# Patient Record
Sex: FEMALE | Race: BLACK OR AFRICAN AMERICAN | NOT HISPANIC OR LATINO | Employment: OTHER | URBAN - METROPOLITAN AREA
[De-identification: names, ages, dates, MRNs, and addresses within clinical notes are randomized per-mention and may not be internally consistent; named-entity substitution may affect disease eponyms.]

---

## 2017-12-05 ENCOUNTER — OFFICE VISIT (OUTPATIENT)
Dept: ENDOCRINOLOGY | Age: 52
End: 2017-12-05

## 2017-12-05 VITALS
SYSTOLIC BLOOD PRESSURE: 128 MMHG | RESPIRATION RATE: 16 BRPM | BODY MASS INDEX: 32.1 KG/M2 | WEIGHT: 188 LBS | DIASTOLIC BLOOD PRESSURE: 76 MMHG | HEIGHT: 64 IN

## 2017-12-05 DIAGNOSIS — R53.82 CHRONIC FATIGUE: ICD-10-CM

## 2017-12-05 DIAGNOSIS — R68.89 HEAT INTOLERANCE: ICD-10-CM

## 2017-12-05 DIAGNOSIS — R00.2 PALPITATIONS: ICD-10-CM

## 2017-12-05 DIAGNOSIS — E05.00 THYROTOXICOSIS WITH DIFFUSE GOITER AND WITHOUT THYROID STORM: Primary | ICD-10-CM

## 2017-12-05 DIAGNOSIS — R68.89 COLD INTOLERANCE: ICD-10-CM

## 2017-12-05 DIAGNOSIS — E55.9 VITAMIN D DEFICIENCY: ICD-10-CM

## 2017-12-05 DIAGNOSIS — R73.9 HYPERGLYCEMIA: ICD-10-CM

## 2017-12-05 DIAGNOSIS — E28.319 PREMATURE MENOPAUSE: ICD-10-CM

## 2017-12-05 DIAGNOSIS — G47.9 SLEEP DISTURBANCE: ICD-10-CM

## 2017-12-05 PROCEDURE — 99205 OFFICE O/P NEW HI 60 MIN: CPT | Performed by: INTERNAL MEDICINE

## 2017-12-05 RX ORDER — HYDROCODONE BITARTRATE AND ACETAMINOPHEN 7.5; 325 MG/1; MG/1
TABLET ORAL
Refills: 0 | COMMUNITY
Start: 2017-11-03 | End: 2021-07-14 | Stop reason: SDUPTHER

## 2017-12-05 RX ORDER — SUCRALFATE 1 G/1
1 TABLET ORAL 4 TIMES DAILY
COMMUNITY
End: 2021-06-29

## 2017-12-05 RX ORDER — PROPRANOLOL HYDROCHLORIDE 40 MG/1
TABLET ORAL
Refills: 1 | COMMUNITY
Start: 2017-11-03

## 2017-12-05 RX ORDER — POLYETHYLENE GLYCOL 3350 17 G/17G
17 POWDER, FOR SOLUTION ORAL DAILY
COMMUNITY
End: 2021-07-14

## 2017-12-05 RX ORDER — METHIMAZOLE 5 MG/1
TABLET ORAL
Refills: 3 | COMMUNITY
Start: 2017-10-14 | End: 2021-06-29

## 2017-12-05 RX ORDER — LISINOPRIL 10 MG/1
TABLET ORAL
Refills: 1 | COMMUNITY
Start: 2017-10-25 | End: 2021-06-29 | Stop reason: SDUPTHER

## 2017-12-05 RX ORDER — SUMATRIPTAN 50 MG/1
50 TABLET, FILM COATED ORAL
COMMUNITY
End: 2021-06-29

## 2017-12-05 RX ORDER — OMEPRAZOLE 40 MG/1
CAPSULE, DELAYED RELEASE ORAL
Refills: 3 | COMMUNITY
Start: 2017-09-22 | End: 2021-09-01

## 2017-12-05 RX ORDER — ONDANSETRON 4 MG/1
4 TABLET, FILM COATED ORAL EVERY 8 HOURS PRN
COMMUNITY
End: 2021-09-01

## 2017-12-05 RX ORDER — SPIRONOLACTONE 50 MG/1
TABLET, FILM COATED ORAL
Refills: 0 | COMMUNITY
Start: 2017-10-25

## 2017-12-05 RX ORDER — CONJUGATED ESTROGENS/BAZEDOXIFENE .45; 2 MG/1; MG/1
TABLET, FILM COATED ORAL
Qty: 30 TABLET | Refills: 5 | Status: SHIPPED | OUTPATIENT
Start: 2017-12-05 | End: 2021-06-29

## 2017-12-05 RX ORDER — MAGNESIUM OXIDE 400 MG/1
TABLET ORAL
Refills: 1 | COMMUNITY
Start: 2017-11-03

## 2017-12-05 RX ORDER — AZATHIOPRINE 50 MG/1
50 TABLET ORAL
Refills: 5 | COMMUNITY
Start: 2017-11-02 | End: 2021-09-01 | Stop reason: SDUPTHER

## 2017-12-05 RX ORDER — FUROSEMIDE 40 MG/1
40 TABLET ORAL 2 TIMES DAILY
COMMUNITY

## 2017-12-05 RX ORDER — APIXABAN 5 MG/1
TABLET, FILM COATED ORAL
Refills: 5 | COMMUNITY
Start: 2017-09-22 | End: 2021-06-21

## 2017-12-05 RX ORDER — PROMETHAZINE HYDROCHLORIDE 25 MG/1
25 TABLET ORAL EVERY 6 HOURS PRN
COMMUNITY
End: 2022-09-30

## 2017-12-05 NOTE — PROGRESS NOTES
"Subjective   Anitha Mcclure is a 52 y.o. female seen as a new patient for hyperthyroidism. She is having fatigue, trouble sleeping, changes in her voice, intolerance to cold and heat, headaches.     History of Present Illness this is a 52-year-old female who is being referred for further evaluation and treatment of recently diagnosed thyrotoxicosis.  She stated that area and in the year because of weighing 2 much she started to diet and when she reached her goal she continued to lose weight and at the same time she started having palpitation and irregular heart rate and because of that she was hospitalized at [facility] where along with other tests she also had a radioactive iodine uptake and scan and the diagnosis of Graves thyrotoxicosis was made and she was placed on Tapazole 5 mg 3 times daily which has made her feel better.  However she wants a more permanent resolution of this problem.  Additionally she also suffering from rheumatoid arthritis and lupus erythematosus.  Also she says at age 36 she stopped having menstrual cycles and despite the symptoms of hot flashes and vaginal dryness she was never treated with hormone replacement therapy.  She feels very tired and run down and also is being bothered by puffy face and puffy eyelids and early-morning we are vision which corrects itself as the day goes on.    /76  Resp 16  Ht 162.6 cm (64\")  Wt 85.3 kg (188 lb)  BMI 32.27 kg/m2     Allergies   Allergen Reactions   • Aspirin    • Salicylic Acid        Current Outpatient Prescriptions:   •  azaTHIOprine (IMURAN) 50 MG tablet, TK 1 T PO BID, Disp: , Rfl: 5  •  calcium carbonate-vitamin d (CALTRATE 600+D) 600-400 MG-UNIT per tablet, Take 1 tablet by mouth Daily., Disp: , Rfl:   •  ELIQUIS 5 MG tablet tablet, TK 1 T PO BID, Disp: , Rfl: 5  •  furosemide (LASIX) 40 MG tablet, Take 40 mg by mouth 2 (Two) Times a Day., Disp: , Rfl:   •  HYDROcodone-acetaminophen (NORCO) 7.5-325 MG per tablet, " TK 1 T PO  Q 6 H PRN P, Disp: , Rfl: 0  •  lisinopril (PRINIVIL,ZESTRIL) 10 MG tablet, TK 1 T PO QD, Disp: , Rfl: 1  •  magnesium oxide (MAG-OX) 400 MG tablet, TK 1 T PO D, Disp: , Rfl: 1  •  MAGNESIUM-OXIDE 400 (241.3 Mg) MG tablet tablet, TK 1 T PO QD, Disp: , Rfl: 1  •  methIMAzole (TAPAZOLE) 5 MG tablet, TK 1 T PO  TID, Disp: , Rfl: 3  •  omeprazole (priLOSEC) 40 MG capsule, TK ONE C PO QD, Disp: , Rfl: 3  •  ondansetron (ZOFRAN) 4 MG tablet, Take 4 mg by mouth Every 8 (Eight) Hours As Needed for Nausea or Vomiting., Disp: , Rfl:   •  polyethylene glycol (MIRALAX) packet, Take 17 g by mouth Daily., Disp: , Rfl:   •  PROAIR  (90 Base) MCG/ACT inhaler, INHALE 2 PUFFS PO Q 6 H, Disp: , Rfl: 4  •  promethazine (PHENERGAN) 25 MG tablet, Take 25 mg by mouth Every 6 (Six) Hours As Needed for Nausea or Vomiting., Disp: , Rfl:   •  propranolol (INDERAL) 40 MG tablet, TK SS T PO TID, Disp: , Rfl: 1  •  spironolactone (ALDACTONE) 50 MG tablet, TK 1 T PO QD, Disp: , Rfl: 0  •  sucralfate (CARAFATE) 1 g tablet, Take 1 g by mouth 4 (Four) Times a Day., Disp: , Rfl:   •  SUMAtriptan (IMITREX) 50 MG tablet, Take 50 mg by mouth Every 2 (Two) Hours As Needed for Migraine. Take one tablet at onset of headache. May repeat dose one time in 2 hours if headache not relieved., Disp: , Rfl:       The following portions of the patient's history were reviewed and updated as appropriate: allergies, current medications, past family history, past medical history, past social history, past surgical history and problem list.    Review of Systems   Constitutional: Positive for fatigue.   HENT: Positive for voice change.    Eyes: Negative.    Respiratory: Negative.    Cardiovascular: Negative.    Gastrointestinal: Negative.    Endocrine: Positive for cold intolerance and heat intolerance.   Genitourinary: Negative.    Musculoskeletal: Negative.    Skin: Negative.    Allergic/Immunologic: Negative.    Neurological: Positive for headaches.    Hematological: Negative.    Psychiatric/Behavioral: Positive for sleep disturbance.       Objective   Physical Exam   Constitutional: She is oriented to person, place, and time. She appears well-developed and well-nourished. No distress.   HENT:   Head: Normocephalic and atraumatic.   Right Ear: External ear normal.   Left Ear: External ear normal.   Nose: Nose normal.   Mouth/Throat: Oropharynx is clear and moist. No oropharyngeal exudate.   Puffiness around both eyes.   Eyes: Conjunctivae and EOM are normal. Pupils are equal, round, and reactive to light. Right eye exhibits no discharge. Left eye exhibits no discharge. No scleral icterus.   Neck: Normal range of motion. Neck supple. No JVD present. No tracheal deviation present. Thyromegaly present.   Diffuse thyroid enlargement with meaty consistency.  The entire goiter moves freely with swallowing and its not associated with any tenderness or lymphadenopathy in the anterior or posterior cervical or supraclavicular area.   Cardiovascular: Normal rate, regular rhythm, normal heart sounds and intact distal pulses.  Exam reveals no gallop and no friction rub.    No murmur heard.  Pulmonary/Chest: Effort normal and breath sounds normal. No stridor. No respiratory distress. She has no wheezes. She has no rales. She exhibits no tenderness.   Abdominal: Soft. Bowel sounds are normal. She exhibits no distension and no mass. There is no tenderness. There is no rebound and no guarding. No hernia.   Musculoskeletal: Normal range of motion. She exhibits deformity. She exhibits no edema or tenderness.   Deformities in her fingers and hand consistent with rheumatoid arthritis.   Lymphadenopathy:     She has no cervical adenopathy.   Neurological: She is alert and oriented to person, place, and time. She has normal reflexes. She displays normal reflexes. No cranial nerve deficit. She exhibits normal muscle tone. Coordination normal.   Skin: Skin is warm and dry. No rash  noted. She is not diaphoretic. No erythema. No pallor.   Psychiatric: She has a normal mood and affect. Her behavior is normal. Judgment and thought content normal.   Nursing note and vitals reviewed.        Assessment/Plan   Diagnoses and all orders for this visit:    Thyrotoxicosis with diffuse goiter and without thyroid storm  -     T3, Free  -     T4 & TSH ([facility])  -     T4, Free  -     Thyroglobulin With Anti-TG  -     Uric Acid  -     Vitamin D 25 Hydroxy  -     Thyroid Stimulating Immunoglobulin  -     ACTH  -     Cortisol  -     Comprehensive Metabolic Panel  -     Follicle Stimulating Hormone  -     Hemoglobin A1c  -     C-Peptide  -     Lipid Panel  -     Luteinizing Hormone  -     Prolactin  -     Magnesium    Chronic fatigue  -     T3, Free  -     T4 & TSH ([facility])  -     T4, Free  -     Thyroglobulin With Anti-TG  -     Uric Acid  -     Vitamin D 25 Hydroxy  -     Thyroid Stimulating Immunoglobulin  -     ACTH  -     Cortisol  -     Comprehensive Metabolic Panel  -     Follicle Stimulating Hormone  -     Hemoglobin A1c  -     C-Peptide  -     Lipid Panel  -     Luteinizing Hormone  -     Prolactin  -     Magnesium    Palpitations  -     T3, Free  -     T4 & TSH ([facility])  -     T4, Free  -     Thyroglobulin With Anti-TG  -     Uric Acid  -     Vitamin D 25 Hydroxy  -     Thyroid Stimulating Immunoglobulin  -     ACTH  -     Cortisol  -     Comprehensive Metabolic Panel  -     Follicle Stimulating Hormone  -     Hemoglobin A1c  -     C-Peptide  -     Lipid Panel  -     Luteinizing Hormone  -     Prolactin  -     Magnesium    Heat intolerance  -     T3, Free  -     T4 & TSH ([facility])  -     T4, Free  -     Thyroglobulin With Anti-TG  -     Uric Acid  -     Vitamin D 25 Hydroxy  -     Thyroid Stimulating Immunoglobulin  -     ACTH  -     Cortisol  -     Comprehensive Metabolic Panel  -     Follicle Stimulating Hormone  -     Hemoglobin A1c  -     C-Peptide  -     Lipid Panel  -     Luteinizing  Hormone  -     Prolactin  -     Magnesium    Cold intolerance  -     T3, Free  -     T4 & TSH ([facility])  -     T4, Free  -     Thyroglobulin With Anti-TG  -     Uric Acid  -     Vitamin D 25 Hydroxy  -     Thyroid Stimulating Immunoglobulin  -     ACTH  -     Cortisol  -     Comprehensive Metabolic Panel  -     Follicle Stimulating Hormone  -     Hemoglobin A1c  -     C-Peptide  -     Lipid Panel  -     Luteinizing Hormone  -     Prolactin  -     Magnesium    Sleep disturbance  -     T3, Free  -     T4 & TSH ([facility])  -     T4, Free  -     Thyroglobulin With Anti-TG  -     Uric Acid  -     Vitamin D 25 Hydroxy  -     Thyroid Stimulating Immunoglobulin  -     ACTH  -     Cortisol  -     Comprehensive Metabolic Panel  -     Follicle Stimulating Hormone  -     Hemoglobin A1c  -     C-Peptide  -     Lipid Panel  -     Luteinizing Hormone  -     Prolactin  -     Magnesium    Vitamin D deficiency  -     T3, Free  -     T4 & TSH ([facility])  -     T4, Free  -     Thyroglobulin With Anti-TG  -     Uric Acid  -     Vitamin D 25 Hydroxy  -     Thyroid Stimulating Immunoglobulin  -     ACTH  -     Cortisol  -     Comprehensive Metabolic Panel  -     Follicle Stimulating Hormone  -     Hemoglobin A1c  -     C-Peptide  -     Lipid Panel  -     Luteinizing Hormone  -     Prolactin  -     Magnesium    Premature menopause  -     T3, Free  -     T4 & TSH ([facility])  -     T4, Free  -     Thyroglobulin With Anti-TG  -     Uric Acid  -     Vitamin D 25 Hydroxy  -     Thyroid Stimulating Immunoglobulin  -     ACTH  -     Cortisol  -     Comprehensive Metabolic Panel  -     Follicle Stimulating Hormone  -     Hemoglobin A1c  -     C-Peptide  -     Lipid Panel  -     Luteinizing Hormone  -     Prolactin  -     Magnesium    Hyperglycemia  -     T3, Free  -     T4 & TSH ([facility])  -     T4, Free  -     Thyroglobulin With Anti-TG  -     Uric Acid  -     Vitamin D 25 Hydroxy  -     Thyroid Stimulating Immunoglobulin  -     ACTH  -      Cortisol  -     Comprehensive Metabolic Panel  -     Follicle Stimulating Hormone  -     Hemoglobin A1c  -     C-Peptide  -     Lipid Panel  -     Luteinizing Hormone  -     Prolactin  -     Magnesium    Other orders  -     DUAVEE 0.45-20 MG tablet; Take 1 tablet by mouth daily.             In summary I saw and examined this 52-year-old female for above-mentioned problems.  I reviewed her laboratory evaluation and office notes from her primary care provider's office.  However at this point we do not have her report of radioactive iodine uptake and scan which we are trying to get by calling the hospital.  In the meantime I am going to go ahead and order an extensive laboratory evaluation and once the results come back we will go ahead and call for any possible modification or new medications.  Also explained to her the nature of Graves' disease and thyrotoxicosis and the therapeutic at approaches to a it being the thyroidectomy or staying on Tapazole or going ahead with radioactive iodine therapy.  I provided her with literature and she seemed to the interested in either staying on her medication which she said she is taking Tapazole 5 mg twice a day instead of what is recorded at 3 times a day or going ahead with radioactive iodine therapy I also provided her literature to make her decision making easier.  I also gave her 2 weeks sample of Duavee for her menopausal symptoms and a prescription was sent to her pharmacy.  This office visit including patient counseling and education which was 50% of the time lasted 60 minutes.  She will see Ms. rand Rehman in 3 months or sooner if needed with laboratory evaluation prior to each office visit.

## 2017-12-08 ENCOUNTER — CONVERSION ENCOUNTER (OUTPATIENT)
Dept: MAMMOGRAPHY | Facility: HOSPITAL | Age: 52
End: 2017-12-08

## 2017-12-15 LAB
25(OH)D3+25(OH)D2 SERPL-MCNC: 22.6 NG/ML (ref 30–100)
ACTH PLAS-MCNC: 10.1 PG/ML (ref 7.2–63.3)
ALBUMIN SERPL-MCNC: 3.6 G/DL (ref 3.5–5.2)
ALBUMIN/GLOB SERPL: 1.2 G/DL
ALP SERPL-CCNC: 113 U/L (ref 39–117)
ALT SERPL-CCNC: 5 U/L (ref 1–33)
AST SERPL-CCNC: 9 U/L (ref 1–32)
BILIRUB SERPL-MCNC: 0.6 MG/DL (ref 0.1–1.2)
BUN SERPL-MCNC: 15 MG/DL (ref 6–20)
BUN/CREAT SERPL: 19.7 (ref 7–25)
C PEPTIDE SERPL-MCNC: 4.3 NG/ML (ref 1.1–4.4)
CALCIUM SERPL-MCNC: 9.2 MG/DL (ref 8.6–10.5)
CHLORIDE SERPL-SCNC: 111 MMOL/L (ref 98–107)
CHOLEST SERPL-MCNC: 92 MG/DL (ref 0–200)
CO2 SERPL-SCNC: 25.9 MMOL/L (ref 22–29)
CORTIS SERPL-MCNC: 4.2 UG/DL
CREAT SERPL-MCNC: 0.76 MG/DL (ref 0.57–1)
FSH SERPL-ACNC: 51.6 MIU/ML
GFR SERPLBLD CREATININE-BSD FMLA CKD-EPI: 79 ML/MIN/1.73
GFR SERPLBLD CREATININE-BSD FMLA CKD-EPI: 96 ML/MIN/1.73
GLOBULIN SER CALC-MCNC: 3.1 GM/DL
GLUCOSE SERPL-MCNC: 89 MG/DL (ref 65–99)
HBA1C MFR BLD: 5.1 % (ref 4.8–5.6)
HDLC SERPL-MCNC: 43 MG/DL (ref 40–60)
INTERPRETATION: NORMAL
LDLC SERPL CALC-MCNC: 32 MG/DL (ref 0–100)
LH SERPL-ACNC: 37.1 MIU/ML
MAGNESIUM SERPL-MCNC: 1.7 MG/DL (ref 1.6–2.6)
POTASSIUM SERPL-SCNC: 5.4 MMOL/L (ref 3.5–5.2)
PROLACTIN SERPL-MCNC: 17.8 NG/ML (ref 4.8–23.3)
PROT SERPL-MCNC: 6.7 G/DL (ref 6–8.5)
SODIUM SERPL-SCNC: 146 MMOL/L (ref 136–145)
T3FREE SERPL-MCNC: 3.8 PG/ML (ref 2–4.4)
T4 FREE SERPL-MCNC: 1.21 NG/DL (ref 0.93–1.7)
T4 SERPL-MCNC: 6.03 MCG/DL (ref 4.5–11.7)
THYROGLOB AB SERPL-ACNC: 2.1 IU/ML (ref 0–0.9)
THYROGLOB SERPL-MCNC: 342 NG/ML
TRIGL SERPL-MCNC: 86 MG/DL (ref 0–150)
TSH SERPL DL<=0.005 MIU/L-ACNC: <0.005 MIU/ML (ref 0.27–4.2)
TSI ACT/NOR SER: 442 % (ref 0–139)
URATE SERPL-MCNC: 6.9 MG/DL (ref 2.4–5.7)
VLDLC SERPL CALC-MCNC: 17.2 MG/DL (ref 5–40)

## 2017-12-15 RX ORDER — ERGOCALCIFEROL 1.25 MG/1
50000 CAPSULE ORAL 2 TIMES WEEKLY
Qty: 26 CAPSULE | Refills: 3 | Status: SHIPPED | OUTPATIENT
Start: 2017-12-18 | End: 2018-12-18

## 2018-01-05 ENCOUNTER — OFFICE VISIT CONVERTED (OUTPATIENT)
Dept: INTERNAL MEDICINE | Facility: CLINIC | Age: 53
End: 2018-01-05
Attending: INTERNAL MEDICINE

## 2018-01-05 ENCOUNTER — CONVERSION ENCOUNTER (OUTPATIENT)
Dept: INTERNAL MEDICINE | Facility: CLINIC | Age: 53
End: 2018-01-05

## 2018-02-28 ENCOUNTER — OFFICE VISIT CONVERTED (OUTPATIENT)
Dept: GASTROENTEROLOGY | Facility: CLINIC | Age: 53
End: 2018-02-28
Attending: NURSE PRACTITIONER

## 2018-03-27 ENCOUNTER — OFFICE VISIT CONVERTED (OUTPATIENT)
Dept: PULMONOLOGY | Facility: CLINIC | Age: 53
End: 2018-03-27
Attending: PHYSICIAN ASSISTANT

## 2018-03-27 ENCOUNTER — OFFICE VISIT CONVERTED (OUTPATIENT)
Dept: INTERNAL MEDICINE | Facility: CLINIC | Age: 53
End: 2018-03-27
Attending: INTERNAL MEDICINE

## 2018-04-10 ENCOUNTER — OFFICE VISIT CONVERTED (OUTPATIENT)
Dept: INTERNAL MEDICINE | Facility: CLINIC | Age: 53
End: 2018-04-10
Attending: INTERNAL MEDICINE

## 2018-04-24 ENCOUNTER — OFFICE VISIT CONVERTED (OUTPATIENT)
Dept: INTERNAL MEDICINE | Facility: CLINIC | Age: 53
End: 2018-04-24
Attending: PHYSICIAN ASSISTANT

## 2018-04-24 ENCOUNTER — CONVERSION ENCOUNTER (OUTPATIENT)
Dept: OTHER | Facility: HOSPITAL | Age: 53
End: 2018-04-24

## 2018-05-01 ENCOUNTER — OFFICE VISIT CONVERTED (OUTPATIENT)
Dept: CARDIOLOGY | Facility: CLINIC | Age: 53
End: 2018-05-01
Attending: INTERNAL MEDICINE

## 2018-06-05 ENCOUNTER — OFFICE VISIT CONVERTED (OUTPATIENT)
Dept: PULMONOLOGY | Facility: CLINIC | Age: 53
End: 2018-06-05
Attending: INTERNAL MEDICINE

## 2018-06-14 ENCOUNTER — CONVERSION ENCOUNTER (OUTPATIENT)
Dept: INTERNAL MEDICINE | Facility: CLINIC | Age: 53
End: 2018-06-14

## 2018-06-14 ENCOUNTER — OFFICE VISIT CONVERTED (OUTPATIENT)
Dept: INTERNAL MEDICINE | Facility: CLINIC | Age: 53
End: 2018-06-14
Attending: INTERNAL MEDICINE

## 2018-07-05 ENCOUNTER — OFFICE VISIT CONVERTED (OUTPATIENT)
Dept: GASTROENTEROLOGY | Facility: CLINIC | Age: 53
End: 2018-07-05
Attending: NURSE PRACTITIONER

## 2018-08-07 ENCOUNTER — OFFICE VISIT CONVERTED (OUTPATIENT)
Dept: INTERNAL MEDICINE | Facility: CLINIC | Age: 53
End: 2018-08-07
Attending: PHYSICIAN ASSISTANT

## 2018-08-09 ENCOUNTER — OFFICE VISIT CONVERTED (OUTPATIENT)
Dept: INTERNAL MEDICINE | Facility: CLINIC | Age: 53
End: 2018-08-09
Attending: NURSE PRACTITIONER

## 2018-09-14 ENCOUNTER — OFFICE VISIT CONVERTED (OUTPATIENT)
Dept: INTERNAL MEDICINE | Facility: CLINIC | Age: 53
End: 2018-09-14
Attending: INTERNAL MEDICINE

## 2018-09-25 ENCOUNTER — CONVERSION ENCOUNTER (OUTPATIENT)
Dept: MAMMOGRAPHY | Facility: HOSPITAL | Age: 53
End: 2018-09-25

## 2018-10-30 ENCOUNTER — OFFICE VISIT CONVERTED (OUTPATIENT)
Dept: INTERNAL MEDICINE | Facility: CLINIC | Age: 53
End: 2018-10-30
Attending: INTERNAL MEDICINE

## 2019-01-30 ENCOUNTER — OFFICE VISIT CONVERTED (OUTPATIENT)
Dept: INTERNAL MEDICINE | Facility: CLINIC | Age: 54
End: 2019-01-30
Attending: PHYSICIAN ASSISTANT

## 2019-01-30 ENCOUNTER — HOSPITAL ENCOUNTER (OUTPATIENT)
Dept: OTHER | Facility: HOSPITAL | Age: 54
Discharge: HOME | End: 2019-01-30
Attending: PHYSICIAN ASSISTANT

## 2019-02-08 ENCOUNTER — HOSPITAL ENCOUNTER (OUTPATIENT)
Dept: OTHER | Facility: HOSPITAL | Age: 54
Discharge: HOME | End: 2019-02-08
Attending: PHYSICIAN ASSISTANT

## 2019-02-08 ENCOUNTER — OFFICE VISIT CONVERTED (OUTPATIENT)
Dept: INTERNAL MEDICINE | Facility: CLINIC | Age: 54
End: 2019-02-08
Attending: PHYSICIAN ASSISTANT

## 2019-02-08 LAB
ALBUMIN SERPL-MCNC: 3.8 G/DL (ref 3.5–5)
ALBUMIN/GLOB SERPL: 1.3 {RATIO} (ref 1.4–2.6)
ALP SERPL-CCNC: 131 U/L (ref 53–141)
ALT SERPL-CCNC: 10 U/L (ref 10–40)
ANION GAP SERPL CALC-SCNC: 16 MMOL/L (ref 8–19)
AST SERPL-CCNC: 24 U/L (ref 15–50)
BASOPHILS # BLD AUTO: 0 10*3/UL (ref 0–0.2)
BASOPHILS NFR BLD AUTO: 0 % (ref 0–3)
BILIRUB SERPL-MCNC: 0.66 MG/DL (ref 0.2–1.3)
BUN SERPL-MCNC: 23 MG/DL (ref 5–25)
BUN/CREAT SERPL: 30 {RATIO} (ref 6–20)
CALCIUM SERPL-MCNC: 9.1 MG/DL (ref 8.7–10.4)
CHLORIDE SERPL-SCNC: 108 MMOL/L (ref 99–111)
CONV CO2: 24 MMOL/L (ref 22–32)
CONV TOTAL PROTEIN: 6.7 G/DL (ref 6.3–8.2)
CREAT UR-MCNC: 0.77 MG/DL (ref 0.5–0.9)
EOSINOPHIL # BLD AUTO: 0.06 10*3/UL (ref 0–0.7)
EOSINOPHIL # BLD AUTO: 2.29 % (ref 0–7)
ERYTHROCYTE [DISTWIDTH] IN BLOOD BY AUTOMATED COUNT: 12.6 % (ref 11.5–14.5)
GFR SERPLBLD BASED ON 1.73 SQ M-ARVRAT: >60 ML/MIN/{1.73_M2}
GLOBULIN UR ELPH-MCNC: 2.9 G/DL (ref 2–3.5)
GLUCOSE SERPL-MCNC: 103 MG/DL (ref 65–99)
HBA1C MFR BLD: 11.1 G/DL (ref 12–16)
HCT VFR BLD AUTO: 34.4 % (ref 37–47)
LYMPHOCYTES # BLD AUTO: 0.94 10*3/UL (ref 1–5)
MCH RBC QN AUTO: 30.1 PG (ref 27–31)
MCHC RBC AUTO-ENTMCNC: 32.2 G/DL (ref 33–37)
MCV RBC AUTO: 93.5 FL (ref 81–99)
MONOCYTES # BLD AUTO: 0.59 10*3/UL (ref 0.2–1.2)
MONOCYTES NFR BLD AUTO: 23.2 % (ref 3–10)
NEUTROPHILS # BLD AUTO: 0.96 10*3/UL (ref 2–8)
NEUTROPHILS NFR BLD AUTO: 37.7 % (ref 30–85)
NRBC BLD AUTO-RTO: 0 % (ref 0–0.01)
OSMOLALITY SERPL CALC.SUM OF ELEC: 300 MOSM/KG (ref 273–304)
PLATELET # BLD AUTO: 121 10*3/UL (ref 130–400)
PMV BLD AUTO: 11 FL (ref 7.4–10.4)
POTASSIUM SERPL-SCNC: 4.7 MMOL/L (ref 3.5–5.3)
RBC # BLD AUTO: 3.69 10*6/UL (ref 4.2–5.4)
SODIUM SERPL-SCNC: 143 MMOL/L (ref 135–147)
T4 FREE SERPL-MCNC: 6.7 NG/DL (ref 0.9–1.8)
TSH SERPL-ACNC: <0.005 M[IU]/L (ref 0.27–4.2)
VARIANT LYMPHS NFR BLD MANUAL: 36.7 % (ref 20–45)
WBC # BLD AUTO: 2.55 10*3/UL (ref 4.8–10.8)

## 2019-02-11 ENCOUNTER — HOSPITAL ENCOUNTER (OUTPATIENT)
Dept: OTHER | Facility: HOSPITAL | Age: 54
Discharge: HOME | End: 2019-02-11
Attending: PHYSICIAN ASSISTANT

## 2019-02-13 LAB — BACTERIA SPEC AEROBE CULT: NORMAL

## 2019-02-26 ENCOUNTER — HOSPITAL ENCOUNTER (OUTPATIENT)
Dept: CARDIOLOGY | Facility: HOSPITAL | Age: 54
Discharge: HOME | End: 2019-02-26
Attending: INTERNAL MEDICINE

## 2019-06-11 ENCOUNTER — OFFICE VISIT CONVERTED (OUTPATIENT)
Dept: PULMONOLOGY | Facility: CLINIC | Age: 54
End: 2019-06-11
Attending: INTERNAL MEDICINE

## 2019-06-17 ENCOUNTER — CONVERSION ENCOUNTER (OUTPATIENT)
Dept: INTERNAL MEDICINE | Facility: CLINIC | Age: 54
End: 2019-06-17

## 2019-06-17 ENCOUNTER — OFFICE VISIT CONVERTED (OUTPATIENT)
Dept: INTERNAL MEDICINE | Facility: CLINIC | Age: 54
End: 2019-06-17
Attending: INTERNAL MEDICINE

## 2019-06-18 ENCOUNTER — HOSPITAL ENCOUNTER (OUTPATIENT)
Dept: OTHER | Facility: HOSPITAL | Age: 54
Discharge: HOME | End: 2019-06-18
Attending: INTERNAL MEDICINE

## 2019-06-18 LAB
ALBUMIN SERPL-MCNC: 3.8 G/DL (ref 3.5–5)
ALBUMIN/GLOB SERPL: 1.3 {RATIO} (ref 1.4–2.6)
ALP SERPL-CCNC: 116 U/L (ref 53–141)
ALT SERPL-CCNC: 20 U/L (ref 10–40)
ANION GAP SERPL CALC-SCNC: 17 MMOL/L (ref 8–19)
AST SERPL-CCNC: 33 U/L (ref 15–50)
BASOPHILS # BLD AUTO: 0.02 10*3/UL (ref 0–0.2)
BASOPHILS NFR BLD AUTO: 0.5 % (ref 0–3)
BILIRUB SERPL-MCNC: 1.74 MG/DL (ref 0.2–1.3)
BUN SERPL-MCNC: 60 MG/DL (ref 5–25)
BUN/CREAT SERPL: 40 {RATIO} (ref 6–20)
CALCIUM SERPL-MCNC: 9.7 MG/DL (ref 8.7–10.4)
CHLORIDE SERPL-SCNC: 108 MMOL/L (ref 99–111)
CHOLEST SERPL-MCNC: 67 MG/DL (ref 107–200)
CHOLEST/HDLC SERPL: 2.2 {RATIO} (ref 3–6)
CK SERPL-CCNC: 40 U/L (ref 35–230)
CONV ABS IMM GRAN: 0 10*3/UL (ref 0–0.2)
CONV CO2: 22 MMOL/L (ref 22–32)
CONV IMMATURE GRAN: 0 % (ref 0–1.8)
CONV TOTAL PROTEIN: 6.8 G/DL (ref 6.3–8.2)
CREAT UR-MCNC: 1.5 MG/DL (ref 0.5–0.9)
DEPRECATED RDW RBC AUTO: 50.8 FL (ref 36.4–46.3)
EOSINOPHIL # BLD AUTO: 0.05 10*3/UL (ref 0–0.7)
EOSINOPHIL # BLD AUTO: 1.2 % (ref 0–7)
ERYTHROCYTE [DISTWIDTH] IN BLOOD BY AUTOMATED COUNT: 14.4 % (ref 11.7–14.4)
ERYTHROCYTE [SEDIMENTATION RATE] IN BLOOD: 6 MM/H (ref 0–30)
GFR SERPLBLD BASED ON 1.73 SQ M-ARVRAT: 45 ML/MIN/{1.73_M2}
GLOBULIN UR ELPH-MCNC: 3 G/DL (ref 2–3.5)
GLUCOSE SERPL-MCNC: 116 MG/DL (ref 65–99)
HBA1C MFR BLD: 10 G/DL (ref 12–16)
HCT VFR BLD AUTO: 32.2 % (ref 37–47)
HDLC SERPL-MCNC: 31 MG/DL (ref 40–60)
LDLC SERPL CALC-MCNC: 19 MG/DL (ref 70–100)
LYMPHOCYTES # BLD AUTO: 1.2 10*3/UL (ref 1–5)
MCH RBC QN AUTO: 29.9 PG (ref 27–31)
MCHC RBC AUTO-ENTMCNC: 31.1 G/DL (ref 33–37)
MCV RBC AUTO: 96.4 FL (ref 81–99)
MONOCYTES # BLD AUTO: 1.11 10*3/UL (ref 0.2–1.2)
MONOCYTES NFR BLD AUTO: 25.7 % (ref 3–10)
NEUTROPHILS # BLD AUTO: 1.94 10*3/UL (ref 2–8)
NEUTROPHILS NFR BLD AUTO: 44.8 % (ref 30–85)
NRBC CBCN: 0.5 % (ref 0–0.7)
OSMOLALITY SERPL CALC.SUM OF ELEC: 312 MOSM/KG (ref 273–304)
PLATELET # BLD AUTO: 155 10*3/UL (ref 130–400)
PMV BLD AUTO: 12.7 FL (ref 9.4–12.3)
POTASSIUM SERPL-SCNC: 5.2 MMOL/L (ref 3.5–5.3)
RBC # BLD AUTO: 3.34 10*6/UL (ref 4.2–5.4)
SODIUM SERPL-SCNC: 142 MMOL/L (ref 135–147)
T4 FREE SERPL-MCNC: >7.8 NG/DL (ref 0.9–1.8)
TRIGL SERPL-MCNC: 86 MG/DL (ref 40–150)
TSH SERPL-ACNC: <0.005 M[IU]/L (ref 0.27–4.2)
VARIANT LYMPHS NFR BLD MANUAL: 27.8 % (ref 20–45)
VLDLC SERPL-MCNC: 17 MG/DL (ref 5–37)
WBC # BLD AUTO: 4.32 10*3/UL (ref 4.8–10.8)

## 2019-08-05 ENCOUNTER — HOSPITAL ENCOUNTER (OUTPATIENT)
Dept: OTHER | Facility: HOSPITAL | Age: 54
Discharge: HOME | End: 2019-08-05
Attending: INTERNAL MEDICINE

## 2019-08-05 ENCOUNTER — OFFICE VISIT CONVERTED (OUTPATIENT)
Dept: INTERNAL MEDICINE | Facility: CLINIC | Age: 54
End: 2019-08-05
Attending: PHYSICIAN ASSISTANT

## 2019-08-05 ENCOUNTER — HOSPITAL ENCOUNTER (OUTPATIENT)
Dept: OTHER | Facility: HOSPITAL | Age: 54
Discharge: HOME | End: 2019-08-05
Attending: PHYSICIAN ASSISTANT

## 2019-08-05 LAB
ALBUMIN SERPL-MCNC: 3.5 G/DL (ref 3.5–5)
ALBUMIN/GLOB SERPL: 1.3 {RATIO} (ref 1.4–2.6)
ALP SERPL-CCNC: 169 U/L (ref 53–141)
ALT SERPL-CCNC: 16 U/L (ref 10–40)
ANION GAP SERPL CALC-SCNC: 18 MMOL/L (ref 8–19)
AST SERPL-CCNC: 36 U/L (ref 15–50)
BILIRUB SERPL-MCNC: 1.25 MG/DL (ref 0.2–1.3)
BUN SERPL-MCNC: 36 MG/DL (ref 5–25)
BUN/CREAT SERPL: 42 {RATIO} (ref 6–20)
CALCIUM SERPL-MCNC: 9.5 MG/DL (ref 8.7–10.4)
CHLORIDE SERPL-SCNC: 109 MMOL/L (ref 99–111)
CONV CO2: 19 MMOL/L (ref 22–32)
CONV TOTAL PROTEIN: 6.1 G/DL (ref 6.3–8.2)
CREAT UR-MCNC: 0.86 MG/DL (ref 0.5–0.9)
FERRITIN SERPL-MCNC: 390 NG/ML (ref 10–200)
FOLATE SERPL-MCNC: 6.6 NG/ML (ref 4.8–20)
GFR SERPLBLD BASED ON 1.73 SQ M-ARVRAT: >60 ML/MIN/{1.73_M2}
GLOBULIN UR ELPH-MCNC: 2.6 G/DL (ref 2–3.5)
GLUCOSE SERPL-MCNC: 92 MG/DL (ref 65–99)
IRON SATN MFR SERPL: 46 % (ref 20–55)
IRON SERPL-MCNC: 63 UG/DL (ref 60–170)
OSMOLALITY SERPL CALC.SUM OF ELEC: 300 MOSM/KG (ref 273–304)
POTASSIUM SERPL-SCNC: 4.6 MMOL/L (ref 3.5–5.3)
RBC # BLD AUTO: 3 10*6/UL (ref 4.2–5.4)
RETICS # AUTO: 0.03 10*6/UL (ref 0.02–0.08)
RETICS/RBC NFR AUTO: 1.09 % (ref 0.5–1.7)
SODIUM SERPL-SCNC: 141 MMOL/L (ref 135–147)
T4 FREE SERPL-MCNC: >7.8 NG/DL (ref 0.9–1.8)
TIBC SERPL-MCNC: 137 UG/DL (ref 245–450)
TRANSFERRIN SERPL-MCNC: 96 MG/DL (ref 250–380)
TSH SERPL-ACNC: <0.005 M[IU]/L (ref 0.27–4.2)
VIT B12 SERPL-MCNC: 598 PG/ML (ref 211–911)
WBC # BLD AUTO: 3.62 10*3/UL (ref 4.8–10.8)

## 2019-08-12 ENCOUNTER — OFFICE VISIT CONVERTED (OUTPATIENT)
Dept: INTERNAL MEDICINE | Facility: CLINIC | Age: 54
End: 2019-08-12
Attending: INTERNAL MEDICINE

## 2019-08-12 ENCOUNTER — HOSPITAL ENCOUNTER (OUTPATIENT)
Dept: OTHER | Facility: HOSPITAL | Age: 54
Discharge: HOME | End: 2019-08-12
Attending: INTERNAL MEDICINE

## 2019-08-14 LAB — BACTERIA SPEC AEROBE CULT: NORMAL

## 2019-08-20 ENCOUNTER — OFFICE VISIT CONVERTED (OUTPATIENT)
Dept: INTERNAL MEDICINE | Facility: CLINIC | Age: 54
End: 2019-08-20
Attending: PHYSICIAN ASSISTANT

## 2019-08-26 ENCOUNTER — OFFICE VISIT CONVERTED (OUTPATIENT)
Dept: INTERNAL MEDICINE | Facility: CLINIC | Age: 54
End: 2019-08-26
Attending: INTERNAL MEDICINE

## 2019-09-19 ENCOUNTER — HOSPITAL ENCOUNTER (OUTPATIENT)
Dept: LAB | Facility: HOSPITAL | Age: 54
Discharge: HOME | End: 2019-09-19
Attending: INTERNAL MEDICINE

## 2019-09-19 LAB
APPEARANCE UR: CLEAR
BILIRUB UR QL: NEGATIVE
COLOR UR: YELLOW
CONV BACTERIA: NEGATIVE
CONV COLLECTION SOURCE (UA): ABNORMAL
CONV CREATININE URINE, RANDOM: 82.9 MG/DL (ref 10–300)
CONV UROBILINOGEN IN URINE BY AUTOMATED TEST STRIP: 1 {EHRLICHU}/DL (ref 0.1–1)
GLUCOSE UR QL: NEGATIVE MG/DL
HGB UR QL STRIP: NEGATIVE
KETONES UR QL STRIP: NEGATIVE MG/DL
LEUKOCYTE ESTERASE UR QL STRIP: ABNORMAL
NITRITE UR QL STRIP: NEGATIVE
PH UR STRIP.AUTO: 5 [PH] (ref 5–8)
PROT UR QL: NEGATIVE MG/DL
PROT UR-MCNC: 7.9 MG/DL
RBC #/AREA URNS HPF: ABNORMAL /[HPF]
SP GR UR: 1.02 (ref 1–1.03)
WBC #/AREA URNS HPF: ABNORMAL /[HPF]

## 2019-09-23 ENCOUNTER — HOSPITAL ENCOUNTER (OUTPATIENT)
Dept: OTHER | Facility: HOSPITAL | Age: 54
Discharge: HOME | End: 2019-09-23
Attending: PHYSICIAN ASSISTANT

## 2019-09-23 ENCOUNTER — OFFICE VISIT CONVERTED (OUTPATIENT)
Dept: INTERNAL MEDICINE | Facility: CLINIC | Age: 54
End: 2019-09-23
Attending: PHYSICIAN ASSISTANT

## 2019-09-25 LAB — BACTERIA SPEC AEROBE CULT: NORMAL

## 2019-10-10 ENCOUNTER — OFFICE VISIT CONVERTED (OUTPATIENT)
Dept: INTERNAL MEDICINE | Facility: CLINIC | Age: 54
End: 2019-10-10
Attending: NURSE PRACTITIONER

## 2019-10-10 ENCOUNTER — HOSPITAL ENCOUNTER (OUTPATIENT)
Dept: OTHER | Facility: HOSPITAL | Age: 54
Discharge: HOME | End: 2019-10-10
Attending: NURSE PRACTITIONER

## 2019-10-14 ENCOUNTER — HOSPITAL ENCOUNTER (OUTPATIENT)
Dept: MAMMOGRAPHY | Facility: HOSPITAL | Age: 54
Discharge: HOME | End: 2019-10-14
Attending: INTERNAL MEDICINE

## 2019-10-19 ENCOUNTER — HOSPITAL ENCOUNTER (OUTPATIENT)
Dept: URGENT CARE | Facility: CLINIC | Age: 54
Discharge: HOME | End: 2019-10-19

## 2019-10-29 ENCOUNTER — OFFICE VISIT CONVERTED (OUTPATIENT)
Dept: INTERNAL MEDICINE | Facility: CLINIC | Age: 54
End: 2019-10-29
Attending: INTERNAL MEDICINE

## 2019-11-26 ENCOUNTER — OFFICE VISIT CONVERTED (OUTPATIENT)
Dept: INTERNAL MEDICINE | Facility: CLINIC | Age: 54
End: 2019-11-26
Attending: INTERNAL MEDICINE

## 2019-11-26 ENCOUNTER — CONVERSION ENCOUNTER (OUTPATIENT)
Dept: INTERNAL MEDICINE | Facility: CLINIC | Age: 54
End: 2019-11-26

## 2020-01-14 ENCOUNTER — OFFICE VISIT CONVERTED (OUTPATIENT)
Dept: INTERNAL MEDICINE | Facility: CLINIC | Age: 55
End: 2020-01-14
Attending: PHYSICIAN ASSISTANT

## 2020-01-14 ENCOUNTER — HOSPITAL ENCOUNTER (OUTPATIENT)
Dept: OTHER | Facility: HOSPITAL | Age: 55
Discharge: HOME | End: 2020-01-14
Attending: PHYSICIAN ASSISTANT

## 2020-01-14 LAB
ALBUMIN SERPL-MCNC: 3 G/DL (ref 3.5–5)
ALBUMIN/GLOB SERPL: 0.9 {RATIO} (ref 1.4–2.6)
ALP SERPL-CCNC: 301 U/L (ref 53–141)
ALT SERPL-CCNC: 12 U/L (ref 10–40)
ANION GAP SERPL CALC-SCNC: 13 MMOL/L (ref 8–19)
AST SERPL-CCNC: 21 U/L (ref 15–50)
BASOPHILS # BLD AUTO: 0.01 10*3/UL (ref 0–0.2)
BASOPHILS NFR BLD AUTO: 0.2 % (ref 0–3)
BILIRUB SERPL-MCNC: 1.68 MG/DL (ref 0.2–1.3)
BUN SERPL-MCNC: 27 MG/DL (ref 5–25)
BUN/CREAT SERPL: 36 {RATIO} (ref 6–20)
CALCIUM SERPL-MCNC: 9.5 MG/DL (ref 8.7–10.4)
CHLORIDE SERPL-SCNC: 106 MMOL/L (ref 99–111)
CONV ABS IMM GRAN: 0.01 10*3/UL (ref 0–0.2)
CONV CO2: 27 MMOL/L (ref 22–32)
CONV IMMATURE GRAN: 0.2 % (ref 0–1.8)
CONV TOTAL PROTEIN: 6.4 G/DL (ref 6.3–8.2)
CREAT UR-MCNC: 0.74 MG/DL (ref 0.5–0.9)
DEPRECATED RDW RBC AUTO: 47.9 FL (ref 36.4–46.3)
EOSINOPHIL # BLD AUTO: 0.09 10*3/UL (ref 0–0.7)
EOSINOPHIL # BLD AUTO: 1.6 % (ref 0–7)
ERYTHROCYTE [DISTWIDTH] IN BLOOD BY AUTOMATED COUNT: 18.1 % (ref 11.7–14.4)
GFR SERPLBLD BASED ON 1.73 SQ M-ARVRAT: >60 ML/MIN/{1.73_M2}
GLOBULIN UR ELPH-MCNC: 3.4 G/DL (ref 2–3.5)
GLUCOSE SERPL-MCNC: 95 MG/DL (ref 65–99)
HCT VFR BLD AUTO: 23 % (ref 37–47)
HGB BLD-MCNC: 7.5 G/DL (ref 12–16)
LYMPHOCYTES # BLD AUTO: 2.66 10*3/UL (ref 1–5)
LYMPHOCYTES NFR BLD AUTO: 47.4 % (ref 20–45)
MCH RBC QN AUTO: 24.4 PG (ref 27–31)
MCHC RBC AUTO-ENTMCNC: 32.6 G/DL (ref 33–37)
MCV RBC AUTO: 74.9 FL (ref 81–99)
MONOCYTES # BLD AUTO: 0.89 10*3/UL (ref 0.2–1.2)
MONOCYTES NFR BLD AUTO: 15.9 % (ref 3–10)
NEUTROPHILS # BLD AUTO: 1.95 10*3/UL (ref 2–8)
NEUTROPHILS NFR BLD AUTO: 34.7 % (ref 30–85)
NRBC CBCN: 0.9 % (ref 0–0.7)
OSMOLALITY SERPL CALC.SUM OF ELEC: 299 MOSM/KG (ref 273–304)
PLATELET # BLD AUTO: 105 10*3/UL (ref 130–400)
PMV BLD AUTO: ABNORMAL FL (ref 9.4–12.3)
POTASSIUM SERPL-SCNC: 4.4 MMOL/L (ref 3.5–5.3)
RBC # BLD AUTO: 3.07 10*6/UL (ref 4.2–5.4)
SODIUM SERPL-SCNC: 142 MMOL/L (ref 135–147)
T4 FREE SERPL-MCNC: 6.2 NG/DL (ref 0.9–1.8)
TSH SERPL-ACNC: <0.005 M[IU]/L (ref 0.27–4.2)
WBC # BLD AUTO: 5.61 10*3/UL (ref 4.8–10.8)

## 2020-01-16 LAB — BACTERIA SPEC AEROBE CULT: NORMAL

## 2020-02-28 ENCOUNTER — OFFICE VISIT CONVERTED (OUTPATIENT)
Dept: INTERNAL MEDICINE | Facility: CLINIC | Age: 55
End: 2020-02-28
Attending: INTERNAL MEDICINE

## 2020-02-28 ENCOUNTER — CONVERSION ENCOUNTER (OUTPATIENT)
Dept: INTERNAL MEDICINE | Facility: CLINIC | Age: 55
End: 2020-02-28

## 2020-03-02 ENCOUNTER — OFFICE VISIT CONVERTED (OUTPATIENT)
Dept: PULMONOLOGY | Facility: CLINIC | Age: 55
End: 2020-03-02
Attending: NURSE PRACTITIONER

## 2020-03-20 ENCOUNTER — HOSPITAL ENCOUNTER (OUTPATIENT)
Dept: OTHER | Facility: HOSPITAL | Age: 55
Discharge: HOME | End: 2020-03-20
Attending: PHYSICIAN ASSISTANT

## 2020-03-20 ENCOUNTER — OFFICE VISIT CONVERTED (OUTPATIENT)
Dept: INTERNAL MEDICINE | Facility: CLINIC | Age: 55
End: 2020-03-20
Attending: PHYSICIAN ASSISTANT

## 2020-03-20 ENCOUNTER — CONVERSION ENCOUNTER (OUTPATIENT)
Dept: INTERNAL MEDICINE | Facility: CLINIC | Age: 55
End: 2020-03-20

## 2020-03-22 LAB — BACTERIA SPEC AEROBE CULT: NORMAL

## 2020-04-03 ENCOUNTER — CONVERSION ENCOUNTER (OUTPATIENT)
Dept: INTERNAL MEDICINE | Facility: CLINIC | Age: 55
End: 2020-04-03

## 2020-04-03 ENCOUNTER — OFFICE VISIT CONVERTED (OUTPATIENT)
Dept: INTERNAL MEDICINE | Facility: CLINIC | Age: 55
End: 2020-04-03
Attending: INTERNAL MEDICINE

## 2020-04-03 ENCOUNTER — HOSPITAL ENCOUNTER (OUTPATIENT)
Dept: OTHER | Facility: HOSPITAL | Age: 55
Discharge: HOME | End: 2020-04-03
Attending: OTOLARYNGOLOGY

## 2020-04-03 LAB
ALBUMIN SERPL-MCNC: 4.4 G/DL (ref 3.5–5)
ALBUMIN/GLOB SERPL: 1.2 {RATIO} (ref 1.4–2.6)
ALP SERPL-CCNC: 85 U/L (ref 53–141)
ALT SERPL-CCNC: <5 U/L (ref 10–40)
ANION GAP SERPL CALC-SCNC: 16 MMOL/L (ref 8–19)
AST SERPL-CCNC: 21 U/L (ref 15–50)
BASOPHILS # BLD AUTO: 0.04 10*3/UL (ref 0–0.2)
BASOPHILS NFR BLD AUTO: 0.9 % (ref 0–3)
BILIRUB SERPL-MCNC: 0.64 MG/DL (ref 0.2–1.3)
BUN SERPL-MCNC: 22 MG/DL (ref 5–25)
BUN/CREAT SERPL: 17 {RATIO} (ref 6–20)
CALCIUM SERPL-MCNC: 9.1 MG/DL (ref 8.7–10.4)
CHLORIDE SERPL-SCNC: 97 MMOL/L (ref 99–111)
CHOLEST SERPL-MCNC: 172 MG/DL (ref 107–200)
CHOLEST/HDLC SERPL: 2 {RATIO} (ref 3–6)
CONV ABS IMM GRAN: 0.04 10*3/UL (ref 0–0.2)
CONV CO2: 27 MMOL/L (ref 22–32)
CONV IMMATURE GRAN: 0.9 % (ref 0–1.8)
CONV TOTAL PROTEIN: 8.1 G/DL (ref 6.3–8.2)
CREAT UR-MCNC: 1.32 MG/DL (ref 0.5–0.9)
DEPRECATED RDW RBC AUTO: 77.4 FL (ref 36.4–46.3)
EOSINOPHIL # BLD AUTO: 0.19 10*3/UL (ref 0–0.7)
EOSINOPHIL # BLD AUTO: 4.1 % (ref 0–7)
ERYTHROCYTE [DISTWIDTH] IN BLOOD BY AUTOMATED COUNT: 22.5 % (ref 11.7–14.4)
GFR SERPLBLD BASED ON 1.73 SQ M-ARVRAT: 52 ML/MIN/{1.73_M2}
GLOBULIN UR ELPH-MCNC: 3.7 G/DL (ref 2–3.5)
GLUCOSE SERPL-MCNC: 91 MG/DL (ref 65–99)
HCT VFR BLD AUTO: 31.7 % (ref 37–47)
HDLC SERPL-MCNC: 85 MG/DL (ref 40–60)
HGB BLD-MCNC: 10 G/DL (ref 12–16)
LDLC SERPL CALC-MCNC: 72 MG/DL (ref 70–100)
LYMPHOCYTES # BLD AUTO: 1.31 10*3/UL (ref 1–5)
LYMPHOCYTES NFR BLD AUTO: 28.5 % (ref 20–45)
MCH RBC QN AUTO: 30.2 PG (ref 27–31)
MCHC RBC AUTO-ENTMCNC: 31.5 G/DL (ref 33–37)
MCV RBC AUTO: 95.8 FL (ref 81–99)
MONOCYTES # BLD AUTO: 0.22 10*3/UL (ref 0.2–1.2)
MONOCYTES NFR BLD AUTO: 4.8 % (ref 3–10)
NEUTROPHILS # BLD AUTO: 2.8 10*3/UL (ref 2–8)
NEUTROPHILS NFR BLD AUTO: 60.8 % (ref 30–85)
NRBC CBCN: 0.4 % (ref 0–0.7)
OSMOLALITY SERPL CALC.SUM OF ELEC: 285 MOSM/KG (ref 273–304)
PLATELET # BLD AUTO: 318 10*3/UL (ref 130–400)
PMV BLD AUTO: 10.7 FL (ref 9.4–12.3)
POTASSIUM SERPL-SCNC: 4.1 MMOL/L (ref 3.5–5.3)
PTH-INTACT SERPL-MCNC: 93.2 PG/ML (ref 11.1–79.5)
RBC # BLD AUTO: 3.31 10*6/UL (ref 4.2–5.4)
SODIUM SERPL-SCNC: 136 MMOL/L (ref 135–147)
T4 FREE SERPL-MCNC: 1.2 NG/DL (ref 0.9–1.8)
TRIGL SERPL-MCNC: 73 MG/DL (ref 40–150)
TSH SERPL-ACNC: 0.27 M[IU]/L (ref 0.27–4.2)
VLDLC SERPL-MCNC: 15 MG/DL (ref 5–37)
WBC # BLD AUTO: 4.6 10*3/UL (ref 4.8–10.8)

## 2020-04-10 ENCOUNTER — HOSPITAL ENCOUNTER (OUTPATIENT)
Dept: OTHER | Facility: HOSPITAL | Age: 55
Discharge: HOME | End: 2020-04-10
Attending: INTERNAL MEDICINE

## 2020-04-10 LAB
ANION GAP SERPL CALC-SCNC: 16 MMOL/L (ref 8–19)
BUN SERPL-MCNC: 19 MG/DL (ref 5–25)
BUN/CREAT SERPL: 14 {RATIO} (ref 6–20)
CALCIUM SERPL-MCNC: 8.5 MG/DL (ref 8.7–10.4)
CHLORIDE SERPL-SCNC: 101 MMOL/L (ref 99–111)
CONV CO2: 27 MMOL/L (ref 22–32)
CREAT UR-MCNC: 1.4 MG/DL (ref 0.5–0.9)
GFR SERPLBLD BASED ON 1.73 SQ M-ARVRAT: 48 ML/MIN/{1.73_M2}
GLUCOSE SERPL-MCNC: 83 MG/DL (ref 65–99)
OSMOLALITY SERPL CALC.SUM OF ELEC: 287 MOSM/KG (ref 273–304)
POTASSIUM SERPL-SCNC: 5.8 MMOL/L (ref 3.5–5.3)
SODIUM SERPL-SCNC: 138 MMOL/L (ref 135–147)

## 2020-05-01 ENCOUNTER — HOSPITAL ENCOUNTER (OUTPATIENT)
Dept: OTHER | Facility: HOSPITAL | Age: 55
Discharge: HOME | End: 2020-05-01
Attending: INTERNAL MEDICINE

## 2020-05-01 LAB
APPEARANCE UR: CLEAR
BILIRUB UR QL: NEGATIVE
COLOR UR: YELLOW
CONV BACTERIA: NEGATIVE
CONV COLLECTION SOURCE (UA): ABNORMAL
CONV UROBILINOGEN IN URINE BY AUTOMATED TEST STRIP: 1 {EHRLICHU}/DL (ref 0.1–1)
GLUCOSE UR QL: NEGATIVE MG/DL
HGB UR QL STRIP: NEGATIVE
KETONES UR QL STRIP: NEGATIVE MG/DL
LEUKOCYTE ESTERASE UR QL STRIP: ABNORMAL
NITRITE UR QL STRIP: NEGATIVE
PH UR STRIP.AUTO: 6 [PH] (ref 5–8)
PROT UR QL: NEGATIVE MG/DL
RBC #/AREA URNS HPF: ABNORMAL /[HPF]
SP GR UR: 1.02 (ref 1–1.03)
WBC #/AREA URNS HPF: ABNORMAL /[HPF]

## 2020-05-02 LAB — C4 SERPL-MCNC: 15 MG/DL (ref 14–44)

## 2020-05-03 LAB
CENTROMERE AB TITR SER IF: 0.9 [IU]/ML (ref 0–7)
DSDNA AB SER-ACNC: NEGATIVE [IU]/ML
ENA AB SER IA-ACNC: POSITIVE {RATIO}
ENA SCL70 AB SER QL IA: 1.3 [IU]/ML (ref 0–7)
JO-1 (WB)*: 0.6 [IU]/ML (ref 0–7)
RNP: 2.4 [IU]/ML (ref 0–7)
RNP: 6.1 [IU]/ML (ref 0–5)
SMI: 3.8 [IU]/ML (ref 0–7)
SSA (RO) (ENA) AB, IGG: 51 [IU]/ML (ref 0–7)
SSB (LA) ENA AB, IGG: 0.6 [IU]/ML (ref 0–7)

## 2020-05-05 LAB
ALBUMIN 24H MFR UR ELPH: 29.5 %
ALPHA1 GLOB 24H MFR UR ELPH: 3.2 %
ALPHA2 GLOB 24H MFR UR ELPH: 16.9 %
CONV BETA GLOBULIN, URINE: 26.9 %
CONV HEPATITIS B SURFACE AG W CONFIRMATION RE: NEGATIVE
GAMMA GLOB 24H MFR UR ELPH: 23.6 %
HBV CORE AB SER DONR QL IA: POSITIVE
HBV CORE IGM SERPL QL IA: NEGATIVE
HBV E AB SERPL QL IA: POSITIVE
HBV E AG SERPL QL IA: NEGATIVE
HBV SURFACE AB SER QL: REACTIVE
Lab: NORMAL
M PROTEIN 24H MFR UR ELPH: NORMAL %
PROT UR-MCNC: 6.6 MG/DL

## 2020-05-06 ENCOUNTER — HOSPITAL ENCOUNTER (OUTPATIENT)
Dept: OTHER | Facility: HOSPITAL | Age: 55
Discharge: HOME | End: 2020-05-06
Attending: INTERNAL MEDICINE

## 2020-05-08 LAB — BACTERIA UR CULT: NORMAL

## 2020-05-11 ENCOUNTER — HOSPITAL ENCOUNTER (OUTPATIENT)
Dept: GENERAL RADIOLOGY | Facility: HOSPITAL | Age: 55
Discharge: HOME | End: 2020-05-11
Attending: INTERNAL MEDICINE

## 2020-05-12 ENCOUNTER — HOSPITAL ENCOUNTER (OUTPATIENT)
Dept: OTHER | Facility: HOSPITAL | Age: 55
Discharge: HOME | End: 2020-05-12
Attending: OTOLARYNGOLOGY

## 2020-05-12 LAB
CALCIUM SERPL-MCNC: 9.3 MG/DL (ref 8.7–10.4)
T4 FREE SERPL-MCNC: 1.2 NG/DL (ref 0.9–1.8)
TSH SERPL-ACNC: 1.16 M[IU]/L (ref 0.27–4.2)

## 2020-05-19 ENCOUNTER — HOSPITAL ENCOUNTER (OUTPATIENT)
Dept: OTHER | Facility: HOSPITAL | Age: 55
Discharge: HOME | End: 2020-05-19
Attending: INTERNAL MEDICINE

## 2020-05-19 LAB
ANION GAP SERPL CALC-SCNC: 16 MMOL/L (ref 8–19)
APPEARANCE UR: CLEAR
BILIRUB UR QL: NEGATIVE
BUN SERPL-MCNC: 13 MG/DL (ref 5–25)
BUN/CREAT SERPL: 10 {RATIO} (ref 6–20)
CALCIUM SERPL-MCNC: 8.6 MG/DL (ref 8.7–10.4)
CHLORIDE SERPL-SCNC: 107 MMOL/L (ref 99–111)
COLOR UR: ABNORMAL
CONV BACTERIA: NEGATIVE
CONV CO2: 22 MMOL/L (ref 22–32)
CONV COLLECTION SOURCE (UA): ABNORMAL
CONV CREATININE URINE, RANDOM: 109.2 MG/DL (ref 10–300)
CONV PROTEIN TO CREATININE RATIO (RANDOM URINE): 0.07 {RATIO} (ref 0–0.1)
CONV UROBILINOGEN IN URINE BY AUTOMATED TEST STRIP: 1 {EHRLICHU}/DL (ref 0.1–1)
CREAT UR-MCNC: 1.32 MG/DL (ref 0.5–0.9)
GFR SERPLBLD BASED ON 1.73 SQ M-ARVRAT: 52 ML/MIN/{1.73_M2}
GLUCOSE SERPL-MCNC: 79 MG/DL (ref 65–99)
GLUCOSE UR QL: NEGATIVE MG/DL
HGB UR QL STRIP: NEGATIVE
KETONES UR QL STRIP: NEGATIVE MG/DL
LEUKOCYTE ESTERASE UR QL STRIP: ABNORMAL
Lab: 0.13 RATIO (ref 0–0.33)
NITRITE UR QL STRIP: NEGATIVE
OSMOLALITY SERPL CALC.SUM OF ELEC: 289 MOSM/KG (ref 273–304)
PH UR STRIP.AUTO: 6.5 [PH] (ref 5–8)
POTASSIUM SERPL-SCNC: 5.2 MMOL/L (ref 3.5–5.3)
PROT UR QL: NEGATIVE MG/DL
PROT UR-MCNC: 7.5 MG/DL
RBC #/AREA URNS HPF: ABNORMAL /[HPF]
SODIUM SERPL-SCNC: 140 MMOL/L (ref 135–147)
SP GR UR: 1.01 (ref 1–1.03)
SQUAMOUS SPT QL MICRO: ABNORMAL /[HPF]
WBC #/AREA URNS HPF: ABNORMAL /[HPF]

## 2020-05-21 ENCOUNTER — HOSPITAL ENCOUNTER (OUTPATIENT)
Dept: GENERAL RADIOLOGY | Facility: HOSPITAL | Age: 55
Discharge: HOME | End: 2020-05-21
Attending: NURSE PRACTITIONER

## 2020-05-28 ENCOUNTER — OFFICE VISIT CONVERTED (OUTPATIENT)
Dept: INTERNAL MEDICINE | Facility: CLINIC | Age: 55
End: 2020-05-28
Attending: PHYSICIAN ASSISTANT

## 2020-06-03 ENCOUNTER — HOSPITAL ENCOUNTER (OUTPATIENT)
Dept: OTHER | Facility: HOSPITAL | Age: 55
Discharge: HOME | End: 2020-06-03
Attending: PHYSICIAN ASSISTANT

## 2020-06-03 LAB
ALBUMIN SERPL-MCNC: 4.2 G/DL (ref 3.5–5)
ALBUMIN/GLOB SERPL: 1.2 {RATIO} (ref 1.4–2.6)
ALP SERPL-CCNC: 83 U/L (ref 53–141)
ALT SERPL-CCNC: <5 U/L (ref 10–40)
ANION GAP SERPL CALC-SCNC: 15 MMOL/L (ref 8–19)
AST SERPL-CCNC: 19 U/L (ref 15–50)
BASOPHILS # BLD AUTO: 0.05 10*3/UL (ref 0–0.2)
BASOPHILS NFR BLD AUTO: 1.1 % (ref 0–3)
BILIRUB SERPL-MCNC: 0.51 MG/DL (ref 0.2–1.3)
BUN SERPL-MCNC: 52 MG/DL (ref 5–25)
BUN/CREAT SERPL: 20 {RATIO} (ref 6–20)
CALCIUM SERPL-MCNC: 8.8 MG/DL (ref 8.7–10.4)
CHLORIDE SERPL-SCNC: 103 MMOL/L (ref 99–111)
CONV ABS IMM GRAN: 0.01 10*3/UL (ref 0–0.2)
CONV CO2: 23 MMOL/L (ref 22–32)
CONV IMMATURE GRAN: 0.2 % (ref 0–1.8)
CONV TOTAL PROTEIN: 7.8 G/DL (ref 6.3–8.2)
CREAT UR-MCNC: 2.57 MG/DL (ref 0.5–0.9)
DEPRECATED RDW RBC AUTO: 52.9 FL (ref 36.4–46.3)
EOSINOPHIL # BLD AUTO: 0.44 10*3/UL (ref 0–0.7)
EOSINOPHIL # BLD AUTO: 9.3 % (ref 0–7)
ERYTHROCYTE [DISTWIDTH] IN BLOOD BY AUTOMATED COUNT: 14.5 % (ref 11.7–14.4)
GFR SERPLBLD BASED ON 1.73 SQ M-ARVRAT: 23 ML/MIN/{1.73_M2}
GLOBULIN UR ELPH-MCNC: 3.6 G/DL (ref 2–3.5)
GLUCOSE SERPL-MCNC: 95 MG/DL (ref 65–99)
HCT VFR BLD AUTO: 37.2 % (ref 37–47)
HGB BLD-MCNC: 11.9 G/DL (ref 12–16)
LYMPHOCYTES # BLD AUTO: 1.48 10*3/UL (ref 1–5)
LYMPHOCYTES NFR BLD AUTO: 31.4 % (ref 20–45)
MCH RBC QN AUTO: 32 PG (ref 27–31)
MCHC RBC AUTO-ENTMCNC: 32 G/DL (ref 33–37)
MCV RBC AUTO: 100 FL (ref 81–99)
MONOCYTES # BLD AUTO: 0.59 10*3/UL (ref 0.2–1.2)
MONOCYTES NFR BLD AUTO: 12.5 % (ref 3–10)
NEUTROPHILS # BLD AUTO: 2.14 10*3/UL (ref 2–8)
NEUTROPHILS NFR BLD AUTO: 45.5 % (ref 30–85)
NRBC CBCN: 0 % (ref 0–0.7)
OSMOLALITY SERPL CALC.SUM OF ELEC: 296 MOSM/KG (ref 273–304)
PLATELET # BLD AUTO: 159 10*3/UL (ref 130–400)
PMV BLD AUTO: 10.9 FL (ref 9.4–12.3)
POTASSIUM SERPL-SCNC: 5.1 MMOL/L (ref 3.5–5.3)
RBC # BLD AUTO: 3.72 10*6/UL (ref 4.2–5.4)
SODIUM SERPL-SCNC: 136 MMOL/L (ref 135–147)
WBC # BLD AUTO: 4.71 10*3/UL (ref 4.8–10.8)

## 2020-06-04 LAB
FERRITIN SERPL-MCNC: 179 NG/ML (ref 10–200)
IRON SATN MFR SERPL: 28 % (ref 20–55)
IRON SERPL-MCNC: 78 UG/DL (ref 60–170)
TIBC SERPL-MCNC: 280 UG/DL (ref 245–450)
TRANSFERRIN SERPL-MCNC: 196 MG/DL (ref 250–380)

## 2020-06-15 ENCOUNTER — HOSPITAL ENCOUNTER (OUTPATIENT)
Dept: PREADMISSION TESTING | Facility: HOSPITAL | Age: 55
Discharge: HOME | End: 2020-06-15
Attending: INTERNAL MEDICINE

## 2020-06-16 LAB — SARS-COV-2 RNA SPEC QL NAA+PROBE: NOT DETECTED

## 2020-06-17 ENCOUNTER — HOSPITAL ENCOUNTER (OUTPATIENT)
Dept: CARDIOLOGY | Facility: HOSPITAL | Age: 55
Discharge: HOME | End: 2020-06-17
Attending: NURSE PRACTITIONER

## 2020-06-17 LAB
ANION GAP SERPL CALC-SCNC: 15 MMOL/L (ref 8–19)
APPEARANCE UR: CLEAR
BASOPHILS # BLD AUTO: 0.05 10*3/UL (ref 0–0.2)
BASOPHILS NFR BLD AUTO: 1 % (ref 0–3)
BILIRUB UR QL: NEGATIVE
BUN SERPL-MCNC: 40 MG/DL (ref 5–25)
BUN/CREAT SERPL: 20 {RATIO} (ref 6–20)
CALCIUM SERPL-MCNC: 8.9 MG/DL (ref 8.7–10.4)
CHLORIDE SERPL-SCNC: 104 MMOL/L (ref 99–111)
COLOR UR: YELLOW
CONV ABS IMM GRAN: 0.01 10*3/UL (ref 0–0.2)
CONV BACTERIA: ABNORMAL
CONV CO2: 23 MMOL/L (ref 22–32)
CONV COLLECTION SOURCE (UA): ABNORMAL
CONV CREATININE URINE, RANDOM: 106.5 MG/DL (ref 10–300)
CONV IMMATURE GRAN: 0.2 % (ref 0–1.8)
CONV MICROALBUM.,U,RANDOM: <12 MG/L (ref 0–20)
CONV UROBILINOGEN IN URINE BY AUTOMATED TEST STRIP: 1 {EHRLICHU}/DL (ref 0.1–1)
CREAT UR-MCNC: 2.01 MG/DL (ref 0.5–0.9)
DEPRECATED RDW RBC AUTO: 51.7 FL (ref 36.4–46.3)
EOSINOPHIL # BLD AUTO: 0.18 10*3/UL (ref 0–0.7)
EOSINOPHIL # BLD AUTO: 3.6 % (ref 0–7)
ERYTHROCYTE [DISTWIDTH] IN BLOOD BY AUTOMATED COUNT: 13.9 % (ref 11.7–14.4)
GFR SERPLBLD BASED ON 1.73 SQ M-ARVRAT: 31 ML/MIN/{1.73_M2}
GLUCOSE SERPL-MCNC: 112 MG/DL (ref 65–99)
GLUCOSE UR QL: NEGATIVE MG/DL
HCT VFR BLD AUTO: 37.3 % (ref 37–47)
HGB BLD-MCNC: 11.8 G/DL (ref 12–16)
HGB UR QL STRIP: NEGATIVE
KETONES UR QL STRIP: NEGATIVE MG/DL
LEUKOCYTE ESTERASE UR QL STRIP: ABNORMAL
LYMPHOCYTES # BLD AUTO: 1.35 10*3/UL (ref 1–5)
LYMPHOCYTES NFR BLD AUTO: 26.9 % (ref 20–45)
MCH RBC QN AUTO: 31.7 PG (ref 27–31)
MCHC RBC AUTO-ENTMCNC: 31.6 G/DL (ref 33–37)
MCV RBC AUTO: 100.3 FL (ref 81–99)
MICROALBUMIN/CREAT UR: 11.3 MG/G{CRE} (ref 0–35)
MONOCYTES # BLD AUTO: 0.39 10*3/UL (ref 0.2–1.2)
MONOCYTES NFR BLD AUTO: 7.8 % (ref 3–10)
NEUTROPHILS # BLD AUTO: 3.04 10*3/UL (ref 2–8)
NEUTROPHILS NFR BLD AUTO: 60.5 % (ref 30–85)
NITRITE UR QL STRIP: NEGATIVE
NRBC CBCN: 0 % (ref 0–0.7)
OSMOLALITY SERPL CALC.SUM OF ELEC: 295 MOSM/KG (ref 273–304)
PH UR STRIP.AUTO: 6 [PH] (ref 5–8)
PLATELET # BLD AUTO: 167 10*3/UL (ref 130–400)
PMV BLD AUTO: 10.4 FL (ref 9.4–12.3)
POTASSIUM SERPL-SCNC: 5.1 MMOL/L (ref 3.5–5.3)
PROT UR QL: NEGATIVE MG/DL
RBC # BLD AUTO: 3.72 10*6/UL (ref 4.2–5.4)
RBC #/AREA URNS HPF: ABNORMAL /[HPF]
SODIUM SERPL-SCNC: 137 MMOL/L (ref 135–147)
SP GR UR: 1.02 (ref 1–1.03)
WBC # BLD AUTO: 5.02 10*3/UL (ref 4.8–10.8)
WBC #/AREA URNS HPF: ABNORMAL /[HPF]

## 2020-06-22 ENCOUNTER — TELEPHONE CONVERTED (OUTPATIENT)
Dept: INTERNAL MEDICINE | Facility: CLINIC | Age: 55
End: 2020-06-22
Attending: INTERNAL MEDICINE

## 2020-06-30 ENCOUNTER — OFFICE VISIT CONVERTED (OUTPATIENT)
Dept: PULMONOLOGY | Facility: CLINIC | Age: 55
End: 2020-06-30
Attending: INTERNAL MEDICINE

## 2020-08-19 ENCOUNTER — OFFICE VISIT CONVERTED (OUTPATIENT)
Dept: INTERNAL MEDICINE | Facility: CLINIC | Age: 55
End: 2020-08-19
Attending: NURSE PRACTITIONER

## 2020-08-19 ENCOUNTER — HOSPITAL ENCOUNTER (OUTPATIENT)
Dept: URGENT CARE | Facility: CLINIC | Age: 55
Discharge: HOME | End: 2020-08-19
Attending: NURSE PRACTITIONER

## 2020-08-19 ENCOUNTER — HOSPITAL ENCOUNTER (OUTPATIENT)
Dept: OTHER | Facility: HOSPITAL | Age: 55
Discharge: HOME | End: 2020-08-19
Attending: NURSE PRACTITIONER

## 2020-08-19 LAB
BASOPHILS # BLD AUTO: 0.07 10*3/UL (ref 0–0.2)
BASOPHILS NFR BLD AUTO: 1.2 % (ref 0–3)
CONV ABS IMM GRAN: 0.02 10*3/UL (ref 0–0.2)
CONV IMMATURE GRAN: 0.3 % (ref 0–1.8)
DEPRECATED RDW RBC AUTO: 53.2 FL (ref 36.4–46.3)
EOSINOPHIL # BLD AUTO: 0.25 10*3/UL (ref 0–0.7)
EOSINOPHIL # BLD AUTO: 4.2 % (ref 0–7)
ERYTHROCYTE [DISTWIDTH] IN BLOOD BY AUTOMATED COUNT: 14.7 % (ref 11.7–14.4)
HCT VFR BLD AUTO: 38.7 % (ref 37–47)
HGB BLD-MCNC: 12.1 G/DL (ref 12–16)
LYMPHOCYTES # BLD AUTO: 1.75 10*3/UL (ref 1–5)
LYMPHOCYTES NFR BLD AUTO: 29.1 % (ref 20–45)
MCH RBC QN AUTO: 30.6 PG (ref 27–31)
MCHC RBC AUTO-ENTMCNC: 31.3 G/DL (ref 33–37)
MCV RBC AUTO: 97.7 FL (ref 81–99)
MONOCYTES # BLD AUTO: 0.51 10*3/UL (ref 0.2–1.2)
MONOCYTES NFR BLD AUTO: 8.5 % (ref 3–10)
NEUTROPHILS # BLD AUTO: 3.42 10*3/UL (ref 2–8)
NEUTROPHILS NFR BLD AUTO: 56.7 % (ref 30–85)
NRBC CBCN: 0 % (ref 0–0.7)
PLATELET # BLD AUTO: 162 10*3/UL (ref 130–400)
PMV BLD AUTO: 12 FL (ref 9.4–12.3)
RBC # BLD AUTO: 3.96 10*6/UL (ref 4.2–5.4)
WBC # BLD AUTO: 6.02 10*3/UL (ref 4.8–10.8)

## 2020-08-20 LAB
ALBUMIN SERPL-MCNC: 4.4 G/DL (ref 3.5–5)
ALBUMIN/GLOB SERPL: 1.3 {RATIO} (ref 1.4–2.6)
ALP SERPL-CCNC: 90 U/L (ref 53–141)
ALT SERPL-CCNC: <5 U/L (ref 10–40)
ANION GAP SERPL CALC-SCNC: 17 MMOL/L (ref 8–19)
AST SERPL-CCNC: 20 U/L (ref 15–50)
BILIRUB SERPL-MCNC: 0.69 MG/DL (ref 0.2–1.3)
BUN SERPL-MCNC: 25 MG/DL (ref 5–25)
BUN/CREAT SERPL: 17 {RATIO} (ref 6–20)
CALCIUM SERPL-MCNC: 8.8 MG/DL (ref 8.7–10.4)
CHLORIDE SERPL-SCNC: 101 MMOL/L (ref 99–111)
CK SERPL-CCNC: 207 U/L (ref 35–230)
CONV CO2: 27 MMOL/L (ref 22–32)
CONV TOTAL PROTEIN: 7.7 G/DL (ref 6.3–8.2)
CREAT UR-MCNC: 1.44 MG/DL (ref 0.5–0.9)
GFR SERPLBLD BASED ON 1.73 SQ M-ARVRAT: 46 ML/MIN/{1.73_M2}
GLOBULIN UR ELPH-MCNC: 3.3 G/DL (ref 2–3.5)
GLUCOSE SERPL-MCNC: 84 MG/DL (ref 65–99)
OSMOLALITY SERPL CALC.SUM OF ELEC: 294 MOSM/KG (ref 273–304)
POTASSIUM SERPL-SCNC: 4.8 MMOL/L (ref 3.5–5.3)
SODIUM SERPL-SCNC: 140 MMOL/L (ref 135–147)
VIT B12 SERPL-MCNC: 604 PG/ML (ref 211–911)

## 2020-08-22 LAB — SARS-COV-2 RNA SPEC QL NAA+PROBE: NOT DETECTED

## 2020-09-17 ENCOUNTER — OFFICE VISIT CONVERTED (OUTPATIENT)
Dept: INTERNAL MEDICINE | Facility: CLINIC | Age: 55
End: 2020-09-17
Attending: INTERNAL MEDICINE

## 2020-09-17 ENCOUNTER — HOSPITAL ENCOUNTER (OUTPATIENT)
Dept: OTHER | Facility: HOSPITAL | Age: 55
Discharge: HOME | End: 2020-09-17
Attending: INTERNAL MEDICINE

## 2020-10-15 ENCOUNTER — HOSPITAL ENCOUNTER (OUTPATIENT)
Dept: MAMMOGRAPHY | Facility: HOSPITAL | Age: 55
Discharge: HOME | End: 2020-10-15
Attending: INTERNAL MEDICINE

## 2020-10-28 ENCOUNTER — HOSPITAL ENCOUNTER (OUTPATIENT)
Dept: OTHER | Facility: HOSPITAL | Age: 55
Discharge: HOME | End: 2020-10-28
Attending: OTOLARYNGOLOGY

## 2020-10-28 LAB
T4 FREE SERPL-MCNC: 0.4 NG/DL (ref 0.9–1.8)
TSH SERPL-ACNC: 68.83 M[IU]/L (ref 0.27–4.2)

## 2020-11-11 ENCOUNTER — HOSPITAL ENCOUNTER (OUTPATIENT)
Dept: LAB | Facility: HOSPITAL | Age: 55
Discharge: HOME | End: 2020-11-11
Attending: OTOLARYNGOLOGY

## 2020-11-11 ENCOUNTER — OFFICE VISIT CONVERTED (OUTPATIENT)
Dept: INTERNAL MEDICINE | Facility: CLINIC | Age: 55
End: 2020-11-11
Attending: PHYSICIAN ASSISTANT

## 2020-11-11 ENCOUNTER — CONVERSION ENCOUNTER (OUTPATIENT)
Dept: INTERNAL MEDICINE | Facility: CLINIC | Age: 55
End: 2020-11-11

## 2020-11-11 LAB
T4 FREE SERPL-MCNC: 0.8 NG/DL (ref 0.9–1.8)
TSH SERPL-ACNC: 69.63 M[IU]/L (ref 0.27–4.2)

## 2020-11-16 ENCOUNTER — OFFICE VISIT CONVERTED (OUTPATIENT)
Dept: INTERNAL MEDICINE | Facility: CLINIC | Age: 55
End: 2020-11-16
Attending: INTERNAL MEDICINE

## 2020-11-16 ENCOUNTER — HOSPITAL ENCOUNTER (OUTPATIENT)
Dept: URGENT CARE | Facility: CLINIC | Age: 55
Discharge: HOME | End: 2020-11-16
Attending: INTERNAL MEDICINE

## 2020-11-19 LAB — SARS-COV-2 RNA SPEC QL NAA+PROBE: NOT DETECTED

## 2020-12-14 ENCOUNTER — TELEPHONE CONVERTED (OUTPATIENT)
Dept: INTERNAL MEDICINE | Facility: CLINIC | Age: 55
End: 2020-12-14
Attending: INTERNAL MEDICINE

## 2020-12-16 ENCOUNTER — HOSPITAL ENCOUNTER (OUTPATIENT)
Dept: OTHER | Facility: HOSPITAL | Age: 55
Discharge: HOME | End: 2020-12-16
Attending: OTOLARYNGOLOGY

## 2020-12-16 LAB
ALBUMIN SERPL-MCNC: 3.7 G/DL (ref 3.5–5)
ALBUMIN/GLOB SERPL: 1.1 {RATIO} (ref 1.4–2.6)
ALP SERPL-CCNC: 111 U/L (ref 53–141)
ALT SERPL-CCNC: 6 U/L (ref 10–40)
ANION GAP SERPL CALC-SCNC: 13 MMOL/L (ref 8–19)
AST SERPL-CCNC: 16 U/L (ref 15–50)
BASOPHILS # BLD AUTO: 0.03 10*3/UL (ref 0–0.2)
BASOPHILS NFR BLD AUTO: 0.6 % (ref 0–3)
BILIRUB SERPL-MCNC: 0.51 MG/DL (ref 0.2–1.3)
BUN SERPL-MCNC: 19 MG/DL (ref 5–25)
BUN/CREAT SERPL: 14 {RATIO} (ref 6–20)
CALCIUM SERPL-MCNC: 8.7 MG/DL (ref 8.7–10.4)
CHLORIDE SERPL-SCNC: 105 MMOL/L (ref 99–111)
CHOLEST SERPL-MCNC: 128 MG/DL (ref 107–200)
CHOLEST/HDLC SERPL: 2.3 {RATIO} (ref 3–6)
CONV ABS IMM GRAN: 0.03 10*3/UL (ref 0–0.2)
CONV CO2: 24 MMOL/L (ref 22–32)
CONV IMMATURE GRAN: 0.6 % (ref 0–1.8)
CONV TOTAL PROTEIN: 7 G/DL (ref 6.3–8.2)
CREAT UR-MCNC: 1.34 MG/DL (ref 0.5–0.9)
DEPRECATED RDW RBC AUTO: 49 FL (ref 36.4–46.3)
EOSINOPHIL # BLD AUTO: 0.25 10*3/UL (ref 0–0.7)
EOSINOPHIL # BLD AUTO: 4.8 % (ref 0–7)
ERYTHROCYTE [DISTWIDTH] IN BLOOD BY AUTOMATED COUNT: 14.5 % (ref 11.7–14.4)
GFR SERPLBLD BASED ON 1.73 SQ M-ARVRAT: 51 ML/MIN/{1.73_M2}
GLOBULIN UR ELPH-MCNC: 3.3 G/DL (ref 2–3.5)
GLUCOSE SERPL-MCNC: 77 MG/DL (ref 65–99)
HCT VFR BLD AUTO: 36.4 % (ref 37–47)
HDLC SERPL-MCNC: 55 MG/DL (ref 40–60)
HGB BLD-MCNC: 11.7 G/DL (ref 12–16)
LDLC SERPL CALC-MCNC: 59 MG/DL (ref 70–100)
LYMPHOCYTES # BLD AUTO: 0.95 10*3/UL (ref 1–5)
LYMPHOCYTES NFR BLD AUTO: 18.1 % (ref 20–45)
MCH RBC QN AUTO: 29.7 PG (ref 27–31)
MCHC RBC AUTO-ENTMCNC: 32.1 G/DL (ref 33–37)
MCV RBC AUTO: 92.4 FL (ref 81–99)
MONOCYTES # BLD AUTO: 0.65 10*3/UL (ref 0.2–1.2)
MONOCYTES NFR BLD AUTO: 12.4 % (ref 3–10)
NEUTROPHILS # BLD AUTO: 3.33 10*3/UL (ref 2–8)
NEUTROPHILS NFR BLD AUTO: 63.5 % (ref 30–85)
NRBC CBCN: 0 % (ref 0–0.7)
OSMOLALITY SERPL CALC.SUM OF ELEC: 285 MOSM/KG (ref 273–304)
PLATELET # BLD AUTO: 181 10*3/UL (ref 130–400)
PMV BLD AUTO: 10.9 FL (ref 9.4–12.3)
POTASSIUM SERPL-SCNC: 4.6 MMOL/L (ref 3.5–5.3)
RBC # BLD AUTO: 3.94 10*6/UL (ref 4.2–5.4)
SODIUM SERPL-SCNC: 137 MMOL/L (ref 135–147)
T4 FREE SERPL-MCNC: 2 NG/DL (ref 0.9–1.8)
TRIGL SERPL-MCNC: 71 MG/DL (ref 40–150)
TSH SERPL-ACNC: 0.25 M[IU]/L (ref 0.27–4.2)
VIT B12 SERPL-MCNC: 412 PG/ML (ref 211–911)
VLDLC SERPL-MCNC: 14 MG/DL (ref 5–37)
WBC # BLD AUTO: 5.24 10*3/UL (ref 4.8–10.8)

## 2021-01-02 ENCOUNTER — HOSPITAL ENCOUNTER (OUTPATIENT)
Dept: URGENT CARE | Facility: CLINIC | Age: 56
Discharge: HOME | End: 2021-01-02
Attending: NURSE PRACTITIONER

## 2021-01-07 ENCOUNTER — HOSPITAL ENCOUNTER (OUTPATIENT)
Dept: OTHER | Facility: HOSPITAL | Age: 56
Discharge: HOME | End: 2021-01-07
Attending: OTOLARYNGOLOGY

## 2021-01-07 LAB
T4 FREE SERPL-MCNC: 1.8 NG/DL (ref 0.9–1.8)
TSH SERPL-ACNC: 0.29 M[IU]/L (ref 0.27–4.2)

## 2021-01-15 ENCOUNTER — HOSPITAL ENCOUNTER (OUTPATIENT)
Dept: OTHER | Facility: HOSPITAL | Age: 56
Discharge: HOME | End: 2021-01-15
Attending: STUDENT IN AN ORGANIZED HEALTH CARE EDUCATION/TRAINING PROGRAM

## 2021-01-15 ENCOUNTER — OFFICE VISIT CONVERTED (OUTPATIENT)
Dept: INTERNAL MEDICINE | Facility: CLINIC | Age: 56
End: 2021-01-15
Attending: STUDENT IN AN ORGANIZED HEALTH CARE EDUCATION/TRAINING PROGRAM

## 2021-01-15 ENCOUNTER — CONVERSION ENCOUNTER (OUTPATIENT)
Dept: INTERNAL MEDICINE | Facility: CLINIC | Age: 56
End: 2021-01-15

## 2021-01-15 LAB
ANION GAP SERPL CALC-SCNC: 14 MMOL/L (ref 8–19)
BASOPHILS # BLD AUTO: 0.07 10*3/UL (ref 0–0.2)
BASOPHILS NFR BLD AUTO: 1 % (ref 0–3)
BNP SERPL-MCNC: 437 PG/ML (ref 0–900)
BUN SERPL-MCNC: 13 MG/DL (ref 5–25)
BUN/CREAT SERPL: 13 {RATIO} (ref 6–20)
CALCIUM SERPL-MCNC: 7.9 MG/DL (ref 8.7–10.4)
CHLORIDE SERPL-SCNC: 106 MMOL/L (ref 99–111)
CONV ABS IMM GRAN: 0.02 10*3/UL (ref 0–0.2)
CONV CO2: 26 MMOL/L (ref 22–32)
CONV IMMATURE GRAN: 0.3 % (ref 0–1.8)
CREAT UR-MCNC: 1 MG/DL (ref 0.5–0.9)
DEPRECATED RDW RBC AUTO: 48.9 FL (ref 36.4–46.3)
EOSINOPHIL # BLD AUTO: 0.29 10*3/UL (ref 0–0.7)
EOSINOPHIL # BLD AUTO: 4.2 % (ref 0–7)
ERYTHROCYTE [DISTWIDTH] IN BLOOD BY AUTOMATED COUNT: 14.6 % (ref 11.7–14.4)
GFR SERPLBLD BASED ON 1.73 SQ M-ARVRAT: >60 ML/MIN/{1.73_M2}
GLUCOSE SERPL-MCNC: 79 MG/DL (ref 65–99)
HCT VFR BLD AUTO: 39.4 % (ref 37–47)
HGB BLD-MCNC: 12.6 G/DL (ref 12–16)
LYMPHOCYTES # BLD AUTO: 1.97 10*3/UL (ref 1–5)
LYMPHOCYTES NFR BLD AUTO: 28.4 % (ref 20–45)
MCH RBC QN AUTO: 29.1 PG (ref 27–31)
MCHC RBC AUTO-ENTMCNC: 32 G/DL (ref 33–37)
MCV RBC AUTO: 91 FL (ref 81–99)
MONOCYTES # BLD AUTO: 0.72 10*3/UL (ref 0.2–1.2)
MONOCYTES NFR BLD AUTO: 10.4 % (ref 3–10)
NEUTROPHILS # BLD AUTO: 3.87 10*3/UL (ref 2–8)
NEUTROPHILS NFR BLD AUTO: 55.7 % (ref 30–85)
NRBC CBCN: 0 % (ref 0–0.7)
OSMOLALITY SERPL CALC.SUM OF ELEC: 291 MOSM/KG (ref 273–304)
PLATELET # BLD AUTO: 170 10*3/UL (ref 130–400)
PMV BLD AUTO: 12.5 FL (ref 9.4–12.3)
POTASSIUM SERPL-SCNC: 4.7 MMOL/L (ref 3.5–5.3)
RBC # BLD AUTO: 4.33 10*6/UL (ref 4.2–5.4)
SODIUM SERPL-SCNC: 141 MMOL/L (ref 135–147)
T4 FREE SERPL-MCNC: 1.5 NG/DL (ref 0.9–1.8)
TSH SERPL-ACNC: 0.53 M[IU]/L (ref 0.27–4.2)
WBC # BLD AUTO: 6.94 10*3/UL (ref 4.8–10.8)

## 2021-01-16 LAB — SARS-COV-2 RNA SPEC QL NAA+PROBE: NOT DETECTED

## 2021-01-19 ENCOUNTER — HOSPITAL ENCOUNTER (OUTPATIENT)
Dept: OTHER | Facility: HOSPITAL | Age: 56
Discharge: HOME | End: 2021-01-19
Attending: STUDENT IN AN ORGANIZED HEALTH CARE EDUCATION/TRAINING PROGRAM

## 2021-01-19 ENCOUNTER — OFFICE VISIT CONVERTED (OUTPATIENT)
Dept: INTERNAL MEDICINE | Facility: CLINIC | Age: 56
End: 2021-01-19
Attending: STUDENT IN AN ORGANIZED HEALTH CARE EDUCATION/TRAINING PROGRAM

## 2021-01-19 LAB
25(OH)D3 SERPL-MCNC: 10.2 NG/ML (ref 30–100)
FERRITIN SERPL-MCNC: 49 NG/ML (ref 10–200)
IRON SATN MFR SERPL: 14 % (ref 20–55)
IRON SERPL-MCNC: 38 UG/DL (ref 60–170)
MAGNESIUM SERPL-MCNC: 1.53 MG/DL (ref 1.6–2.3)
PHOSPHATE SERPL-MCNC: 4.5 MG/DL (ref 2.4–4.5)
TIBC SERPL-MCNC: 272 UG/DL (ref 245–450)
TRANSFERRIN SERPL-MCNC: 190 MG/DL (ref 250–380)

## 2021-01-20 ENCOUNTER — OFFICE VISIT CONVERTED (OUTPATIENT)
Dept: URGENT CARE | Facility: CLINIC | Age: 56
End: 2021-01-20
Attending: PHYSICIAN ASSISTANT

## 2021-01-20 ENCOUNTER — HOSPITAL ENCOUNTER (OUTPATIENT)
Dept: CT IMAGING | Facility: HOSPITAL | Age: 56
Discharge: HOME | End: 2021-01-20
Attending: STUDENT IN AN ORGANIZED HEALTH CARE EDUCATION/TRAINING PROGRAM

## 2021-01-20 LAB
CONV CALCIUM IONIZED: 4.2 MG/DL (ref 4.5–5.6)
PTH-INTACT SERPL-MCNC: 153.3 PG/ML (ref 11.1–79.5)

## 2021-01-21 LAB
ALBUMIN SERPL-MCNC: 3.9 G/DL (ref 3.5–5)
CALCIUM SERPL-MCNC: 8.1 MG/DL (ref 8.7–10.4)

## 2021-01-25 ENCOUNTER — HOSPITAL ENCOUNTER (OUTPATIENT)
Dept: URGENT CARE | Facility: CLINIC | Age: 56
Discharge: HOME | End: 2021-01-25
Attending: EMERGENCY MEDICINE

## 2021-01-29 ENCOUNTER — OFFICE VISIT CONVERTED (OUTPATIENT)
Dept: INTERNAL MEDICINE | Facility: CLINIC | Age: 56
End: 2021-01-29
Attending: STUDENT IN AN ORGANIZED HEALTH CARE EDUCATION/TRAINING PROGRAM

## 2021-01-29 ENCOUNTER — HOSPITAL ENCOUNTER (OUTPATIENT)
Dept: OTHER | Facility: HOSPITAL | Age: 56
Discharge: HOME | End: 2021-01-29
Attending: STUDENT IN AN ORGANIZED HEALTH CARE EDUCATION/TRAINING PROGRAM

## 2021-02-03 ENCOUNTER — HOSPITAL ENCOUNTER (OUTPATIENT)
Dept: URGENT CARE | Facility: CLINIC | Age: 56
Discharge: HOME | End: 2021-02-03
Attending: NURSE PRACTITIONER

## 2021-02-04 ENCOUNTER — OFFICE VISIT CONVERTED (OUTPATIENT)
Dept: INTERNAL MEDICINE | Facility: CLINIC | Age: 56
End: 2021-02-04
Attending: STUDENT IN AN ORGANIZED HEALTH CARE EDUCATION/TRAINING PROGRAM

## 2021-02-04 ENCOUNTER — HOSPITAL ENCOUNTER (OUTPATIENT)
Dept: CARDIOLOGY | Facility: HOSPITAL | Age: 56
Discharge: HOME | End: 2021-02-04
Attending: PHYSICIAN ASSISTANT

## 2021-02-04 ENCOUNTER — HOSPITAL ENCOUNTER (OUTPATIENT)
Dept: OTHER | Facility: HOSPITAL | Age: 56
Discharge: HOME | End: 2021-02-04
Attending: STUDENT IN AN ORGANIZED HEALTH CARE EDUCATION/TRAINING PROGRAM

## 2021-02-05 LAB — MAGNESIUM SERPL-MCNC: 1.77 MG/DL (ref 1.6–2.3)

## 2021-02-06 LAB — SARS-COV-2 RNA SPEC QL NAA+PROBE: NOT DETECTED

## 2021-02-23 ENCOUNTER — HOSPITAL ENCOUNTER (OUTPATIENT)
Dept: OTHER | Facility: HOSPITAL | Age: 56
Discharge: HOME | End: 2021-02-23
Attending: SPECIALIST

## 2021-02-24 LAB
ALBUMIN SERPL-MCNC: 3.8 G/DL (ref 3.5–5)
ALBUMIN/GLOB SERPL: 1.1 {RATIO} (ref 1.4–2.6)
ALP SERPL-CCNC: 96 U/L (ref 53–141)
ALT SERPL-CCNC: 7 U/L (ref 10–40)
ANION GAP SERPL CALC-SCNC: 15 MMOL/L (ref 8–19)
AST SERPL-CCNC: 17 U/L (ref 15–50)
BILIRUB SERPL-MCNC: 0.74 MG/DL (ref 0.2–1.3)
BUN SERPL-MCNC: 20 MG/DL (ref 5–25)
BUN/CREAT SERPL: 17 {RATIO} (ref 6–20)
CALCIUM SERPL-MCNC: 8.5 MG/DL (ref 8.7–10.4)
CHLORIDE SERPL-SCNC: 103 MMOL/L (ref 99–111)
CHOLEST SERPL-MCNC: 164 MG/DL (ref 107–200)
CHOLEST/HDLC SERPL: 2.6 {RATIO} (ref 3–6)
CONV CO2: 26 MMOL/L (ref 22–32)
CONV TOTAL PROTEIN: 7.2 G/DL (ref 6.3–8.2)
CREAT UR-MCNC: 1.21 MG/DL (ref 0.5–0.9)
EST. AVERAGE GLUCOSE BLD GHB EST-MCNC: 108 MG/DL
GFR SERPLBLD BASED ON 1.73 SQ M-ARVRAT: 57 ML/MIN/{1.73_M2}
GLOBULIN UR ELPH-MCNC: 3.4 G/DL (ref 2–3.5)
GLUCOSE SERPL-MCNC: 73 MG/DL (ref 65–99)
HBA1C MFR BLD: 5.4 % (ref 3.5–5.7)
HDLC SERPL-MCNC: 64 MG/DL (ref 40–60)
LDLC SERPL CALC-MCNC: 82 MG/DL (ref 70–100)
OSMOLALITY SERPL CALC.SUM OF ELEC: 289 MOSM/KG (ref 273–304)
POTASSIUM SERPL-SCNC: 5.4 MMOL/L (ref 3.5–5.3)
SODIUM SERPL-SCNC: 139 MMOL/L (ref 135–147)
T4 FREE SERPL-MCNC: 1.4 NG/DL (ref 0.9–1.8)
TRIGL SERPL-MCNC: 88 MG/DL (ref 40–150)
TSH SERPL-ACNC: 2.22 M[IU]/L (ref 0.27–4.2)
VLDLC SERPL-MCNC: 18 MG/DL (ref 5–37)

## 2021-03-03 ENCOUNTER — OFFICE VISIT CONVERTED (OUTPATIENT)
Dept: URGENT CARE | Facility: CLINIC | Age: 56
End: 2021-03-03
Attending: INTERNAL MEDICINE

## 2021-03-09 ENCOUNTER — HOSPITAL ENCOUNTER (OUTPATIENT)
Dept: URGENT CARE | Facility: CLINIC | Age: 56
Discharge: HOME | End: 2021-03-09
Attending: PHYSICIAN ASSISTANT

## 2021-03-11 ENCOUNTER — HOSPITAL ENCOUNTER (OUTPATIENT)
Dept: NUCLEAR MEDICINE | Facility: HOSPITAL | Age: 56
Discharge: HOME | End: 2021-03-11
Attending: SPECIALIST

## 2021-03-17 ENCOUNTER — HOSPITAL ENCOUNTER (OUTPATIENT)
Dept: URGENT CARE | Facility: CLINIC | Age: 56
Discharge: HOME | End: 2021-03-17
Attending: EMERGENCY MEDICINE

## 2021-03-17 LAB — SARS-COV-2 RNA SPEC QL NAA+PROBE: NOT DETECTED

## 2021-03-19 ENCOUNTER — OFFICE VISIT CONVERTED (OUTPATIENT)
Dept: INTERNAL MEDICINE | Facility: CLINIC | Age: 56
End: 2021-03-19
Attending: STUDENT IN AN ORGANIZED HEALTH CARE EDUCATION/TRAINING PROGRAM

## 2021-03-19 ENCOUNTER — HOSPITAL ENCOUNTER (OUTPATIENT)
Dept: OTHER | Facility: HOSPITAL | Age: 56
Discharge: HOME | End: 2021-03-19
Attending: STUDENT IN AN ORGANIZED HEALTH CARE EDUCATION/TRAINING PROGRAM

## 2021-03-21 LAB — BACTERIA SPEC AEROBE CULT: NORMAL

## 2021-04-05 ENCOUNTER — HOSPITAL ENCOUNTER (OUTPATIENT)
Dept: OTHER | Facility: HOSPITAL | Age: 56
Discharge: HOME | End: 2021-04-05
Attending: OTOLARYNGOLOGY

## 2021-04-05 LAB
T4 FREE SERPL-MCNC: 2 NG/DL (ref 0.9–1.8)
TSH SERPL-ACNC: 0.42 M[IU]/L (ref 0.27–4.2)

## 2021-04-13 ENCOUNTER — CONVERSION ENCOUNTER (OUTPATIENT)
Dept: INTERNAL MEDICINE | Facility: CLINIC | Age: 56
End: 2021-04-13

## 2021-04-13 ENCOUNTER — HOSPITAL ENCOUNTER (OUTPATIENT)
Dept: OTHER | Facility: HOSPITAL | Age: 56
Discharge: HOME | End: 2021-04-13
Attending: STUDENT IN AN ORGANIZED HEALTH CARE EDUCATION/TRAINING PROGRAM

## 2021-04-23 ENCOUNTER — CONVERSION ENCOUNTER (OUTPATIENT)
Dept: INTERNAL MEDICINE | Facility: CLINIC | Age: 56
End: 2021-04-23

## 2021-05-04 ENCOUNTER — HOSPITAL ENCOUNTER (OUTPATIENT)
Dept: OTHER | Facility: HOSPITAL | Age: 56
Discharge: HOME | End: 2021-05-04
Attending: STUDENT IN AN ORGANIZED HEALTH CARE EDUCATION/TRAINING PROGRAM

## 2021-05-04 LAB
25(OH)D3 SERPL-MCNC: 27.7 NG/ML (ref 30–100)
APPEARANCE UR: CLEAR
BASOPHILS # BLD AUTO: 0.03 10*3/UL (ref 0–0.2)
BASOPHILS NFR BLD AUTO: 0.6 % (ref 0–3)
BILIRUB UR QL: NEGATIVE
COLOR UR: YELLOW
CONV ABS IMM GRAN: 0.01 10*3/UL (ref 0–0.2)
CONV BACTERIA: NEGATIVE
CONV COLLECTION SOURCE (UA): ABNORMAL
CONV IMMATURE GRAN: 0.2 % (ref 0–1.8)
CONV UROBILINOGEN IN URINE BY AUTOMATED TEST STRIP: 1 {EHRLICHU}/DL (ref 0.1–1)
DEPRECATED RDW RBC AUTO: 50.7 FL (ref 36.4–46.3)
EOSINOPHIL # BLD AUTO: 0.15 10*3/UL (ref 0–0.7)
EOSINOPHIL # BLD AUTO: 3 % (ref 0–7)
ERYTHROCYTE [DISTWIDTH] IN BLOOD BY AUTOMATED COUNT: 15.3 % (ref 11.7–14.4)
FERRITIN SERPL-MCNC: 110 NG/ML (ref 10–200)
GLUCOSE UR QL: NEGATIVE MG/DL
HCT VFR BLD AUTO: 42.8 % (ref 37–47)
HGB BLD-MCNC: 13.5 G/DL (ref 12–16)
HGB UR QL STRIP: NEGATIVE
IRON SATN MFR SERPL: 33 % (ref 20–55)
IRON SERPL-MCNC: 94 UG/DL (ref 60–170)
KETONES UR QL STRIP: NEGATIVE MG/DL
LEUKOCYTE ESTERASE UR QL STRIP: ABNORMAL
LYMPHOCYTES # BLD AUTO: 1.7 10*3/UL (ref 1–5)
LYMPHOCYTES NFR BLD AUTO: 34.3 % (ref 20–45)
MCH RBC QN AUTO: 28.5 PG (ref 27–31)
MCHC RBC AUTO-ENTMCNC: 31.5 G/DL (ref 33–37)
MCV RBC AUTO: 90.5 FL (ref 81–99)
MONOCYTES # BLD AUTO: 0.48 10*3/UL (ref 0.2–1.2)
MONOCYTES NFR BLD AUTO: 9.7 % (ref 3–10)
NEUTROPHILS # BLD AUTO: 2.58 10*3/UL (ref 2–8)
NEUTROPHILS NFR BLD AUTO: 52.2 % (ref 30–85)
NITRITE UR QL STRIP: NEGATIVE
NRBC CBCN: 0 % (ref 0–0.7)
PH UR STRIP.AUTO: 6.5 [PH] (ref 5–8)
PLATELET # BLD AUTO: 160 10*3/UL (ref 130–400)
PMV BLD AUTO: 11.5 FL (ref 9.4–12.3)
PROT UR QL: 100 MG/DL
RBC # BLD AUTO: 4.73 10*6/UL (ref 4.2–5.4)
RBC #/AREA URNS HPF: ABNORMAL /[HPF]
SP GR UR: 1.02 (ref 1–1.03)
SQUAMOUS SPT QL MICRO: ABNORMAL /[HPF]
TIBC SERPL-MCNC: 283 UG/DL (ref 245–450)
TRANSFERRIN SERPL-MCNC: 198 MG/DL (ref 250–380)
WBC # BLD AUTO: 4.95 10*3/UL (ref 4.8–10.8)
WBC #/AREA URNS HPF: ABNORMAL /[HPF]

## 2021-05-08 ENCOUNTER — HOSPITAL ENCOUNTER (OUTPATIENT)
Dept: URGENT CARE | Facility: CLINIC | Age: 56
Discharge: HOME | End: 2021-05-08
Attending: EMERGENCY MEDICINE

## 2021-05-12 NOTE — PROGRESS NOTES
"   Progress Note      Patient Name: Aida Mcclure   Patient ID: [id number]   Sex: Female   YOB: 1965    Primary Care Provider: Boy Levy MD   Referring Provider: Boy Levy MD    Visit Date: April 3, 2020    Provider: Boy Levy MD   Location: [facility]   Location Address: [address] [place] [address]  [id number]   Location Phone: [phone]          Chief Complaint  · Follow up office visit within 14 calendar days of discharge from inpatient status (moderate complexity).  · \"I feel better since discharge\"      History Of Present Illness  FOLLOW UP OFFICE VISIT WITHIN 14 CALENDAR DAYS OF INPATIENT STATUS (MODERATE COMPLEXITY)  Aida Mcclure presents to office for follow up post discharge from inpatient status within 14 calendar days. Patient was contacted within 2 business days via phone conversation. Documentation of that phone contact is present in the patient's electronic chart. Patient was admitted to inpatient facility on 03/20/2020 and discharged 03/21/2020 due to: shortness of air..   Admitting MD: Rafi Lynch MD, David Esparza, and Jayjay Soriano.   Her discharge summary has been reviewed and placed in the patient's electronic chart.   Patient's problem list is: Anemia, Unspecified, CHF (congestive heart failure), Degeneration of lumbar intervertebral disc, Essential hypertension, GERD, Hyperthyroidism, Insomnia, Interstitial lung disease, Lumbosacral disc herniation, L5-S1, Lupus, Migraines, Pulmonary embolism, Rheumatoid arthritis, and Tachycardia   Patient's outpatient medication list has been reconcilled with the medication list from the discharge summary and has been reviewed with the patient. Current medication list is: 3 in 1 Commode 0, azathioprine 50 mg oral tablet, Bactrim -160 mg oral tablet, Caltrate 600 + D 600 mg (1,500 mg)-800 unit oral tablet,chewable, dextromethorphan-guaifenesin 60-1,200 mg oral tablet " extended release 12 hr, fluticasone propionate 50 mcg/actuation nasal spray,suspension, furosemide 40 mg oral tablet, gabapentin 300 mg oral capsule, hydrocodone-acetaminophen 7.5-325 mg oral tablet, hydroxychloroquine 200 mg oral tablet, levothyroxine 137 mcg oral tablet, loratadine 10 mg oral capsule, Miralax 17 gram/dose oral powder, Mucinex D  mg oral tablet extended release 12 hr, pilocarpine HCl 5 mg oral tablet, prednisone 10 mg oral tablet, Preparation H Maximum Strength 0.25-1 % rectal cream, ProAir HFA 90 mcg/actuation inhalation HFA aerosol inhaler, and sumatriptan succinate 50 mg oral tablet   Aida Mcclure is a 55 year old /Black female who presents for evaluation and treatment of:      pt reports feeling much better. pt has f/u appointment with pulm upcoming. pt cont to be on Bactrim.   HTN- pt reports some lower BPs at home recently. pt denies dizziness,  HAs, CP.   SLE- pt cont to be on plauqenil and imuran. pt recently f/u with Dr. Garnett.    ILD- pt reports breathing seems to be at baseline.   hypothyroid - pt due for recheck  CHF with preserved EF - pt has not f/u with Dr. Ann. pt denies THOMPSON beyond baseline and orthopnea. no edema.       Past Medical History  Disease Name Date Onset Notes   Anemia, Unspecified --  --    Annual physical exam 11/06/2017 will schedule mammogram   Bilateral Hamstring injury/pain 08/17/2015 --    Breast lump --  --    CHF (congestive heart failure) 11/06/2017 follows with cards. currently doing well on Lasix, Lisinopril, aldactone, and propanolol   Degeneration of lumbar intervertebral disc 06/12/2012 --    Essential hypertension 11/06/2017 well controlled on current regimen.    GERD --  --    Head injury --  --    Hernia --  --    Hyperthyroidism 11/06/2017 currently on methimazole. Pt reports possibly due to Grave's disease. Will need to obtain and review medical records from [facility]. will refer to endo.   Hypoxia --  --    Insomnia  2017 discussed sleep hygiene. Pt to try avoiding blue light at night. pt also to to set routine prior to bedtime. will avoid meds at this time.   Interstitial lung disease --  --    Lumbosacral disc herniation, L5-S1 2012 Left L5-S1 small in nature. She has failed all conservative measures and desires surgery.   Lupus --  sees Dr. Jones    Migraines --  --    Pulmonary embolism --  --    Rheumatoid arthritis 2017 currently controlled, but with residual deformity. will follow with Dr. Jones   Right Total Knee 2015 --    Tachycardia --  --          Past Surgical History  Procedure Name Date Notes   Ankle surgery --  bilateral   Breast biopsy --  --     --  --    Cataract exctraction with lens implant --  --    Colonoscopy --  --    Eye Implant --  yes   Fasciotomy --  left anterior arm   Hand surgery, left --  --    Knee surgery --  --    Lymph Node Excision --  --    Power Port Placement --  --          Medication List  Name Date Started Instructions   albuterol sulfate 90 mcg/actuation inhalation HFA aerosol inhaler 2020 inhale 1 puff (90 mcg) by inhalation route every 4 hours as needed   azathioprine 50 mg oral tablet 2018 take 3 tablets (150 mg) by oral route once daily   Bactrim -160 mg oral tablet  take 1 tablet by oral route once daily   Caltrate 600 + D 600 mg (1,500 mg)-800 unit oral tablet,chewable  chew 1 tablet by oral route daily   fluticasone propionate 50 mcg/actuation nasal spray,suspension  inhale 2 sprays (100 mcg) in each nostril by intranasal route once daily   gabapentin 300 mg oral capsule 2019 take 1 capsule by oral route 3 times a day for 30 days prn pain   hydrocodone-acetaminophen 7.5-325 mg oral tablet 2020 take 1 tablet by oral route every 8 hours as needed for pain   hydroxychloroquine 200 mg oral tablet 2020 TAKE 1 TABLET BY MOUTH TWICE DAILY   levothyroxine 137 mcg oral tablet 2020 TAKE 1 TABLET BY MOUTH EVERY  DAY   loratadine 10 mg oral capsule 08/15/2019 TAKE 1 CAPSULE BY ORAL ROUTE DAILY   Maxalt 10 mg oral tablet 04/03/2020 take 1 tablet (10 mg) by oral route once, may repeat at 2 hour intervals; do not exceed 30 mg in 24 hours   Miralax 17 gram/dose oral powder 11/26/2019 take 17 gram mixed with 8 oz. water, juice, soda, coffee or tea by oral route once daily   pilocarpine HCl 5 mg oral tablet 02/28/2020 take 1 tablet by oral route once a day (at bedtime) for 90 days   prednisone 10 mg oral tablet  take 4 tablets (40 mg) by oral route once daily   Preparation H Maximum Strength 0.25-1 % rectal cream 08/12/2019 apply by rectal route 4 times a day         Allergy List  Allergen Name Date Reaction Notes   aspirin --  --  LARGE DOSES OF ASPIRIN   Disalcid --  --  --          Family Medical History  Disease Name Relative/Age Notes   Lung Disease  --    Arthritis, Rheumatoid  --    Stroke Father/   Father   Cancer, Unspecified  --    - No Family History of Colorectal Cancer  --    Parkinson's Disease  --    Diabetes mellitus, type II  --          Social History  Finding Status Start/Stop Quantity Notes   Alcohol Current some day 0/0 --  2-3 week drinks weekly; wine   Caffeine Current some day 0/0 --  drinks occasionally; tea; 3-4 times per day   Recreational Drug Use Never --/-- --  no   Second hand smoke exposure --  --/-- --  For most of her life   Single. --  --/-- --  --    Tobacco Never 0/0 --  never a smoker  smoked 0.5 year   Unemployed. --  --/-- --  on disability         Immunizations  NameDate Admin Mfg Trade Name Lot Number Route Inj VIS Given VIS Publication   Nyqcqabqo66/29/2019 [facility] Fluzone Quadrivalent [id number] IM LD 10/29/2019    Comments: tolerated well   Uoyivpmir60/18/2018 [facility] Fluzone > 3 Years [id number] IM RD 10/18/2018 08/19/2014   Comments: pt tolerated well   Mfwohmzjl9523/26/2019 [facility] PNEUMOVAX 23 [id number] IM LD 08/26/2019    Comments: tolerated well   Prevnar 1306/14/2018 [facility] PREVNAR 13 [id number] IM LD  "06/14/2018 11/05/2015   Comments: tolerated well         Review of Systems  · Constitutional  o Denies  o : fever, weight gain, weight loss, fatigue  · Cardiovascular  o Denies  o : palpitation, chest pain, lower extremity edema  · Respiratory  o Denies  o : shortness of breath, wheezing, dry cough, productive cough  · Gastrointestinal  o Denies  o : nausea, vomiting, diarrhea, constipation, abdominal pain  · Neurologic  o Denies  o : unsteady gait, weakness, dizziness, H/A      Vitals  Date Time BP Position Site L\R Cuff Size HR RR TEMP (F) WT  HT  BMI kg/m2 BSA m2 O2 Sat HC       02/28/2020 02:04 /74 Sitting    116 - R 16 99 134lbs 4oz 5'  4\" 23.04 1.66 95 %    03/20/2020 04:19 /84 Sitting    111 - R 16 99.7 138lbs 2oz 5'  4\" 23.71 1.68 95 %    03/20/2020 04:30 PM             83 %    04/03/2020 10:01 AM 94/62 Sitting    91 - R 14 98.5 140lbs 4oz 5'  4\" 24.07 1.69 95 %          Physical Examination  · Constitutional  o Appearance  o : no acute distress, well-nourished  · Head and Face  o Head  o :   § Inspection  § : atraumatic, normocephalic  · Eyes  o Eyes  o : extraocular movements intact, no scleral icterus, no conjunctival injection  · Ears, Nose, Mouth and Throat  o Ears  o :   § External Ears  § : normal  o Nose  o :   § Intranasal Exam  § : nares patent  o Oral Cavity  o :   § Oral Mucosa  § : moist mucous membranes  · Respiratory  o Respiratory Effort  o : breathing comfortably, symmetric chest rise  o Auscultation of Lungs  o : clear to asculatation bilaterally, no wheezes, rales, or rhonchii  · Cardiovascular  o Heart  o :   § Auscultation of Heart  § : regular rate and rhythm, no murmurs, rubs, or gallops  o Peripheral Vascular System  o :   § Extremities  § : no edema  · Neurologic  o Mental Status Examination  o :   § Orientation  § : grossly oriented to person, place and time  o Gait and Station  o :   § Gait Screening  § : normal gait  · Psychiatric  o General  o : normal mood and " affect          Results  · In-Office Procedures  o Lab procedure  § IOP - Urine Drug Screen In-House [facility] ([address])   § Amphetamines Ur Ql: Negative   § Barbiturates Ur Ql: Negative   § Buprenorphine+Nor Ur Ql Scn: Negative   § Benzodiaz Ur Ql: Negative   § Cocaine Ur Ql: Negative   § Methadone Ur Ql: Negative   § Methamphet Ur Ql: Negative   § MDMA Ur Ql Scn: Negative   § Opiates Ur Ql: Positive   § Oxycodone Ur Ql: Negative   § PCP Ur Ql: Negative   § THC Ur Ql: Negative   § Temp in Range?: Within/Acceptable   § Control Seen?: Yes       Assessment  · CHF (congestive heart failure)     428.0/I50.9  with preserved EF. will refer to Dr. Ann for cont f/u.   · Essential hypertension     401.9/I10  lower BP today. with concurrent tachycardia, concerned with possible dehydration. pt to DC Lasix.   · Hypothyroidism     244.9/E03.9  recheck labs and adjust synthroid accordingly.   · Pneumonia     486/J18.9  improving. cont Bactrim as Rx'd by pulm.  · Migraines     346.90/G43.909  imitrex no longer preferred on formulary. will Rx maxalt instead.   · Interstitial lung disease     515/J84.9  cont to f/u with pulm. cont current resp regimen. cont Bactrim daily until f/u with pulm.   · Tachycardia     785.0/R00.0  may be related to volume down with low BPs. pt to stop Lasix and monitor. f/u in 1 month for repeat eval.   · Lupus     710.0/M32.9  f/u with Dr. Jones. cont plaquenil and Imuran.   · Inpatient hospitalization within last 30 days     V49.89/Z78.9    Problems Reconciled  Plan  · Orders  o Discharge medications reconciled with the current medication list (1111F) - - 04/03/2020  o Physical, Primary Care Panel (CBC, CMP, Lipid, TSH) [facility] (45183, 66139, 36809, 34341) - 515/J84.9, 710.0/M32.9, 244.9/E03.9, 486/J18.9 - 04/03/2020  o ACO-39: Current medications updated and reviewed () - - 04/03/2020  o ACO-15: Pneumococcal Vaccine Administered or Previously Received (4040F) - - 04/03/2020  o ACO-14: Influenza immunization  administered or previously received () - - 04/03/2020  o ACO-20: Screening Mammography documented and reviewed () - - 04/03/2020  o CARDIOLOGY CONSULTATION (CARDI) - 428.0/I50.9 - 04/03/2020  · Medications  o albuterol sulfate 90 mcg/actuation inhalation HFA aerosol inhaler   SIG: inhale 1 puff (90 mcg) by inhalation route every 4 hours as needed   DISP: (1) 18 gm canister with 3 refills  Adjusted on 04/03/2020     o Maxalt 10 mg oral tablet   SIG: take 1 tablet (10 mg) by oral route once, may repeat at 2 hour intervals; do not exceed 30 mg in 24 hours   DISP: (30) tablets with 0 refills  Adjusted on 04/03/2020     o hydrocodone-acetaminophen 7.5-325 mg oral tablet   SIG: take 1 tablet by oral route every 8 hours as needed for pain   DISP: (60) tablet with 0 refills  Refilled on 04/03/2020     o 3 in 1 Commode 0   SIG: Dispense 1 product. Use as directed.   DISP: (1) product with 0 refills  Discontinued on 04/03/2020     o dextromethorphan-guaifenesin 60-1,200 mg oral tablet extended release 12 hr   SIG: take 1 tablet by oral route 2 times a day prn cough   DISP: (60) tablets with 0 refills  Discontinued on 04/03/2020     o furosemide 40 mg oral tablet   SIG: TAKE 40MG IN AM AND 40MG IN AFTERNOON FOR 90 DAYS   DISP: (180) Tablet with 0 refills  Discontinued on 04/03/2020     o Mucinex D  mg oral tablet extended release 12 hr   SIG: take 1 tablet by oral route 2 times a day as needed   DISP: (0) tablet with 0 refills  Discontinued on 04/03/2020     o Medications have been Reconciled  o Transition of Care or Provider Policy  · Instructions  o Patient discharge summary has been reviewed and placed in patient's electronic medical record.  o Patient received a phone call from my office within 2 business days of discharge from inpatient status, and documented within the patient's chart.  o Also patient was seen (face to face) for follow up evaluation within 14 calendar days of discharge from inpatient  status for a severe complexity issue.  o Patient was educated on their diagnosis, treatment and any medication changes while being evaluated today.  o Patient was educated/instructed on their diagnosis, treatment and medications prior to discharge from the clinic today.  · Disposition  o f/u in 1 month  o labs/x-ray ordered, but not done in clinic  o Care Transition  o MARLEN Sent            Electronically Signed by: Boy Levy MD -Author on April 3, 2020 01:53:15 PM

## 2021-05-13 NOTE — PROGRESS NOTES
"   Progress Note      Patient Name: Aida Mcclure   Patient ID: [id number]   Sex: Female   YOB: 1965    Primary Care Provider: Boy Levy MD   Referring Provider: Boy Levy MD    Visit Date: November 16, 2020    Provider: Boy Levy MD   Location: [facility]   Location Address: [address] [place] [address], [facility] [address]  [place], [place]  [id number]   Location Phone: [phone]          Chief Complaint  · Congestion  · Shortness of air      History Of Present Illness  Aida Mcclure is a 55 year old /Black female who presents for evaluation and treatment of:      58.\">pt reports dry cough and congestion x3 days. pt denies fevers, chest pain. pt does report some SOB as well. pt denies sick contacts. pt has not been using rescue inhalers often. pt unsure if wheezing.   pt with recent f/u with endocrinology where pt had Synthroid adjusted. pt reports TSH was >60.       Past Medical History  Disease Name Date Onset Notes   Anemia, Unspecified --  --    Annual physical exam 11/06/2017 will schedule mammogram   Bilateral Hamstring injury/pain 08/17/2015 --    Breast lump --  --    CHF (congestive heart failure) 11/06/2017 follows with cards. currently doing well on Lasix, Lisinopril, aldactone, and propanolol   Degeneration of lumbar intervertebral disc 06/12/2012 --    Essential hypertension 11/06/2017 well controlled on current regimen.    GERD --  --    Head injury --  --    Hernia --  --    Hyperthyroidism 11/06/2017 currently on methimazole. Pt reports possibly due to Grave's disease. Will need to obtain and review medical records from [facility]. will refer to endo.   Hypoxia --  --    Insomnia 11/06/2017 discussed sleep hygiene. Pt to try avoiding blue light at night. pt also to to set routine prior to bedtime. will avoid meds at this time.   Interstitial lung disease --  --    Lumbosacral disc herniation, L5-S1 06/12/2012 Left L5-S1 small in nature. She has " failed all conservative measures and desires surgery.   Lupus --  sees Dr. Jones    Migraines --  --    Pulmonary embolism --  --    Rheumatoid arthritis 2017 currently controlled, but with residual deformity. will follow with Dr. Jones   Right Total Knee 2015 --    Tachycardia --  --          Past Surgical History  Procedure Name Date Notes   Ankle surgery --  bilateral   Breast biopsy --  --     --  --    Cataract exctraction with lens implant --  --    Colonoscopy --  --    Eye Implant --  yes   Fasciotomy --  left anterior arm   Hand surgery, left --  --    Knee surgery --  --    Lymph Node Excision --  --    Power Port Placement --  --          Medication List  Name Date Started Instructions   3 in 1 Commode 2020 Dispense 1 unit. Use as directed.   albuterol sulfate 90 mcg/actuation inhalation HFA aerosol inhaler 2020 inhale 1 puff (90 mcg) by inhalation route every 4 hours as needed   azathioprine 50 mg oral tablet 2018 take 3 tablets (150 mg) by oral route once daily   Calcium 500 + D 500 mg(1,250mg) -200 unit oral tablet  take 1 tablet by oral route daily   fluticasone propionate 50 mcg/actuation nasal spray,suspension  inhale 2 sprays (100 mcg) in each nostril by intranasal route once daily   folic acid 1 mg oral tablet  take 1 tablet (1 mg) by oral route once daily   furosemide 40 mg oral tablet 2020 TAKE 1 TABLET BY MOUTH IN THE MORNING AND AFTERNOON   gabapentin 300 mg oral capsule 2019 take 1 capsule by oral route 3 times a day for 30 days prn pain   hydrocodone-acetaminophen 7.5-325 mg oral tablet 10/13/2020 take 1 tablet by oral route every 8 hours as needed for pain   hydroxychloroquine 200 mg oral tablet  take 1 tablet (200 mg) by oral route once daily   Imitrex 50 mg oral tablet  take 1 tablet (50 mg) by oral route once with fluids as early as possible after the onset of a migraine attack;may repeat after 2 hours if headache returns, not to  exceed 200mg in 24hrs   levothyroxine 200 mcg oral tablet  --    loratadine 10 mg oral capsule 08/15/2019 TAKE 1 CAPSULE BY ORAL ROUTE DAILY   Miralax 17 gram/dose oral powder 11/26/2019 take 17 gram mixed with 8 oz. water, juice, soda, coffee or tea by oral route once daily   pilocarpine HCl 5 mg oral tablet 02/28/2020 take 1 tablet by oral route once a day (at bedtime) for 90 days   promethazine 12.5 mg oral tablet  take 1 tablet (12.5 mg) by oral route 3 times per day   propranolol 40 mg oral tablet 09/08/2020 TAKE 1/2 TABLET BY MOUTH THREE TIMES DAILY   Requip 0.25 mg oral tablet 09/17/2020 take 1 tablet (0.25 mg) by oral route hour before bedtime   spironolactone 25 mg oral tablet  take 1 tablet (25 mg) by oral route once daily         Allergy List  Allergen Name Date Reaction Notes   aspirin --  --  LARGE DOSES OF ASPIRIN   Disalcid --  --  --        Allergies Reconciled  Family Medical History  Disease Name Relative/Age Notes   Lung Disease  --    Arthritis, Rheumatoid  --    Stroke Father/   Father   Cancer, Unspecified  --    - No Family History of Colorectal Cancer  --    Parkinson's Disease  --    Diabetes Mellitus, Type II  --          Social History  Finding Status Start/Stop Quantity Notes   Alcohol Current some day 0/0 --  2-3 week drinks weekly; wine   Caffeine Current some day 0/0 --  drinks occasionally; tea; 3-4 times per day   Recreational Drug Use Never --/-- --  no   Second hand smoke exposure --  --/-- --  For most of her life   Single. --  --/-- --  --    Tobacco Never 0/0 --  never a smoker  smoked 0.5 year   Unemployed. --  --/-- --  on disability         Immunizations  NameDate Admin Mfg Trade Name Lot Number Route Inj VIS Given VIS Publication   [id number]/17/2020 [facility] Fluzone Quadrivalent [id number]  LD 09/17/2020    Comments: Pt tolerated well, left office in stable condition   Eckparfcg1979/26/2019 [facility] PNEUMOVAX 23 [id number]  LD 08/26/2019    Comments: tolerated well   Prevnar  "1306/14/2018 [facility] PREVNAR 13 [id number] LD 06/14/2018 11/05/2015   Comments: tolerated well         Review of Systems  · Constitutional  o Denies  o : fever, fatigue, weight loss, weight gain  · Cardiovascular  o Denies  o : lower extremity edema, chest pressure, palpitations  · Respiratory  o Admits  o : frequent cough, dyspnea on exertion  · Gastrointestinal  o Denies  o : nausea, vomiting, diarrhea, constipation, abdominal pain      Vitals  Date Time BP Position Site L\R Cuff Size HR RR TEMP (F) WT  HT  BMI kg/m2 BSA m2 O2 Sat FR L/min FiO2 HC       11/16/2020 02:37 /82 Sitting    102 - R  98 202lbs 0oz 5'  4\" 34.67 2.03 95 %            Physical Examination  · Constitutional  o Appearance  o : no acute distress, well-nourished  · Head and Face  o Head  o :   § Inspection  § : atraumatic, normocephalic  · Eyes  o Eyes  o : extraocular movements intact, no scleral icterus, no conjunctival injection  · Ears, Nose, Mouth and Throat  o Ears  o :   § External Ears  § : normal  o Nose  o :   § Intranasal Exam  § : nares patent  o Oral Cavity  o :   § Oral Mucosa  § : moist mucous membranes  · Respiratory  o Respiratory Effort  o : breathing comfortably, symmetric chest rise  o Auscultation of Lungs  o : intermittent, mild, expiratory wheezing. no ronchi or rales.   · Cardiovascular  o Heart  o :   § Auscultation of Heart  § : regular rate and rhythm, no murmurs, rubs, or gallops  o Peripheral Vascular System  o :   § Extremities  § : no edema  · Neurologic  o Mental Status Examination  o :   § Orientation  § : grossly oriented to person, place and time  o Gait and Station  o :   § Gait Screening  § : normal gait  · Psychiatric  o General  o : normal mood and affect              Assessment  · COPD (chronic obstructive pulmonary disease)     496/J44.9  with exacaerbation. will Rx short course of steroids and azithromycin as well as Mucinex DM prn. obtain covid test.     Problems Reconciled  Plan  · Orders  o ACO-39: " Current medications updated and reviewed (1159F, ) - - 11/16/2020  · Medications  o azithromycin 250 mg oral tablet   SIG: take 2 tablets (500 mg) by oral route once daily for 1 day then 1 tablet (250 mg) by oral route once daily for 4 days   DISP: (6) Tablet with 0 refills  Prescribed on 11/16/2020     o prednisone 20 mg oral tablet   SIG: take 2 tablets (40 mg) by oral route once daily for 5 days   DISP: (10) Tablet with 0 refills  Prescribed on 11/16/2020     o Medications have been Reconciled  o Transition of Care or Provider Policy  · Instructions  o Patient was educated/instructed on their diagnosis, treatment and medications prior to discharge from the clinic today.  · Disposition  o Call or Return if symptoms worsen or persist.            Electronically Signed by: Boy Levy MD -Author on November 16, 2020 03:23:39 PM

## 2021-05-13 NOTE — PROGRESS NOTES
Progress Note      Patient Name: Aida Mcclure   Patient ID: [id number]   Sex: Female   YOB: 1965    Primary Care Provider: Boy Levy MD   Referring Provider: Boy Levy MD    Visit Date: June 22, 2020    Provider: Boy Levy MD   Location: [facility]   Location Address: [address] [place] [address]  [id number]   Location Phone: [phone]          Chief Complaint  · Anemia      History Of Present Illness  TELEHEALTH TELEPHONE VISIT  Chief Complaint: follow up HTN   Aida Mcclure is a 55 year old /Black female who is presenting for evaluation via telehealth telephone visit. Verbal consent obtained before beginning visit.   Provider spent 12 minutes with the patient during the telehealth visit.   The following staff were present during this visit: Dr. Levy and pt   Past Medical History/ Overview of Patient Symptoms     pt wants to know results of her hip x-rays.   pt reports leg cramps, sahnte at nighttime. pt has been using muscle relaxers without much relief. pt has magnesium, but has not tried.  HTN- pt reports homes readings 110s/70s. pt reports tachycardia has resolved.   CKD stage 4 - pt restarted on Lasix.    SLE- eye exam scheduled in 8/2020. f/u with rheumatology.   ILD- cont to f/u with pulm. pt reports breathing at baseline.       Past Medical History  Disease Name Date Onset Notes   Anemia, Unspecified --  --    Annual physical exam 11/06/2017 will schedule mammogram   Bilateral Hamstring injury/pain 08/17/2015 --    Breast lump --  --    CHF (congestive heart failure) 11/06/2017 follows with cards. currently doing well on Lasix, Lisinopril, aldactone, and propanolol   Degeneration of lumbar intervertebral disc 06/12/2012 --    Essential hypertension 11/06/2017 well controlled on current regimen.    GERD --  --    Head injury --  --    Hernia --  --    Hyperthyroidism 11/06/2017 currently on methimazole. Pt  reports possibly due to Grave's disease. Will need to obtain and review medical records from [facility]. will refer to endo.   Hypoxia --  --    Insomnia 2017 discussed sleep hygiene. Pt to try avoiding blue light at night. pt also to to set routine prior to bedtime. will avoid meds at this time.   Interstitial lung disease --  --    Lumbosacral disc herniation, L5-S1 2012 Left L5-S1 small in nature. She has failed all conservative measures and desires surgery.   Lupus --  sees Dr. Jones    Migraines --  --    Pulmonary embolism --  --    Rheumatoid arthritis 2017 currently controlled, but with residual deformity. will follow with Dr. Jones   Right Total Knee 2015 --    Tachycardia --  --          Past Surgical History  Procedure Name Date Notes   Ankle surgery --  bilateral   Breast biopsy --  --     --  --    Cataract exctraction with lens implant --  --    Colonoscopy --  --    Eye Implant --  yes   Fasciotomy --  left anterior arm   Hand surgery, left --  --    Knee surgery --  --    Lymph Node Excision --  --    Power Port Placement --  --          Medication List  Name Date Started Instructions   albuterol sulfate 90 mcg/actuation inhalation HFA aerosol inhaler 2020 inhale 1 puff (90 mcg) by inhalation route every 4 hours as needed   azathioprine 50 mg oral tablet 2018 take 3 tablets (150 mg) by oral route once daily   fluticasone propionate 50 mcg/actuation nasal spray,suspension  inhale 2 sprays (100 mcg) in each nostril by intranasal route once daily   furosemide 40 mg oral tablet 2020 TAKE 1 TABLET BY MOUTH IN THE MORNING AND AFTERNOON   gabapentin 300 mg oral capsule 2019 take 1 capsule by oral route 3 times a day for 30 days prn pain   hydrocodone-acetaminophen 7.5-325 mg oral tablet 2020 take 1 tablet by oral route every 8 hours as needed for pain   hydroxychloroquine 200 mg oral tablet 2020 take 1 tablet (200 mg) by oral route once daily    levothyroxine 125 mcg oral tablet 05/18/2020 TAKE 1 TABLET(125 MCG) BY MOUTH EVERY DAY   loratadine 10 mg oral capsule 08/15/2019 TAKE 1 CAPSULE BY ORAL ROUTE DAILY   Miralax 17 gram/dose oral powder 11/26/2019 take 17 gram mixed with 8 oz. water, juice, soda, coffee or tea by oral route once daily   pilocarpine HCl 5 mg oral tablet 02/28/2020 take 1 tablet by oral route once a day (at bedtime) for 90 days   propranolol 40 mg oral tablet 06/16/2020 TAKE 1/2 TABLET BY MOUTH THREE TIMES DAILY         Allergy List  Allergen Name Date Reaction Notes   aspirin --  --  LARGE DOSES OF ASPIRIN   Disalcid --  --  --          Family Medical History  Disease Name Relative/Age Notes   Lung Disease  --    Arthritis, Rheumatoid  --    Stroke Father/   Father   Cancer, Unspecified  --    - No Family History of Colorectal Cancer  --    Parkinson's Disease  --    Diabetes Mellitus, Type II  --          Social History  Finding Status Start/Stop Quantity Notes   Alcohol Current some day 0/0 --  2-3 week drinks weekly; wine   Caffeine Current some day 0/0 --  drinks occasionally; tea; 3-4 times per day   Recreational Drug Use Never --/-- --  no   Second hand smoke exposure --  --/-- --  For most of her life   Single. --  --/-- --  --    Tobacco Never 0/0 --  never a smoker  smoked 0.5 year   Unemployed. --  --/-- --  on disability         Immunizations  NameDate Admin Mfg Trade Name Lot Number Route Inj VIS Given VIS Publication   Yookaexkl35/29/2019 [facility] Fluzone Quadrivalent [id number]  LD 10/29/2019    Comments: tolerated well   Nddkdcrqx36/18/2018 [facility] Fluzone > 3 Years [id number]  RD 10/18/2018 08/19/2014   Comments: pt tolerated well   Kcuewbvym8651/26/2019 [facility] PNEUMOVAX 23 [id number]  LD 08/26/2019    Comments: tolerated well   Prevnar 1306/14/2018 [facility] PREVNAR 13 [id number]  LD 06/14/2018 11/05/2015   Comments: tolerated well         Physical Examination                Assessment  · Anemia, Unspecified     285.9/D64.9  likely  chronic disease. cont monitoring.   · Interstitial lung disease     515/J84.9  doing well. f/u with pulm.   · CHF (congestive heart failure)     428.0/I50.9  follows with cards. currently doing well on Lasix, Lisinopril, aldactone, and propranolol.  · Essential hypertension     401.9/I10  well controlled on current regimen and with Lasix restarted  · Lupus     710.0/M32.9  sees Dr. Jones. eye exam scheduled in 8/2020.  · Leg cramp     729.82/R25.2  trial magnesium supp. may consider requip if not effective.     Problems Reconciled  Plan  · Orders  o Physician Telephone Evaluation, 11-20 minutes (95721) - - 06/22/2020  · Medications  o Medications have been Reconciled  o Transition of Care or Provider Policy  · Disposition  o f/u in 3 months            Electronically Signed by: Boy Levy MD -Author on June 22, 2020 11:18:53 AM

## 2021-05-13 NOTE — PROGRESS NOTES
"   Progress Note      Patient Name: Aida Mcclure   Patient ID: [id number]   Sex: Female   YOB: 1965    Primary Care Provider: Boy Levy MD   Referring Provider: Boy Levy MD    Visit Date: November 11, 2020    Provider: Casi Schaeffer PA-C   Location: [facility]   Location Address: [address] [place] [address], [address]  [id number]   Location Phone: [phone]          Chief Complaint  · \"Getting sutures removed today and to talk about my medications.\"      History Of Present Illness  Aida Mcclure is a 55 year old /Black female who presents for evaluation and treatment of:      Suture removal.  Patient has 8 sutures in her right leg from the ER from 10 days ago.  She also has a wound on her left leg which happened just before the one on her right.  It does not have sutures due to duration of laceration.  It has been healing on its own but she has started to notice some pain around the wound.  No body aches or fever.       Past Medical History  Disease Name Date Onset Notes   Anemia, Unspecified --  --    Annual physical exam 11/06/2017 will schedule mammogram   Bilateral Hamstring injury/pain 08/17/2015 --    Breast lump --  --    CHF (congestive heart failure) 11/06/2017 follows with cards. currently doing well on Lasix, Lisinopril, aldactone, and propanolol   Degeneration of lumbar intervertebral disc 06/12/2012 --    Essential hypertension 11/06/2017 well controlled on current regimen.    GERD --  --    Head injury --  --    Hernia --  --    Hyperthyroidism 11/06/2017 currently on methimazole. Pt reports possibly due to Grave's disease. Will need to obtain and review medical records from [facility]. will refer to endo.   Hypoxia --  --    Insomnia 11/06/2017 discussed sleep hygiene. Pt to try avoiding blue light at night. pt also to to set routine prior to bedtime. will avoid meds at this time.   Interstitial lung disease --  --  "   Lumbosacral disc herniation, L5-S1 2012 Left L5-S1 small in nature. She has failed all conservative measures and desires surgery.   Lupus --  sees Dr. Jones    Migraines --  --    Pulmonary embolism --  --    Rheumatoid arthritis 2017 currently controlled, but with residual deformity. will follow with Dr. Jones   Right Total Knee 2015 --    Tachycardia --  --          Past Surgical History  Procedure Name Date Notes   Ankle surgery --  bilateral   Breast biopsy --  --     --  --    Cataract exctraction with lens implant --  --    Colonoscopy --  --    Eye Implant --  yes   Fasciotomy --  left anterior arm   Hand surgery, left --  --    Knee surgery --  --    Lymph Node Excision --  --    Power Port Placement --  --          Medication List  Name Date Started Instructions   3 in 1 Commode 2020 Dispense 1 unit. Use as directed.   albuterol sulfate 90 mcg/actuation inhalation HFA aerosol inhaler 2020 inhale 1 puff (90 mcg) by inhalation route every 4 hours as needed   azathioprine 50 mg oral tablet 2018 take 3 tablets (150 mg) by oral route once daily   azithromycin 250 mg oral tablet 2020 take 2 tablets (500 mg) by oral route once daily for 1 day then 1 tablet (250 mg) by oral route once daily for 4 days   Bactrim -160 mg oral tablet 2020 take 1 tablet by oral route every 12 hours for 10 days for 10 days   Calcium 500 + D 500 mg(1,250mg) -200 unit oral tablet  take 1 tablet by oral route daily   fluticasone propionate 50 mcg/actuation nasal spray,suspension  inhale 2 sprays (100 mcg) in each nostril by intranasal route once daily   folic acid 1 mg oral tablet  take 1 tablet (1 mg) by oral route once daily   furosemide 40 mg oral tablet 2020 TAKE 1 TABLET BY MOUTH IN THE MORNING AND AFTERNOON   gabapentin 300 mg oral capsule 2019 take 1 capsule by oral route 3 times a day for 30 days prn pain   hydrocodone-acetaminophen 7.5-325 mg oral  tablet 10/13/2020 take 1 tablet by oral route every 8 hours as needed for pain   hydroxychloroquine 200 mg oral tablet 07/06/2020 TAKE 1 TABLET BY MOUTH TWICE DAILY   Imitrex 50 mg oral tablet  take 1 tablet (50 mg) by oral route once with fluids as early as possible after the onset of a migraine attack;may repeat after 2 hours if headache returns, not to exceed 200mg in 24hrs   levothyroxine 125 mcg oral tablet 05/18/2020 TAKE 1 TABLET(125 MCG) BY MOUTH EVERY DAY   loratadine 10 mg oral capsule 08/15/2019 TAKE 1 CAPSULE BY ORAL ROUTE DAILY   Miralax 17 gram/dose oral powder 11/26/2019 take 17 gram mixed with 8 oz. water, juice, soda, coffee or tea by oral route once daily   pilocarpine HCl 5 mg oral tablet 02/28/2020 take 1 tablet by oral route once a day (at bedtime) for 90 days   promethazine 12.5 mg oral tablet  take 1 tablet (12.5 mg) by oral route 3 times per day   propranolol 40 mg oral tablet 09/08/2020 TAKE 1/2 TABLET BY MOUTH THREE TIMES DAILY   Requip 0.25 mg oral tablet 09/17/2020 take 1 tablet (0.25 mg) by oral route hour before bedtime   spironolactone 25 mg oral tablet  take 1 tablet (25 mg) by oral route once daily         Allergy List  Allergen Name Date Reaction Notes   aspirin --  --  LARGE DOSES OF ASPIRIN   Disalcid --  --  --        Allergies Reconciled  Family Medical History  Disease Name Relative/Age Notes   Lung Disease  --    Arthritis, Rheumatoid  --    Stroke Father/   Father   Cancer, Unspecified  --    - No Family History of Colorectal Cancer  --    Parkinson's Disease  --    Diabetes Mellitus, Type II  --          Social History  Finding Status Start/Stop Quantity Notes   Alcohol Current some day 0/0 --  2-3 week drinks weekly; wine   Caffeine Current some day 0/0 --  drinks occasionally; tea; 3-4 times per day   Recreational Drug Use Never --/-- --  no   Second hand smoke exposure --  --/-- --  For most of her life   Single. --  --/-- --  --    Tobacco Never 0/0 --  never a smoker   "smoked 0.5 year   Unemployed. --  --/-- --  on disability         Immunizations  NameDate Admin Mfg Trade Name Lot Number Route Inj VIS Given VIS Publication   Iiuwelsyr75/17/2020 [facility] Fluzone Quadrivalent [id number]  LD 09/17/2020    Comments: Pt tolerated well, left office in stable condition   Uqapfbckz4237/26/2019 [facility] PNEUMOVAX 23 [id number]  LD 08/26/2019    Comments: tolerated well   Prevnar 1306/14/2018 [facility] PREVNAR 13 [id number] LD 06/14/2018 11/05/2015   Comments: tolerated well         Review of Systems  · Constitutional  o Denies  o : fever, fatigue, weight loss, weight gain  · Cardiovascular  o Denies  o : lower extremity edema, claudication, chest pressure, palpitations  · Respiratory  o Denies  o : shortness of breath, wheezing, frequent cough, hemoptysis, dyspnea on exertion  · Gastrointestinal  o Denies  o : nausea, vomiting, diarrhea, constipation, abdominal pain      Vitals  Date Time BP Position Site L\R Cuff Size HR RR TEMP (F) WT  HT  BMI kg/m2 BSA m2 O2 Sat FR L/min FiO2 HC       08/19/2020 01:44 /66 Sitting    95 - R 18 98.8     95 %  21%    09/17/2020 12:44 /90 Sitting    96 - R  97 201lbs 0oz 5'  7\" 31.48 2.08 97 %      11/11/2020 12:01 /72 Sitting    72 - R  98.6 204lbs 4oz 5'  4\" 35.06 2.05 92 %  21%          Physical Examination  · Constitutional  o Appearance  o : no acute distress, well-nourished  · Head and Face  o Head  o :   § Inspection  § : atraumatic, normocephalic  · Ears, Nose, Mouth and Throat  o Ears  o :   § External Ears  § : normal  o Nose  o :   § Intranasal Exam  § : nares patent  o Oral Cavity  o :   § Oral Mucosa  § : moist mucous membranes  · Respiratory  o Respiratory Effort  o : breathing comfortably, symmetric chest rise  o Auscultation of Lungs  o : clear to asculatation bilaterally, no wheezes, rales, or rhonchii  · Cardiovascular  o Heart  o :   § Auscultation of Heart  § : regular rate and rhythm, no murmurs, rubs, or gallops  o Peripheral Vascular " System  o :   § Extremities  § : no edema  · Neurologic  o Mental Status Examination  o :   § Orientation  § : grossly oriented to person, place and time  o Gait and Station  o :   § Gait Screening  § : normal gait     8 sutures removed from right lower leg, wound has healed well, approximates well.  No swelling, deformity, discoloration or warmth.  Left lower leg with 1 cm circular wound with surrounding tenderness to palpate and mild warmth.           Assessment  · Wound cellulitis     682.9/L03.90  Left lower leg appears like that it may be getting infected. Will call in a course of Bactrim and have patient return next week for follow-up visit. She needs to return sooner if symptoms are worsening.  · Encounter for removal of sutures     V58.32/Z48.02  8 sutures removed in office, area healing well. Return as needed.    Problems Reconciled  Plan  · Orders  o ACO-39: Current medications updated and reviewed (, 1159F) - - 11/11/2020  · Medications  o Medications have been Reconciled  o Transition of Care or Provider Policy  · Instructions  o Take all medications as prescribed/directed.  o Patient was educated/instructed on their diagnosis, treatment and medications prior to discharge from the clinic today.  o Patient instructed to seek medical attention urgently for new or worsening symptoms.  o Call the office with any concerns or questions.  · Disposition  o Call or Return if symptoms worsen or persist.  o Follow up in 1 week            Electronically Signed by: Casi Schaeffer PA-C -Author on November 13, 2020 01:17:37 PM

## 2021-05-13 NOTE — PROGRESS NOTES
"   Progress Note      Patient Name: Aida Mcclure   Patient ID: [id number]   Sex: Female   YOB: 1965    Primary Care Provider: Boy Levy MD   Referring Provider: Seema Rowland PA-C    Visit Date: May 28, 2020    Provider: Seema Rowland PA-C   Location: [facility]   Location Address: [address] [place] [address], [address]  [place], [place]  [id number]   Location Phone: [phone]          Chief Complaint  · \"I have been having issues where my whole body is cramping up\"  · \"My hip pops out of socket sometimes\"      History Of Present Illness  Aida Mcclure is a 55 year old /Black female who presents for evaluation and treatment of:      Cramps have been going on for about a week.  Cramps occur in arms and legs. At times she feels cramp in abdominal muscles as well.  Cramp usually lasts 1-2 min. She states if she stretches it feels better.   she drinks 6 cups of water /day.  Denies cp, palpitations, sob.   She tries Flexoril without improvement.   She is already on hydrocodone and gabapentin.      She states bilateral hips feel like they pop out of socket at times.  When she tries to walk when hips pop out, her feet and knees feel cramped up and she cannot bend knees to walk. She states she has to walk with legs straight.   Denies falls.         Past Medical History  Disease Name Date Onset Notes   Anemia, Unspecified --  --    Annual physical exam 11/06/2017 will schedule mammogram   Bilateral Hamstring injury/pain 08/17/2015 --    Breast lump --  --    CHF (congestive heart failure) 11/06/2017 follows with cards. currently doing well on Lasix, Lisinopril, aldactone, and propanolol   Degeneration of lumbar intervertebral disc 06/12/2012 --    Essential hypertension 11/06/2017 well controlled on current regimen.    GERD --  --    Head injury --  --    Hernia --  --    Hyperthyroidism 11/06/2017 currently on methimazole. Pt reports possibly due to Grave's disease. Will " need to obtain and review medical records from [facility]. will refer to endo.   Hypoxia --  --    Insomnia 2017 discussed sleep hygiene. Pt to try avoiding blue light at night. pt also to to set routine prior to bedtime. will avoid meds at this time.   Interstitial lung disease --  --    Lumbosacral disc herniation, L5-S1 2012 Left L5-S1 small in nature. She has failed all conservative measures and desires surgery.   Lupus --  sees Dr. Jones    Migraines --  --    Pulmonary embolism --  --    Rheumatoid arthritis 2017 currently controlled, but with residual deformity. will follow with Dr. Jones   Right Total Knee 2015 --    Tachycardia --  --          Past Surgical History  Procedure Name Date Notes   Ankle surgery --  bilateral   Breast biopsy --  --     --  --    Cataract exctraction with lens implant --  --    Colonoscopy --  --    Eye Implant --  yes   Fasciotomy --  left anterior arm   Hand surgery, left --  --    Knee surgery --  --    Lymph Node Excision --  --    Power Port Placement --  --          Medication List  Name Date Started Instructions   albuterol sulfate 90 mcg/actuation inhalation HFA aerosol inhaler 2020 inhale 1 puff (90 mcg) by inhalation route every 4 hours as needed   azathioprine 50 mg oral tablet 2018 take 3 tablets (150 mg) by oral route once daily   Bactrim -160 mg oral tablet  take 1 tablet by oral route once daily   fluticasone propionate 50 mcg/actuation nasal spray,suspension  inhale 2 sprays (100 mcg) in each nostril by intranasal route once daily   gabapentin 300 mg oral capsule 2019 take 1 capsule by oral route 3 times a day for 30 days prn pain   hydrocodone-acetaminophen 7.5-325 mg oral tablet 2020 take 1 tablet by oral route every 8 hours as needed for pain   hydroxychloroquine 200 mg oral tablet 2020 TAKE 1 TABLET BY MOUTH TWICE DAILY   levothyroxine 125 mcg oral tablet 2020 TAKE 1 TABLET(125 MCG) BY  MOUTH EVERY DAY   loratadine 10 mg oral capsule 08/15/2019 TAKE 1 CAPSULE BY ORAL ROUTE DAILY   Miralax 17 gram/dose oral powder 11/26/2019 take 17 gram mixed with 8 oz. water, juice, soda, coffee or tea by oral route once daily   pilocarpine HCl 5 mg oral tablet 02/28/2020 take 1 tablet by oral route once a day (at bedtime) for 90 days         Allergy List  Allergen Name Date Reaction Notes   aspirin --  --  LARGE DOSES OF ASPIRIN   Disalcid --  --  --        Allergies Reconciled  Family Medical History  Disease Name Relative/Age Notes   Lung Disease  --    Arthritis, Rheumatoid  --    Stroke Father/   Father   Cancer, Unspecified  --    - No Family History of Colorectal Cancer  --    Parkinson's Disease  --    Diabetes mellitus, type II  --          Social History  Finding Status Start/Stop Quantity Notes   Alcohol Current some day 0/0 --  2-3 week drinks weekly; wine   Caffeine Current some day 0/0 --  drinks occasionally; tea; 3-4 times per day   Recreational Drug Use Never --/-- --  no   Second hand smoke exposure --  --/-- --  For most of her life   Single. --  --/-- --  --    Tobacco Never 0/0 --  never a smoker  smoked 0.5 year   Unemployed. --  --/-- --  on disability         Immunizations  NameDate Admin Mfg Trade Name Lot Number Route Inj VIS Given VIS Publication   Knwhqlfsv90/29/2019 [facility] Fluzone Quadrivalent [id number] [facility] 10/29/2019    Comments: tolerated well   Alqiwznen97/18/2018 [facility] Fluzone > 3 Years [id number]  RD 10/18/2018 08/19/2014   Comments: pt tolerated well   Hknbdjggb8029/26/2019 [facility] PNEUMOVAX 23 [id number]  LD 08/26/2019    Comments: tolerated well   Prevnar 1306/14/2018 [facility] PREVNAR 13 [id number]  LD 06/14/2018 11/05/2015   Comments: tolerated well         Review of Systems  · Constitutional  o Denies  o : fever, fatigue, weight loss, weight gain  · Cardiovascular  o Denies  o : lower extremity edema, claudication, chest pressure, palpitations  · Respiratory  o Denies  o : shortness of  "breath, wheezing, cough, hemoptysis, dyspnea on exertion  · Gastrointestinal  o Denies  o : nausea, vomiting, diarrhea, constipation, abdominal pain  · Neurologic  o Denies  o : tingling or numbness  · Musculoskeletal  o Admits  o : joint pain, muscle pain, muscle cramps, hip pain      Vitals  Date Time BP Position Site L\R Cuff Size HR RR TEMP (F) WT  HT  BMI kg/m2 BSA m2 O2 Sat HC       03/20/2020 04:30 PM             83 %    04/03/2020 10:01 AM 94/62 Sitting    91 - R 14 98.5 140lbs 4oz 5'  4\" 24.07 1.69 95 %    05/28/2020 02:26 /72 Sitting    82 - R 16 98.6 157lbs 4oz 5'  4\" 26.99 1.79 94 %          Physical Examination  · Constitutional  o Appearance  o : no acute distress, well-nourished  · Head and Face  o Head  o :   § Inspection  § : atraumatic, normocephalic  · Eyes  o Eyes  o : extraocular movements intact, no scleral icterus, no conjunctival injection  · Ears, Nose, Mouth and Throat  o Ears  o :   § External Ears  § : normal  o Nose  o :   § Intranasal Exam  § : nares patent  o Oral Cavity  o :   § Oral Mucosa  § : moist mucous membranes  · Respiratory  o Respiratory Effort  o : breathing comfortably, symmetric chest rise  o Auscultation of Lungs  o : clear to asculatation bilaterally, no wheezes, rales, or rhonchii  · Cardiovascular  o Heart  o :   § Auscultation of Heart  § : regular rate and rhythm, no murmurs, rubs, or gallops  o Peripheral Vascular System  o :   § Extremities  § : no edema  · Skin and Subcutaneous Tissue  o General Inspection  o : no lesions present, no areas of discoloration, skin turgor normal  · Neurologic  o Mental Status Examination  o :   § Orientation  § : grossly oriented to person, place and time  o Gait and Station  o :   § Gait Screening  § : normal gait  · Psychiatric  o General  o : normal mood and affect     MSK: NROM bilateral hips. Antalgic gait due to c/o pain  No muscle spasm not on exam today               Assessment  · Hyperkalemia     276.7/E87.5  · Cramp " and spasm     781.0/R25.2  discussed ddx, cont hydration. labs today to eval. discussed cont exercise and stretching.  · Hip pain     719.45/M25.559  Will get xr to eval hip pain. discussed referral to ortho if needed.    Problems Reconciled  Plan  · Orders  o CBC with Auto Diff [facility] ([id number]) - 276.7/E87.5, 781.0/R25.2, 719.45/M25.559 - 05/28/2020  o CMP [facility] ([id number]) - 276.7/E87.5, 781.0/R25.2, 719.45/M25.559 - 05/28/2020  o ACO-39: Current medications updated and reviewed () - - 05/28/2020  o Hip Bilateral with AP Pelvis X-Ray [facility] ([id number]) - 719.45/M25.559 - 05/28/2020  · Medications  o Medications have been Reconciled  o Transition of Care or Provider Policy  · Instructions  o Patient was educated/instructed on their diagnosis, treatment and medications prior to discharge from the clinic today.  · Disposition  o Call or Return if symptoms worsen or persist.  o Follow up in 1 month            Electronically Signed by: FRANCESCA Gillette-MICHELE -Author on May 29, 2020 10:37:12 AM  Electronically Co-signed by: Mihaela Barboza MD -Reviewer on May 29, 2020 11:02:07 AM

## 2021-05-13 NOTE — PROGRESS NOTES
"   Progress Note      Patient Name: Aida Mcclure   Patient ID: [id number]   Sex: Female   YOB: 1965    Primary Care Provider: Boy Levy MD   Referring Provider: Boy Levy MD    Visit Date: September 17, 2020    Provider: Boy Levy MD   Location: [facility]   Location Address: [address] [place] [address]  [id number]   Location Phone: [phone]          Chief Complaint  · Follow Up HTN  · \" still having issues with the cramping \"      History Of Present Illness  Aida Mcclure is a 55 year old /Black female who presents for evaluation and treatment of:      HTN- pt denies HAs, dizziness, CP. pt reports elevation today may be related to pain. pt reports home BP readings 130/70s.   hypothyroid- pt f/u with Dr. Ozuna and she reports normal thyroid levels.   SLE- pt is on plaquenil and imuran. pt f/u with Dr. Garnett. eye exam scheduled in 2 weeks.   RA- pt also reports inc pain and swelling in her fingers.   pt reports inc cramping \"all over.\" pt notices it is worse with certain movements. pt reports the cramps will keep her awake at night. pt has tried magnesium for 1 week without relief.   pt also reports some coughing without sputum production. pt does reports some chest pain with deep breaths and specific movements. no CP with exertion.   due for flu vaccine       Past Medical History  Disease Name Date Onset Notes   Anemia, Unspecified --  --    Annual physical exam 11/06/2017 will schedule mammogram   Bilateral Hamstring injury/pain 08/17/2015 --    Breast lump --  --    CHF (congestive heart failure) 11/06/2017 follows with cards. currently doing well on Lasix, Lisinopril, aldactone, and propanolol   Degeneration of lumbar intervertebral disc 06/12/2012 --    Essential hypertension 11/06/2017 well controlled on current regimen.    GERD --  --    Head injury --  --    Hernia --  --    Hyperthyroidism 11/06/2017 " currently on methimazole. Pt reports possibly due to Grave's disease. Will need to obtain and review medical records from [facility]. will refer to endo.   Hypoxia --  --    Insomnia 2017 discussed sleep hygiene. Pt to try avoiding blue light at night. pt also to to set routine prior to bedtime. will avoid meds at this time.   Interstitial lung disease --  --    Lumbosacral disc herniation, L5-S1 2012 Left L5-S1 small in nature. She has failed all conservative measures and desires surgery.   Lupus --  sees Dr. Jones    Migraines --  --    Pulmonary embolism --  --    Rheumatoid arthritis 2017 currently controlled, but with residual deformity. will follow with Dr. Jones   Right Total Knee 2015 --    Tachycardia --  --          Past Surgical History  Procedure Name Date Notes   Ankle surgery --  bilateral   Breast biopsy --  --     --  --    Cataract exctraction with lens implant --  --    Colonoscopy --  --    Eye Implant --  yes   Fasciotomy --  left anterior arm   Hand surgery, left --  --    Knee surgery --  --    Lymph Node Excision --  --    Power Port Placement --  --          Medication List  Name Date Started Instructions   3 in 1 Commode 2020 Dispense 1 unit. Use as directed.   albuterol sulfate 90 mcg/actuation inhalation HFA aerosol inhaler 2020 inhale 1 puff (90 mcg) by inhalation route every 4 hours as needed   azathioprine 50 mg oral tablet 2018 take 3 tablets (150 mg) by oral route once daily   Calcium 500 + D 500 mg(1,250mg) -200 unit oral tablet  take 1 tablet by oral route daily   fluticasone propionate 50 mcg/actuation nasal spray,suspension  inhale 2 sprays (100 mcg) in each nostril by intranasal route once daily   folic acid 1 mg oral tablet  take 1 tablet (1 mg) by oral route once daily   furosemide 40 mg oral tablet 2020 TAKE 1 TABLET BY MOUTH IN THE MORNING AND AFTERNOON   gabapentin 300 mg oral capsule 2019 take 1 capsule by oral  route 3 times a day for 30 days prn pain   hydrocodone-acetaminophen 7.5-325 mg oral tablet 07/17/2020 take 1 tablet by oral route every 8 hours as needed for pain   hydroxychloroquine 200 mg oral tablet 07/06/2020 TAKE 1 TABLET BY MOUTH TWICE DAILY   Imitrex 50 mg oral tablet  take 1 tablet (50 mg) by oral route once with fluids as early as possible after the onset of a migraine attack;may repeat after 2 hours if headache returns, not to exceed 200mg in 24hrs   levothyroxine 125 mcg oral tablet 05/18/2020 TAKE 1 TABLET(125 MCG) BY MOUTH EVERY DAY   loratadine 10 mg oral capsule 08/15/2019 TAKE 1 CAPSULE BY ORAL ROUTE DAILY   Miralax 17 gram/dose oral powder 11/26/2019 take 17 gram mixed with 8 oz. water, juice, soda, coffee or tea by oral route once daily   pilocarpine HCl 5 mg oral tablet 02/28/2020 take 1 tablet by oral route once a day (at bedtime) for 90 days   promethazine 12.5 mg oral tablet  take 1 tablet (12.5 mg) by oral route 3 times per day   propranolol 40 mg oral tablet 09/08/2020 TAKE 1/2 TABLET BY MOUTH THREE TIMES DAILY   Vitamin B12 shot  1 time a month         Allergy List  Allergen Name Date Reaction Notes   aspirin --  --  LARGE DOSES OF ASPIRIN   Disalcid --  --  --        Allergies Reconciled  Family Medical History  Disease Name Relative/Age Notes   Lung Disease  --    Arthritis, Rheumatoid  --    Stroke Father/   Father   Cancer, Unspecified  --    - No Family History of Colorectal Cancer  --    Parkinson's Disease  --    Diabetes Mellitus, Type II  --          Social History  Finding Status Start/Stop Quantity Notes   Alcohol Current some day 0/0 --  2-3 week drinks weekly; wine   Caffeine Current some day 0/0 --  drinks occasionally; tea; 3-4 times per day   Recreational Drug Use Never --/-- --  no   Second hand smoke exposure --  --/-- --  For most of her life   Single. --  --/-- --  --    Tobacco Never 0/0 --  never a smoker  smoked 0.5 year   Unemployed. --  --/-- --  on disability  "        Immunizations  NameDate Admin Mfg Trade Name Lot Number Route Inj VIS Given VIS Publication   Ghhhbfkxh12/17/2020 [facility] Fluzone Quadrivalent [id number] [facility] 09/17/2020    Comments: Pt tolerated well, left office in stable condition   Cvcbxgldb27/29/2019 [facility]C Fluzone Quadrivalent [id number] IM LD 10/29/2019    Comments: tolerated well   Xgfnjzain20/18/2018 [facility]C Fluzone > 3 Years [id number] IM RD 10/18/2018 08/19/2014   Comments: pt tolerated well   Mexunanwm0355/26/2019 [facility] PNEUMOVAX 23 [id number]  LD 08/26/2019    Comments: tolerated well   Prevnar 1306/14/2018 [facility] PREVNAR 13 [id number] IM LD 06/14/2018 11/05/2015   Comments: tolerated well         Review of Systems  · Constitutional  o Denies  o : fever, fatigue, weight loss, weight gain  · Cardiovascular  o Admits  o : chest pressure  o Denies  o : lower extremity edema, palpitations  · Respiratory  o Admits  o : frequent cough  o Denies  o : shortness of breath, wheezing, dyspnea on exertion  · Gastrointestinal  o Denies  o : nausea, vomiting, diarrhea, constipation, abdominal pain      Vitals  Date Time BP Position Site L\R Cuff Size HR RR TEMP (F) WT  HT  BMI kg/m2 BSA m2 O2 Sat        09/17/2020 12:44 /90 Sitting    96 - R  97 201lbs 0oz 5'  7\" 31.48 2.08 97 %          Physical Examination  · Constitutional  o Appearance  o : no acute distress, well-nourished  · Head and Face  o Head  o :   § Inspection  § : atraumatic, normocephalic  · Eyes  o Eyes  o : extraocular movements intact, no scleral icterus, no conjunctival injection  · Ears, Nose, Mouth and Throat  o Ears  o :   § External Ears  § : normal  o Nose  o :   § Intranasal Exam  § : nares patent  o Oral Cavity  o :   § Oral Mucosa  § : moist mucous membranes  · Respiratory  o Respiratory Effort  o : breathing comfortably, symmetric chest rise  o Auscultation of Lungs  o : bibasilar rales. no wheezes or rhonchi.   · Cardiovascular  o Heart  o :   § Auscultation of Heart  § : regular rate and rhythm, no " murmurs, rubs, or gallops  o Peripheral Vascular System  o :   § Extremities  § : no edema  · Neurologic  o Mental Status Examination  o :   § Orientation  § : grossly oriented to person, place and time  o Gait and Station  o :   § Gait Screening  § : normal gait  · Psychiatric  o General  o : normal mood and affect              Assessment  · Hyperthyroidism     242.90/E05.90  reportedly normal on last check with ENT. cont to f/u with Dr. Ozuna.   · Rheumatoid arthritis     714.0/M06.9  currently controlled, but with residual deformity. will follow with Dr. Jones  · CHF (congestive heart failure)     428.0/I50.9  follows with cards. currently doing well on Lasix, Lisinopril, aldactone, and propranolol. euvolemic today.   · Essential hypertension     401.9/I10  well controlled based on home readings. elevated in clinic today but may be 2/2 pain. cont to monitor closely.   · Need for influenza vaccination     V04.81/Z23  · Cough     786.2/R05  CXR in clinic without acute process. vitals stable. will Rx azithromycin. call or RTC with any concerns   · RLS (restless legs syndrome)     333.94/G25.81  will Rx requip and monitor for effectiveness. call or RTC with any concerns.     Problems Reconciled  Plan  · Orders  o Immunization Admin Fee (Single) ([facility]) (76510) - V04.81/Z23 - 09/17/2020  o Fluzone Quadrivalent Vaccine, age 6 months + (88475) - V04.81/Z23 - 09/17/2020   Vaccine - Influenza; Dose: 0.5; Site: Left Deltoid; Route: Intramuscular; Date: 09/17/2020 13:46:00; Exp: 06/30/2020; Lot: [id number]; Mfg: sanofi pasteur; TradeName: Fluzone Quadrivalent; Administered By: Dilcia Altamirano MA; Comment: Pt tolerated well, left office in stable condition  o Chest xray 2 views [facility] ([id number]) - 786.2/R05 - 09/17/2020  o ACO-39: Current medications updated and reviewed () - - 09/17/2020  o ACO-15: Pneumococcal Vaccine Administered or Previously Received (4040F) - - 09/17/2020  o ACO-14: Influenza immunization administered or  previously received () - - 09/17/2020  o ACO-20: Screening Mammography documented and reviewed () - - 09/17/2020  o ACO-19: Colorectal cancer screening results documented and reviewed (3017F) - - 09/17/2020  · Medications  o Requip 0.25 mg oral tablet   SIG: take 1 tablet (0.25 mg) by oral route hour before bedtime   DISP: (60) tablets with 1 refills  Prescribed on 09/17/2020     o azithromycin 250 mg oral tablet   SIG: take 2 tablets (500 mg) by oral route once daily for 1 day then 1 tablet (250 mg) by oral route once daily for 4 days   DISP: (6) tablets with 0 refills  Prescribed on 09/17/2020     o Discontinue Home Oxygen   SIG: Discontinue hoe oxygen therapy, indication of use has resolved   DISP: (1) Unspecified with 0 refills  Discontinued on 09/17/2020     o Medications have been Reconciled  o Transition of Care or Provider Policy  · Instructions  o Patient was educated/instructed on their diagnosis, treatment and medications prior to discharge from the clinic today.  · Disposition  o f/u in 3 months  o x-ray done in clinic            Electronically Signed by: Boy Levy MD -Author on September 17, 2020 02:55:49 PM

## 2021-05-13 NOTE — PROGRESS NOTES
Progress Note      Patient Name: Aida Mcclure   Patient ID: [id number]   Sex: Female   YOB: 1965    Primary Care Provider: Boy Levy MD   Referring Provider: Boy Levy MD    Visit Date: December 14, 2020    Provider: Boy Levy MD   Location: [facility]   Location Address: [address] [place] [address]  [id number]   Location Phone: [phone]          Chief Complaint  · Follow Up  · COPD      History Of Present Illness  TELEHEALTH TELEPHONE VISIT  Aida Mcclure is a 55 year old /Black female who is presenting for evaluation via telehealth telephone visit. Verbal consent obtained before beginning visit.   Provider spent 13 minutes with patient during telehealth visit.   The following staff were present during this visit: Dr. Levy and pt   Past Medical History/Overview of Patient Symptoms     pt reports having wound on right leg that she is concerned with infection as becoming more painful with drainage. pt also reports inc leg spasms. pt denies fevers.   HTN- pt denies HAs, dizziness, CP  hyperthyroid- pt f/u with Dr. Ozuna this week. pt reports due to   SLE/RA- cont to be on plaquenil and Imuran.   CHF- pt has not seen cardiology recently. pt denies any changes.       Past Medical History  Disease Name Date Onset Notes   Anemia, Unspecified --  --    Annual physical exam 11/06/2017 will schedule mammogram   Bilateral Hamstring injury/pain 08/17/2015 --    Breast lump --  --    CHF (congestive heart failure) 11/06/2017 follows with cards. currently doing well on Lasix, Lisinopril, aldactone, and propanolol   Degeneration of lumbar intervertebral disc 06/12/2012 --    Essential hypertension 11/06/2017 well controlled on current regimen.    GERD --  --    Head injury --  --    Hernia --  --    Hyperthyroidism 11/06/2017 currently on methimazole. Pt reports possibly due to Grave's disease. Will need to obtain and review  medical records from [facility]. will refer to endo.   Hypoxia --  --    Insomnia 2017 discussed sleep hygiene. Pt to try avoiding blue light at night. pt also to to set routine prior to bedtime. will avoid meds at this time.   Interstitial lung disease --  --    Lumbosacral disc herniation, L5-S1 2012 Left L5-S1 small in nature. She has failed all conservative measures and desires surgery.   Lupus --  sees Dr. Jones    Migraines --  --    Pulmonary embolism --  --    Rheumatoid arthritis 2017 currently controlled, but with residual deformity. will follow with Dr. Jones   Right Total Knee 2015 --    Tachycardia --  --          Past Surgical History  Procedure Name Date Notes   Ankle surgery --  bilateral   Breast biopsy --  --     --  --    Cataract exctraction with lens implant --  --    Colonoscopy --  --    Eye Implant --  yes   Fasciotomy --  left anterior arm   Hand surgery, left --  --    Knee surgery --  --    Lymph Node Excision --  --    Power Port Placement --  --          Medication List  Name Date Started Instructions   albuterol sulfate 90 mcg/actuation inhalation HFA aerosol inhaler 2020 inhale 1 puff (90 mcg) by inhalation route every 4 hours as needed   azathioprine 50 mg oral tablet 2018 take 3 tablets (150 mg) by oral route once daily   Calcium 500 + D 500 mg(1,250mg) -200 unit oral tablet  take 1 tablet by oral route daily   fluticasone propionate 50 mcg/actuation nasal spray,suspension  inhale 2 sprays (100 mcg) in each nostril by intranasal route once daily   folic acid 1 mg oral tablet  take 1 tablet (1 mg) by oral route once daily   furosemide 40 mg oral tablet 2020 TAKE 1 TABLET BY MOUTH IN THE MORNING AND AFTERNOON   gabapentin 300 mg oral capsule 2020 take 1 capsule by oral route 3 times a day for 30 days prn pain   hydrocodone-acetaminophen 7.5-325 mg oral tablet 2020 take 1 tablet by oral route every 8 hours as needed for pain    hydroxychloroquine 200 mg oral tablet  take 1 tablet (200 mg) by oral route once daily   Imitrex 50 mg oral tablet  take 1 tablet (50 mg) by oral route once with fluids as early as possible after the onset of a migraine attack;may repeat after 2 hours if headache returns, not to exceed 200mg in 24hrs   levothyroxine 200 mcg oral tablet  --    loratadine 10 mg oral capsule 08/15/2019 TAKE 1 CAPSULE BY ORAL ROUTE DAILY   Miralax 17 gram/dose oral powder 11/26/2019 take 17 gram mixed with 8 oz. water, juice, soda, coffee or tea by oral route once daily   PILOCARPINE 5MG TABLETS 12/11/2020 TAKE 1 TABLET BY MOUTH EVERY DAY AT BEDTIME   promethazine 12.5 mg oral tablet  take 1 tablet (12.5 mg) by oral route 3 times per day   propranolol 40 mg oral tablet 09/08/2020 TAKE 1/2 TABLET BY MOUTH THREE TIMES DAILY   Requip 0.25 mg oral tablet 12/14/2020 take 2 tablets by oral route once a day (at bedtime)   spironolactone 25 mg oral tablet  take 1 tablet (25 mg) by oral route once daily         Allergy List  Allergen Name Date Reaction Notes   aspirin --  --  LARGE DOSES OF ASPIRIN   Disalcid --  --  --        Allergies Reconciled  Family Medical History  Disease Name Relative/Age Notes   Lung Disease  --    Arthritis, Rheumatoid  --    Stroke Father/   Father   Cancer, Unspecified  --    - No Family History of Colorectal Cancer  --    Parkinson's Disease  --    Diabetes Mellitus, Type II  --          Social History  Finding Status Start/Stop Quantity Notes   Alcohol Current some day 0/0 --  2-3 week drinks weekly; wine   Caffeine Current some day 0/0 --  drinks occasionally; tea; 3-4 times per day   Recreational Drug Use Never --/-- --  no   Second hand smoke exposure --  --/-- --  For most of her life   Single. --  --/-- --  --    Tobacco Never 0/0 --  never a smoker  smoked 0.5 year   Unemployed. --  --/-- --  on disability         Immunizations  NameDate Admin Mfg Trade Name Lot Number Route Inj VIS Given VIS Publication    Jumaxsfqi57/17/2020 [facility] Fluzone Quadrivalent [id number] IM LD 09/17/2020    Comments: Pt tolerated well, left office in stable condition   Lxecijvkn6705/26/2019 [facility] PNEUMOVAX 23 [id number] IM LD 08/26/2019    Comments: tolerated well   Prevnar 1306/14/2018 [facility] PREVNAR 13 [id number] IM LD 06/14/2018 11/05/2015   Comments: tolerated well         Physical Examination                Assessment  · Hyperthyroidism     242.90/E05.90  currently on methimazole. cont f/u with ENT  · Rheumatoid arthritis     714.0/M06.9  currently controlled, but with residual deformity. will follow with Dr. Jones  · CHF (congestive heart failure)     428.0/I50.9  follows with cards. currently doing well on Lasix, aldactone, and propranolol. consider addition of ACEi/ARB?  · Essential hypertension     401.9/I10  well controlled on current regimen.  · Lupus     710.0/M32.9  cont f/u with rheum. consider addition of statin with AI conditions.   · Vitamin B12 deficiency     266.2/E53.8  check level  · Leg wound, right     891.0/S81.801A  concerned for infection based on history. will Rx levofloxacin to help with pseudomonas coverage as pt had Bactrim approx. 8 weeks ago.     Problems Reconciled  Plan  · Orders  o Physical, Primary Care Panel (CBC, CMP, Lipid, TSH) [facility] (23600, 84721, 57056, 84400) - 242.90/E05.90, 401.9/I10, 714.0/M06.9, 428.0/I50.9 - 12/14/2020  o ACO-39: Current medications updated and reviewed (1159F, ) - - 12/14/2020  o ACO-15: Pneumococcal Vaccine Administered or Previously Received [facility] (21348) - - 12/14/2020  o ACO-14: Influenza immunization administered or previously received [facility] (28591) - - 12/14/2020  o ACO-20: Screening Mammography documented and reviewed [facility] (16456) - - 12/14/2020  o ACO-19: Colorectal cancer screening results documented and reviewed (3017F) - - 12/14/2020  o Physician Telephone Evaluation, 11-20 minutes (81366) - - 12/14/2020  o Vitamin B12 level (50272) - 266.2/E53.8 -  12/14/2020  · Medications  o levofloxacin 500 mg oral tablet   SIG: take 1 tablet (500 mg) by oral route once daily for 7 days   DISP: (7) Tablet with 0 refills  Prescribed on 12/14/2020     o Requip 0.25 mg oral tablet   SIG: take 2 tablets by oral route once a day (at bedtime)   DISP: (60) Tablet with 1 refills  Adjusted on 12/14/2020     o hydrocodone-acetaminophen 7.5-325 mg oral tablet   SIG: take 1 tablet by oral route every 8 hours as needed for pain   DISP: (60) Tablet with 0 refills  Refilled on 12/14/2020     o 3 in 1 Commode   SIG: Dispense 1 unit. Use as directed.   DISP: (1) unit with 0 refills  Discontinued on 12/14/2020     o Medications have been Reconciled  o Transition of Care or Provider Policy  · Disposition  o f/u in 3 months            Electronically Signed by: Boy Levy MD -Author on December 14, 2020 01:45:18 PM

## 2021-05-13 NOTE — PROGRESS NOTES
"   Progress Note      Patient Name: Aida Mcclure   Patient ID: [id number]   Sex: Female   YOB: 1965    Primary Care Provider: Boy Levy MD   Referring Provider: Boy Levy MD    Visit Date: August 19, 2020    Provider: ROBE Bal   Location: [facility]   Location Address: [address] [place] [address], [address]  [place], [place]  [id number]   Location Phone: [phone]          Chief Complaint  · \"I have had body aches and cramping all over for about a week\"      History Of Present Illness  Aida Mcclure is a 55 year old /Black female who presents for evaluation and treatment of:      Body aches, sneezing x1 week  cramping pain in abdomen, back, and legs  Denies fever, cough, soa, loss of appetite/taste/smell.  no known exposure to common but states she is concerned about this.       Past Medical History  Disease Name Date Onset Notes   Anemia, Unspecified --  --    Annual physical exam 11/06/2017 will schedule mammogram   Bilateral Hamstring injury/pain 08/17/2015 --    Breast lump --  --    CHF (congestive heart failure) 11/06/2017 follows with cards. currently doing well on Lasix, Lisinopril, aldactone, and propanolol   Degeneration of lumbar intervertebral disc 06/12/2012 --    Essential hypertension 11/06/2017 well controlled on current regimen.    GERD --  --    Head injury --  --    Hernia --  --    Hyperthyroidism 11/06/2017 currently on methimazole. Pt reports possibly due to Grave's disease. Will need to obtain and review medical records from [facility]. will refer to endo.   Hypoxia --  --    Insomnia 11/06/2017 discussed sleep hygiene. Pt to try avoiding blue light at night. pt also to to set routine prior to bedtime. will avoid meds at this time.   Interstitial lung disease --  --    Lumbosacral disc herniation, L5-S1 06/12/2012 Left L5-S1 small in nature. She has failed all conservative measures and desires surgery.   Lupus --  sees  " Robert    Migraines --  --    Pulmonary embolism --  --    Rheumatoid arthritis 2017 currently controlled, but with residual deformity. will follow with Dr. Jones   Right Total Knee 2015 --    Tachycardia --  --          Past Surgical History  Procedure Name Date Notes   Ankle surgery --  bilateral   Breast biopsy --  --     --  --    Cataract exctraction with lens implant --  --    Colonoscopy --  --    Eye Implant --  yes   Fasciotomy --  left anterior arm   Hand surgery, left --  --    Knee surgery --  --    Lymph Node Excision --  --    Power Port Placement --  --          Medication List  Name Date Started Instructions   3 in 1 Commode 2020 Dispense 1 unit. Use as directed.   albuterol sulfate 90 mcg/actuation inhalation HFA aerosol inhaler 2020 inhale 1 puff (90 mcg) by inhalation route every 4 hours as needed   azathioprine 50 mg oral tablet 2018 take 3 tablets (150 mg) by oral route once daily   Discontinue Home Oxygen 2020 Discontinue hoe oxygen therapy, indication of use has resolved   fluticasone propionate 50 mcg/actuation nasal spray,suspension  inhale 2 sprays (100 mcg) in each nostril by intranasal route once daily   furosemide 40 mg oral tablet 2020 TAKE 1 TABLET BY MOUTH IN THE MORNING AND AFTERNOON   gabapentin 300 mg oral capsule 2019 take 1 capsule by oral route 3 times a day for 30 days prn pain   hydrocodone-acetaminophen 7.5-325 mg oral tablet 2020 take 1 tablet by oral route every 8 hours as needed for pain   hydroxychloroquine 200 mg oral tablet 2020 TAKE 1 TABLET BY MOUTH TWICE DAILY   levothyroxine 125 mcg oral tablet 2020 TAKE 1 TABLET(125 MCG) BY MOUTH EVERY DAY   loratadine 10 mg oral capsule 08/15/2019 TAKE 1 CAPSULE BY ORAL ROUTE DAILY   Miralax 17 gram/dose oral powder 2019 take 17 gram mixed with 8 oz. water, juice, soda, coffee or tea by oral route once daily   pilocarpine HCl 5 mg oral tablet  02/28/2020 take 1 tablet by oral route once a day (at bedtime) for 90 days   propranolol 40 mg oral tablet 07/06/2020 TAKE 1/2 TABLET BY MOUTH THREE TIMES DAILY         Allergy List  Allergen Name Date Reaction Notes   aspirin --  --  LARGE DOSES OF ASPIRIN   Disalcid --  --  --          Family Medical History  Disease Name Relative/Age Notes   Lung Disease  --    Arthritis, Rheumatoid  --    Stroke Father/   Father   Cancer, Unspecified  --    - No Family History of Colorectal Cancer  --    Parkinson's Disease  --    Diabetes Mellitus, Type II  --          Social History  Finding Status Start/Stop Quantity Notes   Alcohol Current some day 0/0 --  2-3 week drinks weekly; wine   Caffeine Current some day 0/0 --  drinks occasionally; tea; 3-4 times per day   Recreational Drug Use Never --/-- --  no   Second hand smoke exposure --  --/-- --  For most of her life   Single. --  --/-- --  --    Tobacco Never 0/0 --  never a smoker  smoked 0.5 year   Unemployed. --  --/-- --  on disability         Immunizations  NameDate Admin Mfg Trade Name Lot Number Route Inj VIS Given VIS Publication   Hseyjhkmf14/29/2019 [facility] Fluzone Quadrivalent [id number] [facility] 10/29/2019    Comments: tolerated well   Ljhbaohbp03/18/2018 [facility] Fluzone > 3 Years [id number]  RD 10/18/2018 08/19/2014   Comments: pt tolerated well   Xevwduwse5309/26/2019 [facility] PNEUMOVAX 23 [id number] [facility] 08/26/2019    Comments: tolerated well   Prevnar 1306/14/2018 [facility] PREVNAR 13 [id number] [facility] 06/14/2018 11/05/2015   Comments: tolerated well         Review of Systems  · Constitutional  o Denies  o : fever, fatigue, weight loss, weight gain  · Cardiovascular  o Denies  o : lower extremity edema, claudication, chest pressure, palpitations  · Respiratory  o Denies  o : shortness of breath, wheezing, cough, hemoptysis, dyspnea on exertion  · Gastrointestinal  o Denies  o : nausea, vomiting, diarrhea, constipation, abdominal pain      Vitals  Date Time BP Position Site L\R Cuff Size  "HR RR TEMP (F) WT  HT  BMI kg/m2 BSA m2 O2 Sat HC       04/03/2020 10:01 AM 94/62 Sitting    91 - R 14 98.5 140lbs 4oz 5'  4\" 24.07 1.69 95 %    05/28/2020 02:26 /72 Sitting    82 - R 16 98.6 157lbs 4oz 5'  4\" 26.99 1.79 94 %    08/19/2020 01:44 /66 Sitting    95 - R 18 98.8     95 %          Physical Examination  · Constitutional  o Appearance  o : no acute distress, well-nourished  · Head and Face  o Head  o :   § Inspection  § : atraumatic, normocephalic  · Eyes  o Eyes  o : extraocular movements intact, no scleral icterus, no conjunctival injection  · Ears, Nose, Mouth and Throat  o Ears  o :   § External Ears  § : normal  § Otoscopic Examination  § : tympanic membrane appearance within normal limits bilaterally  o Nose  o :   § Intranasal Exam  § : nares patent  o Oral Cavity  o :   § Oral Mucosa  § : moist mucous membranes  · Respiratory  o Respiratory Effort  o : breathing comfortably, symmetric chest rise  o Auscultation of Lungs  o : clear to asculatation bilaterally, no wheezes, rales, or rhonchii  · Cardiovascular  o Heart  o :   § Auscultation of Heart  § : regular rate and rhythm, no murmurs, rubs, or gallops  o Peripheral Vascular System  o :   § Extremities  § : 1+ edema bilateral  · Lymphatic  o Neck  o : no lymphadenopathy present  · Skin and Subcutaneous Tissue  o General Inspection  o : no rashes present, no lesions present, no areas of discoloration, skin turgor normal  · Neurologic  o Mental Status Examination  o :   § Orientation  § : grossly oriented to person, place and time  o Gait and Station  o :   § Gait Screening  § : normal gait  · Psychiatric  o General  o : normal mood and affect     MSKmuscle tenderness of legs, N/V distally intact.           Assessment  · Body aches     780.96/R52  · Myalgia     729.1/M79.10  We will check labs today. Discussed differential diagnosis for muscle cramps. Discussed potential of medications contributing to this. Discussed importance of " hydration and stretching prior to exercise. Discussed slight concern for viral infection and possibly COVID-19. Will send for testing today. Discussed need for self quarantine, self-monitoring, and follow-up for worsening symptoms. Discussed supportive care including Tylenol, Motrin use which is controversial, pushing fluids, and rest. Discussed potential course of illness and risk for worsening symptoms. Patient will call or go to the ER urgently with respiratory distress, chest pain, or intractable fever. Patient verbalized understanding      Plan  · Orders  o CBC with Auto Diff [facility] (18413) - 780.96/R52, 729.1/M79.10 - 08/19/2020  o CMP [facility] (41936) - 780.96/R52, 729.1/M79.10 - 08/19/2020  o ACO-39: Current medications updated and reviewed () - - 08/19/2020  o [facility] NCOV2 (send-out) (46749) - 780.96/R52, 729.1/M79.10 - 08/19/2020  o CK Total [facility] (17191) - 780.96/R52, 729.1/M79.10 - 08/19/2020  o B12 level (71513) - 780.96/R52, 729.1/M79.10 - 08/19/2020  · Medications  o Medications have been Reconciled  o Transition of Care or Provider Policy  · Instructions  o Patient was educated/instructed on their diagnosis, treatment and medications prior to discharge from the clinic today.  o Call the office with any concerns or questions.  · Disposition  o Call or Return if symptoms worsen or persist.  o labs drawn in house today            Electronically Signed by: ROBE Bal -Author on August 19, 2020 02:18:22 PM

## 2021-05-14 VITALS
SYSTOLIC BLOOD PRESSURE: 140 MMHG | HEIGHT: 64 IN | OXYGEN SATURATION: 93 % | WEIGHT: 194.37 LBS | DIASTOLIC BLOOD PRESSURE: 74 MMHG | TEMPERATURE: 98 F | BODY MASS INDEX: 33.18 KG/M2 | HEART RATE: 112 BPM

## 2021-05-14 VITALS
SYSTOLIC BLOOD PRESSURE: 140 MMHG | BODY MASS INDEX: 32.8 KG/M2 | OXYGEN SATURATION: 90 % | WEIGHT: 192.12 LBS | HEIGHT: 64 IN | HEART RATE: 108 BPM | DIASTOLIC BLOOD PRESSURE: 80 MMHG | TEMPERATURE: 97.8 F

## 2021-05-14 VITALS
HEIGHT: 67 IN | DIASTOLIC BLOOD PRESSURE: 90 MMHG | BODY MASS INDEX: 31.55 KG/M2 | TEMPERATURE: 97 F | OXYGEN SATURATION: 97 % | HEART RATE: 96 BPM | SYSTOLIC BLOOD PRESSURE: 142 MMHG | WEIGHT: 201 LBS

## 2021-05-14 VITALS
DIASTOLIC BLOOD PRESSURE: 82 MMHG | OXYGEN SATURATION: 95 % | HEART RATE: 102 BPM | SYSTOLIC BLOOD PRESSURE: 134 MMHG | BODY MASS INDEX: 34.49 KG/M2 | HEIGHT: 64 IN | WEIGHT: 202 LBS | TEMPERATURE: 98 F

## 2021-05-14 VITALS
HEART RATE: 72 BPM | BODY MASS INDEX: 34.87 KG/M2 | OXYGEN SATURATION: 92 % | SYSTOLIC BLOOD PRESSURE: 124 MMHG | DIASTOLIC BLOOD PRESSURE: 72 MMHG | WEIGHT: 204.25 LBS | HEIGHT: 64 IN | TEMPERATURE: 98.6 F

## 2021-05-14 VITALS
TEMPERATURE: 97.8 F | BODY MASS INDEX: 33.83 KG/M2 | HEIGHT: 64 IN | SYSTOLIC BLOOD PRESSURE: 140 MMHG | WEIGHT: 198.12 LBS | OXYGEN SATURATION: 90 % | DIASTOLIC BLOOD PRESSURE: 84 MMHG | HEART RATE: 113 BPM

## 2021-05-14 VITALS
OXYGEN SATURATION: 95 % | HEIGHT: 64 IN | DIASTOLIC BLOOD PRESSURE: 68 MMHG | TEMPERATURE: 98 F | SYSTOLIC BLOOD PRESSURE: 124 MMHG | BODY MASS INDEX: 32.97 KG/M2 | WEIGHT: 193.12 LBS | HEART RATE: 72 BPM

## 2021-05-14 VITALS
HEART RATE: 90 BPM | TEMPERATURE: 97.8 F | HEIGHT: 64 IN | DIASTOLIC BLOOD PRESSURE: 80 MMHG | SYSTOLIC BLOOD PRESSURE: 134 MMHG | OXYGEN SATURATION: 95 % | WEIGHT: 191.37 LBS | BODY MASS INDEX: 32.67 KG/M2

## 2021-05-14 VITALS
SYSTOLIC BLOOD PRESSURE: 130 MMHG | BODY MASS INDEX: 33.51 KG/M2 | DIASTOLIC BLOOD PRESSURE: 72 MMHG | TEMPERATURE: 97.8 F | HEIGHT: 64 IN | OXYGEN SATURATION: 92 % | WEIGHT: 196.25 LBS | HEART RATE: 101 BPM

## 2021-05-14 VITALS
WEIGHT: 191.12 LBS | HEIGHT: 64 IN | SYSTOLIC BLOOD PRESSURE: 140 MMHG | BODY MASS INDEX: 32.63 KG/M2 | DIASTOLIC BLOOD PRESSURE: 84 MMHG | TEMPERATURE: 97.6 F | OXYGEN SATURATION: 88 % | HEART RATE: 113 BPM

## 2021-05-14 NOTE — PROGRESS NOTES
"   Progress Note      Patient Name: Aida Mcclure   Patient ID: [id number]   Sex: Female   YOB: 1965    Primary Care Provider: Boy Levy MD   Referring Provider: Boy Levy MD    Visit Date: January 19, 2021    Provider: Merline Enamorado MD   Location: [facility]   Location Address: [address] [place] [address]  [id number]   Location Phone: [phone]          Chief Complaint  · \"Right leg wound.\"      History Of Present Illness  Aida Mcclure is a 55 year old /Black female who presents for evaluation and treatment of:      Last seen 4 days ago for worsening dyspnea, cough, and bilateral LE swelling concerning for CHF exacerbation.   Today patient reports  her cough is about the same, dyspnea is also about the same, despite increasing Lasix dose to bid. She would like to review her labs and discuss  right leg wound.       Chronic cough and dyspnea:   Hx of known ILD. follows with Dr. Dennis, believes she has an apt in Feb.She is unsure of what date. Cough and dyspnea are about the same since her last visit, with onset of worsening about 6 mos ago. Using her albuterol inhaler about 6x at work. She also has a hx of CHF for which she follows with Dr. Ann (cardiologist) however she has not seen this provider for the past 1 yr (or more). She also cannot recall the last time she had an ultrasound of her heart.      Right leg wound:  Chronic as of the past several months.  Initially improving however now the wound is weeping per patient she is also concerned about an abnormal odor coming from the site.  Wound sustained secondary to injury with her car door in November 2020. Initially treated with Bactrim : She has been using mupirocin on the site. She has a hx of MRSA (noted during hospitalization in 2016). Today she shares that weeping has decreased and odor is improving.     Bilateral leg cramping:   Catching sensation, mostly at " night but does occur throughout the day. Symptoms present for at least the past 6 mos.              Past Medical History  Disease Name Date Onset Notes   Anemia, Unspecified --  --    Annual physical exam 2017 will schedule mammogram   Bilateral Hamstring injury/pain 2015 --    Breast lump --  --    CHF (congestive heart failure) 2017 follows with cards. currently doing well on Lasix, Lisinopril, aldactone, and propanolol   Degeneration of lumbar intervertebral disc 2012 --    Essential hypertension 2017 well controlled on current regimen.    GERD --  --    Head injury --  --    Hernia --  --    Hyperthyroidism 2017 currently on methimazole. Pt reports possibly due to Grave's disease. Will need to obtain and review medical records from [facility]. will refer to endo.   Hypoxia --  --    Insomnia 2017 discussed sleep hygiene. Pt to try avoiding blue light at night. pt also to to set routine prior to bedtime. will avoid meds at this time.   Interstitial lung disease --  --    Lumbosacral disc herniation, L5-S1 2012 Left L5-S1 small in nature. She has failed all conservative measures and desires surgery.   Lupus --  sees Dr. Jones    Migraines --  --    Pulmonary embolism --  --    Rheumatoid arthritis 2017 currently controlled, but with residual deformity. will follow with Dr. Jones   Right Total Knee 2015 --    Tachycardia --  --          Past Surgical History  Procedure Name Date Notes   Ankle surgery --  bilateral   Breast biopsy --  --     --  --    Cataract exctraction with lens implant --  --    Colonoscopy --  --    Eye Implant --  yes   Fasciotomy --  left anterior arm   Hand surgery, left --  --    Knee surgery --  --    Lymph Node Excision --  --    Power Port Placement --  --          Medication List  Name Date Started Instructions   albuterol sulfate 90 mcg/actuation inhalation HFA aerosol inhaler 2020 inhale 1 puff (90 mcg) by  inhalation route every 4 hours as needed   azathioprine 50 mg oral tablet 03/16/2018 take 3 tablets (150 mg) by oral route once daily   Calcium 500 + D 500 mg(1,250mg) -200 unit oral tablet  take 1 tablet by oral route daily   Diflucan 150 mg oral tablet 12/15/2020 take 1 tablet (150 mg) by oral route once can repeat in 3 days if needed.   fluticasone propionate 50 mcg/actuation nasal spray,suspension  inhale 2 sprays (100 mcg) in each nostril by intranasal route once daily   folic acid 1 mg oral tablet  take 1 tablet (1 mg) by oral route once daily   furosemide 40 mg oral tablet 09/08/2020 TAKE 1 TABLET BY MOUTH IN THE MORNING AND AFTERNOON   gabapentin 300 mg oral capsule 11/16/2020 take 1 capsule by oral route 3 times a day for 30 days prn pain   hydrocodone-acetaminophen 7.5-325 mg oral tablet 12/14/2020 take 1 tablet by oral route every 8 hours as needed for pain   hydroxychloroquine 200 mg oral tablet  take 1 tablet (200 mg) by oral route once daily   Imitrex 50 mg oral tablet  take 1 tablet (50 mg) by oral route once with fluids as early as possible after the onset of a migraine attack;may repeat after 2 hours if headache returns, not to exceed 200mg in 24hrs   levofloxacin 500 mg oral tablet 12/14/2020 take 1 tablet (500 mg) by oral route once daily for 7 days   levothyroxine 200 mcg oral tablet  --    loratadine 10 mg oral capsule 08/15/2019 TAKE 1 CAPSULE BY ORAL ROUTE DAILY   Miralax 17 gram/dose oral powder 11/26/2019 take 17 gram mixed with 8 oz. water, juice, soda, coffee or tea by oral route once daily   promethazine 12.5 mg oral tablet  take 1 tablet (12.5 mg) by oral route 3 times per day   propranolol 40 mg oral tablet 09/08/2020 TAKE 1/2 TABLET BY MOUTH THREE TIMES DAILY   Requip 0.25 mg oral tablet 12/14/2020 take 2 tablets by oral route once a day (at bedtime)   ROPINIROLE 0.25MG TABLETS 01/08/2021 TAKE 2 TABLETS BY MOUTH EVERY DAY AT BEDTIME   spironolactone 25 mg oral tablet  take 1 tablet (25  mg) by oral route once daily         Allergy List  Allergen Name Date Reaction Notes   aspirin --  --  LARGE DOSES OF ASPIRIN   Disalcid --  --  --          Family Medical History  Disease Name Relative/Age Notes   Lung Disease  --    Arthritis, Rheumatoid  --    Stroke Father/   Father   Cancer, Unspecified  --    - No Family History of Colorectal Cancer  --    Parkinson's Disease  --    Diabetes Mellitus, Type II  --          Social History  Finding Status Start/Stop Quantity Notes   Alcohol Current some day 0/0 --  2-3 week drinks weekly; wine   Caffeine Current some day 0/0 --  drinks occasionally; tea; 3-4 times per day   Recreational Drug Use Never --/-- --  no   Second hand smoke exposure --  --/-- --  For most of her life   Single. --  --/-- --  --    Tobacco Never 0/0 --  never a smoker  smoked 0.5 year   Unemployed. --  --/-- --  on disability         Immunizations  NameDate Admin Mfg Trade Name Lot Number Route Inj VIS Given VIS Publication   Nvvnjbqtp16/17/2020 [facility] Fluzone Quadrivalent [id number] [facility] 09/17/2020    Comments: Pt tolerated well, left office in stable condition   Lovtmdjiw2610/26/2019 [facility] PNEUMOVAX 23 [id number] [facility] 08/26/2019    Comments: tolerated well   Prevnar 1306/14/2018 [facility] PREVNAR 13 [id number] [facility] 06/14/2018 11/05/2015   Comments: tolerated well         Review of Systems  · Constitutional  o Denies  o : fever, fatigue, weight loss, weight gain  · Cardiovascular  o Admits  o : lower extremity edema which is about the same. LLE is chronically larger than rt since lupus dx in 1989.   o Denies  o : , claudication, chest pressure, palpitations  · Respiratory  o Admits  o : chronic cough and dyspnea   o Denies  o : , wheezing, emoptysis, dyspnea on exertion  · Gastrointestinal  o Denies  o : nausea, vomiting, diarrhea, constipation, abdominal pain  · Integument  o Admits  o : right leg wound      Vitals  Date Time BP Position Site L\R Cuff Size HR RR TEMP (F) WT  HT  BMI kg/m2 BSA m2 O2 Sat  "FR L/min FiO2 HC       11/16/2020 02:37 /82 Sitting    102 - R  98 202lbs 0oz 5'  4\" 34.67 2.03 95 %      01/15/2021 10:35 /84 Sitting    113 - R  97.8 198lbs 2oz 5'  4\" 34.01 2.01 90 %  21%    01/19/2021 08:17 /80 Sitting    108 - R  97.8 192lbs 2oz 5'  4\" 32.98 1.98 90 %  21%          Physical Examination  · Constitutional  o Appearance  o : no acute distress, well-nourished  · Head and Face  o Head  o :   § Inspection  § : atraumatic, normocephalic  · Respiratory  o Respiratory Effort  o : breathing comfortably, symmetric chest rise  o Auscultation of Lungs  o : Coarse breathsounds bilaterally diffusely, more prominent over bilateral LE lung fields and right upper lobe anteriorly. No wheezing or crackles.   · Cardiovascular  o Heart  o :   § Auscultation of Heart  § : regular rate and rhythm, no murmurs, rubs, or gallops  o Peripheral Vascular System  o :   § Extremities  § : 1+ pitting edema bilaterally   · Skin and Subcutaneous Tissue  o General Inspection  o : right leg wound-improved in appearance, slightly smaller, no weeping noted today. healthy edges. No rash or swelling noted.   · Neurologic  o Mental Status Examination  o :   § Orientation  § : grossly oriented to person, place and time  o Gait and Station  o :   § Gait Screening  § : normal gait          Assessment  · Interstitial lung disease     515/J84.9  Chronic, with concern for interval worsening. See resp failure below.   · Tachycardia     785.0/R00.0  Chronic. Improved today compared to last visit. Most likely related to dyspnea and resp failure.   · CHF (congestive heart failure)     428.0/I50.9  follows with cards. presumed stable on lasix, Lisinopril, aldactone, and propranolol. She is to go back to her once a day dosing of Lasix. Advised pt to call and schedule f/u with cardiologist. Will attempt to obtain prior echo studies, if none in the past 6 mos will plan to repeat.   · Hypocalcemia     275.41/E83.51  Acute findings. " Calcium level typically towards the LLN over the past 1yr, level of 7.9 noted on recent labs, this was prior to pt increasing her Lasix dose. Hypocalcemia w/u ordered. Attempted to draw labs in office, however pt is difficult stick. She will present to the hospital for lab draw.   · Leg cramps     729.82/R25.2  Acute on chronic. Pt does have a hx of iron deficiency which may be contributing, however hypocalcemia with Calcium level of 7.9 was noted on labs from 1 wk ago. see work up outlined above. Iron studies ordered.   · Dyspnea     786.09/R06.00  Most likely secondary to worsening ILD, given no significant improvement in her symptom with increased Lasix dose. Covid testing negative. cxr obtained during last visit was unremarkable for any acute changes. BNP was wnl. No signs of anemia. Thyroid studies wnl. See respiratory failure below.  · Respiratory failure with hypoxia     518.81/J96.91  Most likely secondary to worsening of her underlying ILD, although pt also w/ hx of HF (rt vs left unknown, no prior echo cardiogram to review).chronicity unclear. This is provider's 2nd visit with patient.     6 Min walk test:  1. SPO2 on room air @ rest 90%  2. SPO2 on room air ambulating 76%  3. SPO2 on O2 @ 2 LPM 94%    Her pulmonologist was called with apt scheduled for 9:15am tomorrow. Started the process for home oxygen. Considered short course of prednisone, however since she will be seeing pulm tomorrow this was deferred   · Leg wound, right     891.0/S81.801A  Chronic and persisting. Wound itself remains well appearing at this time and is actually improved compared to visit 4d ago. Continue topical mupirocin. Encourage patient to work on keeping the wound clean and dry as much as possible. Held off obtaining wound culture given well appearance. Will continue to monitor.     Problems Reconciled  Plan  · Orders  o Vitamin D (25-Hydroxy) Level (51032) - 275.41/E83.51 - 01/19/2021  o ACO-39: Current medications updated  and reviewed (1159F, ) - 275.41/E83.51, 428.0/I50.9, 515/J84.9, 518.81/J96.91 - 01/19/2021  o Parathyroid hormone (PTH) measurement with total and ionized calcium measurement (41765, 36422, 97203) - 275.41/E83.51 - 01/19/2021  o Phosphorus, serum (96370) - 275.41/E83.51 - 01/19/2021  o Magnesium, serum (88116) - 275.41/E83.51 - 01/19/2021  o Iron Profile (Iron 50409 TIBC 78911 and Transferrin 58985) (IRONP) - 729.82/R25.2 - 01/19/2021  o Ferritin (33949) - 729.82/R25.2 - 01/19/2021  · Medications  o mupirocin 2 % topical ointment   SIG: apply a small amount to the affected area by topical route 3 times per day for 10 days   DISP: (1) Tube with 0 refills  Prescribed on 01/19/2021     o Medications have been Reconciled  o Transition of Care or Provider Policy  · Instructions  o Take all medications as prescribed/directed.  o Patient was educated/instructed on their diagnosis, treatment and medications prior to discharge from the clinic today.  o Call the office with any concerns or questions.  o Avoid strenuous physical activity  · Disposition  o Follow up 2 weeks.            Electronically Signed by: Merline Enamorado MD -Author on January 19, 2021 10:55:00 AM

## 2021-05-14 NOTE — PROGRESS NOTES
"   Progress Note      Patient Name: Aida Mcclure   Patient ID: [id number]   Sex: Female   YOB: 1965    Primary Care Provider: Boy Levy MD   Referring Provider: Boy Levy MD    Visit Date: January 15, 2021    Provider: Merline Enamorado MD   Location: [facility]   Location Address: [address] [place] [address]  [id number]   Location Phone: [phone]          Chief Complaint  · \"I have been having a real bad cough for about two weeks.\"  · \"My right hip keeps going out.\"      History Of Present Illness  Aida Mcclure is a 55 year old /Black female who presents for evaluation and treatment of:      Patient presenting with multiple concerns today during acute visit.  We will focus on her cough and right leg lesion at this time .she will  return to discuss her right hip pain.    Cough:   Present for the past 2 weeks. Non-productive. No fever, however woke up 1 night with her pajama soaked, but mostly has chills. Endorses constant wheezing, which she hasn't had before. She also endorses SOA, even with short distances. She ahs a hx of of CHF with some SOA, however somewhat worsening over the past couple of weeks. She also shares she feel like she is retaining more water.     Uses 2 pillows at night which is unchanged, however has been using her blanket to prop her upright at night.   Increase snoring as well. She has a hx of sleep apnea but has not required use of her CPAP machine for the past decade.    She's had several test for Covid which have been negative. Last test was in Nov.    She follows with cardiology however has not seen her cardiologist in over 1 yr. She also cannot recall the last time she had an ultrasound of her heart.     Story of hypothyroidism status post thyroidectomy:  She follows with Dr. Cortez ENT who is managing her thyroid. She last saw him on Monday and had blood work.     Right leg wound:  Chronic as of the " past several months.  Initially improving however now the wound is weeping per patient she is also concerned about an abnormal odor coming from the site.  Wound sustained secondary to injury with her car door.  Initially treated with Bactrim : She has been using an antibiotic ointment on the site for the past couple days           Past Medical History  Disease Name Date Onset Notes   Anemia, Unspecified --  --    Annual physical exam 2017 will schedule mammogram   Bilateral Hamstring injury/pain 2015 --    Breast lump --  --    CHF (congestive heart failure) 2017 follows with cards. currently doing well on Lasix, Lisinopril, aldactone, and propanolol   Degeneration of lumbar intervertebral disc 2012 --    Essential hypertension 2017 well controlled on current regimen.    GERD --  --    Head injury --  --    Hernia --  --    Hyperthyroidism 2017 currently on methimazole. Pt reports possibly due to Grave's disease. Will need to obtain and review medical records from [facility]. will refer to endo.   Hypoxia --  --    Insomnia 2017 discussed sleep hygiene. Pt to try avoiding blue light at night. pt also to to set routine prior to bedtime. will avoid meds at this time.   Interstitial lung disease --  --    Lumbosacral disc herniation, L5-S1 2012 Left L5-S1 small in nature. She has failed all conservative measures and desires surgery.   Lupus --  sees Dr. Jones    Migraines --  --    Pulmonary embolism --  --    Rheumatoid arthritis 2017 currently controlled, but with residual deformity. will follow with Dr. Jones   Right Total Knee 2015 --    Tachycardia --  --          Past Surgical History  Procedure Name Date Notes   Ankle surgery --  bilateral   Breast biopsy --  --     --  --    Cataract exctraction with lens implant --  --    Colonoscopy --  --    Eye Implant --  yes   Fasciotomy --  left anterior arm   Hand surgery, left --  --    Knee surgery --   --    Lymph Node Excision --  --    Power Port Placement --  --          Medication List  Name Date Started Instructions   albuterol sulfate 90 mcg/actuation inhalation HFA aerosol inhaler 04/03/2020 inhale 1 puff (90 mcg) by inhalation route every 4 hours as needed   azathioprine 50 mg oral tablet 03/16/2018 take 3 tablets (150 mg) by oral route once daily   Calcium 500 + D 500 mg(1,250mg) -200 unit oral tablet  take 1 tablet by oral route daily   Diflucan 150 mg oral tablet 12/15/2020 take 1 tablet (150 mg) by oral route once can repeat in 3 days if needed.   fluticasone propionate 50 mcg/actuation nasal spray,suspension  inhale 2 sprays (100 mcg) in each nostril by intranasal route once daily   folic acid 1 mg oral tablet  take 1 tablet (1 mg) by oral route once daily   furosemide 40 mg oral tablet 09/08/2020 TAKE 1 TABLET BY MOUTH IN THE MORNING AND AFTERNOON   gabapentin 300 mg oral capsule 11/16/2020 take 1 capsule by oral route 3 times a day for 30 days prn pain   hydrocodone-acetaminophen 7.5-325 mg oral tablet 12/14/2020 take 1 tablet by oral route every 8 hours as needed for pain   hydroxychloroquine 200 mg oral tablet  take 1 tablet (200 mg) by oral route once daily   Imitrex 50 mg oral tablet  take 1 tablet (50 mg) by oral route once with fluids as early as possible after the onset of a migraine attack;may repeat after 2 hours if headache returns, not to exceed 200mg in 24hrs   levofloxacin 500 mg oral tablet 12/14/2020 take 1 tablet (500 mg) by oral route once daily for 7 days   levothyroxine 200 mcg oral tablet  --    loratadine 10 mg oral capsule 08/15/2019 TAKE 1 CAPSULE BY ORAL ROUTE DAILY   Miralax 17 gram/dose oral powder 11/26/2019 take 17 gram mixed with 8 oz. water, juice, soda, coffee or tea by oral route once daily   promethazine 12.5 mg oral tablet  take 1 tablet (12.5 mg) by oral route 3 times per day   propranolol 40 mg oral tablet 09/08/2020 TAKE 1/2 TABLET BY MOUTH THREE TIMES DAILY    Requip 0.25 mg oral tablet 12/14/2020 take 2 tablets by oral route once a day (at bedtime)   ROPINIROLE 0.25MG TABLETS 01/08/2021 TAKE 2 TABLETS BY MOUTH EVERY DAY AT BEDTIME   spironolactone 25 mg oral tablet  take 1 tablet (25 mg) by oral route once daily         Allergy List  Allergen Name Date Reaction Notes   aspirin --  --  LARGE DOSES OF ASPIRIN   Disalcid --  --  --          Family Medical History  Disease Name Relative/Age Notes   Lung Disease  --    Arthritis, Rheumatoid  --    Stroke Father/   Father   Cancer, Unspecified  --    - No Family History of Colorectal Cancer  --    Parkinson's Disease  --    Diabetes Mellitus, Type II  --          Social History  Finding Status Start/Stop Quantity Notes   Alcohol Current some day 0/0 --  2-3 week drinks weekly; wine   Caffeine Current some day 0/0 --  drinks occasionally; tea; 3-4 times per day   Recreational Drug Use Never --/-- --  no   Second hand smoke exposure --  --/-- --  For most of her life   Single. --  --/-- --  --    Tobacco Never 0/0 --  never a smoker  smoked 0.5 year   Unemployed. --  --/-- --  on disability         Immunizations  NameDate Admin Mfg Trade Name Lot Number Route Inj VIS Given VIS Publication   [id number]/17/2020 [facility] Fluzone Quadrivalent [id number] [facility] 09/17/2020    Comments: Pt tolerated well, left office in stable condition   [id number]/26/2019 [facility] PNEUMOVAX 23 [id number] [facility] 08/26/2019    Comments: tolerated well   Prevnar 1306/14/2018 [facility] PREVNAR 13 [id number] [facility] 06/14/2018 11/05/2015   Comments: tolerated well         Review of Systems  · Constitutional  o Denies  o : fever, fatigue, weight loss, weight gain  · Cardiovascular  o Admits  o : Lower extremity edema which is worse than her baseline, palpitations also worse than her baseline.   o Denies  o : claudication, chest pressure  · Respiratory  o Admits  o : frequent cough, wheezing, DOA,   o Denies  o : wheezing, frequent cough,  "hemoptysis,  · Gastrointestinal  o Denies  o : nausea, vomiting, diarrhea, constipation, abdominal pain      Vitals  Date Time BP Position Site L\R Cuff Size HR RR TEMP (F) WT  HT  BMI kg/m2 BSA m2 O2 Sat FR L/min FiO2 HC       11/11/2020 12:01 /72 Sitting    72 - R  98.6 204lbs 4oz 5'  4\" 35.06 2.05 92 %  21%    11/16/2020 02:37 /82 Sitting    102 - R  98 202lbs 0oz 5'  4\" 34.67 2.03 95 %      01/15/2021 10:35 /84 Sitting    113 - R  97.8 198lbs 2oz 5'  4\" 34.01 2.01 90 %  21%          Physical Examination  · Constitutional  o Appearance  o : no acute distress, well-nourished  · Head and Face  o Head  o :   § Inspection  § : atraumatic, normocephalic  · Ears, Nose, Mouth and Throat  o Ears  o :   § External Ears  § : normal  o Nose  o :   § Intranasal Exam  § : nares patent  o Oral Cavity  o :   § Oral Mucosa  § : moist mucous membranes  o Throat  o :   § Oropharynx  § : no inflammation or lesions present, tonsils within normal limits  · Respiratory  o Respiratory Effort  o : breathing comfortably, symmetric chest rise  o Auscultation of Lungs  o : Coarse breath sounds and crackles bilaterally   · Cardiovascular  o Heart  o :   § Auscultation of Heart  § : regular rate and rhythm, no murmurs, rubs, or gallops  o Peripheral Vascular System  o :   § Extremities  § : Bilateral pitting LE edema at 1+  · Gastrointestinal  o Abdominal Examination  o : normal bowel sounds, soft, non-tender,  · Skin and Subcutaneous Tissue  o General Inspection  o : approximately 2cm lesion noted over lateral aspect of right leg midway down her leg. Serosanguinous drainage noted with removal of bandage. Healthy pink and yellow granulomatous tissue noted, edges are clean, no warmth or redness.   · Neurologic  o Mental Status Examination  o :   § Orientation  § : grossly oriented to person, place and time  o Gait and Station  o :   § Gait Screening  § : normal gait          Assessment  · CHF (congestive heart " failure)     428.0/I50.9  Chronic with concern for acute exacerbations see dyspnea below. Follows with cardiology however has not been seen in the past 1 to 1.5 years. She is currently on appropriate medical therapy.  · Cough     786.2/R05  Acute onset cough, dyspnea, palpitation and worsening LE edema with vital concerning for CHF exacerbation. Breathing comfortably on room air although lung exam is abnormal. SPO2 of 90% in office. Patient with complex medical conditions. Chest x-ray, baseline labs, BNP, and Covid testing ordered for further evaluation. CXR reviewed independently and appeared unchanged from last study in Sept 2020. Patient to increase Lasix dose to 40 mg twice daily over the next few days. f/u in 3-4d.     Patient also with history of chronic interstitial lung disease and autoimmune disease which could be contributing as well to her worsening lung function.    Reasons to call and/or present to the ER for further management discussed.  · Leg wound, right     891.0/S81.801A  Chronic and persisting. Wound itself is well-appearing at this time. Weeping presumed secondary to swelling of her leg. Continue with topical antibiotics. Encourage patient to work on keeping the wound clean and dry as much as possible.  · Dyspnea     786.09/R06.00  See cough above.    Problems Reconciled  Plan  · Orders  o BMP [facility] ([id number]) - 428.0/I50.9, 786.2/R05, 891.0/S81.801A - 01/15/2021  o CBC with Auto Diff [facility] ([id number]) - 428.0/I50.9, 786.2/R05, 891.0/S81.801A - 01/15/2021  o Thyroid Profile (25306, 44580, THYII) - 428.0/I50.9, 786.2/R05, 891.0/S81.801A - 01/15/2021  o ACO-39: Current medications updated and reviewed (1159F, ) - 428.0/I50.9, 786.2/R05, 891.0/S81.801A, 786.09/R06.00 - 01/15/2021  o Chest xray 2 views [facility] ([id number]) - 428.0/I50.9, 786.2/R05 - 01/15/2021  o BNP (brain natriuretic peptide measurement) (84411) - 428.0/I50.9, 786.2/R05, 891.0/S81.801A - 01/15/2021  o [facility] NCOV2  (send-out) ([id number]) - 786.2/R05, 786.09/R06.00 - 01/15/2021  · Medications  o Medications have been Reconciled  o Transition of Care or Provider Policy  · Instructions  o Take all medications as prescribed/directed.  o Patient was educated/instructed on their diagnosis, treatment and medications prior to discharge from the clinic today.  o Patient instructed to seek medical attention urgently for new or worsening symptoms.  · Disposition  o Follow up in 3 days            Electronically Signed by: Merline Enamorado MD -Author on January 16, 2021 10:17:12 PM

## 2021-05-14 NOTE — PROGRESS NOTES
Progress Note      Patient Name: Aida Mcclure   Patient ID: [id number]   Sex: Female   YOB: 1965    Primary Care Provider: Boy Levy MD   Referring Provider: Boy Levy MD    Visit Date: February 4, 2021    Provider: Merline Enamorado MD   Location: [facility]   Location Address: [address] [place] [address]  [id number]   Location Phone: [phone]          Chief Complaint  · Follow up  · hip pain      History Of Present Illness  Aida Mcclure is a 55 year old /Black female who presents for evaluation and treatment of:      f/u hip pain:   Hip pain persisting. Xray showed arthritis of her hip. She has been referred to PT and awaiting schedule apt.     left ear pain:   Went to urgent care yesterday and was diagnosed with left ear infection for which she was presecribed cefdinir, 10d course.    She was also diagnosed with bronchitis and was prescribed prednisone 40mg for 5d.     Hypoxic respiratory failure:   Spo2 of 88% in office. Abnormal 6mn walk test on 1/19 for which home oxygen 2L was ordered. Unfortunatley patient has yet has  yet to receive her oxygen.     HTN:   Chronic hx . BP elevated during urgent care visit to 148/90.  Endorses continued compliance with her antihypertensive.    Patient shares that dates that were shared regarding her follow-ups with her specialists were wrong during our last encounter.  States that she was off by 1 week with corrected dates below:   2/8 Dr. Isaacs (sp?) -Lupus   2/10 Dr. Hua- ILD   2/11-Dr. Ozuna-thyroid            Past Medical History  Disease Name Date Onset Notes   Anemia, Unspecified --  --    Annual physical exam 11/06/2017 will schedule mammogram   Bilateral Hamstring injury/pain 08/17/2015 --    Breast lump --  --    CHF (congestive heart failure) 11/06/2017 follows with cards. currently doing well on Lasix, Lisinopril, aldactone, and propanolol   Degeneration of lumbar  intervertebral disc 2012 --    Essential hypertension 2017 well controlled on current regimen.    GERD --  --    Head injury --  --    Hernia --  --    Hyperthyroidism 2017 currently on methimazole. Pt reports possibly due to Grave's disease. Will need to obtain and review medical records from [facility]. will refer to endo.   Hypoxia --  --    Insomnia 2017 discussed sleep hygiene. Pt to try avoiding blue light at night. pt also to to set routine prior to bedtime. will avoid meds at this time.   Interstitial lung disease --  --    Lumbosacral disc herniation, L5-S1 2012 Left L5-S1 small in nature. She has failed all conservative measures and desires surgery.   Lupus --  sees Dr. oJnes    Migraines --  --    Pulmonary embolism --  --    Rheumatoid arthritis 2017 currently controlled, but with residual deformity. will follow with Dr. Jones   Right Total Knee 2015 --    Tachycardia --  --          Past Surgical History  Procedure Name Date Notes   Ankle surgery --  bilateral   Breast biopsy --  --     --  --    Cataract exctraction with lens implant --  --    Colonoscopy --  --    Eye Implant --  yes   Fasciotomy --  left anterior arm   Hand surgery, left --  --    Knee surgery --  --    Lymph Node Excision --  --    Power Port Placement --  --          Medication List  Name Date Started Instructions   albuterol sulfate 90 mcg/actuation inhalation HFA aerosol inhaler 2020 inhale 1 puff (90 mcg) by inhalation route every 4 hours as needed   azathioprine 50 mg oral tablet 2018 take 3 tablets (150 mg) by oral route once daily   Calcium 500 + D 500 mg(1,250mg) -200 unit oral tablet  take 1 tablet by oral route daily   calcium carbonate 500 mg calcium (1,250 mg) oral tablet 2021 take 1 tablet by oral route 2 times a day for 5 days   Diflucan 150 mg oral tablet 12/15/2020 take 1 tablet (150 mg) by oral route once can repeat in 3 days if needed.   ferrous  sulfate 325 mg (65 mg iron) oral tablet 01/21/2021 take 1 tablet (325 mg) by oral route 2 times per day for 90 days   fluticasone propionate 50 mcg/actuation nasal spray,suspension 02/04/2021 inhale 2 sprays (100 mcg) in each nostril by intranasal route once daily for 30 days   folic acid 1 mg oral tablet  take 1 tablet (1 mg) by oral route once daily   furosemide 40 mg oral tablet 09/08/2020 TAKE 1 TABLET BY MOUTH IN THE MORNING AND AFTERNOON   gabapentin 300 mg oral capsule 11/16/2020 take 1 capsule by oral route 3 times a day for 30 days prn pain   hydrocodone-acetaminophen 7.5-325 mg oral tablet 02/04/2021 take 1 tablet by oral route every 8 hours as needed for pain for 30 days   hydroxychloroquine 200 mg oral tablet  take 1 tablet (200 mg) by oral route once daily   Imitrex 50 mg oral tablet  take 1 tablet (50 mg) by oral route once with fluids as early as possible after the onset of a migraine attack;may repeat after 2 hours if headache returns, not to exceed 200mg in 24hrs   levofloxacin 500 mg oral tablet 12/14/2020 take 1 tablet (500 mg) by oral route once daily for 7 days   levothyroxine 200 mcg oral tablet  --    loratadine 10 mg oral capsule 08/15/2019 TAKE 1 CAPSULE BY ORAL ROUTE DAILY   magnesium oxide 400 mg magnesium oral tablet 01/21/2021 take 1 tablet by oral route QD for 5 days   Miralax 17 gram/dose oral powder 11/26/2019 take 17 gram mixed with 8 oz. water, juice, soda, coffee or tea by oral route once daily   mupirocin 2 % topical ointment 01/19/2021 apply a small amount to the affected area by topical route 3 times per day for 10 days   promethazine 12.5 mg oral tablet  take 1 tablet (12.5 mg) by oral route 3 times per day   propranolol 40 mg oral tablet 09/08/2020 TAKE 1/2 TABLET BY MOUTH THREE TIMES DAILY   Requip 0.25 mg oral tablet 12/14/2020 take 2 tablets by oral route once a day (at bedtime)   ROPINIROLE 0.25MG TABLETS 01/08/2021 TAKE 2 TABLETS BY MOUTH EVERY DAY AT BEDTIME    spironolactone 25 mg oral tablet  take 1 tablet (25 mg) by oral route once daily   Vitamin C 250 mg oral tablet 01/21/2021 take 1 tablet by oral route daily   Vitamin D2 1,250 mcg (50,000 unit) oral capsule 01/21/2021 take 1 capsule by oral route once weekly         Allergy List  Allergen Name Date Reaction Notes   aspirin --  --  LARGE DOSES OF ASPIRIN   Disalcid --  --  --          Family Medical History  Disease Name Relative/Age Notes   Lung Disease  --    Arthritis, Rheumatoid  --    Stroke Father/   Father   Cancer, Unspecified  --    - No Family History of Colorectal Cancer  --    Parkinson's Disease  --    Diabetes Mellitus, Type II  --          Social History  Finding Status Start/Stop Quantity Notes   Alcohol Current some day 0/0 --  2-3 week drinks weekly; wine   Caffeine Current some day 0/0 --  drinks occasionally; tea; 3-4 times per day   Recreational Drug Use Never --/-- --  no   Second hand smoke exposure --  --/-- --  For most of her life   Single. --  --/-- --  --    Tobacco Never 0/0 --  never a smoker  smoked 0.5 year   Unemployed. --  --/-- --  on disability         Immunizations  NameDate Admin Mfg Trade Name Lot Number Route Inj VIS Given VIS Publication   [id number]/17/2020 [facility] Fluzone Quadrivalent [id number] [facility] 09/17/2020    Comments: Pt tolerated well, left office in stable condition   [id number]/26/2019 [facility] PNEUMOVAX 23 [id number] [facility] 08/26/2019    Comments: tolerated well   Prevnar 1306/14/2018 [facility] PREVNAR 13 [id number] [facility] 06/14/2018 11/05/2015   Comments: tolerated well         Review of Systems  · Constitutional  o Denies  o : fever, fatigue, weight loss, weight gain  · HENT  o Admits  o : ear pain  · Cardiovascular  o Denies  o : lower extremity edema, claudication, chest pressure, palpitations  · Respiratory  o Admits  o : shortness of breath-chronic   o Denies  o : wheezing, frequent cough, hemoptysis  · Gastrointestinal  o Denies  o : nausea, vomiting, diarrhea, constipation,  "abdominal pain  · Musculoskeletal  o Admits  o : hip pain      Vitals  Date Time BP Position Site L\R Cuff Size HR RR TEMP (F) WT  HT  BMI kg/m2 BSA m2 O2 Sat FR L/min FiO2 HC       01/19/2021 08:17 /80 Sitting    108 - R  97.8 192lbs 2oz 5'  4\" 32.98 1.98 90 %  21%    01/29/2021 11:15 /74 Sitting    112 - R  98 194lbs 6oz 5'  4\" 33.36 2 93 %  21%    02/04/2021 11:08 /84 Sitting    113 - R  97.6 191lbs 2oz 5'  4\" 32.81 1.98 88 %  21%          Physical Examination  · Constitutional  o Appearance  o : no acute distress, well-nourished  · Head and Face  o Head  o :   § Inspection  § : atraumatic, normocephalic  · Respiratory  o Respiratory Effort  o : breathing comfortably, symmetric chest rise  o Auscultation of Lungs  o : Mostly clear over bilateral apices however coarse breath sounds at the bases.   · Cardiovascular  o Heart  o :   § Auscultation of Heart  § : regular rate and rhythm, no murmurs, rubs, or gallops  o Peripheral Vascular System  o :   § Extremities  § : no edema  · Neurologic  o Mental Status Examination  o :   § Orientation  § : grossly oriented to person, place and time  o Gait and Station  o :   § Gait Screening  § : normal gait          Assessment  · Interstitial lung disease     515/J84.9  Chronic with interval worsening given patient now with hypoxic respiratory failure. She has follow-up with her pulmonologist next week 2/10.   · Essential hypertension     401.9/I10  Chronic and stable on current regimen. SBP mostly in the 140s during previous encounters including today's. She is compliant with her medications. Discussed lifestyle changes, including weight loss and low sodium diet which if actively can lead to 5-10 point reduction in SBP. At least 10 minutes was spent counseling pt on low salt diet and educating patient on how to read and interpret food labels.  · Respiratory failure with hypoxia     518.81/J96.91  Most likely secondary to worsening of her underlying ILD, " although pt also w/ hx of HF (rt vs left unknown, no prior echo cardiogram to review).chronicity unclear.     6 Min walk test from 1/19 visit:  1. SPO2 on room air @ rest 90%  2. SPO2 on room air ambulating 76%  3. SPO2 on O2 @ 2 LPM 94%    Process to ensure home oxygen was started on 1/19 however patient has not yet received her home oxygen. Nursing staff to review and ensure oxygen delivery.Continues to endorse significant shortness of breath with exertion. CT chest PE protocol was negative. Her follow-up with pulmonologist was rescheduled and will take place on 2/10. Currently on steroid course from urgent care center provider for bronchitis.  · Hypomagnesemia     275.2/E83.42  Noted on most recent lab. Supplement initiated last week. Will repeat magnesium level today.  · Chronic pain     338.29/G89.29  Stable on [place]. Patient in need a refill today. Candelario report reviewed. Refilled sent to preferred pharmacy.  · Chronically on opiate therapy     V58.69/Z79.891  · Pruritic rash     698.2/L28.2  Intermittent. In need of refill of hydroxyzine. Refill sent.    Problems Reconciled  Plan  · Orders  o ACO-39: Current medications updated and reviewed (, 1159F) - 515/J84.9, 518.81/J96.91, 338.29/G89.29, 698.2/L28.2 - 02/04/2021  · Medications  o Nasal Spray (sodium chloride) 0.65 % nasal aerosol,spray   SIG: apply 1 spray by nasal route 2 times a day as needed for 30 days   DISP: (1) Package with 0 refills  Prescribed on 02/04/2021     o hydroxyzine HCl 25 mg oral tablet   SIG: take 1 tablet by oral route 2 times a day as needed for 30 days   DISP: (30) Tablet with 0 refills  Prescribed on 02/04/2021     o fluticasone propionate 50 mcg/actuation nasal spray,suspension   SIG: inhale 2 sprays (100 mcg) in each nostril by intranasal route once daily for 30 days   DISP: (1) Inhaler with 3 refills  Courtesy Refilled on 02/04/2021     o hydrocodone-acetaminophen 7.5-325 mg oral tablet   SIG: take 1 tablet by oral route  every 8 hours as needed for pain for 30 days   DISP: (60) Tablet with 0 refills  Refilled on 02/04/2021     o Medications have been Reconciled  o Transition of Care or Provider Policy  · Instructions  o Patient advised to monitor blood pressure (B/P) at home and journal readings. Patient informed that a B/P reading at home of more than 130/80 is considered hypertension. For readings greater lwvy175/90 or higher patient is advised to follow up in the office with readings for management. Patient advised to limit sodium intake.  o Patient was educated/instructed on their diagnosis, treatment and medications prior to discharge from the clinic today.  o Patient was instructed to exercise regularly.  o Call the office with any concerns or questions.  · Disposition  o Call or Return if symptoms worsen or persist.  o Follow up 6 weeks.            Electronically Signed by: Merline Enamorado MD -Author on February 4, 2021 08:03:45 PM

## 2021-05-14 NOTE — PROGRESS NOTES
"   Progress Note      Patient Name: Aida Mcclure   Patient ID: [id number]   Sex: Female   YOB: 1965    Primary Care Provider: Boy Levy MD   Referring Provider: Boy Levy MD    Visit Date: January 29, 2021    Provider: Merline Enamorado MD   Location: [facility]   Location Address: [address] [place] [address]  [id number]   Location Phone: [phone]          Chief Complaint  · Right hip pain x 2-3 months  · Medication questions      History Of Present Illness  Aida Mcclure is a 55 year old /Black female who presents for evaluation and treatment of:      Right hip pain:   present for the past few  (3-4)months. Pain is described as stabbing and sometimes lock as she is walking and it's a \"paniful lock\". 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 Denies injury or trauma to that hip. Pain last about 2 minutes, mostly present when she's standing, but has occurred when she is sitting down as well.  She often has to try to pop her hip back out to get rid of the pain.  Endorses some swelling over the area.     Medication questions:   Patient noted to have hypocalcemia, low vitamin D and low iron during most recent labs for which she was prescribed supplements.  However patient shares that price has been prohibitive.  Her pharmacist was able to substitute all of the medications for over-the-counter equivalent which her medication card would cover it.  She would like to have these meds reviewed today to ensure that they are correct and to reviewed proper dosing.    Vit C 500mg tablets, take 1 tablet daily,  Vit D with Caldcium 600mg + 20mcg of vit D3=1 tab bid   Mag oxide 400 (483mg elemental magnesium) take 1 tablet daily for 7 day, she will return for repeat Mg level.   Vit [id number] capsule, take 5 pills once a week.   Iron 325mg (65mg elemental), take 1 bid, be " sure to take with Vitamin C.     Upcoming f/u:   Dr. Meléndez-,   Dr. Madison,   Dr Mittal-Wed, 2/3         Past Medical History  Disease Name Date Onset Notes   Anemia, Unspecified --  --    Annual physical exam 2017 will schedule mammogram   Bilateral Hamstring injury/pain 2015 --    Breast lump --  --    CHF (congestive heart failure) 2017 follows with cards. currently doing well on Lasix, Lisinopril, aldactone, and propanolol   Degeneration of lumbar intervertebral disc 2012 --    Essential hypertension 2017 well controlled on current regimen.    GERD --  --    Head injury --  --    Hernia --  --    Hyperthyroidism 2017 currently on methimazole. Pt reports possibly due to Grave's disease. Will need to obtain and review medical records [facility]. will refer to endo.   Hypoxia --  --    Insomnia 2017 discussed sleep hygiene. Pt to try avoiding blue light at night. pt also to to set routine prior to bedtime. will avoid meds at this time.   Interstitial lung disease --  --    Lumbosacral disc herniation, L5-S1 2012 Left L5-S1 small in nature. She has failed all conservative measures and desires surgery.   Lupus --  sees Dr. Jones    Migraines --  --    Pulmonary embolism --  --    Rheumatoid arthritis 2017 currently controlled, but with residual deformity. will follow with Dr. Jones   Right Total Knee 2015 --    Tachycardia --  --          Past Surgical History  Procedure Name Date Notes   Ankle surgery --  bilateral   Breast biopsy --  --     --  --    Cataract exctraction with lens implant --  --    Colonoscopy --  --    Eye Implant --  yes   Fasciotomy --  left anterior arm   Hand surgery, left --  --    Knee surgery --  --    Lymph Node Excision --  --    Power Port Placement --  --          Medication List  Name Date Started Instructions   albuterol sulfate 90 mcg/actuation inhalation HFA aerosol inhaler 2020 inhale 1 puff  (90 mcg) by inhalation route every 4 hours as needed   azathioprine 50 mg oral tablet 03/16/2018 take 3 tablets (150 mg) by oral route once daily   Calcium 500 + D 500 mg(1,250mg) -200 unit oral tablet  take 1 tablet by oral route daily   calcium carbonate 500 mg calcium (1,250 mg) oral tablet 01/21/2021 take 1 tablet by oral route 2 times a day for 5 days   Diflucan 150 mg oral tablet 12/15/2020 take 1 tablet (150 mg) by oral route once can repeat in 3 days if needed.   ferrous sulfate 325 mg (65 mg iron) oral tablet 01/21/2021 take 1 tablet (325 mg) by oral route 2 times per day for 90 days   fluticasone propionate 50 mcg/actuation nasal spray,suspension  inhale 2 sprays (100 mcg) in each nostril by intranasal route once daily   folic acid 1 mg oral tablet  take 1 tablet (1 mg) by oral route once daily   furosemide 40 mg oral tablet 09/08/2020 TAKE 1 TABLET BY MOUTH IN THE MORNING AND AFTERNOON   gabapentin 300 mg oral capsule 11/16/2020 take 1 capsule by oral route 3 times a day for 30 days prn pain   hydrocodone-acetaminophen 7.5-325 mg oral tablet 12/14/2020 take 1 tablet by oral route every 8 hours as needed for pain   hydroxychloroquine 200 mg oral tablet  take 1 tablet (200 mg) by oral route once daily   Imitrex 50 mg oral tablet  take 1 tablet (50 mg) by oral route once with fluids as early as possible after the onset of a migraine attack;may repeat after 2 hours if headache returns, not to exceed 200mg in 24hrs   levofloxacin 500 mg oral tablet 12/14/2020 take 1 tablet (500 mg) by oral route once daily for 7 days   levothyroxine 200 mcg oral tablet  --    loratadine 10 mg oral capsule 08/15/2019 TAKE 1 CAPSULE BY ORAL ROUTE DAILY   magnesium oxide 400 mg magnesium oral tablet 01/21/2021 take 1 tablet by oral route QD for 5 days   Miralax 17 gram/dose oral powder 11/26/2019 take 17 gram mixed with 8 oz. water, juice, soda, coffee or tea by oral route once daily   mupirocin 2 % topical ointment 01/19/2021  apply a small amount to the affected area by topical route 3 times per day for 10 days   promethazine 12.5 mg oral tablet  take 1 tablet (12.5 mg) by oral route 3 times per day   propranolol 40 mg oral tablet 09/08/2020 TAKE 1/2 TABLET BY MOUTH THREE TIMES DAILY   Requip 0.25 mg oral tablet 12/14/2020 take 2 tablets by oral route once a day (at bedtime)   ROPINIROLE 0.25MG TABLETS 01/08/2021 TAKE 2 TABLETS BY MOUTH EVERY DAY AT BEDTIME   spironolactone 25 mg oral tablet  take 1 tablet (25 mg) by oral route once daily   Vitamin C 250 mg oral tablet 01/21/2021 take 1 tablet by oral route daily   Vitamin D2 1,250 mcg (50,000 unit) oral capsule 01/21/2021 take 1 capsule by oral route once weekly         Allergy List  Allergen Name Date Reaction Notes   aspirin --  --  LARGE DOSES OF ASPIRIN   Disalcid --  --  --          Family Medical History  Disease Name Relative/Age Notes   Lung Disease  --    Arthritis, Rheumatoid  --    Stroke Father/   Father   Cancer, Unspecified  --    - No Family History of Colorectal Cancer  --    Parkinson's Disease  --    Diabetes Mellitus, Type II  --          Social History  Finding Status Start/Stop Quantity Notes   Alcohol Current some day 0/0 --  2-3 week drinks weekly; wine   Caffeine Current some day 0/0 --  drinks occasionally; tea; 3-4 times per day   Recreational Drug Use Never --/-- --  no   Second hand smoke exposure --  --/-- --  For most of her life   Single. --  --/-- --  --    Tobacco Never 0/0 --  never a smoker  smoked 0.5 year   Unemployed. --  --/-- --  on disability         Immunizations  NameDate Admin Mfg Trade Name Lot Number Route Inj VIS Given VIS Publication   [id number]/17/2020 [facility] Fluzone Quadrivalent [id number]  LD 09/17/2020    Comments: Pt tolerated well, left office in stable condition   [id number]/26/2019 [facility] PNEUMOVAX 23 [id number] [facility] 08/26/2019    Comments: tolerated well   Prevnar 1306/14/2018 [facility] PREVNAR 13 [id number]  LD 06/14/2018 11/05/2015  "  Comments: tolerated well         Review of Systems  · Constitutional  o Denies  o : fever, fatigue, weight loss, weight gain  · Cardiovascular  o Admits  o : . Chronic lower extremity edema  o Denies  o : claudication, chest pressure, palpitations  · Respiratory  o Admits  o : Chronic dyspnea on exertion which again has been worsened over the past 6 months.  o Denies  o : wheezing, frequent cough, hemoptysis,  · Gastrointestinal  o Denies  o : nausea, vomiting, diarrhea, constipation, abdominal pain  · Integument  o Admits  o : . right lower extremity wound  · Neurologic  o Denies  o : incoordination  · Musculoskeletal  o Admits  o : joint pain, joint swelling, hip pain      Vitals  Date Time BP Position Site L\R Cuff Size HR RR TEMP (F) WT  HT  BMI kg/m2 BSA m2 O2 Sat FR L/min FiO2 HC       01/15/2021 10:35 /84 Sitting    113 - R  97.8 198lbs 2oz 5'  4\" 34.01 2.01 90 %  21%    01/19/2021 08:17 /80 Sitting    108 - R  97.8 192lbs 2oz 5'  4\" 32.98 1.98 90 %  21%    01/29/2021 11:15 /74 Sitting    112 - R  98 194lbs 6oz 5'  4\" 33.36 2 93 %  21%          Physical Examination  · Constitutional  o Appearance  o : no acute distress, well-nourished  · Head and Face  o Head  o :   § Inspection  § : atraumatic, normocephalic  · Respiratory  o Respiratory Effort  o : breathing comfortably, symmetric chest rise  o Auscultation of Lungs  o : clear to asculatation bilaterally, no wheezes, rales, or rhonchii  · Cardiovascular  o Heart  o :   § Auscultation of Heart  § : regular rate and rhythm, no murmurs, rubs, or gallops  o Peripheral Vascular System  o :   § Extremities  § : Chronic lower extremity swelling noted.  · Skin and Subcutaneous Tissue  o General Inspection  o : Right leg wound is well-appearing today, wound is dry without any drainage whatsoever with healthy edges. No surrounding  · Neurologic  o Mental Status Examination  o :   § Orientation  § : grossly oriented to person, place and " time  o Gait and Station  o :   § Gait Screening  § : normal gait     MSK: Tenderness with palpation over the right sacroiliac joint as well as over the lateral aspect of the right hip.  Range of motion of bilateral lower extremity is limited secondary to pain.  She has referred pain to her lower back with passive flexion and internal rotation of both hips. Pain is not producible with external rotation.  Left patellar reflex is 2+.  Patellar reflexes 1+, patient does have a history of prior surgery in that knee.    Chronic deformity of bilateral hands and fingers related to her underlying rheumatoid arthritis.           Assessment  · Hyperthyroidism     242.90/E05.90  currently on levothyroxine. Has follow-up with her specialist next week.  · Vitamin D deficiency     268.9/E55.9  Significant vitamin D deficiency with level of 10. Vitamin D replacement has been initiated. She will follow up in 3 months for repeat labs.  · Right hip pain     719.45/M25.551  Subacute as of the past 2 to 3 months. No red flag noted on history or exam. Will obtain x-ray of the right hip for further evaluation with plan to refer to physical therapy. Times persist despite PT discussed with patient the MRI and Ortho referral reason neck step.  · Medication management     V58.69/Z79.899  Reviewed all medications that patient brought in with her with dosing regimen as outlined below.  Vit C 500mg tablets, take 1 tablet daily,  Vit D with Caldcium 600mg + 20mcg of vit D3=1 tab bid   Mag oxide 400 (483mg elemental magnesium) take 1 tablet daily for 7 day, she will return for repeat Mg level.   Vit [id number]-[id number] capsule, take 5 pills once a week.   Iron 325mg (65mg elemental), take 1 bid, be sure to take with Vitamin C.   · Hypocalcemia     275.41/E83.51  Likely secondary to significant vitamin D deficiency with vitamin D level of 10, elevated PTH and normal Phos. Calcium supplement has been started.  · Hypomagnesemia     275.2/E83.42  Mild.  Magnesium supplement started she will follow up in a week for repeat lab.  · Respiratory failure with hypoxia     518.81/J96.91  Most likely secondary to worsening of her underlying ILD, although pt also w/ hx of HF (rt vs left unknown, no prior echo cardiogram to review).chronicity unclear.     6 Min walk test from 1/19 visit:  1. SPO2 on room air @ rest 90%  2. SPO2 on room air ambulating 76%  3. SPO2 on O2 @ 2 LPM 94%    Process to ensure home oxygen was started on 1/19 however patient has not yet received her home oxygen. Nursing staff to review and ensure oxygen delivery. Patient previously with borderline hypoxia with SPO2 of 90% over the previous 2 visits today her oxygen level is noted to be at 93% on room air. Tenuous to endorse significant shortness of breath with exertion. CT chest PE protocol was negative. Her follow-up with pulmonologist was rescheduled and will take place on 2/4.    Problems Reconciled  Plan  · Orders  o ACO-39: Current medications updated and reviewed (, 1159F) - 719.45/M25.551 - 01/29/2021  o PHYSICAL THERAPY CONSULTATION ([facility]) - - 01/29/2021   [facility] for [facility], off of [address]  Unless there is a closer location to pt's address.   o Hip (Right) 2 or more views (includes AP Pelvis) X-Ray [facility]. ([id number]) - 719.45/M25.551 - 01/29/2021   DONE IN CLINIC  · Medications  o Medications have been Reconciled  o Transition of Care or Provider Policy  · Instructions  o Recheck Vitamin D blood level in 8-12 weeks.  o Take all medications as prescribed/directed.  o Patient was educated/instructed on their diagnosis, treatment and medications prior to discharge from the clinic today.  o Patient was instructed to exercise regularly.  o Call the office with any concerns or questions.  · Disposition  o Follow up in 3 months.            Electronically Signed by: Merline Enamorado MD -Author on January 29, 2021 06:39:26 PM

## 2021-05-15 VITALS
WEIGHT: 168.12 LBS | HEIGHT: 64 IN | BODY MASS INDEX: 28.7 KG/M2 | SYSTOLIC BLOOD PRESSURE: 132 MMHG | HEART RATE: 130 BPM | RESPIRATION RATE: 15 BRPM | TEMPERATURE: 99.7 F | DIASTOLIC BLOOD PRESSURE: 58 MMHG | OXYGEN SATURATION: 94 %

## 2021-05-15 VITALS
BODY MASS INDEX: 23.58 KG/M2 | RESPIRATION RATE: 16 BRPM | HEIGHT: 64 IN | WEIGHT: 138.12 LBS | TEMPERATURE: 99.7 F | OXYGEN SATURATION: 95 % | HEART RATE: 111 BPM | DIASTOLIC BLOOD PRESSURE: 84 MMHG | SYSTOLIC BLOOD PRESSURE: 128 MMHG

## 2021-05-15 VITALS
TEMPERATURE: 98.2 F | WEIGHT: 163.12 LBS | OXYGEN SATURATION: 96 % | BODY MASS INDEX: 27.85 KG/M2 | SYSTOLIC BLOOD PRESSURE: 138 MMHG | HEIGHT: 64 IN | DIASTOLIC BLOOD PRESSURE: 50 MMHG | RESPIRATION RATE: 15 BRPM | HEART RATE: 88 BPM

## 2021-05-15 VITALS
BODY MASS INDEX: 26.85 KG/M2 | DIASTOLIC BLOOD PRESSURE: 72 MMHG | OXYGEN SATURATION: 94 % | WEIGHT: 157.25 LBS | HEIGHT: 64 IN | HEART RATE: 82 BPM | SYSTOLIC BLOOD PRESSURE: 112 MMHG | RESPIRATION RATE: 16 BRPM | TEMPERATURE: 98.6 F

## 2021-05-15 VITALS
SYSTOLIC BLOOD PRESSURE: 124 MMHG | HEIGHT: 64 IN | OXYGEN SATURATION: 98 % | DIASTOLIC BLOOD PRESSURE: 72 MMHG | TEMPERATURE: 98 F | HEART RATE: 82 BPM | BODY MASS INDEX: 28 KG/M2 | WEIGHT: 164 LBS

## 2021-05-15 VITALS
OXYGEN SATURATION: 96 % | SYSTOLIC BLOOD PRESSURE: 134 MMHG | BODY MASS INDEX: 29.02 KG/M2 | HEART RATE: 135 BPM | TEMPERATURE: 98.2 F | WEIGHT: 170 LBS | DIASTOLIC BLOOD PRESSURE: 68 MMHG | HEIGHT: 64 IN

## 2021-05-15 VITALS
RESPIRATION RATE: 14 BRPM | DIASTOLIC BLOOD PRESSURE: 62 MMHG | HEIGHT: 64 IN | BODY MASS INDEX: 23.95 KG/M2 | WEIGHT: 140.25 LBS | HEART RATE: 91 BPM | OXYGEN SATURATION: 95 % | TEMPERATURE: 98.5 F | SYSTOLIC BLOOD PRESSURE: 94 MMHG

## 2021-05-15 VITALS
HEART RATE: 95 BPM | TEMPERATURE: 98.8 F | DIASTOLIC BLOOD PRESSURE: 66 MMHG | SYSTOLIC BLOOD PRESSURE: 130 MMHG | OXYGEN SATURATION: 95 % | RESPIRATION RATE: 18 BRPM

## 2021-05-15 VITALS
HEART RATE: 122 BPM | OXYGEN SATURATION: 95 % | HEIGHT: 64 IN | BODY MASS INDEX: 24.33 KG/M2 | DIASTOLIC BLOOD PRESSURE: 48 MMHG | RESPIRATION RATE: 16 BRPM | TEMPERATURE: 100 F | SYSTOLIC BLOOD PRESSURE: 120 MMHG | WEIGHT: 142.5 LBS

## 2021-05-15 VITALS
WEIGHT: 166 LBS | TEMPERATURE: 97.4 F | HEIGHT: 64 IN | OXYGEN SATURATION: 93 % | BODY MASS INDEX: 28.34 KG/M2 | DIASTOLIC BLOOD PRESSURE: 66 MMHG | HEART RATE: 118 BPM | SYSTOLIC BLOOD PRESSURE: 132 MMHG

## 2021-05-15 VITALS
SYSTOLIC BLOOD PRESSURE: 134 MMHG | HEIGHT: 64 IN | OXYGEN SATURATION: 94 % | BODY MASS INDEX: 28.7 KG/M2 | DIASTOLIC BLOOD PRESSURE: 52 MMHG | TEMPERATURE: 100.1 F | WEIGHT: 168.12 LBS | HEART RATE: 127 BPM | RESPIRATION RATE: 15 BRPM

## 2021-05-15 VITALS
HEIGHT: 64 IN | OXYGEN SATURATION: 93 % | WEIGHT: 163.12 LBS | SYSTOLIC BLOOD PRESSURE: 124 MMHG | BODY MASS INDEX: 27.85 KG/M2 | HEART RATE: 114 BPM | DIASTOLIC BLOOD PRESSURE: 64 MMHG | TEMPERATURE: 99 F

## 2021-05-15 VITALS
DIASTOLIC BLOOD PRESSURE: 74 MMHG | HEIGHT: 64 IN | RESPIRATION RATE: 16 BRPM | HEART RATE: 116 BPM | TEMPERATURE: 99 F | SYSTOLIC BLOOD PRESSURE: 112 MMHG | WEIGHT: 134.25 LBS | OXYGEN SATURATION: 95 % | BODY MASS INDEX: 22.92 KG/M2

## 2021-05-15 VITALS
HEART RATE: 126 BPM | TEMPERATURE: 99.6 F | WEIGHT: 167.12 LBS | DIASTOLIC BLOOD PRESSURE: 72 MMHG | HEIGHT: 64 IN | SYSTOLIC BLOOD PRESSURE: 132 MMHG | BODY MASS INDEX: 28.53 KG/M2 | OXYGEN SATURATION: 94 %

## 2021-05-15 VITALS
HEIGHT: 64 IN | WEIGHT: 159 LBS | SYSTOLIC BLOOD PRESSURE: 122 MMHG | BODY MASS INDEX: 27.14 KG/M2 | OXYGEN SATURATION: 95 % | TEMPERATURE: 99.1 F | HEART RATE: 82 BPM | DIASTOLIC BLOOD PRESSURE: 64 MMHG

## 2021-05-16 VITALS
HEART RATE: 81 BPM | OXYGEN SATURATION: 96 % | DIASTOLIC BLOOD PRESSURE: 64 MMHG | TEMPERATURE: 97.7 F | HEIGHT: 64 IN | SYSTOLIC BLOOD PRESSURE: 108 MMHG | BODY MASS INDEX: 29.53 KG/M2 | WEIGHT: 173 LBS | RESPIRATION RATE: 15 BRPM

## 2021-05-16 VITALS
SYSTOLIC BLOOD PRESSURE: 112 MMHG | OXYGEN SATURATION: 100 % | HEART RATE: 89 BPM | HEIGHT: 64 IN | WEIGHT: 169 LBS | TEMPERATURE: 97.1 F | DIASTOLIC BLOOD PRESSURE: 78 MMHG | BODY MASS INDEX: 28.85 KG/M2

## 2021-05-16 VITALS
OXYGEN SATURATION: 94 % | TEMPERATURE: 98 F | BODY MASS INDEX: 29.73 KG/M2 | WEIGHT: 174.12 LBS | HEIGHT: 64 IN | RESPIRATION RATE: 16 BRPM | HEART RATE: 81 BPM

## 2021-05-16 VITALS
DIASTOLIC BLOOD PRESSURE: 84 MMHG | HEART RATE: 84 BPM | WEIGHT: 169.25 LBS | HEIGHT: 64 IN | BODY MASS INDEX: 28.89 KG/M2 | SYSTOLIC BLOOD PRESSURE: 126 MMHG | OXYGEN SATURATION: 98 % | RESPIRATION RATE: 14 BRPM | TEMPERATURE: 98.2 F

## 2021-05-16 VITALS
SYSTOLIC BLOOD PRESSURE: 120 MMHG | TEMPERATURE: 98.1 F | HEIGHT: 64 IN | RESPIRATION RATE: 16 BRPM | OXYGEN SATURATION: 94 % | DIASTOLIC BLOOD PRESSURE: 78 MMHG | BODY MASS INDEX: 31.13 KG/M2 | WEIGHT: 182.37 LBS | HEART RATE: 104 BPM

## 2021-05-16 VITALS
HEART RATE: 103 BPM | RESPIRATION RATE: 16 BRPM | TEMPERATURE: 98.4 F | SYSTOLIC BLOOD PRESSURE: 126 MMHG | OXYGEN SATURATION: 97 % | WEIGHT: 174 LBS | DIASTOLIC BLOOD PRESSURE: 76 MMHG | BODY MASS INDEX: 29.71 KG/M2 | HEIGHT: 64 IN

## 2021-05-16 VITALS
HEART RATE: 76 BPM | SYSTOLIC BLOOD PRESSURE: 104 MMHG | HEIGHT: 64 IN | WEIGHT: 171 LBS | OXYGEN SATURATION: 96 % | TEMPERATURE: 99 F | DIASTOLIC BLOOD PRESSURE: 68 MMHG | RESPIRATION RATE: 12 BRPM | BODY MASS INDEX: 29.19 KG/M2

## 2021-05-16 VITALS
WEIGHT: 179.5 LBS | BODY MASS INDEX: 30.64 KG/M2 | DIASTOLIC BLOOD PRESSURE: 76 MMHG | HEART RATE: 99 BPM | HEIGHT: 64 IN | SYSTOLIC BLOOD PRESSURE: 118 MMHG | TEMPERATURE: 99.1 F | OXYGEN SATURATION: 96 %

## 2021-05-16 VITALS
RESPIRATION RATE: 16 BRPM | BODY MASS INDEX: 26.87 KG/M2 | HEIGHT: 64 IN | TEMPERATURE: 98.7 F | WEIGHT: 157.37 LBS | SYSTOLIC BLOOD PRESSURE: 100 MMHG | OXYGEN SATURATION: 95 % | DIASTOLIC BLOOD PRESSURE: 68 MMHG | HEART RATE: 84 BPM

## 2021-05-16 VITALS
BODY MASS INDEX: 33.14 KG/M2 | SYSTOLIC BLOOD PRESSURE: 119 MMHG | RESPIRATION RATE: 16 BRPM | WEIGHT: 194.12 LBS | DIASTOLIC BLOOD PRESSURE: 78 MMHG | HEART RATE: 105 BPM | TEMPERATURE: 99.6 F | OXYGEN SATURATION: 94 % | HEIGHT: 64 IN

## 2021-05-16 VITALS
WEIGHT: 169 LBS | HEIGHT: 64 IN | BODY MASS INDEX: 28.85 KG/M2 | SYSTOLIC BLOOD PRESSURE: 118 MMHG | HEART RATE: 62 BPM | DIASTOLIC BLOOD PRESSURE: 74 MMHG

## 2021-05-16 VITALS
HEIGHT: 64 IN | TEMPERATURE: 98.1 F | DIASTOLIC BLOOD PRESSURE: 62 MMHG | HEART RATE: 70 BPM | WEIGHT: 176.12 LBS | SYSTOLIC BLOOD PRESSURE: 112 MMHG | BODY MASS INDEX: 30.07 KG/M2 | OXYGEN SATURATION: 95 %

## 2021-05-16 VITALS
HEIGHT: 64 IN | BODY MASS INDEX: 31.26 KG/M2 | DIASTOLIC BLOOD PRESSURE: 64 MMHG | SYSTOLIC BLOOD PRESSURE: 138 MMHG | WEIGHT: 183.12 LBS

## 2021-05-16 VITALS
DIASTOLIC BLOOD PRESSURE: 63 MMHG | WEIGHT: 167 LBS | SYSTOLIC BLOOD PRESSURE: 114 MMHG | BODY MASS INDEX: 28.51 KG/M2 | HEIGHT: 64 IN

## 2021-05-26 ENCOUNTER — OFFICE VISIT CONVERTED (OUTPATIENT)
Dept: INTERNAL MEDICINE | Facility: CLINIC | Age: 56
End: 2021-05-26
Attending: STUDENT IN AN ORGANIZED HEALTH CARE EDUCATION/TRAINING PROGRAM

## 2021-05-26 ENCOUNTER — HOSPITAL ENCOUNTER (OUTPATIENT)
Dept: OTHER | Facility: HOSPITAL | Age: 56
Discharge: HOME | End: 2021-05-26
Attending: STUDENT IN AN ORGANIZED HEALTH CARE EDUCATION/TRAINING PROGRAM

## 2021-05-26 ENCOUNTER — CONVERSION ENCOUNTER (OUTPATIENT)
Dept: OTHER | Facility: HOSPITAL | Age: 56
End: 2021-05-26

## 2021-05-26 LAB
ALBUMIN SERPL-MCNC: 4.3 G/DL (ref 3.5–5)
ALBUMIN/GLOB SERPL: 1.3 {RATIO} (ref 1.4–2.6)
ALP SERPL-CCNC: 85 U/L (ref 53–141)
ALT SERPL-CCNC: 8 U/L (ref 10–40)
ANION GAP SERPL CALC-SCNC: 14 MMOL/L (ref 8–19)
AST SERPL-CCNC: 19 U/L (ref 15–50)
BILIRUB SERPL-MCNC: 0.67 MG/DL (ref 0.2–1.3)
BUN SERPL-MCNC: 34 MG/DL (ref 5–25)
BUN/CREAT SERPL: 25 {RATIO} (ref 6–20)
CALCIUM SERPL-MCNC: 7.8 MG/DL (ref 8.7–10.4)
CHLORIDE SERPL-SCNC: 104 MMOL/L (ref 99–111)
CONV CO2: 28 MMOL/L (ref 22–32)
CONV TOTAL PROTEIN: 7.7 G/DL (ref 6.3–8.2)
CREAT UR-MCNC: 1.38 MG/DL (ref 0.5–0.9)
GFR SERPLBLD BASED ON 1.73 SQ M-ARVRAT: 49 ML/MIN/{1.73_M2}
GLOBULIN UR ELPH-MCNC: 3.4 G/DL (ref 2–3.5)
GLUCOSE SERPL-MCNC: 102 MG/DL (ref 65–99)
OSMOLALITY SERPL CALC.SUM OF ELEC: 302 MOSM/KG (ref 273–304)
POTASSIUM SERPL-SCNC: 4 MMOL/L (ref 3.5–5.3)
SODIUM SERPL-SCNC: 142 MMOL/L (ref 135–147)

## 2021-05-27 ENCOUNTER — CONVERSION ENCOUNTER (OUTPATIENT)
Dept: INTERNAL MEDICINE | Facility: CLINIC | Age: 56
End: 2021-05-27

## 2021-05-27 LAB
AMPHET UR QL CFM: NEGATIVE
BARBITURATES UR QL: NEGATIVE
BENZODIAZ UR QL SCN: NEGATIVE
CONV AMP/METHAMP UR: NEGATIVE
CONV COCAINE, UR: NEGATIVE
MDMA UR QL SCN: NEGATIVE
METHADONE UR QL SCN: NEGATIVE
OPIATES UR QL SCN: NEGATIVE
OXYCODONE UR QL SCN: NEGATIVE
PCP UR QL: NEGATIVE
THC SERPLBLD CFM-MCNC: NEGATIVE NG/ML

## 2021-05-28 VITALS
TEMPERATURE: 98.5 F | HEART RATE: 75 BPM | OXYGEN SATURATION: 97 % | HEART RATE: 110 BPM | BODY MASS INDEX: 29.62 KG/M2 | RESPIRATION RATE: 12 BRPM | TEMPERATURE: 98.7 F | WEIGHT: 174 LBS | HEART RATE: 78 BPM | OXYGEN SATURATION: 98 % | BODY MASS INDEX: 28.39 KG/M2 | RESPIRATION RATE: 15 BRPM | RESPIRATION RATE: 12 BRPM | OXYGEN SATURATION: 97 % | DIASTOLIC BLOOD PRESSURE: 60 MMHG | TEMPERATURE: 98 F | SYSTOLIC BLOOD PRESSURE: 110 MMHG | HEIGHT: 64 IN | SYSTOLIC BLOOD PRESSURE: 119 MMHG | DIASTOLIC BLOOD PRESSURE: 61 MMHG | DIASTOLIC BLOOD PRESSURE: 54 MMHG | SYSTOLIC BLOOD PRESSURE: 129 MMHG | BODY MASS INDEX: 29.71 KG/M2 | WEIGHT: 173.5 LBS | WEIGHT: 166.31 LBS | HEIGHT: 64 IN | HEIGHT: 64 IN

## 2021-05-28 VITALS
DIASTOLIC BLOOD PRESSURE: 59 MMHG | HEIGHT: 64 IN | OXYGEN SATURATION: 96 % | HEART RATE: 64 BPM | TEMPERATURE: 98.2 F | SYSTOLIC BLOOD PRESSURE: 117 MMHG | RESPIRATION RATE: 20 BRPM | BODY MASS INDEX: 29.8 KG/M2 | WEIGHT: 174.56 LBS

## 2021-05-28 VITALS
RESPIRATION RATE: 14 BRPM | OXYGEN SATURATION: 91 % | HEIGHT: 64 IN | HEART RATE: 90 BPM | WEIGHT: 195 LBS | TEMPERATURE: 97.8 F | BODY MASS INDEX: 33.29 KG/M2 | SYSTOLIC BLOOD PRESSURE: 135 MMHG | DIASTOLIC BLOOD PRESSURE: 74 MMHG

## 2021-05-28 VITALS
TEMPERATURE: 99.3 F | RESPIRATION RATE: 12 BRPM | BODY MASS INDEX: 22.92 KG/M2 | SYSTOLIC BLOOD PRESSURE: 106 MMHG | WEIGHT: 134.25 LBS | DIASTOLIC BLOOD PRESSURE: 55 MMHG | OXYGEN SATURATION: 96 % | HEIGHT: 64 IN | HEART RATE: 101 BPM

## 2021-05-28 NOTE — PROGRESS NOTES
Patient: AIDA NUÑEZ APRIL     Acct: [id number]     Report: #[id number]  UNIT #: [id number]     : 1965    Encounter Date:2018  PRIMARY CARE: TERESITA ZALDIVAR  ***Signed***  --------------------------------------------------------------------------------------------------------------------  Chief Complaint      Encounter Date      Mar 27, 2018            Primary Care Provider      TERESITA ZALDIVAR            Referring Provider      Tuan Hodges            Patient Complaint      Patient is complaining of      Pt here for 14 day MARLEN            TRANSITION OF CARE 14D      Transition of Care      MARLEN 14 Day Followup      Aida presents to office for follow up post discharge from inpatient status     within 14 calendar days. Patient was contacted within 2 business days via phone     conversation. Documentation of that phone call is present in the patient's     electronic chart. She  was admitted to inpatient facility on 18 and was     discharged on 18 due to: Acute hyperthyroidism due to noncompliance with     treatment       Staph aureus and Klebsiella-positive sputum culture concern for pulmonary     infection      Acute mild Diastolic congestive heart failure       SLE.      Hyperlipidemia.      Hypertension, essential controlled.      Obstructive sleep apnea.      Gastroesophageal reflux disease without esophagitis.      History of recent compartment syndrome.      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            Admitting MD: Tuan Hodges            Hers  discharge summary has been reviewed and placed in the patient's     electronic chart.            Problem List      Problem List Reviewed      Patient's problem list has been reviewed from patient discharge summary.            Medications Reviewed       Meds Reviewed      Patient s outpatient medication list has been reconciled with the medication     list from the discharge summary and has been reviewed with the patient.            VITALS      Height 5 ft 4 in / 162.56 cm      Weight 174 lbs 9 oz / 79.283290 kg      BSA 1.92 m2      BMI 30.0 kg/m2      Temperature 98.2 F / 36.78 C - Oral      Pulse 64      Respirations 20      Blood Pressure 117/59 Sitting, Right Arm      Pulse Oximetry 96%, room air            HPI      The patient is a very pleasant 52 year old  female who is a     patient of Dr. Flores, with a history of connective tissue related     interstitial lung disease, pulmonary embolism, diastolic heart failure, and     lupus on Imuran. She was admitted to [facility] on 03/13/18 and     we treated her for acute hypoxic respiratory failure as well as community     acquired pneumonia secondary to MSSA and Klebsiella. She was discharged on a 2     week course of Levaquin and she is taking her last dose of that today. She     reports that she has done quite well since her hospital discharge. She denies     any dyspnea on exertion other than her baseline. She denies coughing, wheezing,     fevers or chills, hemoptysis or chest tightness.  She continues on her home     dose of Imuran and Plaquenil. She was started on Aldactone during her hospital     and her Lasix was increased this morning by Dr. Levy to 80 mg every morning     and 40 mg every evening. She reports that her weight has been stable since her     discharge and she has not had increasing lower extremity edema, orthopnea or     abdominal distension. She was also started on Methimazole for hyperthyroidism     and will follow up with Dr. Tobar in 1 month.             I have reviewed his Review of Systems medical, surgical and family history and     agree with those as entered.            ROS      Constitutional:  Complains of: Fatigue, Denies: Fever, Weight  gain, Weight loss    , Chills, Insomnia, Other      Respiratory/Breathing:  Denies: Shortness of air, Wheezing, Cough, Hemoptysis,     Pleuritic pain, Other      Endocrine:  Denies: Polydipsia, Polyuria, Heat/cold intolerance, Abnorml     menstrual pattern, Diabetes, Other      Eyes:  Denies: Blurred vision, Vision Changes, Other      Ears, nose, mouth, throat:  Denies: Congestion, Dysphagia, Hearing Changes,     Nose Bleeding, Nasal Discharge, Throat pain, Tinnitus, Other      Cardiovascular:  Denies: Chest Pain, Exertional dyspnea, Peripheral Edema,     Palpitations, Syncope, Wake up Gasping for air, Orthopnea, Tachycardia, Other      Gastrointestinal:  Denies: Abdominal pain/cramping, Bloody stools, Constipation    , Diarrhea, Melena, Nausea, Vomiting, Other      Genitourinary:  Denies: Dysuria, Urinary frequency, Incontinence, Hematuria,     Urgency, Other      Musculoskeletal:  Denies: Joint Pain, Joint Stiffness, Joint Swelling, Myalgias    , Other      Hematologic/lymphatic:  DENIES: Lymphadenopathy, Bruising, Bleeding tendencies,     Other      Neurologic:  Denies: Headache, Numbness, Weakness, Seizures, Other      Psychiatric:  Denies: Anxiety, Appropriate Effect, Depression, Other      Sleep:  No: Excessive daytime sleep, Morning Headache?, Snoring, Insomnia?,     Stop breathing at sleep?, Other      Integumentary:  Denies: Rash, Dry skin, Skin Warm to Touch, Other            FAMILY/SOCIAL/MEDICAL HX      Surgical History:  Yes: Bowel Surgery (COLONOSCOPY), Breast Surgery (HAS HAD     DELTAGRAM ON RT BREAST ), Eye Surgery (BILATERAL CATARACT), Orthopedic Surgery (    BILAT ANKLE FUSION, LEFT HAND SURG, R ACL REPAIR AND RTKR), Vascular Surgery (    LYMPH NODE REMOVAL L SIDE [id number] and under arm), Other Surgeries (PORT), No:     Abdominal Surgery, Appendectomy, CABG, Cholecystectomy, Head Surgery, Oral     Surgery      Cancer/Type - Family Hx:  Sister (LUNG)      Is Father Still Living?:  No      Is Mother  Still Living?:  No      Social History:  No Tobacco Use, No Alcohol Use, No Recreational Drug use      Smoking status:  Never smoker       Section:  Yes      Hysterectomy:  No (Tubal )      Anticoagulation Therapy:  No      Antibiotic Prophylaxis:  No      Medical History:  Yes: Anemia, Arthritis (OSTEOARTHRITIS), Asthma (INHALER),     Blood Disease (ANEMIA), Congestive Heart Failu (3-), Seizures (1995 X 1     EPISODE POST PARTUM), Hemorrhoids/Rectal Prob (ACID REFLUX), High Blood     Pressure (CONTROLLED WITH MEDS), Reflux Disease, Thyroid Problem (HYPERACTIVE),     Miscellaneous Medical/oth (SYSTEMIC LUPUS, compartment syndrome ), No:     Chemotherapy/Cancer, Chronic Bronchitis/COPD, Deafness or Ringing Ears, Diabetes    , Heart Attack, Shortness Of Breath, Sinus Trouble      Psychiatric History      None known.            Hx Influenza Vaccination:  Yes      Date Influenza Vaccine Given:  Dec 1, 2017      Influenza Vaccine Declined:  No      2 or More Falls Past Year?:  No      Fall Past Year with Injury?:  No      Hx Pneumococcal Vaccination:  Yes      Encouraged to follow-up with:  PCP regarding preventative exams.      Chart initiated by      Marija Dillard MA            ALLERGIES/MEDICATIONS      Allergies:        Coded Allergies:             SALSALATE (Verified  Allergy, Unknown, 3/27/18)      Medications    Last Reconciled on 3/27/18 16:23 by RC ALLISON PA-C      Apixaban (Eliquis) 5 Mg Tablet      5 MG PO BID for 30 Days, #60 TAB         Reported         3/23/18       Spironolactone (Aldactone) 25 Mg Tablet      25 MG PO QDAY, #30 TAB         Prov: Tuan Hodges         3/16/18       Propranolol (Inderal) 40 Mg Tablet      20 MG PO TID, #45 TAB 0 Refills         Prov: Tuan Hodges         3/16/18       Promethazine HCl (Phenergan*) 25 Mg Tablet      25 MG PO Q6H, #60 TAB 0 Refills         Reported         3/13/18       Lisinopril* (Lisinopril*) 10 Mg Tablet      10 MG PO QDAY, #30 TAB 0  Refills         Reported         3/13/18       SUMAtriptan Succinate (Imitrex) 50 Mg Tablet      50 MG PO ASDIR Y for MIGRAINE, TAB         Reported         3/13/18       azaTHIOprine (Imuran*) 50 Mg Tab      150 MG PO QDAY, #90 TAB 5 Refills         Prov: KAM LIMON         12/20/17       Magnesium Oxide (Magnesium Oxide*) 400 Mg Tablet      400 MG PO QDAY, #30 TAB 1 Refill         Prov: MIGUEL BASILIO         8/17/17       Furosemide* (Lasix*) 40 Mg Tablet      40 MG PO ASDIR, #90 TAB         Prov: MIGUEL BASILIO         8/17/17       Omeprazole (Omeprazole*) 40 Mg Capsule      40 MG PO QDAY, #30 CAP 0 Refills         Reported         8/12/17       MDI-Albuterol (Proventil HFA) 6.7 Gm Hfa.aer.ad      2 PUFFS INH Q6H Y for SHORTNESS OF BREATH, #1 MDI 0 Refills         Reported         8/12/17       Acetaminophen/Hydrocodone 7.5/325 (Acetaminophen/Hydrocodone 7.5/325) 1 Tab     Tablet      1 TAB PO Q4H Y for PAIN, #12 TAB         Prov: Weston Vázquez         1/18/17       Hydroxychloroquine Sulfate (Plaquenil*) 200 Mg Tab      200 MG PO BID for 30 Days, #60 TAB         Prov: Makayla San         9/14/16      Current Medications      Current Medications Reviewed 3/27/18            EXAM      Vital Signs Reviewed      Gen: WDWN, Alert, NAD.        HEENT:  PERRL, EOMI.  OP, nares clear, no sinus tenderness.      Neck:  Supple, no JVD, no thyromegaly.      Lymph: No axillary, cervical, supraclavicular lymphadenopathy noted bilaterally.      Chest: Grossly clear to auscultation bilaterally without wheezes, rhonchi or     crackles appreciated, normal work of breathing noted.      CV:  RRR, no MGR, pulses 2+, equal.      Abd:  Soft, NT, ND, + BS, no HSM.      EXT:  No clubbing, no cyanosis, trace bilateral lower extremity pitting edema,     mild joint tenderness and mild swan neck deformities in bilateral hand     consistent with rheumatoid arthritis.       Neuro:  A  Skin: No rashes or lesions.      Vitals      Vitals:              Height 5 ft 4 in / 162.56 cm           Weight 174 lbs 9 oz / 79.739826 kg           BSA 1.92 m2           BMI 30.0 kg/m2           Temperature 98.2 F / 36.78 C - Oral           Pulse 64           Respirations 20           Blood Pressure 117/59 Sitting, Right Arm           Pulse Oximetry 96%, room air            REVIEW      Results Reviewed      PCCS Results Reviewed?:  Yes Prev Lab Results, Yes Prev Radiology Results, Yes     Previous [facility] Records (I personally reviewed the patient's previous provider     notes, laboratory work and imaging including her most recent chest CT scan,     pulmonary consultation, progress notes and discharge summary. )      Radiographic Results               [facility]                PACS RADIOLOGY REPORT            Patient: PRECIOUS NUÑEZ APRIL   Acct: #[id number]   Report: #[id number]            UNIT #: [id number]    DOS: 18 0043      INSURANCE:MEDICARE PART A   LOCATION:ER     : 1965            PROVIDERS      ADMITTING:     ATTENDING:       FAMILY:  TERESITA ZALDIVAR   ORDERING:  SALO YAÑEZ         OTHER:    DICTATING:  Shama Tinsley MD            REQ #:[id number]   EXAM:CHW - CT CHEST with CONTRAST      REASON FOR EXAM:  Shortness of Breath      REASON FOR VISIT:  FLU SYMPTOMS,BILAT LEG SWELLING            *******Signed******         PROCEDURE:   CT CHEST W/ CONTRAST             COMPARISON:   [facility], CT, CHEST W/ CONTRAST, 10/14/2017, 1:19.             INDICATIONS:   DYSPNEA X 1 WEEK             TECHNIQUE:   After obtaining the patient's consent, CT images were obtained     with non-ionic       intravenous contrast material.               PROTOCOL:     Pulmonary embolism imaging protocol performed                RADIATION:     DLP: 420.5mGy*cm          Automated exposure control was utilized to minimize radiation dose.       CONTRAST:   75cc Isovue 370 I.V.             FINDINGS:         No  pulmonary arterial filling defects are seen to suggest pulmonary emboli.      The main pulmonary       artery remains dilated, measuring up to 3.6 cm in diameter.  Bilateral     interstitial and       ground-glass opacities, greatest in the dependent lower lobes have not     significantly changed.        Heart size is enlarged.  No pericardial or pleural effusion.  Thyroid gland     remains enlarged.  The       visualized solid organs of the upper abdomen appear unremarkable for this phase     of contrast       enhancement.  No acute osseous abnormality is identified.  There is diffuse     anasarca in the soft       tissues.             CONCLUSION:         1. No evidence of pulmonary emboli.      2. Dilated main pulmonary artery, suggestive of pulmonary hypertension.      3. Cardiomegaly.      4. No significant progression of interstitial and ground-glass opacities, most     prominent in the       lower lobes.      5. Enlarged thyroid gland.              NGUYEN BAL MD             Electronically Signed and Approved By: NGUYEN BAL MD on 3/13/2018 at 2:31                   Until signed, this is an unconfirmed preliminary report that may contain      errors and is subject to change.                                              BARLA:      D:03/13/18 0231            PLAN      Assessment      Pulmonary embolism - I26.99            Interstitial lung disease due to connective tissue disease - M35.8, J84.89            Therapeutic drug monitoring - Z51.81            Rheumatoid arthritis involving both hands - M06.9            ILD (interstitial lung disease) - J84.9            SLE (systemic lupus erythematosus) - M32.9            Notes      New Diagnostics      * CBC, 2 Months       Dx: Pulmonary embolism - I26.99      * Comp Metabolic Panel, 2 Months       Dx: ILD (interstitial lung disease) - J84.9      ASSESSMENT:      1. Interstitial lung disease due to connective tissue disease.        2. SLE.       3.  Therapeutic drug monitoring.       4. Rheumatoid arthritis.       5. History of pulmonary embolism.       6. Chronic diastolic congestive heart failure appears euvolemic.             PLAN:      1. At this time I will continue her Imuran and check a CBC and comprehensive     metabolic profile in 2 months for therapeutic drug monitoring.       2. I have counseled her on the importance of weighing herself daily, monitoring     herself for any increased dyspnea on exertion, orthopnea, weight gain or lower     extremity edema and continuing to take her Lasix and Aldactone as prescribed.       3. Follow up with Dr. Dennis in 3 months, sooner if needed.            Patient Education      Time Spent:  > 50% /Coord Care                 Disclaimer: Converted document may not contain table formatting or lab diagrams. Please see [employer] for the authenticated document.

## 2021-05-28 NOTE — PROGRESS NOTES
Patient: PRECIOUS NUÑEZ APRIL     Acct: [id number]     Report: #[id number]  UNIT #: [id number]     : 1965    Encounter Date:2021  PRIMARY CARE: Merline Enamorado  ***Signed***  --------------------------------------------------------------------------------------------------------------------  History of Present Illness      Chief Complaint: Pt is here today via tele-health for a follow up post Echo             Kami is presenting for evaluation via Telehealth visit. Verbal     consent obtained before beginning visit.            PAST MEDICAL HISTORY/OVERVIEW OF PATIENT SYMPTOMS            Symptoms: None            HX: SHANELLE            Never smoker            Flu vaccine: Current            Pneumonia vaccine: Current            Provider spent 13 minutes with the patient during telehealth visit including     discussing the case with the patient over the phone and personally reviewing all    pertinent labs, imaging and provider notes.            The following staff were present during this visit: Dr. Saeid Dennis, Gloria Mendozaman MA            The patient is a 55 year old  female with interstitial lung     disease secondary to connective tissue disease and diastolic heart failure  who     presents for Telehealth visit today. Since her last office visit she has been     doing better on a higher dose of Lasix. She is wearing 2 liters of oxygen     intermittently. Her leg swelling is at baseline, she denies any orthopnea or     paroxysmal nocturnal dyspnea. Dyspnea is at baseline, she gets short of breath     walking about 500-600 feet, it is moderate in severity, worse with exertion,     improved with rest. She denies any coughing, wheezing, headaches and hemoptysis,    weight loss or chest pain. Echocardiogram and most recent CT scan of the chest     are unchanged from previous. She is able to perform her ADL's without difficulty    and denies any swollen glands in her  lymph nodes, head or neck.            I personally reviewed Review of Systems, family, social, surgical and medical     history and agree with their findings.            Physical exam is deferred due to Telehealth visit.            I personally reviewed my last office visit and Lydia Florez last office visit     notes. I also reviewed an echocardiogram from 2021.                     [facility]                   [facility]                PACS RADIOLOGY REPORT            Patient: PRECIOUS NUÑEZ APRIL   Acct: #[id number]   Report: #[id number]            UNIT #: [id number]    DOS: 21 1816      INSURANCE:[employer]   ORDER #:[id number]      LOCATION:[facility]     : 1965            PROVIDERS      ADMITTING:     ATTENDING: POLY OLMOS cfe      FAMILY:     ORDERING:  POLY OLMOS cfe         OTHER:    DICTATING:  Luis Monzon MD            REQ #:[id number]   EXAM:CXR2 - CHEST 2V AP PA LAT      REASON FOR EXAM:  L EAR PAIN      REASON FOR VISIT:  L EAR PAIN            *******Signed******         PROCEDURE:   CHEST AP/PA AND LATERAL             COMPARISON:   [facility], CR, CHEST PA/AP   3/20/2020, 16:36.        [facility], CR, CHEST PA/AP   13:17.  [facility], CR, CHEST PA/AP          INDICATIONS:   CONGESTION             FINDINGS:         Lung volumes are low.  There is a left port catheter with tip projecting at the     cavoatrial       junction.  The heart appears mildly enlarged.  There are prominent interstitial     markings and       bibasilar opacities, similar to the recent prior exam.  No significant new     infiltrate is seen.  No       effusion or pneumothorax is identified.  Surgical clips are seen at the base of     neck.             CONCLUSION:         1. Mild cardiomegaly.      2. Prominent interstitial markings and bibasilar opacities, similar to the prior     exam, which may be       related to chronic lung disease.  No definite new pulmonary abnormality is     identified.              RUPA MACHUCA MD             Electronically Signed and Approved By: RUPA MACHUCA MD on 2021 at 18:52                               Until signed, this is an unconfirmed preliminary report that may contain      errors and is subject to change.                                              NETMA:      D:21 1852                                  [facility]                PACS RADIOLOGY REPORT            Patient: PRECIOUS NUÑEZ APRIL   Acct: #[id number]   Report: #[id number]            UNIT #: [id number]    DOS: 21 1432      INSURANCE:[employer]   ORDER #:[id number]      LOCATION:CT     : 1965            PROVIDERS      ADMITTING:     ATTENDING: Merline Enamorado      FAMILY:  TERESITA ZALDIVAR   ORDERING:  Merline Enamorado         OTHER:    DICTATING:  Kian Perez MD, IV            REQ #:[id number]   EXAM:CHW - CT CHEST with CONTRAST      REASON FOR EXAM:  RESP FAILURE WITH HYPOXIA      REASON FOR VISIT:  RESP FAILURE WITH HYPOXIA            *******Signed******         PROCEDURE:   CT CHEST W/ CONTRAST             COMPARISON:   [facility], CT, CHEST W/O CONTRAST, 3/21/2020, 1:43.     [facility], CT, CHEST W/O CONTRAST, 2020, 12:28.             INDICATIONS:   RESPIRATORY FAILURE WITH HYPOXIA,  HISTORY OF PE, LUPUS, CHEST     TIGHTNESS             PROTOCOL:     Pulmonary embolism imaging protocol performed                RADIATION:     DLP: 674 mGy*cm          Automated exposure control was utilized to minimize radiation dose.       CONTRAST:   100 cc Isovue 370 I.V.      LABS:     eGFR: >60 ml/min/1.73m2             TECHNIQUE:   Axial images of the chest with intravenous contrast.             FINDINGS:      Cardiomegaly is present.  Prior  thyroidectomy.  The thoracic aorta has a normal     caliber.  No       evidence of pleural or pericardial effusion.  No pulmonary emboli are     identified.  There is stable       bronchiectasis in the mid and lower lung fields with areas of honeycombing and     ground-glass       opacity.  Stable linear consolidation in the left lower lobe.  Small stones are     present in the       gallbladder.  There are a few small cysts in the visualized right kidney.  There    are no enlarged       mediastinal, axillary or hilar lymph nodes.             IMPRESSION:              1. No pulmonary emboli are identified.             2. Stable chronic changes in the lung fields consistent with interstitial lung     disease in a       UIP/NSIP pattern.               3. Cholelithiasis.             4. Cardiomegaly.               NANI HORAN MD             Electronically Signed and Approved By: NANI HORAN MD on 1/20/2021 at 15:12                                  Until signed, this is an unconfirmed preliminary report that may contain      errors and is subject to change.                                              BRYJE:      D:01/20/21 1512            Allergies and Medications      Allergies:        Coded Allergies:             SALSALATE (Verified  Allergy, Unknown, 3/3/21)      Medications    Last Reconciled on 3/3/21 15:33 by KAM LIMON MD      Ferrous Sulfate (Iron Sulfate*) 325 Mg Tablet      325 MG PO QDAY, #30 TAB 0 Refills         Reported         3/3/21       Magnesium Oxide (Magnesium Oxide*) 420 Mg Tablet      420 MG PO QDAY, TAB         Reported         3/3/21       Ascorbic Acid (Vitamin C*) 250 Mg Tablet      250 MG PO QDAY, TAB         Reported         3/3/21       predniSONE (predniSONE) 20 Mg Tablet      40 MG PO QDAY for 5 Days, #10 TAB 0 Refills         Prov: POLY OLMOS cfe         2/3/21       Cefdinir (CEFDINIR) 300 Mg Cap      300 MG PO BID, #20 CAP         Prov: POLY OLMOS cfe         2/3/21        Amoxicillin/Clavulanate K (Augmentin 875/125 Mg) 1 Each Tablet      875 MG PO BID, #20 TAB 0 Refills         Prov: SARTHAK WAKEFIELD cfr         1/25/21       azaTHIOprine (Imuran) 50 Mg Tab      25 MG PO QDAY for 30 Days, #30 TAB 3 Refills         Prov: AGUILAR GREGORY         1/20/21       Albuterol (Proair HFA) 8.5 Gm Inh      1-2 PUFFS INH RTQ6H PRN for SHORTNESS OF BREATH, #1 INH 3 Refills         Prov: AGUILAR GREGORY         1/20/21       Clarence-Fluticasone (Fluticasone 50 mcg) 16 Gm Spray.susp      2 PUFFS NARE EACH QDAY for 28 Days, #1 BOTTLE         Prov: ALLEN BLAIR         3/21/20       azaTHIOprine (azaTHIOprine) 50 Mg Tablet      150 MG PO QAM         Reported         3/20/20       Hydroxychloroquine Sulfate (Plaquenil) 200 Mg Tab      200 MG PO QDAY         Reported         3/20/20       Levothyroxine (Levothyroxine) 0.137 Mg Tablet      0.137 MG PO QAM         Reported         3/20/20       Pilocarpine HCl (Salagen) 5 Mg Tablet      5 MG PO QDAY for 30 Days, #30 TAB         Reported         3/20/20       Loratadine (Loratadine) 10 Mg Tablet      10 MG PO QDAY, #30 TAB 0 Refills         Reported         3/20/20       Gabapentin (Gabapentin) 300 Mg Capsule      300 MG PO TID, #60 CAP 0 Refills         Reported         3/2/20       Calcium/Vitamin D (Caltrate-D) 1 Each Tablet      1200 EACH PO QDAY, #60 TAB         Reported         3/2/20       Polyethylene Glycol (Miralax*) 17 Gm Packet      17 GM PO QDAY PRN for CONSTIPATION, #30 PKT 0 Refills         Reported         1/17/20       HYDROcodone-Acetaminophen 7.5-325 Mg (HYDROcodone-Acetaminophen 7.5-325 Mg) 1     Tab Tablet      1 TAB PO Q8H PRN for PAIN, TAB         Reported         1/17/20       Furosemide (Furosemide) 40 Mg Tablet      40 MG PO QDAY, #30 TAB 0 Refills         Reported         1/17/20            Assessment      Shortness of Air  R06.02            Plan      Orders:  Phone Eval 11-20 mi [id number]      Instructions      * Chronic  conditions reviewed and taken in consideration for today's treatment       plan.      * Plan Of Care: ()      * Patient instructed to seek medical attention urgently for new or worsening       symptoms.      * Patient was educated/instructed on their diagnosis, treatment and medications       today.      * Recommend self monitoring. Instructions given.      * Recommend self quarantine for 14 days.      * Recommend self quarantine until without fever for 72 hours without using fever       reducing medications.      * Recommends over the counter medications for symptom management.            IMPRESSION:      1. Interstitial lung disease secondary to mixed connective tissue disease     without exacerbation.      2. Known mixed connective tissue disease.       3. Chronic compensated diastolic congestive heart failure.       4. Chronic leg edema.       5. Chronic dyspnea at baseline.       6. Immunosuppressed status on Imuran.       7. Therapeutic  drug monitoring on Imuran.       8. Obstructive sleep apnea well controlled with CPAP.       9. Gastroesophageal reflux disease without esophagitis well controlled.       10. History of PE on anticoagulation in the past.             PLAN:      1. Continue Imuran at current dose. She had a history of pneumocystitis in the     past while on Imuran and steroids. She no longer need pneumocystitis prophylaxis     as she is off steroids, just continue Imuran at current dose.       2. Intermittently checking CBC and CMP with Dr. Boone her hematologist for     therapeutic  drug monitoring.      3. Pulmonary function test stable, repeat annually.       4. Echocardiogram at baseline. We will repeat annually or if there is any     evidence of right heart failure.      5. CT scan of the chest stable at baseline, no further follow up needed.       6. Up to date with flu vaccine and Pneumovax. She will be due for Prevnar when     she is 63.       7. See me in 6 months.             Electronically signed by KAM LIMON  03/08/2021 06:29       Disclaimer: Converted document may not contain table formatting or lab diagrams. Please see [employer] for the authenticated document.

## 2021-05-28 NOTE — PROGRESS NOTES
Patient: PRECIOUS NUÑEZ APRIL     Acct: [id number]     Report: #[id number]  UNIT #: [id number]     : 1965    Encounter Date:2018  PRIMARY CARE: TERESITA ZALDIVAR  ***Signed***  --------------------------------------------------------------------------------------------------------------------  Chief Complaint      Encounter Date      2018            Primary Care Provider      TERESITA ZALDIVAR            Referring Provider      Tuan Hodges            Patient Complaint      Patient is complaining of      f/u PE            VITALS      Height 5 ft 4 in / 162.56 cm      Weight 166 lbs 5 oz / 75.477365 kg      BSA 1.87 m2      BMI 28.5 kg/m2      Temperature 98.7 F / 37.06 C - Oral      Pulse 75      Respirations 12      Blood Pressure 110/54 Sitting, Left Arm      Pulse Oximetry 97%, ROOMAIR            HPI      The patient is a very pleasant 53 year old  female with     connective tissue disease related interstitial lung disease, pulmonary embolism     and diastolic heart failure here for follow up.            She was hospitalized in February into March for decompensated diastolic heart     failure and worsening hypoxic respiratory failure. She was adequately diuresed     and followed up with our PA. She is here today and states she feels great. She     is taking Lasix 80 mg in the morning and 40 at night. She has zero edema,     orthopnea or jugular venous distension. She has dyspnea on exertion walking     about 1000 feet, worse with exertion, better with rest.  She has an     intermittent dry cough with no sputum production or hemoptysis.  Her dyspnea is     mild in severity with no chest pain or hemoptysis.  She denies any nausea and     vomiting, fevers or chills or headaches.  She is able to perform her activities     of daily living without difficulty and denies any swollen lymph nodes or glands     in her head and neck. She is on Azathioprine for her connective tissue disease      interstitial lung disease and is at goal 150 mg a day and has no evidence of     pancytopenias.             I have personally reviewed the Review of Systems, past family, social, surgical     and medical histories and I agree with the findings.            ROS      Constitutional:  Denies: Fatigue, Fever, Weight gain, Weight loss, Chills,     Insomnia, Other      Respiratory/Breathing:  Denies: Shortness of air, Wheezing, Cough, Hemoptysis,     Pleuritic pain, Other      Endocrine:  Denies: Polydipsia, Polyuria, Heat/cold intolerance, Abnorml     menstrual pattern, Diabetes, Other      Eyes:  Denies: Blurred vision, Vision Changes, Other      Ears, nose, mouth, throat:  Denies: Mouth lesions, Thrush, Throat pain,     Hoarseness, Allergies/Hay Fever, Post Nasal Drip, Headaches, Recent Head Injury    , Nose Bleeding, Neck Stiffness, Thyroid Mass, Hearing Loss, Ear Fullness, Dry     Mouth, Nasal or Sinus Pain, Dry Lips, Nasal discharge, Nasal congestion, Other      Cardiovascular:  Denies: Palpitations, Syncope, Claudication, Chest Pain, Wake     up Gasping for air, Leg Swelling, Irregular Heart Rate, Cyanosis, Dyspnea on     Exertion, Other      Gastrointestinal:  Denies: Nausea, Constipation, Diarrhea, Abdominal pain,     Vomiting, Difficulty Swallowing, Reflux/Heartburn, Dysphagia, Jaundice, Bloating    , Melena, Bloody stools, Other      Genitourinary:  Denies: Urinary frequency, Incontinence, Hematuria, Urgency,     Nocturia, Dysuria, Testicular problems, Other      Musculoskeletal:  Denies: Joint Pain, Joint Stiffness, Joint Swelling, Myalgias    , Other      Hematologic/lymphatic:  DENIES: Lymphadenopathy, Bruising, Bleeding tendencies,     Other      Neurological:  Denies: Headache, Numbness, Weakness, Seizures, Other      Psychiatric:  Denies: Anxiety, Appropriate Effect, Depression, Other      Sleep:  No: Excessive daytime sleep, Morning Headache?, Snoring, Insomnia?,     Stop breathing at sleep?, Other       Integumentary:  Denies: Rash, Dry skin, Skin Warm to Touch, Other      Immunologic/Allergic:  Denies: Latex allergy, Seasonal allergies, Asthma,     Urticaria, Eczema, Other      Immunization status:  No: Up to date            FAMILY/SOCIAL/MEDICAL HX      Surgical History:  Yes: Bowel Surgery (COLONOSCOPY), Breast Surgery (HAS HAD     DELTAGRAM ON RT BREAST ), Eye Surgery (BILATERAL CATARACT), Orthopedic Surgery (    BILAT ANKLE FUSION, LEFT HAND SURG, R ACL REPAIR AND RTKR), Vascular Surgery (    LYMPH NODE REMOVAL L SIDE [id number] and under arm), Other Surgeries (PORT), No:     Abdominal Surgery, Appendectomy, CABG, Cholecystectomy, Head Surgery, Oral     Surgery      Cancer/Type - Family Hx:  Sister (LUNG)      Is Father Still Living?:  No      Is Mother Still Living?:  No       Family History:  Yes      Social History:  No Tobacco Use, No Alcohol Use, No Recreational Drug use      Smoking status:  Never smoker       Section:  Yes      Hysterectomy:  No (Tubal )      Anticoagulation Therapy:  No      Antibiotic Prophylaxis:  No      Medical History:  Yes: Anemia, Arthritis (OSTEOARTHRITIS), Asthma (INHALER),     Blood Disease (ANEMIA), Congestive Heart Failu (3-), Seizures (1995 X 1     EPISODE POST PARTUM), Hemorrhoids/Rectal Prob (ACID REFLUX), High Blood     Pressure (CONTROLLED WITH MEDS), Reflux Disease, Thyroid Problem (HYPERACTIVE),     Miscellaneous Medical/oth (SYSTEMIC LUPUS, compartment syndrome ), No:     Chemotherapy/Cancer, Chronic Bronchitis/COPD, Deafness or Ringing Ears, Diabetes    , Heart Attack, Shortness Of Breath, Sinus Trouble      Psychiatric History      NONE            PREVENTION      Hx Influenza Vaccination:  Yes      Date Influenza Vaccine Given:  Dec 1, 2017      Influenza Vaccine Declined:  No      2 or More Falls Past Year?:  No      Fall Past Year with Injury?:  No      Hx Pneumococcal Vaccination:  Yes      Encouraged to follow-up with:  PCP regarding  preventative exams.      Chart initiated by      KARLEE JEFFERSON/ MA            ALLERGIES/MEDICATIONS      Allergies:        Coded Allergies:             SALSALATE (Verified  Allergy, Unknown, 6/5/18)      Medications    Last Reconciled on 6/18/18 07:38 by KAM LIMON MD      Methimazole (Methimazole) 10 Mg Tab      5 MG PO QDAY, #30 TAB         Reported         6/5/18       Apixaban (Eliquis) 5 Mg Tablet      5 MG PO BID for 30 Days, #60 TAB         Reported         3/23/18       Spironolactone (Aldactone) 25 Mg Tablet      25 MG PO QDAY, #30 TAB         Prov: Tuan Hodges         3/16/18       Propranolol (Inderal) 40 Mg Tablet      20 MG PO TID, #45 TAB 0 Refills         Prov: Tuan Hodges         3/16/18       Promethazine HCl (Phenergan*) 25 Mg Tablet      25 MG PO Q6H, #60 TAB 0 Refills         Reported         3/13/18       Lisinopril* (Lisinopril*) 10 Mg Tablet      10 MG PO QDAY, #30 TAB 0 Refills         Reported         3/13/18       SUMAtriptan Succinate (Imitrex) 50 Mg Tablet      50 MG PO ASDIR Y for MIGRAINE, TAB         Reported         3/13/18       azaTHIOprine (Imuran*) 50 Mg Tab      150 MG PO QDAY, #90 TAB 5 Refills         Prov: KAM LIMON         12/20/17       Magnesium Oxide (Magnesium Oxide*) 400 Mg Tablet      400 MG PO QDAY, #30 TAB 1 Refill         Prov: MIGUEL BASILIO         8/17/17       Furosemide* (Lasix*) 40 Mg Tablet      40 MG PO ASDIR, #90 TAB         Prov: MIGUEL BASILIO         8/17/17       Omeprazole (Omeprazole*) 40 Mg Capsule      40 MG PO QDAY, #30 CAP 0 Refills         Reported         8/12/17       MDI-Albuterol (Proventil HFA) 6.7 Gm Hfa.aer.ad      2 PUFFS INH Q6H Y for SHORTNESS OF BREATH, #1 MDI 0 Refills         Reported         8/12/17       Acetaminophen/Hydrocodone 7.5/325 (Acetaminophen/Hydrocodone 7.5/325) 1 Tab     Tablet      1 TAB PO Q4H Y for PAIN, #12 TAB         Prov: Weston Vázquez         1/18/17       Hydroxychloroquine Sulfate (Plaquenil*) 200 Mg  Tab      200 MG PO BID for 30 Days, #60 TAB         Prov: Makayla San         9/14/16      Current Medications      Current Medications Reviewed 6/5/18            EXAM      Vital Signs Reviewed      Gen: WDWN, Alert, NAD.        HEENT:  PERRL, EOMI.  OP, nares clear, no sinus tenderness.      Chest:  Good aeration, coarse inspiratory Velcro like crackles at bilateral     bases, tympanic to percussion bilaterally, no work of breathing noted.      CV:  RRR, no MGR, pulses 2+, equal.      Abd:  Soft, NT, ND, + BS, no HSM.      EXT:  No clubbing, no cyanosis, trace bilateral lower extremity pitting edema,     mild joint tenderness and mild swan neck deformities in bilateral hand     consistent with rheumatoid arthritis.       Neuro:  A  Skin: No rashes or lesions.      Vtials      Vitals:             Height 5 ft 4 in / 162.56 cm           Weight 166 lbs 5 oz / 75.630297 kg           BSA 1.87 m2           BMI 28.5 kg/m2           Temperature 98.7 F / 37.06 C - Oral           Pulse 75           Respirations 12           Blood Pressure 110/54 Sitting, Left Arm           Pulse Oximetry 97%, ROOMAIR            REVIEW      Results Reviewed      PCCS Results Reviewed?:  Yes Prev Lab Results, Yes Previous [facility] Records (I     personally reviewed the patient's office notes from [facility] from March 2018. )      Lab Results      I personally reviewed the patient's CBC showing stable hemoglobin, platelets     and white blood count.            Assessment      Interstitial lung disease due to connective tissue disease - M35.8, J84.89            Therapeutic drug monitoring - Z51.81            SLE (systemic lupus erythematosus) - M32.9            Notes      New Medications      * Methimazole 10 MG TAB: 5 MG PO QDAY #30      New Diagnostics      * 6 Min Walk With Pulse Ox, 6 Months       Dx: Interstitial lung disease due to connective tissue disease - M35.8, J84.89      * PFT-Comp, PrePost,DLCO,BodyBox, 6 Months       Dx:  Interstitial lung disease due to connective tissue disease - M35.8, J84.89      IMPRESSION:      1. Connective tissue disease related interstitial lung disease.       2. Cough at baseline.       3. Dyspnea at baseline.       4.  Rheumatoid arthritis of the hands.       5. Therapeutic drug monitoring on Imuran.       6. Chronic compensated diastolic heart failure.       7.  Pulmonary embolism on eliquis.             PLAN:      1. The patient is doing well from my perspective.       2. Continue Imuran 150 mg daily.       3. Check CBC in 6-9 months.       4. We will check pulmonary function tests and six minute walk test in 6 months     as that is a 1 year follow up.       5. Continue Plaquenil for rheumatoid arthritis per Dr. Garnett.       6. Continue albuterol as needed.       7. Complete 6 months of eliquis.       8.  Up to date with flu, Prevnar and Pneumovax.       9. Follow up in 6 months.                 Disclaimer: Converted document may not contain table formatting or lab diagrams. Please see [employer] for the authenticated document.
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
## 2021-05-28 NOTE — PROGRESS NOTES
Patient: PRECIOUS NUÑEZ APRIL     Acct: [id number]     Report: #[id number]  UNIT #: [id number]     : 1965    Encounter Date:2021  PRIMARY CARE: Merline Enamorado  ***Signed***  --------------------------------------------------------------------------------------------------------------------  Chief Complaint      Encounter Date      2021            Primary Care Provider      TERESITA ZALDIVAR            Referring Provider      TERESITA ZALDIVAR            Patient Complaint      Patient is complaining of      PT here for F/U SHANELLE, Pulm Nodule            VITALS      Height 5 ft 4 in / 162.56 cm      Weight 195 lbs  / 88.027872 kg      BSA 1.94 m2      BMI 33.5 kg/m2      Temperature 97.8 F / 36.56 C - Temporal      Pulse 90      Respirations 14      Blood Pressure 135/74 Sitting, Left Arm      Pulse Oximetry 91%, room air            HPI      The patient is a very pleasant 55 year old female patient of Dr. Dennis's with     history of connective tissue disease due to interstitial lung disease, lupus,     diastolic heart failure and history of pneumocystis pneumonia. The patient also     has obstructive sleep apnea on home CPAP and rheumatoid arthritis on Imuran. The    patient is here today for follow up.             She was supposed to be here a month ago however she missed her appointment due     to transportation issues. The patient states she has been feeling poorly for     about a month now. She has had shortness of breath with exertion and a dry     cough. The patient saw her primary care provider yesterday, had a 6 minute walk     test done in the office that showed she became hypoxic with exertion. The     patient states they are setting her up for home oxygen. The patient also     endorses some lower extremity edema and states this has worsened over the past     2-3 days. She recently finished an increased dose of Lasix and noticed the     swelling got worse when she finished. She went up  to Lasix 40 mg twice daily     instead of Lasix once daily. The patient states she also feels like she is     wheezing. She is due to have a CT scan of the chest today. She is requesting     refills on her Imuran. The patient states she forgot she had an albuterol     inhaler so she has not been using it but needs a refill on it. She denies using     any other inhalers at this time. The patient denies any fever or known sick     contacts or any sputum production.             The patient endorses shortness of air, wheezing and dry cough. The patient also     endorses lower extremity swelling and dyspnea on exertion.            ROS      Constitutional:  Denies: Fatigue, Fever, Weight gain, Weight loss, Chills,     Insomnia, Other      Respiratory/Breathing:  Complains of: Shortness of air, Wheezing, Cough; Denies:    Hemoptysis, Pleuritic pain, Other      Endocrine:  Denies: Polydipsia, Polyuria, Heat/cold intolerance, Abnorml     menstrual pattern, Diabetes, Other      Eyes:  Denies: Blurred vision, Vision Changes, Other      Ears, nose, mouth, throat:  Denies: Mouth lesions, Thrush, Throat pain,     Hoarseness, Allergies/Hay Fever, Post Nasal Drip, Headaches, Recent Head Injury,    Nose Bleeding, Neck Stiffness, Thyroid Mass, Hearing Loss, Ear Fullness, Dry     Mouth, Nasal or Sinus Pain, Dry Lips, Nasal discharge, Nasal congestion, Other      Cardiovascular:  Denies: Palpitations, Syncope, Claudication, Chest Pain, Wake     up Gasping for air, Leg Swelling, Irregular Heart Rate, Cyanosis, Dyspnea on     Exertion, Other      Gastrointestinal:  Denies: Nausea, Constipation, Diarrhea, Abdominal pain, Vomit    ing, Difficulty Swallowing, Reflux/Heartburn, Dysphagia, Jaundice, Bloating,     Melena, Bloody stools, Other      Genitourinary:  Denies: Urinary frequency, Incontinence, Hematuria, Urgency,     Nocturia, Dysuria, Testicular problems, Other      Musculoskeletal:  Denies: Joint Pain, Joint Stiffness, Joint  Swelling, Myalgias,    Other      Hematologic/lymphatic:  DENIES: Lymphadenopathy, Bruising, Bleeding tendencies,     Other      Neurological:  Denies: Headache, Numbness, Weakness, Seizures, Other      Psychiatric:  Denies: Anxiety, Appropriate Effect, Depression, Other      Sleep:  No: Excessive daytime sleep, Morning Headache?, Snoring, Insomnia?, Stop    breathing at sleep?, Other      Integumentary:  Denies: Rash, Dry skin, Skin Warm to Touch, Other      Immunologic/Allergic:  Denies: Latex allergy, Seasonal allergies, Asthma,     Urticaria, Eczema, Other      Immunization status:  No: Up to date            FAMILY/SOCIAL/MEDICAL HX      Surgical History:  Yes: Bowel Surgery (COLONOSCOPY), Breast Surgery (HAS HAD     DELTAGRAM ON RT BREAST ), Eye Surgery (BILATERAL CATARACT), Oral Surgery     (THYROID REMOVED 2020), Orthopedic Surgery (BILAT ANKLE FUSION, LEFT HAND     SURG, R ACL REPAIR AND RTKR), Vascular Surgery (LYMPH NODE REMOVAL L SIDE     [id number] and under arm), Other Surgeries (PORT); No: Abdominal Surgery,     Appendectomy, CABG, Cholecystectomy, Head Surgery      Cancer/Type - Family Hx:  Sister      Smoking status:  Never smoker       Section:  Yes      Hysterectomy:  No (Tubal )      Anticoagulation Therapy:  No      Medical History:  Yes: Anemia, Arthritis (OSTEOARTHRITIS), Asthma (INHALER),     Blood Disease (ANEMIA), Congestive Heart Failu (3-), Seizures (1995 X 1     EPISODE POST PARTUM), Hemorrhoids/Rectal Prob (ACID REFLUX), High Blood Pressure    (CONTROLLED WITH MEDS), Reflux Disease, Thyroid Problem (HYPERACTIVE),     Miscellaneous Medical/oth (SYSTEMIC LUPUS, compartment syndrome ); No: Chemo    therapy/Cancer, Chronic Bronchitis/COPD, Deafness or Ringing Ears, Diabetes,     Heart Attack, Shortness Of Breath, Sinus Trouble      Psychiatric History      None            PREVENTION      Hx Influenza Vaccination:  Yes      Date Influenza Vaccine Given:  Dec 31, 2020       Influenza Vaccine Declined:  No      2 or More Falls in Past Year?:  No      Fall Past Year with Injury?:  No      Hx Pneumococcal Vaccination:  Yes      Encouraged to follow-up with:  PCP regarding preventative exams.      Chart initiated by      Lily Hernandez MA            ALLERGIES/MEDICATIONS      Allergies:        Coded Allergies:             SALSALATE (Verified  Allergy, Unknown, 1/20/21)      Medications    Last Reconciled on 6/30/20 14:11 by KAM LIMON MD      Clarence-Fluticasone (Fluticasone 50 mcg) 16 Gm Spray.susp      2 PUFFS NARE EACH QDAY for 28 Days, #1 BOTTLE         Prov: HAYDEE BLAIRS         3/21/20       guaiFENesin--60 Mg (Mucinex D 600-60 Mg) 1 Each Tab.er.12h      1 TAB PO BID PRN for COUGH, TAB         Reported         3/20/20       azaTHIOprine (azaTHIOprine) 50 Mg Tablet      150 MG PO QAM         Reported         3/20/20       Hydroxychloroquine Sulfate (Plaquenil) 200 Mg Tab      200 MG PO QDAY         Reported         3/20/20       Levothyroxine (Levothyroxine) 0.137 Mg Tablet      0.137 MG PO QAM         Reported         3/20/20       Pilocarpine HCl (Salagen) 5 Mg Tablet      5 MG PO QDAY for 30 Days, #30 TAB         Reported         3/20/20       Loratadine (Loratadine) 10 Mg Tablet      10 MG PO QDAY, #30 TAB 0 Refills         Reported         3/20/20       Albuterol (Proair HFA) 8.5 Gm Inh      1-2 PUFFS INH RTQ6H PRN for SHORTNESS OF BREATH, #1 INH 0 Refills         Prov: [facility]         3/2/20       Gabapentin (Gabapentin) 300 Mg Capsule      300 MG PO TID, #60 CAP 0 Refills         Reported         3/2/20       Calcium/Vitamin D (Caltrate-D) 1 Each Tablet      1200 EACH PO QDAY, #60 TAB         Reported         3/2/20       Polyethylene Glycol (Miralax*) 17 Gm Packet      17 GM PO QDAY PRN for CONSTIPATION, #30 PKT 0 Refills         Reported         1/17/20       HYDROcodone-Acetaminophen 7.5-325 Mg (HYDROcodone-Acetaminophen 7.5-325 Mg) 1     Tab  Tablet      1 TAB PO Q8H PRN for PAIN, TAB         Reported         1/17/20       Furosemide (Furosemide) 40 Mg Tablet      40 MG PO QDAY, #30 TAB 0 Refills         Reported         1/17/20      Current Medications      Current Medications Reviewed 1/20/21            EXAM      Vital Signs Reviewed      Gen: WDWN, Alert, NAD.        HEENT:  PERRL, EOMI.  OP, nares clear, no sinus tenderness.      Neck:  Supple, no JVD, no thyromegaly.      Lymph: No axillary, cervical, supraclavicular lymphadenopathy noted bilaterally.      Chest: Crackles bilaterally with faint expiratory wheezing, tympanic to     percussion bilaterally, no work of breathing noted.      CV:  RRR, no MGR, pulses 2+, equal.      Abd:  Soft, NT, ND, + BS, no HSM.      EXT:  No clubbing, no cyanosis, trace pitting edema in bilateral lower     extremities without tenderness, no joint tenderness.       Neuro:  A  Skin: No rashes or lesions.      Vtials      Vitals:             Height 5 ft 4 in / 162.56 cm           Weight 195 lbs  / 88.588682 kg           BSA 1.94 m2           BMI 33.5 kg/m2           Temperature 97.8 F / 36.56 C - Temporal           Pulse 90           Respirations 14           Blood Pressure 135/74 Sitting, Left Arm           Pulse Oximetry 91%, room air            REVIEW      Results Reviewed      [facility] Results Reviewed?:  Yes Prev Lab Results, Yes Prev Radiology Results, Yes     [facility]      Lab Results      I personally reviewed Dr. Dennis's office visit note from 06/30/2020.            Assessment      Diastolic CHF - I50.30            Notes      New Medications      * predniSONE 20 MG TABLET: 40 MG PO QDAY 5 Days #10      * azaTHIOprine (Imuran) 50 MG TAB: 25 MG PO QDAY 30 Days #30      Renewed Medications      * ALBUTEROL (Proair HFA) 8.5 GM INH: 1-2 PUFFS INH RTQ6H PRN SHORTNESS OF BREATH      #1      Changed Medications      * Hydroxychloroquine Sulfate (Plaquenil) 200 MG TAB: 200 MG PO QDAY      New Asterion       * Echo Complete, 1 DAY         Dx: Diastolic CHF - I50.30      ASSESSMENT:      1. Connective tissue disease related to interstitial lung disease with acute     exacerbation.       2. Decompensated chronic diastolic heart failure.      3. Obstructive sleep apnea on home CPAP.       4. Rheumatoid arthritis on Imuran.       5. History of pneumocystis pneumonia resolved.       6. History of MSSA and mycoplasma pneumonia resolved.       7. History of PE on previous anticoagulation.       8. Acute on chronic shortness of breath.       9. Acute on chronic cough.             PLAN:      1. We will send refills for the patient's imuran to her pharmacy.       2. The patient is already scheduled for CT scan of the chest to be obtained     today. I will watch for these results.       3. The patient had crackles on her physical exam today. I advised her to     increase her Lasix to 40 mg in the morning and 40 mg at nighttime for 1 week.       4. The patient has some faint wheezing, we will give her prednisone 40 mg q day     for 5 days.       5. We will send a refill for the patient's albuterol inhaler. I advised her to     take this when she is feeling acutely short of air or if she is wheezing. The     patient verbalized understanding.       6. I gave the patient strict return precautions including worsening shortness of    air, chest pain and productive cough or new concerning symptoms after she     finishes her dose of prednisone and increased dose of Lasix       7. The patient is currently undergoing oxygen certification by her primary care     provider. I will obtain records of the 6 minute walk test and office note from     her primary care provider.      6. Follow up in 1 month with Dr. Dennis soonstefania if needed.            Electronically signed by AGUILAR GREGORY  01/26/2021 19:28  Electronically signed by Rafi Lynch  05/01/2021 19:06       Disclaimer: Converted document may not contain table formatting or lab  diagrams. Please see [employer] for the authenticated document.

## 2021-05-28 NOTE — PROGRESS NOTES
Patient: PRECIOUS NUÑEZ APRIL     Acct: [id number]     Report: #[id number]  UNIT #: [id number]     : 1965    Encounter Date:2020  PRIMARY CARE: TERESITA ZALDIVAR  ***Signed***  --------------------------------------------------------------------------------------------------------------------  Chief Complaint      Encounter Date      2020            Primary Care Provider      TERESITA ZALDIVAR            Referring Provider      Tuan Hodges            Patient Complaint      Patient is complaining of      Patient here today for 1 yr F/U            VITALS      Height 64 in / 162.56 cm      Weight 174 lbs  / 78.084873 kg      BSA 1.84 m2      BMI 29.9 kg/m2      Temperature 98.0 F / 36.67 C - Temporal      Pulse 78      Respirations 15      Blood Pressure 119/60 Sitting, Left Arm      Pulse Oximetry 98%, room air            HPI      The patient is a very pleasant 55 year old  female with connective     tissue disease related to interstitial lung disease, hospitalization in 2020     for Pneumocystis pneumonia, diastolic congestive heart failure here for follow     up.  Since last office visit she has been working with her     hematologist/oncologist as well and is currently on Imuran 25 mg a day due to     the pancytopenia and renal failure. She did have follow up PFTs showing     significant reduction in diffusion capacity, but otherwise unchanged FEV1 and     FVC. She is on room air doing well.  She gets short of breath walking about 1500    feet or going up two flights of steps.  Her dyspnea is moderate in severity,     worse with exertion and relieved with rest.  Denies any nausea, vomiting,     fevers, chills, headaches or chest pain.  She denies any leg swelling, orthopnea    or paroxysmal nocturnal dyspnea on her diuretics.  She is able to perform her     ADLs without difficulty.  Denies any swollen glands or lymph nodes of the head     and neck.  Other than the Imuran, she is  currently off of Plaquenil as well as     PCP prophylaxis. She is also off steroids at this time and remains staying off     this.            I have personally reviewed the review of systems, past family, social, surgical     and medical histories and I agree with the findings.            ROS      Constitutional:  Denies: Fatigue, Fever, Weight gain, Weight loss, Chills,     Insomnia, Other      Respiratory/Breathing:  Denies: Shortness of air, Wheezing, Cough, Hemoptysis,     Pleuritic pain, Other      Endocrine:  Denies: Polydipsia, Polyuria, Heat/cold intolerance, Abnorml     menstrual pattern, Diabetes, Other      Eyes:  Denies: Blurred vision, Vision Changes, Other      Ears, nose, mouth, throat:  Denies: Mouth lesions, Thrush, Throat pain,     Hoarseness, Allergies/Hay Fever, Post Nasal Drip, Headaches, Recent Head Injury,    Nose Bleeding, Neck Stiffness, Thyroid Mass, Hearing Loss, Ear Fullness, Dry     Mouth, Nasal or Sinus Pain, Dry Lips, Nasal discharge, Nasal congestion, Other      Cardiovascular:  Denies: Palpitations, Syncope, Claudication, Chest Pain, Wake     up Gasping for air, Leg Swelling, Irregular Heart Rate, Cyanosis, Dyspnea on     Exertion, Other      Gastrointestinal:  Denies: Nausea, Constipation, Diarrhea, Abdominal pain,     Vomiting, Difficulty Swallowing, Reflux/Heartburn, Dysphagia, Jaundice,     Bloating, Melena, Bloody stools, Other      Genitourinary:  Denies: Urinary frequency, Incontinence, Hematuria, Urgency, Noc    turia, Dysuria, Testicular problems, Other      Musculoskeletal:  Denies: Joint Pain, Joint Stiffness, Joint Swelling, Myalgias,    Other      Hematologic/lymphatic:  DENIES: Lymphadenopathy, Bruising, Bleeding tendencies,     Other      Neurological:  Denies: Headache, Numbness, Weakness, Seizures, Other      Psychiatric:  Denies: Anxiety, Appropriate Effect, Depression, Other      Sleep:  No: Excessive daytime sleep, Morning Headache?, Snoring, Insomnia?, Stop     breathing at sleep?, Other      Integumentary:  Denies: Rash, Dry skin, Skin Warm to Touch, Other      Immunologic/Allergic:  Denies: Latex allergy, Seasonal allergies, Asthma,     Urticaria, Eczema, Other      Immunization status:  No: Up to date            FAMILY/SOCIAL/MEDICAL HX      Surgical History:  Yes: Bowel Surgery (COLONOSCOPY), Breast Surgery (HAS HAD     DELTAGRAM ON RT BREAST ), Eye Surgery (BILATERAL CATARACT), Oral Surgery     (THYROID REMOVED 2020), Orthopedic Surgery (BILAT ANKLE FUSION, LEFT HAND     SURG, R ACL REPAIR AND RTKR), Vascular Surgery (LYMPH NODE REMOVAL L SIDE     [id number] and under arm), Other Surgeries (PORT); No: Abdominal Surgery,     Appendectomy, CABG, Cholecystectomy, Head Surgery      Cancer/Type - Family Hx:  Sister (LUNG)      Is Father Still Living?:  No      Is Mother Still Living?:  No       Family History:  Yes      Social History:  No Tobacco Use, No Alcohol Use, No Recreational Drug use      Smoking status:  Never smoker       Section:  Yes      Hysterectomy:  No (Tubal )      Anticoagulation Therapy:  No      Antibiotic Prophylaxis:  No      Medical History:  Yes: Anemia, Arthritis (OSTEOARTHRITIS), Asthma (INHALER),     Blood Disease (ANEMIA), Congestive Heart Failu (3-), Seizures (1995 X 1     EPISODE POST PARTUM), Hemorrhoids/Rectal Prob (ACID REFLUX), High Blood Pressure    (CONTROLLED WITH MEDS), Reflux Disease, Thyroid Problem (HYPERACTIVE),     Miscellaneous Medical/oth (SYSTEMIC LUPUS, compartment syndrome ); No:     Chemotherapy/Cancer, Chronic Bronchitis/COPD, Deafness or Ringing Ears,     Diabetes, Heart Attack, Shortness Of Breath, Sinus Trouble            PREVENTION      Hx Influenza Vaccination:  Yes      Date Influenza Vaccine Given:  Dec 4, 2019      Influenza Vaccine Declined:  No      2 or More Falls Past Year?:  No      Fall Past Year with Injury?:  No      Hx Pneumococcal Vaccination:  Yes      Encouraged to follow-up with:   PCP regarding preventative exams.      Chart initiated by      Gloria Nick [facility]            ALLERGIES/MEDICATIONS      Allergies:        Coded Allergies:             SALSALATE (Verified  Allergy, Unknown, 3/18/20)      Medications    Last Reconciled on 6/30/20 14:11 by KAM LIMON MD      Clarence-Fluticasone (Fluticasone 50 mcg) 16 Gm Spray.susp      2 PUFFS NARE EACH QDAY for 28 Days, #1 BOTTLE         Prov: ALLEN BLAIR         3/21/20       guaiFENesin--60 Mg (Mucinex D 600-60 Mg) 1 Each Tab.er.12h      1 TAB PO BID PRN for COUGH, TAB         Reported         3/20/20       azaTHIOprine (azaTHIOprine) 50 Mg Tablet      150 MG PO QAM         Reported         3/20/20       Hydroxychloroquine Sulfate (Plaquenil) 200 Mg Tab      200 MG PO BID         Reported         3/20/20       Levothyroxine (Levothyroxine) 0.137 Mg Tablet      0.137 MG PO QAM         Reported         3/20/20       Pilocarpine HCl (Salagen) 5 Mg Tablet      5 MG PO QDAY for 30 Days, #30 TAB         Reported         3/20/20       Loratadine (Loratadine) 10 Mg Tablet      10 MG PO QDAY, #30 TAB 0 Refills         Reported         3/20/20       Albuterol (Proair HFA) 8.5 Gm Inh      1-2 PUFFS INH RTQ6H PRN for SHORTNESS OF BREATH, #1 INH 0 Refills         Prov: PARVEZ PAULSON [facility]         3/2/20       Gabapentin (Gabapentin) 300 Mg Capsule      300 MG PO TID, #60 CAP 0 Refills         Reported         3/2/20       Calcium/Vitamin D (Caltrate-D) 1 Each Tablet      1200 EACH PO QDAY, #60 TAB         Reported         3/2/20       Polyethylene Glycol (Miralax*) 17 Gm Packet      17 GM PO QDAY PRN for CONSTIPATION, #30 PKT 0 Refills         Reported         1/17/20       HYDROcodone-Acetaminophen 7.5-325 Mg (HYDROcodone-Acetaminophen 7.5-325 Mg) 1     Tab Tablet      1 TAB PO Q8H PRN for PAIN, TAB         Reported         1/17/20       Furosemide (Furosemide) 40 Mg Tablet      40 MG PO QDAY, #30 TAB 0 Refills         Reported         1/17/20        SUMAtriptan Succinate (Imitrex) 50 Mg Tablet      50 MG PO ASDIR PRN for MIGRAINE, TAB         Reported         3/13/18      Current Medications      Current Medications Reviewed 20            EXAM      Vital Signs Reviewed.      General:  WDWN, Alert, NAD.      HEENT: PERRL, EOMI.  OP, nares clear, no sinus tenderness.      Neck: Supple, no JVD, no thyromegaly.      Chest: Good aeration, bibasilar inspiratory velcro-like crackles at bases     bilaterally, tympanic to percussion bilaterally, no work of breathing noted.      CV: RRR, no MGR, pulses 2+, equal.        Abd: Soft, NT, ND, +BS, no HSM.      EXT: No clubbing, no cyanosis, trace BLE edema, no joint tenderness.        Neuro:  A  Skin: No rashes or lesions.      Vtials      Vitals:             Height 64 in / 162.56 cm           Weight 174 lbs  / 78.430147 kg           BSA 1.84 m2           BMI 29.9 kg/m2           Temperature 98.0 F / 36.67 C - Temporal           Pulse 78           Respirations 15           Blood Pressure 119/60 Sitting, Left Arm           Pulse Oximetry 98%, room air            REVIEW      Results Reviewed      P[facility]S Results Reviewed?:  Yes Prev Lab Results, Yes Prev Radiology Results, Yes     Previous [facility] Records      Lab Results      I personally reviewed my last office note as well as Tawana Acuna last     office visit note.  Labs from 2020 were personally reviewed showing 180     peripheral eosinophils and a stable hemoglobin panel of 11.8 with no evidence of    pancytopenia.      Radiographic Results               [facility] [place]                [place] RADIOLOGY REPORT            Patient: PRECIOUS NUÑEZ APRIL   Acct: #[id number]   Report: #[id number]            UNIT #: [id number]    DOS: 20 1200      INSURANCE:[employer]   ORDER #:[id number]      LOCATION:[facility]     : 1965            PROVIDERS      ADMITTING:     ATTENDING:  PARVEZ PAULSON [facility]      FAMILY:  NONE,MD   ORDERING:  PARVEZ PAULSON [facility]         OTHER: Bryant Paolo   DICTATING:  L[place] MD            REQ #:[id number]   EXAM:WO - CT CHEST without CONTRAST      REASON FOR EXAM:        REASON FOR VISIT:  PNEUMOCYTOSIS            *******Signed******         PROCEDURE:   CT CHEST WITHOUT CONTRAST             COMPARISON:   [facility], CT, CT CHEST ABD PEL W CONTRAST,     1/18/2020, 20:30.  [facility], CT, CHEST W/O CONTRAST, 1/27/2020, 17:09.  [facility], CT, CHEST       W/O CONTRAST, 3/21/2020, 1:43.             INDICATIONS:   PREV ABNORMAL CT CHEST, PNEUMONIA, SOA             TECHNIQUE:   CT images were created without the administration of contrast     material.               PROTOCOL:     Standard imaging protocol performed                RADIATION:     DLP: 226.6mGy*cm          Automated exposure control was utilized to minimize radiation dose.              FINDINGS:         Lung window images reveal reticulation in the peripheral aspects of the lungs,     with septal       thickening suggesting UIP/NSIP.  Cylindrical bronchiectasis at the lung bases is    consistent with       traction bronchiectasis.             Ground-glass opacity in the mid and lower lungs is slightly improved.  1 cm     pleural-based nodule in       the lateral left lower lobe is stable.  No new ground-glass opacity is evident.             Mediastinal windows reveal small mediastinal lymph nodes, which appear stable.      No axillary       adenopathy is evident.             Left jugular central venous catheter is in unchanged position.  Coronary artery     calcifications are       evident.             CONCLUSION:         CT scan of the chest without IV contrast demonstrating findings consistent with     UIP/NSIP.        Ground-glass opacity in the mid and lower lungs is slightly improved.             Cylindrical bronchiectasis at the lung  bases is consistent with traction     bronchiectasis, and is       unchanged.             1 cm pleural-based nodule in the lateral left lower lobe is unchanged.              HARJIT LUCIANO MD             Electronically Signed and Approved By: HARJIT LUCIANO MD on 2020 at 14:13                           Until signed, this is an unconfirmed preliminary report that may contain      errors and is subject to change.                                              COUST:      D:20 1414      PFT Results      Patient: AIDA NUÑEZ   Acct: #[id number]   Report: #[id number]            UNIT #: [id number]    DOS:       LOCATION:[facility]     : 1965            PROVIDERS      ADMITTING:     FAMILY:  TERESITA ZALDIVAR         OTHER: Marquis Valderrama      DICTATING:  KAM LIMON MD            REASON FOR VISIT:  M35.8            *******Signed******                                    [facility]                          [employer] Services                            [place], [place]  [address]               __________________________________________________________________________             Patient Name:                   Attending Physician:      Aida Nuñez           ROBE GASPAR             Patient Visit # MR #            Admit Date  Disch Date     Location      [id number]    [id number]      2020                 [facility]- -             Date of Birth      1965      __________________________________________________________________________      821 - DIAGNOSTIC REPORT             PULMONARY FUNCTION TEST             DATE OF TEST:      20.             SPIROMETRY:      No obstructive lung defect is noted.      FEV1 is 1.72 L/79% of predicted.      No bronchodilator response is noted.             FLOW VOLUME LOOP:      Flow volume loop shows restriction.             LUNG VOLUMES:      There is mild restrictive lung defect noted.      Total  lung capacity is 2.88 L/66% of predicted.             DIFFUSION CAPACITY:      Diffusion capacity is severely reduced at 33% of predicted.             OVERALL IMPRESSION:      1. Mild restrictive lung defect with no bronchodilator response noted.      2. Diffusion capacity severely reduced.      3. Compared to testing done in February 2019, there have been no significant        changes in FEV1 or FVC.  The diffusion capacity has significantly decreased        by 33%.             To be electronically signed in [facility]      [id number] KAM LIMON M.D.             AM:katt      D:  06/19/2020 16:23      T:  06/19/2020 16:41      #[id number]             Until signed, this is an unconfirmed preliminary report that may contain      errors and is subject to change.                   Until signed, this is an unconfirmed preliminary report that may contain      errors and is subject to change.                     <Electronically signed by KAM LIMON MD>                06/22/20 1123               KAM LIMON MDAA:JTJ      D:06/19/20 1623            Assessment      ILD (interstitial lung disease) - J84.9            Notes      Discontinued Medications      * predniSONE (Deltasone) 10 MG TABLET: 40 MG PO QDAY 5 Days #20      * Trimethoprim/Sulfamethoxazole DS (Bactrim /160 MG) 1 EACH TABLET: 1 TAB       PO BID 30 Days #37      New Office Procedures      * Pneumovax-23, As Soon As Possible         Pneumococcal Vaccine Polyvalen (Pneumovax-23) 25 MCG/0.5 ML VIAL: 25        MICROGRAM INTRAMUSCULARLY Qty 25 MCG         Dx: ILD (interstitial lung disease) - J84.9      IMPRESSION:      1. Pneumocystis pneumonia, resolved.      2.  MSSA and mycoplasma pneumonia, resolved.      3. Connective tissue disease related to interstitial lung disease at baseline.      4. Right pleural effusion, resolved.      5.  Immunosuppressed status on Imuran.      6. Chronic  compensated diastolic congestive heart failure without exacerbation.      7.  Obstructive sleep apnea, well-controlled on CPAP.      8. Gastroesophageal reflux disease without esophagitis, well-controlled.      9. Rheumatoid arthritis, well-controlled on Imuran.      10. History of PE in the past, remotely on anticoagulation.               PLAN:      1.  Continue Imuran at current dose.  No longer needs PCP prophylaxis as she is     just on a low dose of Imuran.      2. Monitor CBC and CMP per her hematologist, Dr. Bravo.      3.  PFTs reassuring and stable.  Repeat annually.  Decreased diffusion capacity     could be related to test variability versus infection.      4. We will repeat noncontrast chest CT in 6-12 months.  We will discuss this     with her next visit.      5. Up-to-date with her flu vaccine. I will give her Pneumovax today.  She will     be due for Prevnar when she is 63.      6. Follow up with me in six months.            Patient Education            Patient Education:        Interstitial Lung Disease            Electronically signed by KAM LIMON  07/01/2020 16:31       Disclaimer: Converted document may not contain table formatting or lab diagrams. Please see [employer] [facility] for the authenticated document.

## 2021-05-28 NOTE — PROGRESS NOTES
Patient: PRECIOUS NUÑEZ APRIL     Acct: [id number]     Report: #[id number]  UNIT #: [id number]     : 1965    Encounter Date:2020  PRIMARY CARE: TERESITA ZALDIVAR  ***Signed***  --------------------------------------------------------------------------------------------------------------------  Chief Complaint      Encounter Date      Mar 2, 2020            Primary Care Provider      TERESITA ZALDIVAR            Referring Provider      Tuan Hodges            Patient Complaint      Patient is complaining of      Pt here for [facility] follow up/ Sepsis      MSSA pneumonia      Pneumocystis pneumonia      Acute hypoxic respiratory failure      Microcytic anemia      Hypocalcemia      Decompensated diastolic heart failure exacerbation      Hyperbilirubinemia      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      Severe protein calorie malnutrition      Recent thyroidectomy for hyperthyroidism      SLE      Rheumatoid arthritis      Hyperlipidemia      Hypertension      Obstructive sleep apnea not on CPAP      GERD      Hypomagnesemia            VITALS      Height 5 ft 4 in / 162.56 cm      Weight 134 lbs 4 oz / 60.88699 kg      BSA 1.65 m2      BMI 23.0 kg/m2      Temperature 99.3 F / 37.39 C - Oral      Pulse 101      Respirations 12      Blood Pressure 106/55 Sitting, Right Arm      Pulse Oximetry 96%, nasal cannula, 2 lpm            HPI      The patient is a 54 year old -American female, patient of Dr. Dennis's     with a history of connective tissue disease related to interstitial lung     disease, history of pulmonary embolism and diastolic heart failure who is here     for follow up today. Since last visit, the patient was recently hospitalized at     [facility] from 2020 through 2020.  The patient had a     recent total thyroidectomy and presented to the hospital with a two day history     of increased dizziness and shortness of breath with exertion.   The patient's O2     saturations were 68% on room air and chest x-ray had showed patchy bilateral     airspace disease and a new small right pleural effusion.  The patient's white     blood cell count was 20,000, lactic acid was 3.5 and BNP was 20,000.  The     patient was admitted with sepsis and heart failure exacerbation. During hospital    stay, the patient was started on broad spectrum antibiotics and diuresed Lasix.     Pulmonary service was consulted and the patient underwent a bronchoscopy on     01/29/2020.  Sputum culture grew MSSA and bronchoscopy pathology was suggestive     of organizing pneumonia, bronchoalveolar lavage stain positive for Pneumocystis.     Bactrim was initiated on 01/31/2020 and then dose was adjusted to full strength    on 02/04/2020.  The patient had completed a course of azithromycin and IV     steroids and then switched to oral steroids with extended taper.  The patient's     recent thyroidectomy site had dehisced and ENT was also consulted who evaluated     wound and recommended cleaning with peroxide and with a dry dressing four times     a day. The patient also had hypocalcemia during hospitalization and calcium was     initiated at 1200 mg three times a day.  The patient was then discharged to     [facility].  The patient states that she is feeling better since hospital     stay, however is still having low grade fevers. The patient states that she has     a dry cough and she is short of air that is worse with exertion, moderate in     severity and improved with rest. The patient states she is wearing her oxygen at    2 liters per minute per nasal cannula. The patient denies any chills, night     sweats, hemoptysis, purulent sputum production, swollen glands in the head and     neck, chest pain, chest tightness, unintentional weight loss, abdominal pain,     nausea, vomiting, diarrhea.  The patient states that she is not taking Plaquenil    or Imuran at this time due to  patient having thyroidectomy site dehisced. The     patient states that Dr. Boone told her to continue to hold Plaquenil for now.    The patient is wanting to know if she should continue to hold her Imuran.              I have personally reviewed the review of systems, past family, social, surgical     and medical histories and I agree with those as entered in the chart.      Copies To:   KAM LIMON      Constitutional:  Denies: Fatigue, Fever, Weight gain, Weight loss, Chills,     Insomnia, Other      Respiratory/Breathing:  Complains of: Shortness of air, Wheezing, Cough; Denies:    Hemoptysis, Pleuritic pain, Other      Endocrine:  Denies: Polydipsia, Polyuria, Heat/cold intolerance, Abnorml     menstrual pattern, Diabetes, Other      Eyes:  Denies: Blurred vision, Vision Changes, Other      Ears, nose, mouth, throat:  Complains of: Throat pain (from coughing), Dry     Mouth; Denies: Mouth lesions, Thrush, Hoarseness, Allergies/Hay Fever, Post     Nasal Drip, Headaches, Recent Head Injury, Nose Bleeding, Neck Stiffness,     Thyroid Mass, Hearing Loss, Ear Fullness, Nasal or Sinus Pain, Dry Lips, Nasal     discharge, Nasal congestion, Other      Cardiovascular:  Complains of: Dyspnea on Exertion, Other (CHF); Denies:     Palpitations, Syncope, Claudication, Chest Pain, Wake up Gasping for air, Leg     Swelling, Irregular Heart Rate, Cyanosis      Gastrointestinal:  Complains of: Reflux/Heartburn; Denies: Nausea, Constipation,    Diarrhea, Abdominal pain, Vomiting, Difficulty Swallowing, Dysphagia, Jaundice,     Bloating, Melena, Bloody stools, Other      Genitourinary:  Denies: Urinary frequency, Incontinence, Hematuria, Urgency,     Nocturia, Dysuria, Testicular problems, Other      Musculoskeletal:  Complains of: Joint Pain, Joint Stiffness, Joint Swelling;     Denies: Myalgias, Other      Hematologic/lymphatic:  DENIES: Lymphadenopathy, Bruising, Bleeding tendencies,     Other       Neurological:  Complains of: Headache (Migraines), Numbness (Hands); Denies:     Weakness, Seizures, Other      Psychiatric:  Denies: Anxiety, Appropriate Effect, Depression, Other      Sleep:  No: Excessive daytime sleep, Morning Headache?, Snoring, Insomnia?, Stop    breathing at sleep?, Other      Integumentary:  Denies: Rash, Dry skin, Skin Warm to Touch, Other      Immunologic/Allergic:  Complains of: Seasonal allergies; Denies: Latex allergy,     Asthma, Urticaria, Eczema, Other      Immunization status:  Up to date            FAMILY/SOCIAL/MEDICAL HX      Surgical History:  Yes: Bowel Surgery (COLONOSCOPY), Breast Surgery (HAS HAD     DELTAGRAM ON RT BREAST ), Eye Surgery (BILATERAL CATARACT), Oral Surgery     (THYROID REMOVED 2020), Orthopedic Surgery (BILAT ANKLE FUSION, LEFT HAND     SURG, R ACL REPAIR AND RTKR), Vascular Surgery (LYMPH NODE REMOVAL L SIDE     [id number] and under arm), Other Surgeries (PORT); No: Abdominal Surgery,     Appendectomy, CABG, Cholecystectomy, Head Surgery      Cancer/Type - Family Hx:  Sister (LUNG)      Is Father Still Living?:  No      Is Mother Still Living?:  No       Family History:  Yes      Social History:  No Tobacco Use, No Alcohol Use, No Recreational Drug use      Smoking status:  Never smoker       Section:  Yes      Hysterectomy:  No (Tubal )      Anticoagulation Therapy:  No      Antibiotic Prophylaxis:  No      Medical History:  Yes: Anemia, Arthritis (OSTEOARTHRITIS), Asthma (INHALER),     Blood Disease (ANEMIA), Congestive Heart Failu (3-), Seizures (1995 X 1     EPISODE POST PARTUM), Hemorrhoids/Rectal Prob (ACID REFLUX), High Blood Pressure    (CONTROLLED WITH MEDS), Reflux Disease, Thyroid Problem (HYPERACTIVE),     Miscellaneous Medical/oth (SYSTEMIC LUPUS, compartment syndrome ); No:     Chemotherapy/Cancer, Chronic Bronchitis/COPD, Deafness or Ringing Ears,     Diabetes, Heart Attack, Shortness Of Breath, Sinus Trouble       Psychiatric History      None            PREVENTION      Hx Influenza Vaccination:  Yes      Date Influenza Vaccine Given:  Dec 4, 2019      Influenza Vaccine Declined:  No      2 or More Falls Past Year?:  No      Fall Past Year with Injury?:  No      Hx Pneumococcal Vaccination:  Yes      Encouraged to follow-up with:  PCP regarding preventative exams.      Chart initiated by      Marija Dillard MA            ALLERGIES/MEDICATIONS      Allergies:        Coded Allergies:             SALSALATE (Verified  Allergy, Unknown, 3/2/20)      Medications    Last Reconciled on 3/2/20 15:35 by PARVEZ PAULSON       Albuterol (Proair HFA) 8.5 Gm Inh      1-2 PUFFS INH RTQ6H PRN for SHORTNESS OF BREATH, #1 INH 0 Refills         Prov: PARVEZ PAULSON [facility]         3/2/20       Trimethoprim/Sulfamethoxazole DS (Bactrim /160 MG) 1 Each Tablet      1 TAB PO once daily for 30 Days, #30 TAB 2 Refills         Prov: PARVEZ PAULSON [facility]         3/2/20       Phenyleph/Pramoxin/Glycr/W.pet (Preparation H Cream) Unknown Strength Cream..g.      RC PRN, TUBE         Reported         3/2/20       Pilocarpine HCl (Pilocarpine HCl) 7.5 Mg Tablet      5 MG PO TID for 30 Days, #60 TAB         Reported         3/2/20       Loratadine (Claritin) 5 Mg/5 Ml Solution      PO QDAY, ML         Reported         3/2/20       Gabapentin (Gabapentin) 300 Mg Capsule      300 MG PO QDAY PRN for QD, #60 CAP 0 Refills         Reported         3/2/20       guaiFENesin (guaiFENesin) 400 Mg Tab               Reported         3/2/20       Calcium/Vitamin D (Caltrate-D) 1 Each Tablet      1200 EACH PO QDAY, #60 TAB         Reported         3/2/20       Polyethylene Glycol (Miralax*) 17 Gm Packet      17 GM PO QDAY PRN for CONSTIPATION, #30 PKT 0 Refills         Reported         1/17/20       HYDROcodone-Acetaminophen 7.5-325 Mg (HYDROcodone-Acetaminophen 7.5-325 Mg) 1     Tab Tablet      1 TAB PO Q8H PRN for PAIN, TAB         Reported         1/17/20        Furosemide (Furosemide) 40 Mg Tablet      40 MG PO QDAY, #30 TAB 0 Refills         Reported         1/17/20       SUMAtriptan Succinate (Imitrex) 50 Mg Tablet      50 MG PO ASDIR PRN for MIGRAINE, TAB         Reported         3/13/18      Current Medications      Current Medications Reviewed 3/2/20            EXAM      Vital Signs Reviewed.      General:  WDWN, Alert, NAD.      HEENT: PERRL, EOMI.  OP, nares clear, no sinus tenderness.      Neck: Supple, no JVD, no thyromegaly, bandage in place and covered.        Lymph: No axillary, cervical, supraclavicular lymphadenopathy noted bilaterally.      Chest: Good aeration, mildly decreased breath sounds throughout, no wheezes,     rales or rhonchi appreciated, normal work of breathing noted. Patient is able to    speak full sentences without difficulty.        CV: RRR, no MGR, pulses 2+, equal.        Abd: Soft, NT, ND, +BS, no HSM.      EXT: No clubbing, no cyanosis, no edema, no joint tenderness.        Neuro:  A  Skin: No rashes or lesions.      Vtials      Vitals:             Height 5 ft 4 in / 162.56 cm           Weight 134 lbs 4 oz / 60.34496 kg           BSA 1.65 m2           BMI 23.0 kg/m2           Temperature 99.3 F / 37.39 C - Oral           Pulse 101           Respirations 12           Blood Pressure 106/55 Sitting, Right Arm           Pulse Oximetry 96%, nasal cannula, 2 lpm            REVIEW      Results Reviewed      PCCS Results Reviewed?:  Yes Prev Lab Results, Yes Prev Radiology Results, Yes     Previous Mecial Records      Lab Results      I reviewed patient's discharge summary from recent hospital stay and patient's     noncontrast chest CT dated for 01/27/2020 and patient's chest x-ray dated for     02/04/2020.  I reviewed Dr. Dennis's last office visit note.      Radiographic Results               [facility]                PACS RADIOLOGY REPORT            Patient: PRECIOUS NUÑEZ APRIL    Acct: #[id number]   Report: #[id number]            UNIT #: [id number]    DOS: 20 0940      INSURANCE:[employer]   ORDER #:[id number]      LOCATION:[facility]  414   : 1965            PROVIDERS      ADMITTING:  Jordyn Duque   ATTENDING: Jordyn Duque      FAMILY:  TERESITA ZALDIVAR   ORDERING:  EMILY SANFORD         OTHER:    DICTATING:  ROYAL CALZADA MD            REQ #:[id number]   EXAM:CXRPORTABL - Portable Chest xray 1 view      REASON FOR EXAM:  Pneumonia, hypoxia      REASON FOR VISIT:  SEPSIS            *******Signed******         PROCEDURE:   PORTABLE CHEST X-RAY             COMPARISON:   [facility], CR, CHEST AP/PA 1 VIEW, 2020,     13:19.             INDICATIONS:   Pneumonia, hypoxia             FINDINGS:         There is a left-sided subclavian Zkegcv-E-Food in place with the tip at the     cavoatrial junction.        The heart is enlarged.  There are bilateral basilar interstitial changes which     may be secondary to       interstitial pneumonia.             CONCLUSION:         1. Bilateral basilar interstitial disease likely pneumonia              ROYAL CALZADA MD             Electronically Signed and Approved By: ROYAL CALZADA MD on 2020 at 15:12                        Until signed, this is an unconfirmed preliminary report that may contain      errors and is subject to change.                                              GHANSHYAMKE:      D:20 1512               [facility]                PACS RADIOLOGY REPORT            Patient: PRECIOUS NUÑEZ APRIL   Acct: #[id number]   Report: #[id number]            UNIT #: [id number]    DOS: 20 1548      INSURANCE:[employer]   ORDER #:[id number]      LOCATION:[facility]  0999-   : 1965            PROVIDERS      ADMITTING:  Eldon Martinez   ATTENDING: Eldon Martinez      FAMILY:  TERESITA ZALDIVAR   ORDERING:  KAM LIMON         OTHER:     DICTATING:  CHECO BRITO MD            REQ #:[id number]   EXAM:CHWO - CT CHEST without CONTRAST      REASON FOR EXAM:  multiple lung infiltrates. ILD      REASON FOR VISIT:  SEPSIS            *******Signed******         PROCEDURE:   CT CHEST WITHOUT CONTRAST             COMPARISON:   [facility], CT, CT CHEST ABD PEL W CONTRAST,     1/18/2020, 20:30.             INDICATIONS:   Multiple Lung Infiltrates, ILD, Cough, Shortness of Breath             TECHNIQUE:   CT images were created without the administration of contrast     material.               PROTOCOL:     Standard imaging protocol performed                RADIATION:     DLP: 177.6 mGy*cm          Automated exposure control was utilized to minimize radiation dose.              FINDINGS:         Patchy geographic areas of ground-glass opacity and interstitial thickening     involving both lungs.        There is bronchiectasis seen in the lower lobes.  There are few scattered more     nodular areas of       dense consolidation in both lungs.  Overall appearance has progressed since the     previous study.        Small bilateral pleural effusions are similar.  No pneumothorax.             Cardiomegaly.  Generalized body wall edema.  No clearly acute findings are seen     in the included       upper abdomen.             CONCLUSION:   Interval progression of patchy areas of ground-glass opacity and     interstitial       thickening.  Given background cardiomegaly and volume overload, this is favored     to represent edema.        Pneumonia could have a similar appearance.  Other entities are possible for     this appearance, but       considered less likely given rapid progression.             Likely superimposed interstitial lung disease in the lung bases.              CHECO BRITO MD             Electronically Signed and Approved By: CHECO BRITO MD on 1/27/2020 at 17:22                        Until signed, this is an unconfirmed preliminary report  that may contain      errors and is subject to change.                                              PANCHOSUSSY:      D:20 1722            Assessment      Pneumocystis jiroveci pneumonia - B59            Interstitial lung disease due to connective tissue disease - M35.8, J84.89            Notes      New Medications      * Calcium/Vitamin D (Caltrate-D) 1 EACH TABLET: 1,200 EACH PO QDAY #60      * guaiFENesin 400 MG TAB:       * Gabapentin 300 MG CAPSULE: 300 MG PO QDAY PRN QD #60      * LORATADINE (Claritin) 5 MG/5 ML SOLUTION: PO QDAY      * Pilocarpine HCl 7.5 MG TABLET: 5 MG PO TID 30 Days #60      * Phenyleph/Pramoxin/Glycr/W.pet (Preparation H Cream) Unknown Strength       CREAM..G.: RC PRN      * Trimethoprim/Sulfamethoxazole DS (Bactrim /160 MG) 1 EACH TABLET: 1 TAB       PO once daily 30 Days #30      * ALBUTEROL (Proair HFA) 8.5 GM INH: 1-2 PUFFS INH RTQ6H PRN SHORTNESS OF BREATH      #1      Discontinued Medications      * (Calcium Carb) 600 MG TAB: 1,200 MG PO TID 30 Days      * Levothyroxine (Synthroid) 0.15 M.15 MG PO QDAY@07 30 Days      * predniSONE 20 MG TABLET: 20 MG PO QDAY 28 Days #21         Instructions: 20mg daily for 2 weeks then 10mg daily for 2 weeks then stop      * Sulfamethoxazole/Trimethoprim 800/160 mg 1 EACH TABLET: 2 TAB PO Q8 21 Days       #126      New Diagnostics      * Chest W/O Cont CT, Month         Dx: Pneumocystis jiroveci pneumonia - B59      * CBC, Month         Dx: Pneumocystis jiroveci pneumonia - B59      * Comp Metabolic Panel, Month         Dx: Pneumocystis jiroveci pneumonia - B59      ASSESSMENT:       1.  Pneumocystis pneumonia.      2.  Community acquired pneumonia secondary to MSSA and mycoplasma.      3.  Exacerbation of connective tissue disease related to interstitial lung     disease.      4.  Pleural effusion on right.      5.  Immunosuppressed status on Imuran.        6. Recent thyroidectomy for hyperthyroidism.      7.  Interstitial lung disease  secondary to connective tissue disease.      8.  Decompensated diastolic heart failure with recent exacerbation.      9. Obstructive sleep apnea, not on CPAP.      10. Gastroesophageal reflux disease.      11. Rheumatoid arthritis.  The patient is under the care of Dr. Hummel.      12. History of isolated pulmonary embolism in the past including     anticoagulation.            PLAN:      1.  Spoke with Dr. Dennis regarding patient, appreciate his help and input.      2.  For Pneumocystis and patient being immunosuppressed, the patient is to be on    Bactrim one tab once daily. Prescription from Bactrim sent to the pharmacy     today.  We will recheck a CBC and CMP again in one month.      3. The patient is under the care of Dr. Ozuna for a history of thyroidectomy with     site dehisced. The patient is advised to follow up with Dr. Ozuna as scheduled.      4. Rheumatoid arthritis.  The patient states she is under the care of Dr. Hummel.  The patient is to follow up with him as scheduled.        5.  The patient is to continue to hold Imuran due to still having low grade     fevers and dehisced site for thyroidectomy has not completely healed.        6. We will repeat chest CT scan again in one month to ensure resolution of     pneumonia, order placed today.        7.  Patient is to call the office, 911 or go to the ER with any new or worsening    symptoms.      8. Follow up with Dr. Boone as scheduled.      9.  Up-to-date with flu, Prevnar and Pneumovax.      10.  Continue albuterol inhaler as needed.      11.  Follow up with Dr. Dennis in 4-6 weeks, sooner if needed.            Patient Education      Patient Education Provided:  Acute Bronchitis, Sleep Apnea      Time Spent:  > 50% /Coord Care            Electronically signed by PARVEZ PAULSON [facility]  03/03/2020 15:51       Disclaimer: Converted document may not contain table formatting or lab diagrams. Please see [email or web address]  for the authenticated document.

## 2021-06-05 NOTE — PROGRESS NOTES
Progress Note      Patient Name: Aida Mcclure   Patient ID: [id number]   Sex: Female   YOB: 1965    Primary Care Provider: Merline Enamorado MD   Referring Provider: Boy Levy MD    Visit Date: May 26, 2021    Provider: Merline Enamorado MD   Location: [facility]   Location Address: [address] [place] [address]  [place], [place]  [id number]   Location Phone: [phone]          Chief Complaint  · Follow-up  · Right hip pain      History Of Present Illness  Aida Mcclure is a 56 year old /Black female who presents for evaluation and treatment of:      pt is here for a follow-up for right hip pain:   Chronic, pain when walking and at rest-if she sits too long, pain is described as pins and needles, does intermittently travel down her right leg. Endorses her leg giving out on her at times, denies falls.   States she limps at times due to the pain.   She does have pain when she lays on the right hip at night.     [facility], however it is not in her network. She's called her insurance and is waiting for a list of in-network locations to be emailed to her.     She has tried Tylenol and ibuprofen w/o much relief. She is using voltaren gel once to twice daily with some improvement. She is on Norco and gabapentin as well for other chronic pain, however she continues to have breakthrough pain with activity with the right hip.     Right sided abdominal pain:   Subacute as of the past couple months, described as cramping pain over the right side of her abdomen and travels upward from her hip to her lower rib cage. Episodes last seconds, sometimes minutes. No clear triggers or alleviating factors.    She also has a catalogue from her insurance company which she would like provider to look at and help her select some of the vitamin supplements she is prescribed at more affordable prices.    Restless leg: no improvement on current  requip dose.       Past Medical History  Disease Name Date Onset Notes   Anemia, Unspecified --  --    Annual physical exam 2017 will schedule mammogram   Bilateral Hamstring injury/pain 2015 --    Breast lump --  --    CHF (congestive heart failure) 2017 follows with cards. currently doing well on Lasix, Lisinopril, aldactone, and propanolol   Degeneration of lumbar intervertebral disc 2012 --    Essential hypertension 2017 well controlled on current regimen.    GERD --  --    Head injury --  --    Hernia --  --    Hyperthyroidism 2017 currently on methimazole. Pt reports possibly due to Grave's disease. Will need to obtain and review medical records from [facility]. will refer to endo.   Hypoxia --  --    Insomnia 2017 discussed sleep hygiene. Pt to try avoiding blue light at night. pt also to to set routine prior to bedtime. will avoid meds at this time.   Interstitial lung disease --  --    Lumbosacral disc herniation, L5-S1 2012 Left L5-S1 small in nature. She has failed all conservative measures and desires surgery.   Lupus --  sees Dr. Jones    Migraines --  --    Pulmonary embolism --  --    Respiratory failure with hypoxia 2021 --    Rheumatoid arthritis 2017 currently controlled, but with residual deformity. will follow with Dr. Jones   Right Total Knee 2015 --    Tachycardia --  --          Past Surgical History  Procedure Name Date Notes   Ankle surgery --  bilateral   Breast biopsy --  --     --  --    Cataract exctraction with lens implant --  --    Colonoscopy --  --    Eye Implant --  yes   Fasciotomy --  left anterior arm   Hand surgery, left --  --    Knee surgery --  --    Lymph Node Excision --  --    Power Port Placement --  --          Medication List  Name Date Started Instructions   albuterol sulfate 90 mcg/actuation inhalation HFA aerosol inhaler 2020 inhale 1 puff (90 mcg) by inhalation route every 4 hours as  needed   azathioprine 50 mg oral tablet 03/16/2018 take 3 tablets (150 mg) by oral route once daily   Calcium 500 + D 500 mg(1,250mg) -200 unit oral tablet  take 1 tablet by oral route daily   ferrous sulfate 325 mg (65 mg iron) oral tablet 01/21/2021 take 1 tablet (325 mg) by oral route 2 times per day for 90 days   FLUTICASONE 50MCG NASAL SP (120) RX 05/03/2021 SHAKE LIQUID AND USE 2 SPRAYS(100 MCG) IN EACH NOSTRIL EVERY DAY   furosemide 40 mg oral tablet 09/08/2020 TAKE 1 TABLET BY MOUTH IN THE MORNING AND AFTERNOON   gabapentin 300 mg oral capsule 05/26/2021 take 1 capsule by oral route 3 times a day for 30 days prn pain   hydrocodone-acetaminophen 7.5-325 mg oral tablet 05/26/2021 take 1 tablet by oral route every 8 hours as needed for pain for 30 days   hydroxychloroquine 200 mg oral tablet  take 1 tablet (200 mg) by oral route once daily   Imitrex 50 mg oral tablet  take 1 tablet (50 mg) by oral route once with fluids as early as possible after the onset of a migraine attack;may repeat after 2 hours if headache returns, not to exceed 200mg in 24hrs   levothyroxine 200 mcg oral tablet  --    loratadine 10 mg oral capsule 08/15/2019 TAKE 1 CAPSULE BY ORAL ROUTE DAILY   magnesium oxide 400 mg magnesium oral tablet 01/21/2021 take 1 tablet by oral route QD for 5 days   Miralax 17 gram/dose oral powder 11/26/2019 take 17 gram mixed with 8 oz. water, juice, soda, coffee or tea by oral route once daily   Nasal Spray (sodium chloride) 0.65 % nasal aerosol,spray 02/04/2021 apply 1 spray by nasal route 2 times a day as needed for 30 days   promethazine 12.5 mg oral tablet  take 1 tablet (12.5 mg) by oral route 3 times per day   propranolol 40 mg oral tablet 09/08/2020 TAKE 1/2 TABLET BY MOUTH THREE TIMES DAILY   ropinirole 1 mg oral tablet 05/26/2021 take 1 tablet (1 mg) PO 1-3 hours before bedtime for 7d, then take 1.5 tablet for 7d, then increase to 2 tablet thereafter.   spironolactone 25 mg oral tablet  take 1  tablet (25 mg) by oral route once daily   Vitamin C 250 mg oral tablet 01/21/2021 take 1 tablet by oral route daily   Vitamin D2 1,250 mcg (50,000 unit) oral capsule 01/21/2021 take 1 capsule by oral route once weekly         Allergy List  Allergen Name Date Reaction Notes   aspirin --  --  LARGE DOSES OF ASPIRIN   Disalcid --  --  --        Allergies Reconciled  Family Medical History  Disease Name Relative/Age Notes   Lung Disease  --    Arthritis, Rheumatoid  --    Stroke Father/   Father   Cancer, Unspecified  --    - No Family History of Colorectal Cancer  --    Parkinson's Disease  --    Diabetes Mellitus, Type II  --          Social History  Finding Status Start/Stop Quantity Notes   Alcohol Current some day 0/0 --  2-3 week drinks weekly; wine   Caffeine Current some day 0/0 --  drinks occasionally; tea; 3-4 times per day   Recreational Drug Use Never --/-- --  no   Second hand smoke exposure --  --/-- --  For most of her life   Single. --  --/-- --  --    Tobacco Never 0/0 --  never a smoker  smoked 0.5 year   Unemployed. --  --/-- --  on disability         Immunizations  NameDate Admin Mfg Trade Name Lot Number Route Inj VIS Given VIS Publication   Xwurbwdju18/17/2020 [facility] Fluzone Quadrivalent [id number] [facility] 09/17/2020    Comments: Pt tolerated well, left office in stable condition   Cskibykmr5942/26/2019 [facility] PNEUMOVAX 23 [id number] [facility] 08/26/2019    Comments: tolerated well   Prevnar 1306/14/2018 [facility] PREVNAR 13 [id number] [facility] 06/14/2018 11/05/2015   Comments: tolerated well         Review of Systems  · Constitutional  o Denies  o : fever, fatigue, weight loss, weight gain  · Cardiovascular  o Admits  o : lower extremity edema (chronic)  o Denies  o : claudication, chest pressure, palpitations  · Respiratory  o Denies  o : shortness of breath, wheezing, cough, hemoptysis, dyspnea on exertion  · Gastrointestinal  o Denies  o : nausea, vomiting, diarrhea, constipation, abdominal pain  · Musculoskeletal  o * See  "HPI      Vitals  Date Time BP Position Site L\R Cuff Size HR RR TEMP (F) WT  HT  BMI kg/m2 BSA m2 O2 Sat FR L/min FiO2 HC       04/13/2021 11:48 /80 Sitting    90 - R  97.8 191lbs 6oz 5'  4\" 32.85 1.98 95 %  21%    04/23/2021 11:01 /72 Sitting    101 - R  97.8 196lbs 4oz 5'  4\" 33.69 2 92 %  21%    05/26/2021 01:46 /84 Sitting    102 - R  98.1 195lbs 16oz 5'  4\" 33.64 2 93 %  21%          Physical Examination  · Constitutional  o Appearance  o : no acute distress, well-nourished  · Head and Face  o Head  o :   § Inspection  § : atraumatic, normocephalic  · Ears, Nose, Mouth and Throat  o Ears  o :   § External Ears  § : normal  o Nose  o :   § Intranasal Exam  § : nares patent, nasal canula in place.   o Oral Cavity  o :   § Oral Mucosa  § : moist mucous membranes  · Respiratory  o Respiratory Effort  o : breathing comfortably, symmetric chest rise  o Auscultation of Lungs  o : clear to asculatation bilaterally, no wheezes, rales, or rhonchii  · Cardiovascular  o Heart  o :   § Auscultation of Heart  § : regular rate and rhythm, no murmurs, rubs, or gallops  o Peripheral Vascular System  o :   § Extremities  § : no edema  · Gastrointestinal  o Abdominal Examination  o : abdomen with mild to moderate tenderness to palpation over the RUQ and RLQ, normal bowel sounds, tone normal without rigidity or guarding, no masses present  · Skin and Subcutaneous Tissue  o General Inspection  o : no lesions present, no areas of discoloration, skin turgor normal  · Neurologic  o Mental Status Examination  o :   § Orientation  § : grossly oriented to person, place and time  o Cranial Nerves  o : crainial nerves 2-12 grossly intact, eye movements within normal limits, no facial weakness present  o Gait and Station  o :   § Gait Screening  § : normal gait     MSK: tender to palpation over the lateral aspect of her right hip. ROM is mildly limited secondary to pain. Strength is 4/5 with flexion at the hip and knee, " 5/5 over the ankle. Patellar reflexes are brisk at 2+ bilaterally.           Results  · In-Office Procedures  o Lab procedure  § IOP - Urine Drug Screen In-House [facility] (99413)   § Amphetamines Ur Ql: Negative   § Barbiturates Ur Ql: Negative   § Buprenorphine+Nor Ur Ql Scn: Negative   § Benzodiaz Ur Ql: Negative   § Cocaine Ur Ql: Negative   § Methadone Ur Ql: Negative   § Methamphet Ur Ql: Negative   § MDMA Ur Ql Scn: Negative   § Opiates Ur Ql: Negative   § Oxycodone Ur Ql: Negative   § PCP Ur Ql: Negative   § THC Ur Ql: Negative   § Temp in Range?: Within/Acceptable   § Control Seen?: Yes       Assessment  · Lumbosacral disc herniation, L5-S1     722.10/M51.27  Chronic and stable.   · Respiratory failure with hypoxia     518.81/J96.91  Chronic and stable on 2L Oxygen via NC.  · Rheumatoid arthritis     714.0/M06.9  currently controlled, but with residual deformity. Follow with Dr. Jones. Continue on current pain regimen per above.   · RUQ cramping     789.01/R10.11  Exact etiology is unclear. No red flags noted on hx or exam. Will obtain CMP and order CT of the abdomen and pelvis for further evaluation.   · Port-A-Cath in place     V45.89/Z95.828  Chronically in place. Accessed for lab draws today.   · Right hip pain     719.45/M25.551  Subacute to chronic. Xray obtained during prior visit showed osteoarthritis. Encouraged patient to pursue to PT. She will call us when she has the name of the PT's within her insurance network for new referral.   · Long term current use of opiate analgesic     V58.69/Z79.891  Chronic use of Norco and gabapentin. Refilled today, paper prescription provided.   · Restless leg     333.94/G25.81  Started requip however no improvement noted. Will increase dose gradually over the next couple weeks. She will f/uin 3 weeks.      At least 30 minutes spent discussing active concerns, reviewing current meds, discussing next steps. She is to f/u in 3wks.     Problems  Reconciled  Plan  · Orders  o CMP [facility] (52695) - 714.0/M06.9, 789.01/R10.11 - 05/26/2021  o ACO-39: Current medications updated and reviewed (, 1159F) - 722.10/M51.27, 714.0/M06.9 - 05/26/2021  o Irrigation of implanted venous access device for drug delivery systems (01660) - V45.89/Z95.828 - 05/26/2021   Accessed port in left upper chest using sterile fashion. Flushed with 9.5 ml of NS, obtained good blood return, wasted 10 ml of blood, obtained blood work, flushed with 9.5 ml NS and 4 ml of Heparin. Deaccessed per protocol. Bandaid applied to the site. Patient tolerated well and left office in stable condition.  · Medications  o ropinirole 1 mg oral tablet   SIG: take 1 tablet (1 mg) PO 1-3 hours before bedtime for 7d, then take 1.5 tablet for 7d, then increase to 2 tablet thereafter.   DISP: (60) Tablet with 0 refills  Adjusted on 05/26/2021     o Medications have been Reconciled  o Transition of Care or Provider Policy  · Instructions  o Patient was educated/instructed on their diagnosis, treatment and medications prior to discharge from the clinic today.  o Patient counseled to reduce calorie intake.  o Call the office with any concerns or questions.  · Disposition  o Follow up in 3 weeks            Electronically Signed by: Merline Enamorado MD -Author on May 31, 2021 05:38:50 PM

## 2021-06-16 ENCOUNTER — LAB (OUTPATIENT)
Dept: LAB | Facility: HOSPITAL | Age: 56
End: 2021-06-16

## 2021-06-16 ENCOUNTER — TRANSCRIBE ORDERS (OUTPATIENT)
Dept: ADMINISTRATIVE | Facility: HOSPITAL | Age: 56
End: 2021-06-16

## 2021-06-16 DIAGNOSIS — E03.9 HYPOTHYROIDISM, UNSPECIFIED TYPE: Primary | ICD-10-CM

## 2021-06-16 LAB
T4 FREE SERPL-MCNC: 0.92 NG/DL (ref 0.93–1.7)
TSH SERPL DL<=0.05 MIU/L-ACNC: 20.3 UIU/ML (ref 0.27–4.2)

## 2021-06-16 PROCEDURE — 36415 COLL VENOUS BLD VENIPUNCTURE: CPT

## 2021-06-16 PROCEDURE — 84439 ASSAY OF FREE THYROXINE: CPT

## 2021-06-16 PROCEDURE — 84443 ASSAY THYROID STIM HORMONE: CPT

## 2021-06-18 ENCOUNTER — TRANSCRIBE ORDERS (OUTPATIENT)
Dept: ADMINISTRATIVE | Facility: HOSPITAL | Age: 56
End: 2021-06-18

## 2021-06-18 DIAGNOSIS — M79.604 RIGHT LEG PAIN: Primary | ICD-10-CM

## 2021-06-21 ENCOUNTER — HOSPITAL ENCOUNTER (EMERGENCY)
Facility: HOSPITAL | Age: 56
Discharge: HOME | End: 2021-06-21
Attending: EMERGENCY MEDICINE | Admitting: EMERGENCY MEDICINE

## 2021-06-21 ENCOUNTER — APPOINTMENT (OUTPATIENT)
Dept: GENERAL RADIOLOGY | Facility: HOSPITAL | Age: 56
End: 2021-06-21

## 2021-06-21 VITALS
BODY MASS INDEX: 38.32 KG/M2 | WEIGHT: 230 LBS | SYSTOLIC BLOOD PRESSURE: 135 MMHG | OXYGEN SATURATION: 100 % | HEIGHT: 65 IN | RESPIRATION RATE: 18 BRPM | DIASTOLIC BLOOD PRESSURE: 79 MMHG | HEART RATE: 62 BPM | TEMPERATURE: 98.5 F

## 2021-06-21 DIAGNOSIS — R60.0 PEDAL EDEMA: ICD-10-CM

## 2021-06-21 DIAGNOSIS — R07.9 CHEST PAIN OF UNCERTAIN ETIOLOGY: Primary | ICD-10-CM

## 2021-06-21 LAB
ALBUMIN SERPL-MCNC: 4.3 G/DL (ref 3.5–5.2)
ALBUMIN/GLOB SERPL: 1.3 G/DL
ALP SERPL-CCNC: 85 U/L (ref 39–117)
ALT SERPL W P-5'-P-CCNC: 8 U/L (ref 1–33)
ANION GAP SERPL CALCULATED.3IONS-SCNC: 8.6 MMOL/L (ref 5–15)
AST SERPL-CCNC: 21 U/L (ref 1–32)
BASOPHILS # BLD AUTO: 0.05 10*3/MM3 (ref 0–0.2)
BASOPHILS NFR BLD AUTO: 0.7 % (ref 0–1.5)
BILIRUB SERPL-MCNC: 0.7 MG/DL (ref 0–1.2)
BUN SERPL-MCNC: 15 MG/DL (ref 6–20)
BUN/CREAT SERPL: 13.6 (ref 7–25)
CALCIUM SPEC-SCNC: 8.5 MG/DL (ref 8.6–10.5)
CHLORIDE SERPL-SCNC: 104 MMOL/L (ref 98–107)
CK MB SERPL-CCNC: 3.08 NG/ML
CK SERPL-CCNC: 221 U/L (ref 20–180)
CO2 SERPL-SCNC: 24.4 MMOL/L (ref 22–29)
CREAT SERPL-MCNC: 1.1 MG/DL (ref 0.57–1)
DEPRECATED RDW RBC AUTO: 48.1 FL (ref 37–54)
EOSINOPHIL # BLD AUTO: 0.31 10*3/MM3 (ref 0–0.4)
EOSINOPHIL NFR BLD AUTO: 4.2 % (ref 0.3–6.2)
ERYTHROCYTE [DISTWIDTH] IN BLOOD BY AUTOMATED COUNT: 14.9 % (ref 12.3–15.4)
GFR SERPL CREATININE-BSD FRML MDRD: 62 ML/MIN/1.73
GLOBULIN UR ELPH-MCNC: 3.2 GM/DL
GLUCOSE SERPL-MCNC: 80 MG/DL (ref 65–99)
HCT VFR BLD AUTO: 35.5 % (ref 34–46.6)
HGB BLD-MCNC: 11.4 G/DL (ref 12–15.9)
HOLD SPECIMEN: NORMAL
IMM GRANULOCYTES # BLD AUTO: 0.01 10*3/MM3 (ref 0–0.05)
IMM GRANULOCYTES NFR BLD AUTO: 0.1 % (ref 0–0.5)
LIPASE SERPL-CCNC: 25 U/L (ref 13–60)
LYMPHOCYTES # BLD AUTO: 1.8 10*3/MM3 (ref 0.7–3.1)
LYMPHOCYTES NFR BLD AUTO: 24.4 % (ref 19.6–45.3)
MAGNESIUM SERPL-MCNC: 1.6 MG/DL (ref 1.6–2.6)
MCH RBC QN AUTO: 28.6 PG (ref 26.6–33)
MCHC RBC AUTO-ENTMCNC: 32.1 G/DL (ref 31.5–35.7)
MCV RBC AUTO: 89.2 FL (ref 79–97)
MONOCYTES # BLD AUTO: 0.73 10*3/MM3 (ref 0.1–0.9)
MONOCYTES NFR BLD AUTO: 9.9 % (ref 5–12)
NEUTROPHILS NFR BLD AUTO: 4.49 10*3/MM3 (ref 1.7–7)
NEUTROPHILS NFR BLD AUTO: 60.7 % (ref 42.7–76)
NRBC BLD AUTO-RTO: 0 /100 WBC (ref 0–0.2)
NT-PROBNP SERPL-MCNC: 283.7 PG/ML (ref 0–900)
PLATELET # BLD AUTO: 159 10*3/MM3 (ref 140–450)
PMV BLD AUTO: 11.6 FL (ref 6–12)
POTASSIUM SERPL-SCNC: 4 MMOL/L (ref 3.5–5.2)
PROT SERPL-MCNC: 7.5 G/DL (ref 6–8.5)
QT INTERVAL: 409 MS
QT INTERVAL: 431 MS
RBC # BLD AUTO: 3.98 10*6/MM3 (ref 3.77–5.28)
SODIUM SERPL-SCNC: 137 MMOL/L (ref 136–145)
TROPONIN I SERPL-MCNC: 0 NG/ML (ref 0–0.6)
TROPONIN I SERPL-MCNC: 0.01 NG/ML (ref 0–0.6)
WBC # BLD AUTO: 7.39 10*3/MM3 (ref 3.4–10.8)
WHOLE BLOOD HOLD SPECIMEN: NORMAL

## 2021-06-21 PROCEDURE — 84484 ASSAY OF TROPONIN QUANT: CPT

## 2021-06-21 PROCEDURE — 71045 X-RAY EXAM CHEST 1 VIEW: CPT

## 2021-06-21 PROCEDURE — 82553 CREATINE MB FRACTION: CPT | Performed by: EMERGENCY MEDICINE

## 2021-06-21 PROCEDURE — 83880 ASSAY OF NATRIURETIC PEPTIDE: CPT | Performed by: EMERGENCY MEDICINE

## 2021-06-21 PROCEDURE — 82550 ASSAY OF CK (CPK): CPT | Performed by: EMERGENCY MEDICINE

## 2021-06-21 PROCEDURE — 93005 ELECTROCARDIOGRAM TRACING: CPT | Performed by: EMERGENCY MEDICINE

## 2021-06-21 PROCEDURE — 99283 EMERGENCY DEPT VISIT LOW MDM: CPT

## 2021-06-21 PROCEDURE — 85025 COMPLETE CBC W/AUTO DIFF WBC: CPT

## 2021-06-21 PROCEDURE — 83690 ASSAY OF LIPASE: CPT | Performed by: EMERGENCY MEDICINE

## 2021-06-21 PROCEDURE — 80053 COMPREHEN METABOLIC PANEL: CPT | Performed by: EMERGENCY MEDICINE

## 2021-06-21 PROCEDURE — 83735 ASSAY OF MAGNESIUM: CPT | Performed by: EMERGENCY MEDICINE

## 2021-06-21 PROCEDURE — 93005 ELECTROCARDIOGRAM TRACING: CPT

## 2021-06-21 RX ORDER — LEVOTHYROXINE SODIUM 0.2 MG/1
200 TABLET ORAL DAILY
COMMUNITY
End: 2021-06-29 | Stop reason: DRUGHIGH

## 2021-06-21 RX ORDER — HYDROXYCHLOROQUINE SULFATE 200 MG/1
1 TABLET, FILM COATED ORAL DAILY
COMMUNITY
End: 2021-06-29 | Stop reason: SDUPTHER

## 2021-06-21 RX ORDER — HYDROCHLOROTHIAZIDE 25 MG/1
25 TABLET ORAL DAILY
Qty: 5 TABLET | Refills: 0 | Status: SHIPPED | OUTPATIENT
Start: 2021-06-21 | End: 2021-12-15

## 2021-06-21 RX ORDER — POTASSIUM CHLORIDE 750 MG/1
10 CAPSULE, EXTENDED RELEASE ORAL 2 TIMES DAILY
COMMUNITY
End: 2021-06-29

## 2021-06-21 RX ORDER — ERGOCALCIFEROL 1.25 MG/1
1 CAPSULE ORAL
COMMUNITY
Start: 2021-01-21 | End: 2023-02-26

## 2021-06-21 RX ORDER — SODIUM CHLORIDE 0.9 % (FLUSH) 0.9 %
10 SYRINGE (ML) INJECTION AS NEEDED
Status: DISCONTINUED | OUTPATIENT
Start: 2021-06-21 | End: 2021-06-21 | Stop reason: HOSPADM

## 2021-06-21 RX ORDER — LORATADINE 10 MG/1
10 TABLET ORAL DAILY
COMMUNITY
End: 2021-09-15 | Stop reason: SDUPTHER

## 2021-06-21 RX ORDER — GABAPENTIN 300 MG/1
1 CAPSULE ORAL 3 TIMES DAILY
COMMUNITY
Start: 2021-05-26 | End: 2021-07-14 | Stop reason: SDUPTHER

## 2021-06-21 RX ORDER — ASCORBIC ACID 250 MG
1 TABLET ORAL DAILY
COMMUNITY
Start: 2021-01-21

## 2021-06-21 NOTE — ED PROVIDER NOTES
Subjective   Pt presents to the ED complaining of sharp, central CP onset 3 days ago. Pt denies SOB and reports her pain has resolved. Pt is on O2. Pt reports the pain has come and gone intermittently the past couple days. Pt has a hx of CHF.      History provided by:  Patient  Chest Pain  Chest pain location: central.  Pain quality: sharp    Pain radiates to:  Does not radiate  Pain severity:  Moderate  Duration:  3 days  Timing:  Intermittent  Progression:  Resolved  Associated symptoms: no abdominal pain, no diaphoresis, no fever, no lower extremity edema, no nausea, no shortness of breath and no vomiting        Review of Systems   Constitutional: Negative.  Negative for diaphoresis and fever.   HENT: Negative.    Eyes: Negative.    Respiratory: Negative.  Negative for shortness of breath.    Cardiovascular: Positive for chest pain.   Gastrointestinal: Negative.  Negative for abdominal pain, nausea and vomiting.   Genitourinary: Negative.    Musculoskeletal: Negative.    Skin: Negative.    Neurological: Negative.    Psychiatric/Behavioral: Negative.    All other systems reviewed and are negative.      Past Medical History:   Diagnosis Date   • Anemia    • Annual physical exam 11/06/2017    WILL RESCHEDULE MAMMOGRAM    • Breast lump    • CHF (congestive heart failure) (CMS/HCC) 11/06/2017    FOLLOWS WITH CARDS. CURRENTLY DOING WELL ON LASIX, LISINOPRIL, ALDACTONE, AND PROPANOLOL   • Degeneration of lumbar intervertebral disc 06/12/2012   • Essential hypertension 11/06/2017    WELL CONTROLLED ON CURRENT REGIMEN    • GERD (gastroesophageal reflux disease)    • Hamstring injury 08/17/2015    BILATERAL HAMSTRING INJURY/PAIN    • Head injury    • Hernia cerebri (CMS/[facility])    • Hyperthyroidism    • Hyperthyroidism 11/06/2017    CURRENTLY ON METHIMAZOLE, PT REPORT POSSIBLY DUE TO GRAVE'S DISEASE. WILL NEED TO OBTAIN AND REVIEW MEDICAL RECORDS FROM [facility]. WILL REFER TO ENDO    • Hypoxia    • Insomnia 11/06/2017    DISCUSSED  SLEEP HYGIENE. PT TO TRY AVOIDING BLUE LIGHT AT NIGHT. PT ALSO TO SET ROUTINE PRIOR TO BEDTIME. WILL AVOID MEDS AT THIS TIME    • Interstitial lung disease (CMS/HCC)    • Lumbosacral disc herniation 2012    LEFT L5-S1 SMALL NATURE. SHE HAS FAILED ALL CONSERVATIVE MEASURES AND DESIRES SURGERY    • Lupus (CMS/HCC)     SEES DR. ANDERSON   • Migraines    • Pulmonary embolism (CMS/[facility])    • Respiratory failure with hypoxia (CMS/[facility]) 2021   • Rheumatoid arthritis (CMS/[facility]) 2017    CURRENTLY CONTROLLED, BUT WITH RESIDUAL DEFORMITY. WILL FOLLOW WITH DR. ANDERSON    • Tachycardia    • Total knee replacement status, right 2015       Allergies   Allergen Reactions   • Aspirin    • Salicylic Acid        Past Surgical History:   Procedure Laterality Date   • ANKLE SURGERY Bilateral    • BREAST BIOPSY     • CATARACT EXTRACTION WITH INTRAOCULAR LENS IMPLANT     •  SECTION     • COLONOSCOPY     • EYE LENS IMPLANT SECONDARY      YES   • FASCIOTOMY      LEFT ANTERIOR ARM    • HAND SURGERY Left    • KNEE SURGERY     • OTHER SURGICAL HISTORY      LYMPH NODE EXCISION   • PORTACATH PLACEMENT      POWER PORT PLACEMENT        Family History   Problem Relation Age of Onset   • Stroke Father    • Lung cancer Other    • Arthritis Other    • Cancer Other    • Parkinsonism Other    • Diabetes type II Other        Social History     Socioeconomic History   • Marital status:      Spouse name: Not on file   • Number of children: Not on file   • Years of education: Not on file   • Highest education level: Not on file   Tobacco Use   • Smoking status: Never Smoker   Substance and Sexual Activity   • Alcohol use: No   • Drug use: Defer   • Sexual activity: Defer         Objective   Physical Exam  Vitals and nursing note reviewed.   HENT:      Head: Atraumatic.   Eyes:      Pupils: Pupils are equal, round, and reactive to light.   Cardiovascular:      Rate and Rhythm: Normal rate and regular rhythm.      Heart  sounds: Normal heart sounds.   Pulmonary:      Breath sounds: Normal breath sounds.   Chest:      Chest wall: No tenderness.   Abdominal:      Palpations: Abdomen is soft.      Tenderness: There is no abdominal tenderness.   Musculoskeletal:      Cervical back: Neck supple.      Right lower leg: No edema.      Left lower leg: No edema.   Skin:     General: Skin is warm and dry.   Neurological:      General: No focal deficit present.      Mental Status: She is alert.   Psychiatric:         Mood and Affect: Mood normal.         Procedures         ED Course                                            MDM    Final diagnoses:   Chest pain of uncertain etiology   Pedal edema       Documentation assistance provided by claire Rice.  Information recorded by the claire was done at my direction and has been verified and validated by me.     Maggie Rice  06/21/21 1244       Dk Simpson DO  06/27/21 1027

## 2021-06-22 RX ORDER — HYDROXYZINE HYDROCHLORIDE 25 MG/1
TABLET, FILM COATED ORAL
Qty: 60 TABLET | Refills: 2 | Status: SHIPPED | OUTPATIENT
Start: 2021-06-22 | End: 2021-07-14 | Stop reason: SDUPTHER

## 2021-06-28 RX ORDER — ROPINIROLE 1 MG/1
TABLET, FILM COATED ORAL
Qty: 60 TABLET | Refills: 2 | Status: SHIPPED | OUTPATIENT
Start: 2021-06-28 | End: 2021-09-29

## 2021-06-29 ENCOUNTER — HOSPITAL ENCOUNTER (OUTPATIENT)
Dept: CARDIOLOGY | Facility: HOSPITAL | Age: 56
Discharge: HOME | End: 2021-06-29
Admitting: STUDENT IN AN ORGANIZED HEALTH CARE EDUCATION/TRAINING PROGRAM

## 2021-06-29 ENCOUNTER — OFFICE VISIT (OUTPATIENT)
Dept: INTERNAL MEDICINE | Facility: CLINIC | Age: 56
End: 2021-06-29

## 2021-06-29 VITALS
TEMPERATURE: 98.2 F | HEIGHT: 65 IN | HEART RATE: 90 BPM | WEIGHT: 203 LBS | DIASTOLIC BLOOD PRESSURE: 82 MMHG | OXYGEN SATURATION: 94 % | BODY MASS INDEX: 33.82 KG/M2 | SYSTOLIC BLOOD PRESSURE: 128 MMHG

## 2021-06-29 DIAGNOSIS — G25.81 RESTLESS LEG: ICD-10-CM

## 2021-06-29 DIAGNOSIS — G89.29 OTHER CHRONIC PAIN: ICD-10-CM

## 2021-06-29 DIAGNOSIS — R05.9 COUGH: ICD-10-CM

## 2021-06-29 DIAGNOSIS — M79.604 RIGHT LEG PAIN: ICD-10-CM

## 2021-06-29 DIAGNOSIS — R60.0 BILATERAL LOWER EXTREMITY EDEMA: Primary | ICD-10-CM

## 2021-06-29 PROBLEM — G47.00 INSOMNIA: Status: ACTIVE | Noted: 2017-11-06

## 2021-06-29 PROBLEM — I26.99 PULMONARY EMBOLISM (HCC): Status: ACTIVE | Noted: 2021-06-29

## 2021-06-29 PROBLEM — M06.9 RHEUMATOID ARTHRITIS: Status: ACTIVE | Noted: 2017-11-06

## 2021-06-29 PROBLEM — S09.90XA HEAD INJURY: Status: ACTIVE | Noted: 2021-06-29

## 2021-06-29 PROBLEM — N63.0 BREAST LUMP: Status: ACTIVE | Noted: 2021-06-29

## 2021-06-29 PROBLEM — I10 ESSENTIAL HYPERTENSION: Status: ACTIVE | Noted: 2017-11-06

## 2021-06-29 PROBLEM — G43.909 MIGRAINES: Status: ACTIVE | Noted: 2021-06-29

## 2021-06-29 PROBLEM — E05.90 HYPERTHYROIDISM: Status: ACTIVE | Noted: 2017-11-06

## 2021-06-29 PROBLEM — R09.02 HYPOXIA: Status: ACTIVE | Noted: 2021-06-29

## 2021-06-29 PROBLEM — M32.9 SYSTEMIC LUPUS ERYTHEMATOSUS: Status: ACTIVE | Noted: 2021-06-29

## 2021-06-29 PROBLEM — K46.9 ABDOMINAL HERNIA: Status: ACTIVE | Noted: 2021-06-29

## 2021-06-29 PROBLEM — K21.9 ESOPHAGEAL REFLUX: Status: ACTIVE | Noted: 2021-06-29

## 2021-06-29 PROBLEM — J96.91 RESPIRATORY FAILURE WITH HYPOXIA: Status: ACTIVE | Noted: 2021-02-04

## 2021-06-29 PROBLEM — D64.9 ANEMIA: Status: ACTIVE | Noted: 2021-06-29

## 2021-06-29 PROBLEM — J84.9 INTERSTITIAL LUNG DISEASE: Status: ACTIVE | Noted: 2021-06-29

## 2021-06-29 PROBLEM — Z00.00 ANNUAL PHYSICAL EXAM: Status: ACTIVE | Noted: 2017-11-06

## 2021-06-29 PROBLEM — I50.9 CHF (CONGESTIVE HEART FAILURE): Status: ACTIVE | Noted: 2017-11-06

## 2021-06-29 PROBLEM — R00.0 TACHYCARDIA: Status: ACTIVE | Noted: 2021-06-29

## 2021-06-29 LAB
BH CV LOWER VASCULAR LEFT COMMON FEMORAL AUGMENT: NORMAL
BH CV LOWER VASCULAR LEFT COMMON FEMORAL COMPETENT: NORMAL
BH CV LOWER VASCULAR LEFT COMMON FEMORAL COMPRESS: NORMAL
BH CV LOWER VASCULAR LEFT COMMON FEMORAL PHASIC: NORMAL
BH CV LOWER VASCULAR LEFT COMMON FEMORAL SPONT: NORMAL
BH CV LOWER VASCULAR RIGHT COMMON FEMORAL AUGMENT: NORMAL
BH CV LOWER VASCULAR RIGHT COMMON FEMORAL COMPETENT: NORMAL
BH CV LOWER VASCULAR RIGHT COMMON FEMORAL COMPRESS: NORMAL
BH CV LOWER VASCULAR RIGHT COMMON FEMORAL PHASIC: NORMAL
BH CV LOWER VASCULAR RIGHT COMMON FEMORAL SPONT: NORMAL
BH CV LOWER VASCULAR RIGHT DISTAL FEMORAL COMPRESS: NORMAL
BH CV LOWER VASCULAR RIGHT GREATER SAPH AK COMPRESS: NORMAL
BH CV LOWER VASCULAR RIGHT MID FEMORAL AUGMENT: NORMAL
BH CV LOWER VASCULAR RIGHT MID FEMORAL COMPETENT: NORMAL
BH CV LOWER VASCULAR RIGHT MID FEMORAL COMPRESS: NORMAL
BH CV LOWER VASCULAR RIGHT MID FEMORAL PHASIC: NORMAL
BH CV LOWER VASCULAR RIGHT MID FEMORAL SPONT: NORMAL
BH CV LOWER VASCULAR RIGHT PERONEAL COMPRESS: NORMAL
BH CV LOWER VASCULAR RIGHT POPLITEAL AUGMENT: NORMAL
BH CV LOWER VASCULAR RIGHT POPLITEAL COMPETENT: NORMAL
BH CV LOWER VASCULAR RIGHT POPLITEAL COMPRESS: NORMAL
BH CV LOWER VASCULAR RIGHT POPLITEAL PHASIC: NORMAL
BH CV LOWER VASCULAR RIGHT POPLITEAL SPONT: NORMAL
BH CV LOWER VASCULAR RIGHT POSTERIOR TIBIAL COMPRESS: NORMAL
BH CV LOWER VASCULAR RIGHT PROXIMAL FEMORAL COMPRESS: NORMAL
BH CV LOWER VASCULAR RIGHT SAPHENOFEMORAL JUNCTION AUGMENT: NORMAL
BH CV LOWER VASCULAR RIGHT SAPHENOFEMORAL JUNCTION COMPETENT: NORMAL
BH CV LOWER VASCULAR RIGHT SAPHENOFEMORAL JUNCTION COMPRESS: NORMAL
BH CV LOWER VASCULAR RIGHT SAPHENOFEMORAL JUNCTION PHASIC: NORMAL
BH CV LOWER VASCULAR RIGHT SAPHENOFEMORAL JUNCTION SPONT: NORMAL
MAXIMAL PREDICTED HEART RATE: 162 BPM
STRESS TARGET HR: 138 BPM

## 2021-06-29 PROCEDURE — 99214 OFFICE O/P EST MOD 30 MIN: CPT | Performed by: STUDENT IN AN ORGANIZED HEALTH CARE EDUCATION/TRAINING PROGRAM

## 2021-06-29 PROCEDURE — 93971 EXTREMITY STUDY: CPT

## 2021-06-29 PROCEDURE — 93971 EXTREMITY STUDY: CPT | Performed by: SURGERY

## 2021-06-29 RX ORDER — CYANOCOBALAMIN 1000 UG/ML
1 INJECTION, SOLUTION INTRAMUSCULAR; SUBCUTANEOUS
COMMUNITY
Start: 2021-05-10 | End: 2022-12-30 | Stop reason: SDUPTHER

## 2021-06-29 RX ORDER — LEVOTHYROXINE SODIUM 137 UG/1
1 TABLET ORAL DAILY
COMMUNITY
Start: 2021-06-28 | End: 2022-11-09

## 2021-06-29 RX ORDER — AZITHROMYCIN 250 MG/1
TABLET, FILM COATED ORAL
Qty: 6 TABLET | Refills: 0 | Status: SHIPPED | OUTPATIENT
Start: 2021-06-29 | End: 2021-09-01

## 2021-06-29 RX ORDER — FLUTICASONE PROPIONATE 50 MCG
1 SPRAY, SUSPENSION (ML) NASAL DAILY
COMMUNITY
Start: 2021-05-03 | End: 2021-09-29

## 2021-06-29 RX ORDER — RIZATRIPTAN BENZOATE 10 MG/1
1 TABLET ORAL AS NEEDED
COMMUNITY
Start: 2021-04-22

## 2021-06-30 RX ORDER — HYDROXYCHLOROQUINE SULFATE 200 MG/1
200 TABLET, FILM COATED ORAL 2 TIMES DAILY
Qty: 180 TABLET | Refills: 3 | Status: SHIPPED | OUTPATIENT
Start: 2021-06-30 | End: 2022-06-25

## 2021-07-14 ENCOUNTER — OFFICE VISIT (OUTPATIENT)
Dept: INTERNAL MEDICINE | Facility: CLINIC | Age: 56
End: 2021-07-14

## 2021-07-14 ENCOUNTER — LAB (OUTPATIENT)
Dept: LAB | Facility: HOSPITAL | Age: 56
End: 2021-07-14

## 2021-07-14 VITALS
TEMPERATURE: 97.9 F | HEIGHT: 65 IN | HEART RATE: 86 BPM | SYSTOLIC BLOOD PRESSURE: 134 MMHG | BODY MASS INDEX: 33.82 KG/M2 | DIASTOLIC BLOOD PRESSURE: 84 MMHG | WEIGHT: 203 LBS | OXYGEN SATURATION: 94 %

## 2021-07-14 DIAGNOSIS — G25.81 RESTLESS LEG: ICD-10-CM

## 2021-07-14 DIAGNOSIS — M32.13 SYSTEMIC LUPUS ERYTHEMATOSUS WITH LUNG INVOLVEMENT, UNSPECIFIED SLE TYPE (HCC): ICD-10-CM

## 2021-07-14 DIAGNOSIS — R53.83 FATIGUE, UNSPECIFIED TYPE: Primary | ICD-10-CM

## 2021-07-14 PROCEDURE — 86235 NUCLEAR ANTIGEN ANTIBODY: CPT | Performed by: STUDENT IN AN ORGANIZED HEALTH CARE EDUCATION/TRAINING PROGRAM

## 2021-07-14 PROCEDURE — 36415 COLL VENOUS BLD VENIPUNCTURE: CPT | Performed by: STUDENT IN AN ORGANIZED HEALTH CARE EDUCATION/TRAINING PROGRAM

## 2021-07-14 PROCEDURE — 99213 OFFICE O/P EST LOW 20 MIN: CPT | Performed by: STUDENT IN AN ORGANIZED HEALTH CARE EDUCATION/TRAINING PROGRAM

## 2021-07-14 PROCEDURE — 86140 C-REACTIVE PROTEIN: CPT | Performed by: STUDENT IN AN ORGANIZED HEALTH CARE EDUCATION/TRAINING PROGRAM

## 2021-07-14 PROCEDURE — 85652 RBC SED RATE AUTOMATED: CPT | Performed by: STUDENT IN AN ORGANIZED HEALTH CARE EDUCATION/TRAINING PROGRAM

## 2021-07-14 RX ORDER — HYDROCODONE BITARTRATE AND ACETAMINOPHEN 7.5; 325 MG/1; MG/1
1 TABLET ORAL EVERY 6 HOURS PRN
Qty: 60 TABLET | Refills: 0 | Status: SHIPPED | OUTPATIENT
Start: 2021-07-14 | End: 2021-08-13

## 2021-07-14 RX ORDER — GABAPENTIN 300 MG/1
300 CAPSULE ORAL 3 TIMES DAILY
Qty: 90 CAPSULE | Refills: 0 | Status: SHIPPED | OUTPATIENT
Start: 2021-07-14 | End: 2021-09-15 | Stop reason: SDUPTHER

## 2021-07-14 RX ORDER — HYDROXYZINE HYDROCHLORIDE 25 MG/1
25 TABLET, FILM COATED ORAL 2 TIMES DAILY PRN
Qty: 180 TABLET | Refills: 2 | Status: SHIPPED | OUTPATIENT
Start: 2021-07-14 | End: 2021-09-15 | Stop reason: SDUPTHER

## 2021-07-14 RX ORDER — POLYETHYLENE GLYCOL 3350 17 G/17G
17 POWDER, FOR SOLUTION ORAL DAILY
Qty: 90 PACKET | Refills: 2 | Status: SHIPPED | OUTPATIENT
Start: 2021-07-14 | End: 2021-10-12

## 2021-07-14 NOTE — PATIENT INSTRUCTIONS
[facility] [email or web address] allows you to send messages to your doctor, view your test results, renew your prescriptions, schedule appointments, and more. To sign up, go to [employer] and click on the Sign Up Now link in the New User? box. Enter your [email or web address] Activation Code exactly as it appears below along with the last four digits of your Social Security Number and your Date of Birth () to complete the sign-up process. If you do not sign up before the expiration date, you must request a new code.    [email or web address] Activation Code: [email or web address]  Expires: 2021  8:20 AM    If you have questions, you can email [email or web address] or call [phone] to talk to our [email or web address] staff. Remember, [email or web address] is NOT to be used for urgent needs. For medical emergencies, dial 911.

## 2021-07-14 NOTE — PROGRESS NOTES
Chief Complaint  Follow-up, Fatigue (pt states she is very sluggish), Cough (still having cough and wheezing ), Wheezing, and Edema (still having swelling in her feet and legs )    Subjective     {Problem List  Visit Diagnosis   Encounters  Notes  Medications  Labs  Result Review Imaging  Media :23}   Aida Mcclure presents to [facility]  History of Present Illness    Fatigue:   She is working every day as of the past 2 weeks. And typically gets home at 9:30pm. Time into work is anywhwere from 9am-2pm.   Getting into bed at 0400am and gets up anywhere b/w 8am and 11 am depending on her work schedule.   She has been working with [employer] since October of 2020.   States she was fatigue prior to working, but getting worse since she started working.     Lupus:   States her nail are splitting again, states this occurred last during a lupus flare.   She is scheduled to see Dr. Hearn and Dr. Gonzales at  08/16.      Restless leg:   Coming off Requip as this is not helping, down to 1/2 pill per day.       Past Medical History:   Diagnosis Date   • Anemia    • Annual physical exam 11/06/2017    WILL RESCHEDULE MAMMOGRAM    • Breast lump    • CHF (congestive heart failure) (CMS/[facility]) 11/06/2017    FOLLOWS WITH CARDS. CURRENTLY DOING WELL ON LASIX, LISINOPRIL, ALDACTONE, AND PROPANOLOL   • Degeneration of lumbar intervertebral disc 06/12/2012   • Essential hypertension 11/06/2017    WELL CONTROLLED ON CURRENT REGIMEN    • GERD (gastroesophageal reflux disease)    • Hamstring injury 08/17/2015    BILATERAL HAMSTRING INJURY/PAIN    • Head injury    • Hernia cerebri (CMS/[facility])    • Hyperthyroidism    • Hyperthyroidism 11/06/2017    CURRENTLY ON METHIMAZOLE, PT REPORT POSSIBLY DUE TO GRAVE'S DISEASE. WILL NEED TO OBTAIN AND REVIEW MEDICAL RECORDS FROM [facility]. WILL REFER TO ENDO    • Hypoxia    • Insomnia 11/06/2017    DISCUSSED SLEEP HYGIENE. PT TO TRY  AVOIDING BLUE LIGHT AT NIGHT. PT ALSO TO SET ROUTINE PRIOR TO BEDTIME. WILL AVOID MEDS AT THIS TIME    • Interstitial lung disease (CMS/HCC)    • Lumbosacral disc herniation 2012    LEFT L5-S1 SMALL NATURE. SHE HAS FAILED ALL CONSERVATIVE MEASURES AND DESIRES SURGERY    • Lupus (CMS/HCC)     SEES DR. ANDERSON   • Migraines    • Pulmonary embolism (CMS/[facility])    • Respiratory failure with hypoxia (CMS/[facility]) 2021   • Rheumatoid arthritis (CMS/[facility]) 2017    CURRENTLY CONTROLLED, BUT WITH RESIDUAL DEFORMITY. WILL FOLLOW WITH DR. ANDERSON    • Tachycardia    • Total knee replacement status, right 2015       Allergies:   Allergies   Allergen Reactions   • Aspirin    • Salicylic Acid    • Salsalate Hives          Past Surgical History:   Procedure Laterality Date   • ANKLE SURGERY Bilateral    • BREAST BIOPSY     • CATARACT EXTRACTION WITH INTRAOCULAR LENS IMPLANT     •  SECTION     • COLONOSCOPY     • EYE LENS IMPLANT SECONDARY      YES   • FASCIOTOMY      LEFT ANTERIOR ARM    • HAND SURGERY Left    • KNEE SURGERY     • OTHER SURGICAL HISTORY      LYMPH NODE EXCISION   • PORTACATH PLACEMENT      POWER PORT PLACEMENT           Social History     Socioeconomic History   • Marital status:      Spouse name: Not on file   • Number of children: Not on file   • Years of education: Not on file   • Highest education level: Not on file   Tobacco Use   • Smoking status: Never Smoker   • Smokeless tobacco: Never Used   Vaping Use   • Vaping Use: Never used   Substance and Sexual Activity   • Alcohol use: No   • Drug use: Defer   • Sexual activity: Defer         Family History   Problem Relation Age of Onset   • Stroke Father    • Lung cancer Other    • Arthritis Other    • Cancer Other    • Parkinsonism Other    • Diabetes type II Other           Health Maintenance Due   Topic Date Due   • COLORECTAL CANCER SCREENING  Never done   • COVID-19 Vaccine (1) Never done   • TDAP/TD VACCINES (1 - Tdap)  Never done   • ZOSTER VACCINE (1 of 2) Never done   • ANNUAL WELLNESS VISIT  Never done   • PAP SMEAR  Never done            Current Outpatient Medications:   •  ascorbic acid (VITAMIN C) 250 MG tablet, Take 1 tablet by mouth Daily., Disp: , Rfl:   •  azaTHIOprine (IMURAN) 50 MG tablet, TK 1 T PO BID, Disp: , Rfl: 5  •  Calcium Carb-Cholecalciferol (Caltrate 600+D3 Soft) 600-800 MG-UNIT chewable tablet, Chew 1 tablet Daily., Disp: , Rfl:   •  cyanocobalamin 1000 MCG/ML injection, Inject 1 mL into the appropriate muscle as directed by prescriber Every 30 (Thirty) Days., Disp: , Rfl:   •  ergocalciferol (ERGOCALCIFEROL) 1.25 MG (24193 UT) capsule, Take 1 capsule by mouth Every 7 (Seven) Days., Disp: , Rfl:   •  fluticasone (FLONASE) 50 MCG/ACT nasal spray, 1 spray into the nostril(s) as directed by provider Daily., Disp: , Rfl:   •  furosemide (LASIX) 40 MG tablet, Take 40 mg by mouth 2 (Two) Times a Day., Disp: , Rfl:   •  hydroCHLOROthiazide (HYDRODIURIL) 25 MG tablet, Take 1 tablet by mouth Daily., Disp: 5 tablet, Rfl: 0  •  hydroxychloroquine (PLAQUENIL) 200 MG tablet, Take 1 tablet by mouth 2 (Two) Times a Day for 360 days. Indications: Systemic Lupus Erythematosus, Disp: 180 tablet, Rfl: 3  •  hydrOXYzine (ATARAX) 25 MG tablet, Take 1 tablet by mouth 2 (Two) Times a Day As Needed for Anxiety for up to 90 days., Disp: 180 tablet, Rfl: 2  •  levothyroxine (SYNTHROID, LEVOTHROID) 137 MCG tablet, Take 1 tablet by mouth Daily., Disp: , Rfl:   •  loratadine (CLARITIN) 10 MG tablet, Take 10 mg by mouth Daily., Disp: , Rfl:   •  magnesium oxide (MAG-OX) 400 MG tablet, TK 1 T PO D, Disp: , Rfl: 1  •  omeprazole (priLOSEC) 40 MG capsule, TK ONE C PO QD, Disp: , Rfl: 3  •  ondansetron (ZOFRAN) 4 MG tablet, Take 4 mg by mouth Every 8 (Eight) Hours As Needed for Nausea or Vomiting., Disp: , Rfl:   •  PROAIR  (90 Base) MCG/ACT inhaler, INHALE 2 PUFFS PO Q 6 H, Disp: , Rfl: 4  •  promethazine (PHENERGAN) 25 MG tablet,  "Take 25 mg by mouth Every 6 (Six) Hours As Needed for Nausea or Vomiting., Disp: , Rfl:   •  propranolol (INDERAL) 40 MG tablet, TK SS T PO TID, Disp: , Rfl: 1  •  rizatriptan (MAXALT) 10 MG tablet, Take 1 tablet by mouth As Needed., Disp: , Rfl:   •  rOPINIRole (REQUIP) 1 MG tablet, TAKE 1 TABLET BY MOUTH 1-3 HOURS EVERY NIGHT AT BEDTIME FOR WEEK 1, THEN TAKE 1 1/2 FOR WEEK 2, THEN TAKE 2 TABLETS THEREAFTER, Disp: 60 tablet, Rfl: 2  •  spironolactone (ALDACTONE) 50 MG tablet, TK 1 T PO QD, Disp: , Rfl: 0  •  azithromycin (Zithromax) 250 MG tablet, Take 2 tablets on day 1, and 1 tablet thereafter for 5 days today., Disp: 6 tablet, Rfl: 0  •  gabapentin (NEURONTIN) 300 MG capsule, Take 1 capsule by mouth 3 (Three) Times a Day for 30 days., Disp: 90 capsule, Rfl: 0  •  HYDROcodone-acetaminophen (NORCO) 7.5-325 MG per tablet, Take 1 tablet by mouth Every 6 (Six) Hours As Needed for Moderate Pain  for up to 30 days., Disp: 60 tablet, Rfl: 0  •  polyethylene glycol (MIRALAX) 17 g packet, Take 17 g by mouth Daily for 90 days., Disp: 90 packet, Rfl: 2      Immunization History   Administered Date(s) Administered   • Flu Vaccine Intradermal Quad 18-64YR 01/02/2008, 11/10/2008, 11/05/2009   • Flu Vaccine Quad PF >36MO 10/25/2017   • Hepatitis A 04/30/2018   • Influenza Quad Vaccine (Inpatient) 08/27/2012, 10/15/2013   • Influenza, Unspecified 09/17/2020   • Pneumococcal Conjugate 13-Valent (PCV13) 06/14/2018   • Pneumococcal Polysaccharide (PPSV23) 08/26/2019         Review of Systems   Per HPI     Objective       Vitals:    07/14/21 0830   BP: 134/84   Pulse: 86   Temp: 97.9 °F (36.6 °C)   SpO2: 94%   Weight: 92.1 kg (203 lb)   Height: 165.1 cm (65\")     Body mass index is 33.78 kg/m².      Physical Exam  Vitals reviewed.   Constitutional:       Appearance: Normal appearance.   HENT:      Head: Normocephalic and atraumatic.      Nose: Nose normal.   Eyes:      Extraocular Movements: Extraocular movements intact.      " Conjunctiva/sclera: Conjunctivae normal.   Cardiovascular:      Rate and Rhythm: Normal rate and regular rhythm.      Pulses: Normal pulses.      Heart sounds: Normal heart sounds.   Pulmonary:      Effort: Pulmonary effort is normal. No respiratory distress.      Comments: Coarse breath sounds over bilateral bases which is chronic and unchanged from prior exam    Nasal canula in place   Musculoskeletal:         General: Normal range of motion.      Comments: Bilateral LE swelling: left LE is chronically larger than right. Right LE swelling is better in appearance today compared to last visit.    Skin:     General: Skin is warm and dry.   Neurological:      General: No focal deficit present.      Mental Status: She is alert and oriented to person, place, and time.      Cranial Nerves: No cranial nerve deficit.   Psychiatric:         Mood and Affect: Mood normal.         Behavior: Behavior normal.         Thought Content: Thought content normal.             Result Review :                         Assessment and Plan      Diagnoses and all orders for this visit:    1. Fatigue, unspecified type (Primary)  Assessment & Plan:  Symptoms are most likely secondary to hypothyroidism with TSH of 20 and free T4 of 0.92 in mid-June. This was noted on chart review as patient did not think to mention this to provider. She saw her endocrinologist in mid mid June for the same and her synthroid was increased from 124mcg to 137mcg which she is ensures provider that she is taking. She is following with her endocrinologist on 7/28.     Orders:  -     Sedimentation Rate  -     C-reactive protein  -     Cancel: Anti-Smith Antibody; Future  -     Anti-Smith Antibody    2. Systemic lupus erythematosus with lung involvement, unspecified SLE type (CMS/[facility])  Assessment & Plan:  Obtaining labs given her concern for lupus flare, however suspect symptoms are secondary to hypothyroidism per above.     Orders:  -     Sedimentation Rate  -      C-reactive protein  -     Cancel: Anti-Smith Antibody; Future  -     Anti-Smith Antibody    3. Restless leg  Comments:  chronic. discontinuing requip.     Other orders  -     hydrOXYzine (ATARAX) 25 MG tablet; Take 1 tablet by mouth 2 (Two) Times a Day As Needed for Anxiety for up to 90 days.  Dispense: 180 tablet; Refill: 2          Follow Up     Return in about 6 weeks (around 8/25/2021) for Recheck.    Patient was given instructions and counseling regarding her condition or for health maintenance advice. Please see specific information pulled into the AVS if appropriate.     Merline Enamorado MD   Internal Medicine-Pediatrics

## 2021-07-15 VITALS
DIASTOLIC BLOOD PRESSURE: 84 MMHG | HEIGHT: 64 IN | WEIGHT: 196 LBS | OXYGEN SATURATION: 93 % | TEMPERATURE: 98.1 F | HEART RATE: 102 BPM | BODY MASS INDEX: 33.46 KG/M2 | SYSTOLIC BLOOD PRESSURE: 128 MMHG

## 2021-07-15 PROBLEM — R53.83 FATIGUE: Status: ACTIVE | Noted: 2021-07-15

## 2021-07-15 LAB
CRP SERPL-MCNC: <0.3 MG/DL (ref 0–0.5)
ENA SM AB SER-ACNC: <0.2 AI (ref 0–0.9)
ERYTHROCYTE [SEDIMENTATION RATE] IN BLOOD: 4 MM/HR (ref 0–30)

## 2021-07-15 NOTE — ASSESSMENT & PLAN NOTE
Symptoms are most likely secondary to hypothyroidism with TSH of 20 and free T4 of 0.92 in mid-June. This was noted on chart review as patient did not think to mention this to provider. She saw her endocrinologist in mid mid June for the same and her synthroid was increased from 124mcg to 137mcg which she is ensures provider that she is taking. She is following with her endocrinologist on 7/28.

## 2021-07-15 NOTE — ASSESSMENT & PLAN NOTE
Obtaining labs given her concern for lupus flare, however suspect symptoms are secondary to hypothyroidism per above.

## 2021-07-19 ENCOUNTER — TELEPHONE (OUTPATIENT)
Dept: INTERNAL MEDICINE | Facility: CLINIC | Age: 56
End: 2021-07-19

## 2021-07-19 NOTE — TELEPHONE ENCOUNTER
Caller: Aida Mcclure April    Relationship: Self    Best call back number: [phone]    What form or medical record are you requesting: PCP NOTE    Who is requesting this form or medical record from you: EMPLOYER    How would you like to receive the form or medical records (pick-up, mail, fax): PICK-UP  If pick-up, provide patient with address and location details    Timeframe paperwork needed:ASAP  Additional notes: THE PATIENT STATED SHE NEEDS A STATEMENT FROM PCP AFTER INCREASING DOSAGE OF WATER PILL THAT SHE WILL BE GOING TO THE RESTROOM MORE FREQUENTLY. THE PATIENT WOULD LIKE A CALL BACK WHEN THIS IS READY TO PICKUP.

## 2021-08-02 ENCOUNTER — LAB (OUTPATIENT)
Dept: LAB | Facility: HOSPITAL | Age: 56
End: 2021-08-02

## 2021-08-02 ENCOUNTER — TRANSCRIBE ORDERS (OUTPATIENT)
Dept: ADMINISTRATIVE | Facility: HOSPITAL | Age: 56
End: 2021-08-02

## 2021-08-02 DIAGNOSIS — E03.9 HYPOTHYROIDISM, UNSPECIFIED TYPE: ICD-10-CM

## 2021-08-02 DIAGNOSIS — E03.9 HYPOTHYROIDISM, UNSPECIFIED TYPE: Primary | ICD-10-CM

## 2021-08-02 LAB
T4 FREE SERPL-MCNC: 1.58 NG/DL (ref 0.93–1.7)
TSH SERPL DL<=0.05 MIU/L-ACNC: 2.89 UIU/ML (ref 0.27–4.2)

## 2021-08-02 PROCEDURE — 84443 ASSAY THYROID STIM HORMONE: CPT

## 2021-08-02 PROCEDURE — 84439 ASSAY OF FREE THYROXINE: CPT

## 2021-08-02 PROCEDURE — 36415 COLL VENOUS BLD VENIPUNCTURE: CPT

## 2021-08-09 ENCOUNTER — TRANSCRIBE ORDERS (OUTPATIENT)
Dept: SLEEP MEDICINE | Facility: HOSPITAL | Age: 56
End: 2021-08-09

## 2021-08-09 DIAGNOSIS — G47.33 OBSTRUCTIVE SLEEP APNEA: Primary | ICD-10-CM

## 2021-08-09 DIAGNOSIS — G47.10 HYPERSOMNIA: ICD-10-CM

## 2021-08-09 DIAGNOSIS — R06.83 SNORING: ICD-10-CM

## 2021-08-19 ENCOUNTER — TRANSCRIBE ORDERS (OUTPATIENT)
Dept: ADMINISTRATIVE | Facility: HOSPITAL | Age: 56
End: 2021-08-19

## 2021-08-19 ENCOUNTER — LAB (OUTPATIENT)
Dept: LAB | Facility: HOSPITAL | Age: 56
End: 2021-08-19

## 2021-08-19 ENCOUNTER — TELEPHONE (OUTPATIENT)
Dept: INTERNAL MEDICINE | Facility: CLINIC | Age: 56
End: 2021-08-19

## 2021-08-19 DIAGNOSIS — D64.9 ANEMIA, UNSPECIFIED TYPE: ICD-10-CM

## 2021-08-19 DIAGNOSIS — Z79.899 ENCOUNTER FOR LONG-TERM (CURRENT) USE OF OTHER MEDICATIONS: ICD-10-CM

## 2021-08-19 DIAGNOSIS — N28.9 ACUTE RENAL INSUFFICIENCY: ICD-10-CM

## 2021-08-19 DIAGNOSIS — N28.9 ACUTE RENAL INSUFFICIENCY: Primary | ICD-10-CM

## 2021-08-19 DIAGNOSIS — D64.9 ANEMIA, UNSPECIFIED TYPE: Primary | ICD-10-CM

## 2021-08-19 LAB
ALBUMIN SERPL-MCNC: 3.9 G/DL (ref 3.5–5.2)
ALBUMIN/GLOB SERPL: 1.2 G/DL
ALP SERPL-CCNC: 76 U/L (ref 39–117)
ALT SERPL W P-5'-P-CCNC: 7 U/L (ref 1–33)
ANION GAP SERPL CALCULATED.3IONS-SCNC: 7.7 MMOL/L (ref 5–15)
AST SERPL-CCNC: 17 U/L (ref 1–32)
BACTERIA UR QL AUTO: ABNORMAL /HPF
BASOPHILS # BLD AUTO: 0.07 10*3/MM3 (ref 0–0.2)
BASOPHILS NFR BLD AUTO: 1.4 % (ref 0–1.5)
BILIRUB SERPL-MCNC: 0.5 MG/DL (ref 0–1.2)
BILIRUB UR QL STRIP: NEGATIVE
BUN SERPL-MCNC: 13 MG/DL (ref 6–20)
BUN/CREAT SERPL: 11.7 (ref 7–25)
CALCIUM SPEC-SCNC: 8.1 MG/DL (ref 8.6–10.5)
CHLORIDE SERPL-SCNC: 106 MMOL/L (ref 98–107)
CLARITY UR: CLEAR
CO2 SERPL-SCNC: 26.3 MMOL/L (ref 22–29)
COLOR UR: YELLOW
CREAT SERPL-MCNC: 1.11 MG/DL (ref 0.57–1)
DEPRECATED RDW RBC AUTO: 45.7 FL (ref 37–54)
EOSINOPHIL # BLD AUTO: 0.18 10*3/MM3 (ref 0–0.4)
EOSINOPHIL NFR BLD AUTO: 3.7 % (ref 0.3–6.2)
ERYTHROCYTE [DISTWIDTH] IN BLOOD BY AUTOMATED COUNT: 13.3 % (ref 12.3–15.4)
ERYTHROCYTE [SEDIMENTATION RATE] IN BLOOD: 5 MM/HR (ref 0–30)
GFR SERPL CREATININE-BSD FRML MDRD: 61 ML/MIN/1.73
GLOBULIN UR ELPH-MCNC: 3.2 GM/DL
GLUCOSE SERPL-MCNC: 87 MG/DL (ref 65–99)
GLUCOSE UR STRIP-MCNC: NEGATIVE MG/DL
HCT VFR BLD AUTO: 39.4 % (ref 34–46.6)
HGB BLD-MCNC: 12.2 G/DL (ref 12–15.9)
HGB UR QL STRIP.AUTO: NEGATIVE
HYALINE CASTS UR QL AUTO: ABNORMAL /LPF
IMM GRANULOCYTES # BLD AUTO: 0.01 10*3/MM3 (ref 0–0.05)
IMM GRANULOCYTES NFR BLD AUTO: 0.2 % (ref 0–0.5)
KETONES UR QL STRIP: NEGATIVE
LEUKOCYTE ESTERASE UR QL STRIP.AUTO: ABNORMAL
LYMPHOCYTES # BLD AUTO: 1.79 10*3/MM3 (ref 0.7–3.1)
LYMPHOCYTES NFR BLD AUTO: 36.5 % (ref 19.6–45.3)
MCH RBC QN AUTO: 28.9 PG (ref 26.6–33)
MCHC RBC AUTO-ENTMCNC: 31 G/DL (ref 31.5–35.7)
MCV RBC AUTO: 93.4 FL (ref 79–97)
MONOCYTES # BLD AUTO: 0.52 10*3/MM3 (ref 0.1–0.9)
MONOCYTES NFR BLD AUTO: 10.6 % (ref 5–12)
NEUTROPHILS NFR BLD AUTO: 2.33 10*3/MM3 (ref 1.7–7)
NEUTROPHILS NFR BLD AUTO: 47.6 % (ref 42.7–76)
NITRITE UR QL STRIP: NEGATIVE
NRBC BLD AUTO-RTO: 0 /100 WBC (ref 0–0.2)
PH UR STRIP.AUTO: 5.5 [PH] (ref 5–8)
PLATELET # BLD AUTO: 153 10*3/MM3 (ref 140–450)
PMV BLD AUTO: 12.5 FL (ref 6–12)
POTASSIUM SERPL-SCNC: 4 MMOL/L (ref 3.5–5.2)
PROT SERPL-MCNC: 7.1 G/DL (ref 6–8.5)
PROT UR QL STRIP: ABNORMAL
RBC # BLD AUTO: 4.22 10*6/MM3 (ref 3.77–5.28)
RBC # UR: ABNORMAL /HPF
REF LAB TEST METHOD: ABNORMAL
SODIUM SERPL-SCNC: 140 MMOL/L (ref 136–145)
SP GR UR STRIP: 1.02 (ref 1–1.03)
SQUAMOUS #/AREA URNS HPF: ABNORMAL /HPF
UROBILINOGEN UR QL STRIP: ABNORMAL
WBC # BLD AUTO: 4.9 10*3/MM3 (ref 3.4–10.8)
WBC UR QL AUTO: ABNORMAL /HPF

## 2021-08-19 PROCEDURE — 85025 COMPLETE CBC W/AUTO DIFF WBC: CPT

## 2021-08-19 PROCEDURE — 80053 COMPREHEN METABOLIC PANEL: CPT

## 2021-08-19 PROCEDURE — 82306 VITAMIN D 25 HYDROXY: CPT

## 2021-08-19 PROCEDURE — 81001 URINALYSIS AUTO W/SCOPE: CPT

## 2021-08-19 PROCEDURE — 85652 RBC SED RATE AUTOMATED: CPT

## 2021-08-19 NOTE — TELEPHONE ENCOUNTER
Caller: Aida Mcclure April    Relationship to patient: Self    Best call back number: [phone]    Chief complaint: FOLLOW-UP    Type of visit: OFFICE    Requested date: 08/19/2021     If rescheduling, when is the original appointment: 09/15/2021     Additional notes:THE PATIENT WOULD LIKE AN APPOINTMENT ASAP AND A CALL BACK TO TRY AND RESCHEDULE.

## 2021-08-20 LAB — 25(OH)D3 SERPL-MCNC: 30 NG/ML

## 2021-08-25 ENCOUNTER — HOSPITAL ENCOUNTER (OUTPATIENT)
Dept: SLEEP MEDICINE | Facility: HOSPITAL | Age: 56
Discharge: HOME | End: 2021-08-25
Admitting: PHYSICIAN ASSISTANT

## 2021-08-25 DIAGNOSIS — G47.10 HYPERSOMNIA: ICD-10-CM

## 2021-08-25 DIAGNOSIS — R06.83 SNORING: ICD-10-CM

## 2021-08-25 DIAGNOSIS — G47.33 OBSTRUCTIVE SLEEP APNEA: ICD-10-CM

## 2021-08-25 PROCEDURE — 95810 POLYSOM 6/> YRS 4/> PARAM: CPT

## 2021-08-25 PROCEDURE — 95810 POLYSOM 6/> YRS 4/> PARAM: CPT | Performed by: INTERNAL MEDICINE

## 2021-08-30 ENCOUNTER — TELEPHONE (OUTPATIENT)
Dept: INTERNAL MEDICINE | Facility: CLINIC | Age: 56
End: 2021-08-30

## 2021-09-01 ENCOUNTER — OFFICE VISIT (OUTPATIENT)
Dept: PULMONOLOGY | Facility: CLINIC | Age: 56
End: 2021-09-01

## 2021-09-01 VITALS
OXYGEN SATURATION: 99 % | TEMPERATURE: 98.4 F | WEIGHT: 201 LBS | RESPIRATION RATE: 17 BRPM | HEART RATE: 87 BPM | HEIGHT: 64 IN | DIASTOLIC BLOOD PRESSURE: 69 MMHG | SYSTOLIC BLOOD PRESSURE: 109 MMHG | BODY MASS INDEX: 34.31 KG/M2

## 2021-09-01 DIAGNOSIS — G47.33 OSA (OBSTRUCTIVE SLEEP APNEA): ICD-10-CM

## 2021-09-01 DIAGNOSIS — M35.1 MIXED CONNECTIVE TISSUE DISEASE (HCC): ICD-10-CM

## 2021-09-01 DIAGNOSIS — Z86.711 PERSONAL HISTORY OF PE (PULMONARY EMBOLISM): ICD-10-CM

## 2021-09-01 DIAGNOSIS — I50.32 CHRONIC DIASTOLIC CHF (CONGESTIVE HEART FAILURE) (HCC): ICD-10-CM

## 2021-09-01 DIAGNOSIS — Z51.81 THERAPEUTIC DRUG MONITORING: ICD-10-CM

## 2021-09-01 DIAGNOSIS — R06.09 CHRONIC DYSPNEA: ICD-10-CM

## 2021-09-01 DIAGNOSIS — K21.9 GASTROESOPHAGEAL REFLUX DISEASE, UNSPECIFIED WHETHER ESOPHAGITIS PRESENT: ICD-10-CM

## 2021-09-01 DIAGNOSIS — J84.9 ILD (INTERSTITIAL LUNG DISEASE) (HCC): Primary | ICD-10-CM

## 2021-09-01 DIAGNOSIS — R60.0 LEG EDEMA: ICD-10-CM

## 2021-09-01 PROCEDURE — 99214 OFFICE O/P EST MOD 30 MIN: CPT | Performed by: NURSE PRACTITIONER

## 2021-09-01 RX ORDER — AZATHIOPRINE 50 MG/1
25 TABLET ORAL DAILY
Qty: 15 TABLET | Refills: 5 | Status: SHIPPED | OUTPATIENT
Start: 2021-09-01 | End: 2021-10-01

## 2021-09-08 NOTE — TELEPHONE ENCOUNTER
Dr. Rick patient requesting O2, placed form in Carmelina's folder to sign since patient is seeing Carmelina on 9/15

## 2021-09-15 ENCOUNTER — OFFICE VISIT (OUTPATIENT)
Dept: INTERNAL MEDICINE | Facility: CLINIC | Age: 56
End: 2021-09-15

## 2021-09-15 VITALS
SYSTOLIC BLOOD PRESSURE: 126 MMHG | HEART RATE: 85 BPM | BODY MASS INDEX: 35.41 KG/M2 | OXYGEN SATURATION: 95 % | TEMPERATURE: 97.4 F | WEIGHT: 207.38 LBS | HEIGHT: 64 IN | DIASTOLIC BLOOD PRESSURE: 86 MMHG

## 2021-09-15 DIAGNOSIS — I50.9 CONGESTIVE HEART FAILURE, UNSPECIFIED HF CHRONICITY, UNSPECIFIED HEART FAILURE TYPE (HCC): ICD-10-CM

## 2021-09-15 DIAGNOSIS — J84.9 ILD (INTERSTITIAL LUNG DISEASE) (HCC): ICD-10-CM

## 2021-09-15 DIAGNOSIS — G89.29 OTHER CHRONIC PAIN: Primary | ICD-10-CM

## 2021-09-15 PROCEDURE — 99213 OFFICE O/P EST LOW 20 MIN: CPT | Performed by: INTERNAL MEDICINE

## 2021-09-15 RX ORDER — HYDROXYZINE HYDROCHLORIDE 25 MG/1
25 TABLET, FILM COATED ORAL 2 TIMES DAILY PRN
Qty: 180 TABLET | Refills: 1 | Status: SHIPPED | OUTPATIENT
Start: 2021-09-15 | End: 2021-12-15 | Stop reason: SDUPTHER

## 2021-09-15 RX ORDER — HYDROCODONE BITARTRATE AND ACETAMINOPHEN 7.5; 325 MG/1; MG/1
1 TABLET ORAL EVERY 8 HOURS PRN
COMMUNITY
End: 2021-09-15 | Stop reason: SDUPTHER

## 2021-09-15 RX ORDER — HYDROCODONE BITARTRATE AND ACETAMINOPHEN 7.5; 325 MG/1; MG/1
1 TABLET ORAL EVERY 8 HOURS PRN
Qty: 30 TABLET | Refills: 0 | Status: SHIPPED | OUTPATIENT
Start: 2021-09-15 | End: 2021-11-12 | Stop reason: SDUPTHER

## 2021-09-15 RX ORDER — GABAPENTIN 300 MG/1
300 CAPSULE ORAL 3 TIMES DAILY
Qty: 90 CAPSULE | Refills: 0 | Status: SHIPPED | OUTPATIENT
Start: 2021-09-15 | End: 2021-12-15 | Stop reason: SDUPTHER

## 2021-09-15 RX ORDER — LORATADINE 10 MG/1
10 TABLET ORAL DAILY
Qty: 90 TABLET | Refills: 0 | OUTPATIENT
Start: 2021-09-15 | End: 2022-05-25

## 2021-09-15 NOTE — PROGRESS NOTES
"Chief Complaint  Follow-up, Back Pain (\"[facility] to [facility] [place], and insurance will not cover I have been looking for someone that is covered\"), and Med Refill (\"I need my hydrocodone, gabapentin, loratidine and hydroxyzine\" )    Subjective          Aida Mcclure presents to [facility]  Having continued back pain. Has not been able to start PT as she cannot find a clinic that will take her insurance.     Needs refills of her pain medications.     Wants to try to get a portable O2 concentrator. States that carrying her O2 tank is worsening her back pain.       Objective   Vital Signs:   /86   Pulse 85   Temp 97.4 °F (36.3 °C) (Temporal)   Ht 162.6 cm (64\")   Wt 94.1 kg (207 lb 6 oz)   SpO2 95%   PF (!) 2 L/min   BMI 35.60 kg/m²     Physical Exam  Vitals reviewed.   Constitutional:       Appearance: Normal appearance. She is well-developed.   HENT:      Head: Normocephalic and atraumatic.      Mouth/Throat:      Pharynx: No oropharyngeal exudate.   Eyes:      Conjunctiva/sclera: Conjunctivae normal.      Pupils: Pupils are equal, round, and reactive to light.   Neck:      Thyroid: No thyroid mass, thyromegaly or thyroid tenderness.   Cardiovascular:      Rate and Rhythm: Normal rate and regular rhythm.      Heart sounds: No murmur heard.   No friction rub. No gallop.    Pulmonary:      Effort: Pulmonary effort is normal. No respiratory distress.      Breath sounds: Wheezing and rhonchi present.   Abdominal:      General: There is no distension.      Tenderness: There is no abdominal tenderness.   Lymphadenopathy:      Cervical: No cervical adenopathy.   Skin:     General: Skin is warm and dry.   Neurological:      Mental Status: She is alert and oriented to person, place, and time.   Psychiatric:         Mood and Affect: Affect normal.        Result Review :          Procedures      Assessment and Plan    Diagnoses and all orders for this visit:    1. Other " chronic pain (Primary)  -     gabapentin (NEURONTIN) 300 MG capsule; Take 1 capsule by mouth 3 (Three) Times a Day for 30 days.  Dispense: 90 capsule; Refill: 0  -     HYDROcodone-acetaminophen (NORCO) 7.5-325 MG per tablet; Take 1 tablet by mouth Every 8 (Eight) Hours As Needed for Moderate Pain .  Dispense: 30 tablet; Refill: 0    2. ILD (interstitial lung disease) (CMS/[facility])  Assessment & Plan:  Form for portable O2 concentrator completed.       3. Congestive heart failure, unspecified HF chronicity, unspecified heart failure type (CMS/[facility])    Other orders  -     hydrOXYzine (ATARAX) 25 MG tablet; Take 1 tablet by mouth 2 (Two) Times a Day As Needed for Anxiety for up to 90 days.  Dispense: 180 tablet; Refill: 1  -     loratadine (CLARITIN) 10 MG tablet; Take 1 tablet by mouth Daily.  Dispense: 90 tablet; Refill: 0            Follow Up   Return in about 3 months (around 12/15/2021) for Next scheduled follow up, with Dr. Rick.  Patient was given instructions and counseling regarding her condition or for health maintenance advice. Please see specific information pulled into the AVS if appropriate.

## 2021-09-29 RX ORDER — ROPINIROLE 1 MG/1
TABLET, FILM COATED ORAL
Qty: 60 TABLET | Refills: 2 | Status: SHIPPED | OUTPATIENT
Start: 2021-09-29 | End: 2021-12-28

## 2021-09-29 RX ORDER — FLUTICASONE PROPIONATE 50 MCG
SPRAY, SUSPENSION (ML) NASAL
Qty: 16 G | Refills: 1 | Status: SHIPPED | OUTPATIENT
Start: 2021-09-29 | End: 2021-12-28

## 2021-09-29 RX ORDER — ROPINIROLE 1 MG/1
TABLET, FILM COATED ORAL
Qty: 60 TABLET | Refills: 2 | Status: CANCELLED | OUTPATIENT
Start: 2021-09-29

## 2021-10-25 ENCOUNTER — PROCEDURE VISIT (OUTPATIENT)
Dept: CARDIAC REHAB | Facility: HOSPITAL | Age: 56
End: 2021-10-25

## 2021-10-25 ENCOUNTER — HOSPITAL ENCOUNTER (OUTPATIENT)
Dept: CARDIOLOGY | Facility: HOSPITAL | Age: 56
Discharge: HOME | End: 2021-10-25

## 2021-10-25 DIAGNOSIS — R60.0 LEG EDEMA: ICD-10-CM

## 2021-10-25 DIAGNOSIS — K21.9 GASTROESOPHAGEAL REFLUX DISEASE, UNSPECIFIED WHETHER ESOPHAGITIS PRESENT: ICD-10-CM

## 2021-10-25 DIAGNOSIS — J84.9 ILD (INTERSTITIAL LUNG DISEASE) (HCC): ICD-10-CM

## 2021-10-25 DIAGNOSIS — M35.1 MIXED CONNECTIVE TISSUE DISEASE (HCC): ICD-10-CM

## 2021-10-25 DIAGNOSIS — R06.09 CHRONIC DYSPNEA: ICD-10-CM

## 2021-10-25 DIAGNOSIS — I50.32 CHRONIC DIASTOLIC CHF (CONGESTIVE HEART FAILURE) (HCC): ICD-10-CM

## 2021-10-25 DIAGNOSIS — G47.33 OSA (OBSTRUCTIVE SLEEP APNEA): ICD-10-CM

## 2021-10-25 DIAGNOSIS — Z51.81 THERAPEUTIC DRUG MONITORING: ICD-10-CM

## 2021-10-25 DIAGNOSIS — Z86.711 PERSONAL HISTORY OF PE (PULMONARY EMBOLISM): ICD-10-CM

## 2021-10-25 LAB
ASCENDING AORTA: 3.3 CM
BH CV ECHO MEAS - AI P1/2T: 513 MSEC
BH CV ECHO MEAS - AO ROOT DIAM: 3 CM
BH CV ECHO MEAS - EDV(MOD-SP2): 58 ML
BH CV ECHO MEAS - EDV(MOD-SP4): 67 ML
BH CV ECHO MEAS - EF(MOD-BP): 60 %
BH CV ECHO MEAS - ESV(MOD-SP2): 23 ML
BH CV ECHO MEAS - ESV(MOD-SP4): 27 ML
BH CV ECHO MEAS - IVSD: 1.3 CM
BH CV ECHO MEAS - LA DIMENSION(2D): 3.7 CM
BH CV ECHO MEAS - LAT PEAK E' VEL: 10.3 CM/SEC
BH CV ECHO MEAS - LVIDD: 4.5 CM
BH CV ECHO MEAS - LVIDS: 3 CM
BH CV ECHO MEAS - LVOT DIAM: 2 CM
BH CV ECHO MEAS - LVPWD: 1.1 CM
BH CV ECHO MEAS - MED PEAK E' VEL: 7.07 CM/SEC
BH CV ECHO MEAS - MV A MAX VEL: 56 CM/SEC
BH CV ECHO MEAS - MV DEC TIME: 211 MSEC
BH CV ECHO MEAS - MV E MAX VEL: 63 CM/SEC
BH CV ECHO MEAS - MV E/A: 1.1
BH CV ECHO MEAS - RAP SYSTOLE: 3 MMHG
BH CV ECHO MEAS - RVDD: 2.5 CM
BH CV ECHO MEAS - RVSP: 20 MMHG
BH CV ECHO MEAS - TR MAX PG: 17 MMHG
BH CV ECHO MEAS - TR MAX VEL: 206 CM/SEC
BH CV ECHO MEASUREMENTS AVERAGE E/E' RATIO: 7.25
IVRT: 95 MSEC
LEFT ATRIUM VOLUME INDEX: 37 ML/M2
MAXIMAL PREDICTED HEART RATE: 162 BPM
SINUS: 3.8 CM
STJ: 2.9 CM
STRESS TARGET HR: 138 BPM

## 2021-10-25 PROCEDURE — 93306 TTE W/DOPPLER COMPLETE: CPT

## 2021-10-25 PROCEDURE — 94618 PULMONARY STRESS TESTING: CPT

## 2021-10-29 RX ORDER — HYDROXYZINE HYDROCHLORIDE 25 MG/1
TABLET, FILM COATED ORAL
Qty: 180 TABLET | Refills: 1 | Status: CANCELLED | OUTPATIENT
Start: 2021-10-29

## 2021-11-03 RX ORDER — HYDROXYZINE HYDROCHLORIDE 25 MG/1
TABLET, FILM COATED ORAL
Qty: 180 TABLET | Refills: 1 | OUTPATIENT
Start: 2021-11-03

## 2021-11-12 DIAGNOSIS — G89.29 OTHER CHRONIC PAIN: ICD-10-CM

## 2021-11-12 NOTE — TELEPHONE ENCOUNTER
Caller: Aida Mcclure April    Relationship: Self    Best call back number: [phone]    Requested Prescriptions:   Requested Prescriptions     Pending Prescriptions Disp Refills   • HYDROcodone-acetaminophen (NORCO) 7.5-325 MG per tablet 30 tablet 0     Sig: Take 1 tablet by mouth Every 8 (Eight) Hours As Needed for Moderate Pain .        Pharmacy where request should be sent: [id number] [place] [place] [facility] [address] AT [facility] [phone] [facility] [phone]      Additional details provided by patient: PATIENT HAS TWO PILL REMAINING.     Does the patient have less than a 3 day supply:  [x] Yes  [] No    Sarah REESE Rep   11/12/21 10:59 EST

## 2021-11-15 ENCOUNTER — TELEPHONE (OUTPATIENT)
Dept: INTERNAL MEDICINE | Facility: CLINIC | Age: 56
End: 2021-11-15

## 2021-11-15 NOTE — TELEPHONE ENCOUNTER
Attempted to e-prescribe, however receiving error message will attempt again when in office tomorrow.

## 2021-11-16 RX ORDER — HYDROCODONE BITARTRATE AND ACETAMINOPHEN 7.5; 325 MG/1; MG/1
1 TABLET ORAL EVERY 12 HOURS PRN
Qty: 60 TABLET | Refills: 0 | Status: SHIPPED | OUTPATIENT
Start: 2021-11-16 | End: 2021-12-15 | Stop reason: SDUPTHER

## 2021-11-16 NOTE — TELEPHONE ENCOUNTER
Caller: Aida Mcclure April    Relationship to patient: Self    Best call back number: [phone]    Patient is needing:     PATIENT CALLED FOR AN UPDATE ON HER HYDROcodone-acetaminophen (NORCO) 7.5-325 MG per tablet AND gabapentin (NEURONTIN) 300 MG capsule ()  SHE IS NOW COMPLETELY OUT OF MEDICATION.

## 2021-11-18 ENCOUNTER — CLINICAL SUPPORT (OUTPATIENT)
Dept: INTERNAL MEDICINE | Facility: CLINIC | Age: 56
End: 2021-11-18

## 2021-11-18 DIAGNOSIS — Z23 FLU VACCINE NEED: Primary | ICD-10-CM

## 2021-11-18 PROCEDURE — G0008 ADMIN INFLUENZA VIRUS VAC: HCPCS | Performed by: STUDENT IN AN ORGANIZED HEALTH CARE EDUCATION/TRAINING PROGRAM

## 2021-11-18 PROCEDURE — 90686 IIV4 VACC NO PRSV 0.5 ML IM: CPT | Performed by: STUDENT IN AN ORGANIZED HEALTH CARE EDUCATION/TRAINING PROGRAM

## 2021-11-23 ENCOUNTER — OFFICE VISIT (OUTPATIENT)
Dept: INTERNAL MEDICINE | Facility: CLINIC | Age: 56
End: 2021-11-23

## 2021-11-23 ENCOUNTER — TELEPHONE (OUTPATIENT)
Dept: INTERNAL MEDICINE | Facility: CLINIC | Age: 56
End: 2021-11-23

## 2021-11-23 VITALS
DIASTOLIC BLOOD PRESSURE: 80 MMHG | SYSTOLIC BLOOD PRESSURE: 136 MMHG | HEIGHT: 64 IN | TEMPERATURE: 99.6 F | WEIGHT: 210.8 LBS | BODY MASS INDEX: 35.99 KG/M2

## 2021-11-23 DIAGNOSIS — J02.9 SORE THROAT: ICD-10-CM

## 2021-11-23 DIAGNOSIS — R06.09 DYSPNEA ON EXERTION: ICD-10-CM

## 2021-11-23 DIAGNOSIS — R05.9 COUGH: ICD-10-CM

## 2021-11-23 LAB
EXPIRATION DATE: NORMAL
EXPIRATION DATE: NORMAL
FLUAV AG UPPER RESP QL IA.RAPID: NOT DETECTED
FLUBV AG UPPER RESP QL IA.RAPID: NOT DETECTED
INTERNAL CONTROL: NORMAL
INTERNAL CONTROL: NORMAL
Lab: NORMAL
Lab: NORMAL
S PYO AG THROAT QL: NEGATIVE
SARS-COV-2 AG UPPER RESP QL IA.RAPID: NOT DETECTED

## 2021-11-23 PROCEDURE — 87428 SARSCOV & INF VIR A&B AG IA: CPT | Performed by: PHYSICIAN ASSISTANT

## 2021-11-23 PROCEDURE — 87880 STREP A ASSAY W/OPTIC: CPT | Performed by: PHYSICIAN ASSISTANT

## 2021-11-23 PROCEDURE — 99214 OFFICE O/P EST MOD 30 MIN: CPT | Performed by: PHYSICIAN ASSISTANT

## 2021-11-23 NOTE — PROGRESS NOTES
"Chief Complaint  Cough (3 days) and Sore Throat (this morning but has resided since this AM)    Subjective          Aidakael Mcclure presents to [facility]  Cough, sore throat- initially started with cough 3 days ago but developed sore throat this morning.  She has had low grade fever.  No runny nose.  She has not taken any medication for it so far.  No sick contacts.     SOB- worsening over the past month.  She is having to increase her oxygen up to 3L because she can't catch her breath. No chest pain.  SOB improves when she is sitting and resting.    Patient is unable to stay for CXR in office today but states that she will go immediately to hospital to have imaging done.      Objective   Vital Signs:   /80 (BP Location: Right arm, Patient Position: Sitting, Cuff Size: Large Adult)   Temp 99.6 °F (37.6 °C) (Temporal)   Ht 162.6 cm (64\")   Wt 95.6 kg (210 lb 12.8 oz)   BMI 36.18 kg/m²     Physical Exam  Vitals reviewed.   Constitutional:       Appearance: Normal appearance. She is well-developed.   HENT:      Head: Normocephalic and atraumatic.      Right Ear: Tympanic membrane, ear canal and external ear normal.      Left Ear: Tympanic membrane, ear canal and external ear normal.      Mouth/Throat:      Pharynx: No oropharyngeal exudate.   Eyes:      Conjunctiva/sclera: Conjunctivae normal.      Pupils: Pupils are equal, round, and reactive to light.   Cardiovascular:      Rate and Rhythm: Normal rate and regular rhythm.      Heart sounds: No murmur heard.  No friction rub. No gallop.    Pulmonary:      Effort: Pulmonary effort is normal.      Breath sounds: Rhonchi (bilateral lower lobes) present. No wheezing.   Abdominal:      General: Bowel sounds are normal. There is no distension.      Palpations: Abdomen is soft.      Tenderness: There is no abdominal tenderness.   Skin:     General: Skin is warm and dry.   Neurological:      Mental Status: She " is alert and oriented to person, place, and time.      Cranial Nerves: No cranial nerve deficit.   Psychiatric:         Mood and Affect: Mood and affect normal.         Behavior: Behavior normal.         Thought Content: Thought content normal.         Judgment: Judgment normal.        Result Review :          Procedures      Assessment and Plan    Diagnoses and all orders for this visit:    1. Dyspnea on exertion  Assessment & Plan:  Concern for possible pneumonia or worsening CHF.  Would like for patient to get CXR and blood work immediately.  If symptoms worsen or new symptoms develop in the mean time then she needs to go directly to the ER for further evaluation.  Discussed importance of this with patient.  Will treat dependent on CXR and labs.      Orders:  -     CBC w AUTO Differential  -     Comprehensive metabolic panel  -     proBNP  -     TSH    2. Cough  -     POCT SARS-CoV-2 Antigen JEN + Flu  -     XR Chest PA & Lateral (In Office)    3. Sore throat  -     POCT rapid strep A            Follow Up   Return if symptoms worsen or fail to improve.  Patient was given instructions and counseling regarding her condition or for health maintenance advice. Please see specific information pulled into the AVS if appropriate.

## 2021-11-23 NOTE — ASSESSMENT & PLAN NOTE
Concern for possible pneumonia or worsening CHF.  Would like for patient to get CXR and blood work immediately.  If symptoms worsen or new symptoms develop in the mean time then she needs to go directly to the ER for further evaluation.  Discussed importance of this with patient.  Will treat dependent on CXR and labs.

## 2021-11-24 ENCOUNTER — TELEPHONE (OUTPATIENT)
Dept: INTERNAL MEDICINE | Facility: CLINIC | Age: 56
End: 2021-11-24

## 2021-11-24 ENCOUNTER — LAB (OUTPATIENT)
Dept: LAB | Facility: HOSPITAL | Age: 56
End: 2021-11-24

## 2021-11-24 ENCOUNTER — HOSPITAL ENCOUNTER (OUTPATIENT)
Dept: GENERAL RADIOLOGY | Facility: HOSPITAL | Age: 56
Discharge: HOME | End: 2021-11-24

## 2021-11-24 LAB
ALBUMIN SERPL-MCNC: 4.1 G/DL (ref 3.5–5.2)
ALBUMIN/GLOB SERPL: 1.3 G/DL
ALP SERPL-CCNC: 71 U/L (ref 39–117)
ALT SERPL W P-5'-P-CCNC: 8 U/L (ref 1–33)
ANION GAP SERPL CALCULATED.3IONS-SCNC: 7.3 MMOL/L (ref 5–15)
AST SERPL-CCNC: 18 U/L (ref 1–32)
BASOPHILS # BLD AUTO: 0.06 10*3/MM3 (ref 0–0.2)
BASOPHILS NFR BLD AUTO: 1.1 % (ref 0–1.5)
BILIRUB SERPL-MCNC: 0.8 MG/DL (ref 0–1.2)
BUN SERPL-MCNC: 11 MG/DL (ref 6–20)
BUN/CREAT SERPL: 10.2 (ref 7–25)
CALCIUM SPEC-SCNC: 8.3 MG/DL (ref 8.6–10.5)
CHLORIDE SERPL-SCNC: 106 MMOL/L (ref 98–107)
CO2 SERPL-SCNC: 26.7 MMOL/L (ref 22–29)
CREAT SERPL-MCNC: 1.08 MG/DL (ref 0.57–1)
DEPRECATED RDW RBC AUTO: 45.8 FL (ref 37–54)
EOSINOPHIL # BLD AUTO: 0.14 10*3/MM3 (ref 0–0.4)
EOSINOPHIL NFR BLD AUTO: 2.5 % (ref 0.3–6.2)
ERYTHROCYTE [DISTWIDTH] IN BLOOD BY AUTOMATED COUNT: 13.8 % (ref 12.3–15.4)
GFR SERPL CREATININE-BSD FRML MDRD: 63 ML/MIN/1.73
GLOBULIN UR ELPH-MCNC: 3.1 GM/DL
GLUCOSE SERPL-MCNC: 80 MG/DL (ref 65–99)
HCT VFR BLD AUTO: 37.7 % (ref 34–46.6)
HGB BLD-MCNC: 11.9 G/DL (ref 12–15.9)
IMM GRANULOCYTES # BLD AUTO: 0.01 10*3/MM3 (ref 0–0.05)
IMM GRANULOCYTES NFR BLD AUTO: 0.2 % (ref 0–0.5)
LYMPHOCYTES # BLD AUTO: 2.15 10*3/MM3 (ref 0.7–3.1)
LYMPHOCYTES NFR BLD AUTO: 38.3 % (ref 19.6–45.3)
MCH RBC QN AUTO: 28.7 PG (ref 26.6–33)
MCHC RBC AUTO-ENTMCNC: 31.6 G/DL (ref 31.5–35.7)
MCV RBC AUTO: 90.8 FL (ref 79–97)
MONOCYTES # BLD AUTO: 0.48 10*3/MM3 (ref 0.1–0.9)
MONOCYTES NFR BLD AUTO: 8.5 % (ref 5–12)
NEUTROPHILS NFR BLD AUTO: 2.78 10*3/MM3 (ref 1.7–7)
NEUTROPHILS NFR BLD AUTO: 49.4 % (ref 42.7–76)
NRBC BLD AUTO-RTO: 0 /100 WBC (ref 0–0.2)
PLATELET # BLD AUTO: 150 10*3/MM3 (ref 140–450)
PMV BLD AUTO: 12.8 FL (ref 6–12)
POTASSIUM SERPL-SCNC: 4.1 MMOL/L (ref 3.5–5.2)
PROT SERPL-MCNC: 7.2 G/DL (ref 6–8.5)
RBC # BLD AUTO: 4.15 10*6/MM3 (ref 3.77–5.28)
SODIUM SERPL-SCNC: 140 MMOL/L (ref 136–145)
TSH SERPL DL<=0.05 MIU/L-ACNC: 4.51 UIU/ML (ref 0.27–4.2)
WBC NRBC COR # BLD: 5.62 10*3/MM3 (ref 3.4–10.8)

## 2021-11-24 PROCEDURE — 85025 COMPLETE CBC W/AUTO DIFF WBC: CPT | Performed by: PHYSICIAN ASSISTANT

## 2021-11-24 PROCEDURE — 71046 X-RAY EXAM CHEST 2 VIEWS: CPT

## 2021-11-24 PROCEDURE — 80053 COMPREHEN METABOLIC PANEL: CPT | Performed by: PHYSICIAN ASSISTANT

## 2021-11-24 PROCEDURE — 36415 COLL VENOUS BLD VENIPUNCTURE: CPT | Performed by: PHYSICIAN ASSISTANT

## 2021-11-24 PROCEDURE — 84443 ASSAY THYROID STIM HORMONE: CPT | Performed by: PHYSICIAN ASSISTANT

## 2021-11-24 PROCEDURE — 83880 ASSAY OF NATRIURETIC PEPTIDE: CPT | Performed by: PHYSICIAN ASSISTANT

## 2021-11-24 NOTE — TELEPHONE ENCOUNTER
called and confirmed patient was getting her lab work and cxr done asap. patient stated she was currently waiting in the office to complete these two tasks.

## 2021-11-25 LAB — NT-PROBNP SERPL-MCNC: 251.1 PG/ML (ref 0–900)

## 2021-11-29 ENCOUNTER — TELEPHONE (OUTPATIENT)
Dept: INTERNAL MEDICINE | Facility: CLINIC | Age: 56
End: 2021-11-29

## 2021-12-08 NOTE — TELEPHONE ENCOUNTER
Spoke with Chidi and Dr Rick.    Per Dr Rick the form was signed and faxed back to [employer].    Will check with Chioma RUFF tomorrow.      Left generic message to CB.

## 2021-12-10 NOTE — TELEPHONE ENCOUNTER
Confirmed with Mrs. Alvarez today that updated order for oxygen was faxed when requested a couple days ago. Mrs. Alvarez called the DME company and confirmed that they had received the fax and that they will be working on getting the oxygen out to her today.     Merline Enamorado MD

## 2021-12-15 ENCOUNTER — OFFICE VISIT (OUTPATIENT)
Dept: INTERNAL MEDICINE | Facility: CLINIC | Age: 56
End: 2021-12-15

## 2021-12-15 VITALS
DIASTOLIC BLOOD PRESSURE: 60 MMHG | HEIGHT: 64 IN | SYSTOLIC BLOOD PRESSURE: 120 MMHG | TEMPERATURE: 98.4 F | RESPIRATION RATE: 16 BRPM | WEIGHT: 207.4 LBS | HEART RATE: 80 BPM | BODY MASS INDEX: 35.41 KG/M2 | OXYGEN SATURATION: 93 %

## 2021-12-15 DIAGNOSIS — D64.9 ANEMIA, UNSPECIFIED TYPE: ICD-10-CM

## 2021-12-15 DIAGNOSIS — J96.11 CHRONIC RESPIRATORY FAILURE WITH HYPOXIA (HCC): ICD-10-CM

## 2021-12-15 DIAGNOSIS — R79.89 LOW SERUM CALCIUM: ICD-10-CM

## 2021-12-15 DIAGNOSIS — R10.9 ABDOMINAL SPASMS: ICD-10-CM

## 2021-12-15 DIAGNOSIS — R05.9 COUGH: Primary | ICD-10-CM

## 2021-12-15 DIAGNOSIS — K92.1 HEMATOCHEZIA: ICD-10-CM

## 2021-12-15 DIAGNOSIS — G89.29 OTHER CHRONIC PAIN: ICD-10-CM

## 2021-12-15 DIAGNOSIS — R06.00 DYSPNEA, UNSPECIFIED TYPE: ICD-10-CM

## 2021-12-15 DIAGNOSIS — E05.00 THYROTOXICOSIS WITH DIFFUSE GOITER AND WITHOUT THYROID STORM: ICD-10-CM

## 2021-12-15 DIAGNOSIS — E67.8 OTHER SPECIFIED HYPERALIMENTATION: ICD-10-CM

## 2021-12-15 DIAGNOSIS — J84.9 ILD (INTERSTITIAL LUNG DISEASE) (HCC): ICD-10-CM

## 2021-12-15 DIAGNOSIS — Z86.39 HISTORY OF IRON DEFICIENCY: ICD-10-CM

## 2021-12-15 DIAGNOSIS — Z79.899 CONTROLLED SUBSTANCE AGREEMENT SIGNED: ICD-10-CM

## 2021-12-15 PROCEDURE — 80305 DRUG TEST PRSMV DIR OPT OBS: CPT | Performed by: STUDENT IN AN ORGANIZED HEALTH CARE EDUCATION/TRAINING PROGRAM

## 2021-12-15 PROCEDURE — 99214 OFFICE O/P EST MOD 30 MIN: CPT | Performed by: STUDENT IN AN ORGANIZED HEALTH CARE EDUCATION/TRAINING PROGRAM

## 2021-12-15 RX ORDER — HYDROXYZINE HYDROCHLORIDE 25 MG/1
25 TABLET, FILM COATED ORAL 2 TIMES DAILY PRN
Qty: 180 TABLET | Refills: 1 | Status: SHIPPED | OUTPATIENT
Start: 2021-12-15 | End: 2022-02-17 | Stop reason: SDUPTHER

## 2021-12-15 RX ORDER — HYDROCODONE BITARTRATE AND ACETAMINOPHEN 7.5; 325 MG/1; MG/1
1 TABLET ORAL EVERY 12 HOURS PRN
Qty: 60 TABLET | Refills: 0 | Status: SHIPPED | OUTPATIENT
Start: 2021-12-15 | End: 2022-11-11 | Stop reason: SDUPTHER

## 2021-12-15 RX ORDER — GABAPENTIN 300 MG/1
300 CAPSULE ORAL 3 TIMES DAILY
Qty: 90 CAPSULE | Refills: 0 | Status: SHIPPED | OUTPATIENT
Start: 2021-12-15 | End: 2022-02-17 | Stop reason: SDUPTHER

## 2021-12-15 RX ORDER — AZATHIOPRINE 50 MG/1
25 TABLET ORAL DAILY
COMMUNITY
Start: 2021-11-28 | End: 2022-02-28

## 2021-12-15 NOTE — PATIENT INSTRUCTIONS
[employer] allows you to send messages to your doctor, view your test results, renew your prescriptions, schedule appointments, and more. To sign up, go to [employer] and click on the Sign Up Now link in the New User? box. Enter your [employer] Activation Code exactly as it appears below along with the last four digits of your Social Security Number and your Date of Birth () to complete the sign-up process. If you do not sign up before the expiration date, you must request a new code.    [employer] Activation Code: Activation code not generated  Current [employer] Status: Active    If you have questions, you can email [email or web address] or call [phone] to talk to our [employer] staff. Remember, [employer] is NOT to be used for urgent needs. For medical emergencies, dial 911.    Pulmonary Fibrosis    Pulmonary fibrosis is a type of lung disease that causes scarring. Over time, the scar tissue builds up in the air sacs of your lungs (alveoli). This makes it hard for you to breathe. Less oxygen can get into your blood.  Scarring from pulmonary fibrosis gets worse over time. This damage is permanent and may lead to other serious health problems.  What are the causes?  There are many different causes of pulmonary fibrosis. Sometimes the cause is not known. This is called idiopathic pulmonary fibrosis. Other causes include:  · Exposure to chemicals and substances found in agricultural, farm, construction, or factory work. These include mold, asbestos, silica, metal dusts, and toxic fumes.  · Sarcoidosis. In this disease, areas of inflammatory cells (granulomas) form and most often affect the lungs.  · Autoimmune diseases. These include diseases such as rheumatoid arthritis, systemic sclerosis, or connective tissue disease.  · Taking certain medicines. These include drugs used in radiation therapy or used to treat seizures, heart problems, and some infections.  What increases the risk?  You  are more likely to develop this condition if:  · You have a family history of the disease.  · You are older. The condition is more common in older adults.  · You have a history of smoking.  · You have a job that exposes you to certain chemicals.  · You have gastroesophageal reflux disease (GERD).  What are the signs or symptoms?  Symptoms of this condition include:  · Difficulty breathing that gets worse with activity.  · Shortness of breath (dyspnea).  · Dry, hacking cough.  · Rapid, shallow breathing during exercise or while at rest.  · Bluish skin and lips.  · Loss of appetite.  · Weakness.  · Weight loss and fatigue.  · Rounded and enlarged fingertips (clubbing).  How is this diagnosed?  This condition may be diagnosed based on:  · Your symptoms and medical history.  · A physical exam.  You may also have tests, including:  · A test that involves looking inside your lungs with an instrument (bronchoscopy).  · Imaging studies of your lungs and heart.  · Tests to measure how well you are breathing (pulmonary function tests).  · Blood tests.  · Tests to see how well your lungs work while you are walking (pulmonary stress test).  · A procedure to remove a lung tissue sample to look at it under a microscope (biopsy).  How is this treated?  There is no cure for pulmonary fibrosis. Treatment focuses on managing symptoms and preventing scarring from getting worse. This may include:  · Medicines, such as:  ? Steroids to prevent permanent lung changes.  ? Medicines to suppress your body's defense system (immune system).  ? Medicines to help with lung function by reducing inflammation or scarring.  · Ongoing monitoring with X-rays and lab work.  · Oxygen therapy.  · Pulmonary rehabilitation.  · Surgery. In some cases, a lung transplant is possible.  Follow these instructions at home:         Medicines  · Take over-the-counter and prescription medicines only as told by your health care provider.  · Keep your vaccinations up  to date as recommended by your health care provider.  General instructions  · Do not use any products that contain nicotine or tobacco, such as cigarettes and e-cigarettes. If you need help quitting, ask your health care provider.  · Get regular exercise, but do not overexert yourself. Ask your health care provider to suggest some activities that are safe for you to do.  ? If you have physical limitations, you may get exercise by walking, using a stationary bike, or doing chair exercises.  ? Ask your health care provider about using oxygen while exercising.  · If you are exposed to chemicals and substances at work, make sure that you wear a mask or respirator at all times.  · Join a pulmonary rehabilitation program or a support group for people with pulmonary fibrosis.  · Eat small meals often so you do not get too full. Overeating can make breathing trouble worse.  · Maintain a healthy weight. Lose weight if you need to.  · Do breathing exercises as directed by your health care provider.  · Keep all follow-up visits as told by your health care provider. This is important.  Contact a health care provider if you:  · Have symptoms that do not get better with medicines.  · Are not able to be as active as usual.  · Have trouble taking a deep breath.  · Have a fever or chills.  · Have blue lips or skin.  · Have clubbing of your fingers.  Get help right away if you:  · Have a sudden worsening of your symptoms.  · Have chest pain.  · Cough up mucus that is dark in color.  · Have a lot of headaches.  · Get very confused or sleepy.  Summary  · Pulmonary fibrosis is a type of lung disease that causes scar tissue to build up in the air sacs of your lungs (alveoli) over time. Less oxygen can get into your blood. This makes it hard for you to breathe.  · Scarring from pulmonary fibrosis gets worse over time. This damage is permanent and may lead to other serious health problems.  · You are more likely to develop this condition  if you have a family history of the condition or a job that exposes you to certain chemicals.  · There is no cure for pulmonary fibrosis. Treatment focuses on managing symptoms and preventing scarring from getting worse.  This information is not intended to replace advice given to you by your health care provider. Make sure you discuss any questions you have with your health care provider.  Document Revised: 2019 Document Reviewed: 2019  [employer] Patient Education ©  [employer]      Introduction to Interstitial Lung Disease  In this video, you will be introduced to the medical condition of pulmonary fibrosis.  To view the content, go to this web address:  [email or web address]    This video will  on: 2023. If you need access to this video following this date, please reach out to the healthcare provider who assigned it to you.  This information is not intended to replace advice given to you by your health care provider. Make sure you discuss any questions you have with your health care provider.  [employer]’s editorial and clinical teams regularly review and update content to ensure it is up-to-date with changing practice standards and recognized medical guidelines.  Document Revised: 2021  [employer] Patient Education ©  [employer]vier Inc.

## 2021-12-15 NOTE — PROGRESS NOTES
Chief Complaint  Follow-up, Cough, and Shortness of Breath    Subjective            Aida April Kami presents to [facility]  History of Present Illness   Cough:   Persisting.   Sthates her breathing is getting worse. She is now up to 3l on her oxygen.   Shares that she gets short of breath even from walking form her car to the office.   States the last time she felt this way she ended up spending 2 mos in the hospital. At that time she had a cold with abnormal xray and was started on abx. Stares a few days later she had a difficulty breathing after stepping outside in the cold and had difficulty breathing for which she went into the hospital.     However she states that she remembered this incorrectly. States that her 2 mos in the hospital was related to a thyroid storm.     Hydrochlorothiazide is on her list of medication, prescribed form the ED.     Leg cramping, bilaterally and stay sore over her lower abdomen.-states it's like a wave that starts from her lower abdomen and rolls up towards her mid-abdomen and then tightens. Occurring multiple times throughout the day. Has not noticed any associated patterns (position or food type).   Endorses increase BM as well, which are runny, this is occurring at least 4 days a week and that this alternates with constipation. Endorses blood in her stool x3 this year.         Past Medical History:   Diagnosis Date   • Anemia    • Annual physical exam 11/06/2017    WILL RESCHEDULE MAMMOGRAM    • Breast lump    • CHF (congestive heart failure) ([facility]) 11/06/2017    FOLLOWS WITH CARDS. CURRENTLY DOING WELL ON LASIX, LISINOPRIL, ALDACTONE, AND PROPANOLOL   • Degeneration of lumbar intervertebral disc 06/12/2012   • Essential hypertension 11/06/2017    WELL CONTROLLED ON CURRENT REGIMEN    • GERD (gastroesophageal reflux disease)    • Hamstring injury 08/17/2015    BILATERAL HAMSTRING INJURY/PAIN    • Head injury    • Hernia  cerebri (HCC)    • Hyperthyroidism    • Hyperthyroidism 2017    CURRENTLY ON METHIMAZOLE, PT REPORT POSSIBLY DUE TO GRAVE'S DISEASE. WILL NEED TO OBTAIN AND REVIEW MEDICAL RECORDS FROM [facility]. WILL REFER TO ENDO    • Hypoxia    • Insomnia 2017    DISCUSSED SLEEP HYGIENE. PT TO TRY AVOIDING BLUE LIGHT AT NIGHT. PT ALSO TO SET ROUTINE PRIOR TO BEDTIME. WILL AVOID MEDS AT THIS TIME    • Interstitial lung disease (HCC)    • Lumbosacral disc herniation 2012    LEFT L5-S1 SMALL NATURE. SHE HAS FAILED ALL CONSERVATIVE MEASURES AND DESIRES SURGERY    • Lupus (HCC)     SEES DR. ANDERSON   • Migraines    • Pulmonary embolism (HCC)    • Respiratory failure with hypoxia ([facility]) 2021   • Rheumatoid arthritis ([facility]) 2017    CURRENTLY CONTROLLED, BUT WITH RESIDUAL DEFORMITY. WILL FOLLOW WITH DR. ANDERSON    • Tachycardia    • Total knee replacement status, right 2015       Allergies:   Allergies   Allergen Reactions   • Salsalate Hives and Anaphylaxis   • Aspirin    • Salicylic Acid           Past Surgical History:   Procedure Laterality Date   • ANKLE SURGERY Bilateral    • BREAST BIOPSY     • CATARACT EXTRACTION WITH INTRAOCULAR LENS IMPLANT     •  SECTION     • COLONOSCOPY     • EYE LENS IMPLANT SECONDARY      YES   • FASCIOTOMY      LEFT ANTERIOR ARM    • HAND SURGERY Left    • KNEE SURGERY     • OTHER SURGICAL HISTORY      LYMPH NODE EXCISION   • PORTACATH PLACEMENT      POWER PORT PLACEMENT           Social History     Socioeconomic History   • Marital status:    Tobacco Use   • Smoking status: Never Smoker   • Smokeless tobacco: Never Used   Vaping Use   • Vaping Use: Never used   Substance and Sexual Activity   • Alcohol use: No   • Drug use: Defer   • Sexual activity: Defer         Family History   Problem Relation Age of Onset   • Stroke Father    • Lung cancer Other    • Arthritis Other    • Cancer Other    • Parkinsonism Other    • Diabetes type II Other           Health  Maintenance Due   Topic Date Due   • COLORECTAL CANCER SCREENING  Never done   • TDAP/TD VACCINES (1 - Tdap) Never done   • ZOSTER VACCINE (1 of 2) Never done   • ANNUAL WELLNESS VISIT  Never done   • PAP SMEAR  Never done            Current Outpatient Medications:   •  ascorbic acid (VITAMIN C) 250 MG tablet, Take 1 tablet by mouth Daily., Disp: , Rfl:   •  azaTHIOprine (IMURAN) 50 MG tablet, Take 25 mg by mouth Daily., Disp: , Rfl:   •  Calcium Carb-Cholecalciferol (Caltrate 600+D3 Soft) 600-800 MG-UNIT chewable tablet, Chew 1 tablet Daily., Disp: , Rfl:   •  cyanocobalamin 1000 MCG/ML injection, Inject 1 mL into the appropriate muscle as directed by prescriber Every 30 (Thirty) Days., Disp: , Rfl:   •  ergocalciferol (ERGOCALCIFEROL) 1.25 MG (82328 UT) capsule, Take 1 capsule by mouth Every 7 (Seven) Days., Disp: , Rfl:   •  fluticasone (FLONASE) 50 MCG/ACT nasal spray, SHAKE LIQUID AND USE 2 SPRAYS(100 MCG) IN EACH NOSTRIL EVERY DAY, Disp: 16 g, Rfl: 1  •  furosemide (LASIX) 40 MG tablet, Take 40 mg by mouth 2 (Two) Times a Day., Disp: , Rfl:   •  HYDROcodone-acetaminophen (NORCO) 7.5-325 MG per tablet, Take 1 tablet by mouth Every 12 (Twelve) Hours As Needed for Moderate Pain  for up to 30 days., Disp: 60 tablet, Rfl: 0  •  hydroxychloroquine (PLAQUENIL) 200 MG tablet, Take 1 tablet by mouth 2 (Two) Times a Day for 360 days. Indications: Systemic Lupus Erythematosus, Disp: 180 tablet, Rfl: 3  •  levothyroxine (SYNTHROID, LEVOTHROID) 137 MCG tablet, Take 1 tablet by mouth Daily., Disp: , Rfl:   •  loratadine (CLARITIN) 10 MG tablet, Take 1 tablet by mouth Daily., Disp: 90 tablet, Rfl: 0  •  magnesium oxide (MAG-OX) 400 MG tablet, TK 1 T PO D, Disp: , Rfl: 1  •  PROAIR  (90 Base) MCG/ACT inhaler, INHALE 2 PUFFS PO Q 6 H, Disp: , Rfl: 4  •  promethazine (PHENERGAN) 25 MG tablet, Take 25 mg by mouth Every 6 (Six) Hours As Needed for Nausea or Vomiting., Disp: , Rfl:   •  propranolol (INDERAL) 40 MG tablet,  "TK SS T PO TID, Disp: , Rfl: 1  •  rizatriptan (MAXALT) 10 MG tablet, Take 1 tablet by mouth As Needed., Disp: , Rfl:   •  rOPINIRole (REQUIP) 1 MG tablet, TAKE 1 TABLET BY MOUTH EVERY NIGHT AT BEDTIME X 1 WEEK, THEN 1.5 BY MOUTH WEEK 2 THEN 2 BY MOUTH THEREAFTER. TAKE 1-3 HOURS BEFORE, Disp: 60 tablet, Rfl: 2  •  spironolactone (ALDACTONE) 50 MG tablet, TK 1 T PO QD, Disp: , Rfl: 0  •  gabapentin (NEURONTIN) 300 MG capsule, Take 1 capsule by mouth 3 (Three) Times a Day for 30 days., Disp: 90 capsule, Rfl: 0  •  hydrOXYzine (ATARAX) 25 MG tablet, Take 1 tablet by mouth 2 (Two) Times a Day As Needed for Anxiety for up to 90 days., Disp: 180 tablet, Rfl: 1      Immunization History   Administered Date(s) Administered   • COVID-19 (YaSabe) 03/29/2021   • COVID-19 (PFIZER) 12/16/2021   • Flu Vaccine Intradermal Quad 18-64YR 01/02/2008, 11/10/2008, 11/05/2009   • Flu Vaccine Quad PF >36MO 10/25/2017   • FluLaval/Fluarix/Fluzone >6 11/18/2021   • Hepatitis A 04/30/2018   • Influenza Quad Vaccine (Inpatient) 08/27/2012, 10/15/2013   • Influenza, Unspecified 09/17/2020   • Pneumococcal Conjugate 13-Valent (PCV13) 06/14/2018   • Pneumococcal Polysaccharide (PPSV23) 08/26/2019     Review of Systems   Per HPI     Objective       Vitals:    12/15/21 1609   BP: 120/60   Pulse: 80   Resp: 16   Temp: 98.4 °F (36.9 °C)   SpO2: 93%   Weight: 94.1 kg (207 lb 6.4 oz)   Height: 162.6 cm (64\")     Body mass index is 35.6 kg/m².      Physical Exam  Vitals reviewed.   Constitutional:       Appearance: Normal appearance.   HENT:      Head: Normocephalic and atraumatic.      Nose: Nose normal.   Eyes:      Extraocular Movements: Extraocular movements intact.      Conjunctiva/sclera: Conjunctivae normal.   Cardiovascular:      Rate and Rhythm: Normal rate and regular rhythm.      Pulses: Normal pulses.      Heart sounds: Normal heart sounds.   Pulmonary:      Effort: Pulmonary effort is normal. No respiratory distress.      Breath sounds: " No wheezing.      Comments: Mild crackles intermixed with velcro sound noted, worse over the RLL.   Musculoskeletal:         General: Swelling (Chronic, with right leg slight worse than left which is chronic ) present. No tenderness. Normal range of motion.   Skin:     General: Skin is warm and dry.   Neurological:      General: No focal deficit present.      Mental Status: She is alert and oriented to person, place, and time.      Cranial Nerves: No cranial nerve deficit.   Psychiatric:         Mood and Affect: Mood normal.         Behavior: Behavior normal.         Thought Content: Thought content normal.             Result Review :                           Assessment and Plan      Diagnoses and all orders for this visit:    1. Cough (Primary)  Comments:  Chronic and persisting. Concern for post viral cough vs worsening of her underlying IPF, see below.     2. Chronic respiratory failure with hypoxia (HCC)  Comments:  Chronic, worsening with pt having to increase her O2 from 2l to 3l. Last CT chest was on Jan 2021. Repeatig CT chest and advised pt to call pulm for earlier apt    3. Dyspnea, unspecified type  Comments:  Chronic and stable.   Orders:  -     Magnesium  -     Phosphorus  -     CT Chest Hi Resolution    4. Other chronic pain  Comments:  Chronic and stable. Due for refill of norco.     Orders:  -     gabapentin (NEURONTIN) 300 MG capsule; Take 1 capsule by mouth 3 (Three) Times a Day for 30 days.  Dispense: 90 capsule; Refill: 0  -     HYDROcodone-acetaminophen (NORCO) 7.5-325 MG per tablet; Take 1 tablet by mouth Every 12 (Twelve) Hours As Needed for Moderate Pain  for up to 30 days.  Dispense: 60 tablet; Refill: 0    5. Controlled substance agreement signed  Comments:  Signed and pt compliant with terms.   Orders:  -     POC Urine Drug Screen [facility]    6. Low serum calcium  Comments:  Noted on recent labs. Due for repeat and additioan w/u which is ordered.   Orders:  -     Phosphorus  -      Vitamin D 25 hydroxy  -     Basic metabolic panel; Future  -     Basic metabolic panel    7. Abdominal spasms  Comments:  Chronic and persisting. Exact etiology is unclear. Referrin gto GI for evaluation.   Orders:  -     Ambulatory Referral to Gastroenterology  -     Basic metabolic panel; Future  -     Basic metabolic panel    8. Hematochezia  Comments:  Intermittent, however continues to recur, referring to GI for evaluation for colonoscopy.   Orders:  -     Ambulatory Referral to Gastroenterology    9. ILD (interstitial lung disease) (HCC)  Comments:  Chronic with concern for interval worsening. See above.   Orders:  -     CT Chest Hi Resolution    10. 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 Due for albs.   Orders:  -     TSH  -     T4, Free    11. Anemia, unspecified type  Comments:  Chronic. Iron studies ordered.     12. History of iron deficiency  Comments:  Due for repeat labs in light of hematochezia   Orders:  -     Iron and TIBC  -     Ferritin    13. Other specified hyperalimentation   -     Vitamin D 25 hydroxy    Other orders  -     hydrOXYzine (ATARAX) 25 MG tablet; Take 1 tablet by mouth 2 (Two) Times a Day As Needed for Anxiety for up to 90 days.  Dispense: 180 tablet; Refill: 1                  Follow Up     Return in about 6 weeks (around 1/26/2022) for Recheck.    Patient was given instructions and counseling regarding her condition or for health maintenance advice. Please see specific information pulled into the AVS if appropriate.     Merline Enamorado MD   Internal Medicine-Pediatrics

## 2021-12-16 ENCOUNTER — IMMUNIZATION (OUTPATIENT)
Dept: INTERNAL MEDICINE | Facility: CLINIC | Age: 56
End: 2021-12-16

## 2021-12-16 ENCOUNTER — TELEPHONE (OUTPATIENT)
Dept: PULMONOLOGY | Facility: CLINIC | Age: 56
End: 2021-12-16

## 2021-12-16 DIAGNOSIS — I10 ESSENTIAL HYPERTENSION: ICD-10-CM

## 2021-12-16 DIAGNOSIS — R53.82 CHRONIC FATIGUE: ICD-10-CM

## 2021-12-16 DIAGNOSIS — R73.9 HYPERGLYCEMIA: ICD-10-CM

## 2021-12-16 DIAGNOSIS — E05.90 HYPERTHYROIDISM: ICD-10-CM

## 2021-12-16 LAB
25(OH)D3 SERPL-MCNC: 38.2 NG/ML
ANION GAP SERPL CALCULATED.3IONS-SCNC: 7.7 MMOL/L (ref 5–15)
BUN SERPL-MCNC: 13 MG/DL (ref 6–20)
BUN/CREAT SERPL: 11.8 (ref 7–25)
CALCIUM SPEC-SCNC: 8.6 MG/DL (ref 8.6–10.5)
CHLORIDE SERPL-SCNC: 106 MMOL/L (ref 98–107)
CO2 SERPL-SCNC: 27.3 MMOL/L (ref 22–29)
CREAT SERPL-MCNC: 1.1 MG/DL (ref 0.57–1)
FERRITIN SERPL-MCNC: 101 NG/ML (ref 13–150)
GFR SERPL CREATININE-BSD FRML MDRD: 62 ML/MIN/1.73
GLUCOSE SERPL-MCNC: 89 MG/DL (ref 65–99)
IRON 24H UR-MRATE: 88 MCG/DL (ref 37–145)
IRON SATN MFR SERPL: 31 % (ref 20–50)
MAGNESIUM SERPL-MCNC: 1.6 MG/DL (ref 1.6–2.6)
PHOSPHATE SERPL-MCNC: 3.4 MG/DL (ref 2.5–4.5)
POTASSIUM SERPL-SCNC: 4.1 MMOL/L (ref 3.5–5.2)
SODIUM SERPL-SCNC: 141 MMOL/L (ref 136–145)
T4 FREE SERPL-MCNC: 1.44 NG/DL (ref 0.93–1.7)
TIBC SERPL-MCNC: 288 MCG/DL (ref 298–536)
TRANSFERRIN SERPL-MCNC: 193 MG/DL (ref 200–360)
TSH SERPL DL<=0.05 MIU/L-ACNC: 3.37 UIU/ML (ref 0.27–4.2)

## 2021-12-16 PROCEDURE — 84100 ASSAY OF PHOSPHORUS: CPT | Performed by: STUDENT IN AN ORGANIZED HEALTH CARE EDUCATION/TRAINING PROGRAM

## 2021-12-16 PROCEDURE — 83735 ASSAY OF MAGNESIUM: CPT | Performed by: STUDENT IN AN ORGANIZED HEALTH CARE EDUCATION/TRAINING PROGRAM

## 2021-12-16 PROCEDURE — 80048 BASIC METABOLIC PNL TOTAL CA: CPT | Performed by: STUDENT IN AN ORGANIZED HEALTH CARE EDUCATION/TRAINING PROGRAM

## 2021-12-16 PROCEDURE — 36415 COLL VENOUS BLD VENIPUNCTURE: CPT | Performed by: STUDENT IN AN ORGANIZED HEALTH CARE EDUCATION/TRAINING PROGRAM

## 2021-12-16 PROCEDURE — 82306 VITAMIN D 25 HYDROXY: CPT | Performed by: STUDENT IN AN ORGANIZED HEALTH CARE EDUCATION/TRAINING PROGRAM

## 2021-12-16 PROCEDURE — 84439 ASSAY OF FREE THYROXINE: CPT | Performed by: STUDENT IN AN ORGANIZED HEALTH CARE EDUCATION/TRAINING PROGRAM

## 2021-12-16 PROCEDURE — 82728 ASSAY OF FERRITIN: CPT | Performed by: STUDENT IN AN ORGANIZED HEALTH CARE EDUCATION/TRAINING PROGRAM

## 2021-12-16 PROCEDURE — 83540 ASSAY OF IRON: CPT | Performed by: STUDENT IN AN ORGANIZED HEALTH CARE EDUCATION/TRAINING PROGRAM

## 2021-12-16 PROCEDURE — 84466 ASSAY OF TRANSFERRIN: CPT | Performed by: STUDENT IN AN ORGANIZED HEALTH CARE EDUCATION/TRAINING PROGRAM

## 2021-12-16 PROCEDURE — 0003A COVID-19 (PFIZER): CPT | Performed by: STUDENT IN AN ORGANIZED HEALTH CARE EDUCATION/TRAINING PROGRAM

## 2021-12-16 PROCEDURE — 84443 ASSAY THYROID STIM HORMONE: CPT | Performed by: STUDENT IN AN ORGANIZED HEALTH CARE EDUCATION/TRAINING PROGRAM

## 2021-12-16 PROCEDURE — 91300 COVID-19 (PFIZER): CPT | Performed by: STUDENT IN AN ORGANIZED HEALTH CARE EDUCATION/TRAINING PROGRAM

## 2021-12-28 RX ORDER — ROPINIROLE 1 MG/1
TABLET, FILM COATED ORAL
Qty: 90 TABLET | Refills: 1 | Status: SHIPPED | OUTPATIENT
Start: 2021-12-28 | End: 2022-02-25

## 2021-12-28 RX ORDER — ROPINIROLE 1 MG/1
TABLET, FILM COATED ORAL
Qty: 60 TABLET | Refills: 2 | Status: SHIPPED | OUTPATIENT
Start: 2021-12-28 | End: 2022-02-25

## 2021-12-28 RX ORDER — FLUTICASONE PROPIONATE 50 MCG
SPRAY, SUSPENSION (ML) NASAL
Qty: 16 G | Refills: 3 | Status: SHIPPED | OUTPATIENT
Start: 2021-12-28

## 2021-12-30 ENCOUNTER — HOSPITAL ENCOUNTER (OUTPATIENT)
Dept: CT IMAGING | Facility: HOSPITAL | Age: 56
Discharge: HOME | End: 2021-12-30

## 2021-12-30 ENCOUNTER — TELEPHONE (OUTPATIENT)
Dept: INTERNAL MEDICINE | Facility: CLINIC | Age: 56
End: 2021-12-30

## 2021-12-30 ENCOUNTER — HOSPITAL ENCOUNTER (OUTPATIENT)
Dept: RESPIRATORY THERAPY | Facility: HOSPITAL | Age: 56
Discharge: HOME | End: 2021-12-30

## 2021-12-30 DIAGNOSIS — R06.09 CHRONIC DYSPNEA: ICD-10-CM

## 2021-12-30 DIAGNOSIS — J84.9 ILD (INTERSTITIAL LUNG DISEASE) (HCC): ICD-10-CM

## 2021-12-30 PROCEDURE — 94726 PLETHYSMOGRAPHY LUNG VOLUMES: CPT

## 2021-12-30 PROCEDURE — 71250 CT THORAX DX C-: CPT

## 2021-12-30 PROCEDURE — 94060 EVALUATION OF WHEEZING: CPT

## 2021-12-30 PROCEDURE — 94729 DIFFUSING CAPACITY: CPT | Performed by: INTERNAL MEDICINE

## 2021-12-30 PROCEDURE — 94726 PLETHYSMOGRAPHY LUNG VOLUMES: CPT | Performed by: INTERNAL MEDICINE

## 2021-12-30 PROCEDURE — 94729 DIFFUSING CAPACITY: CPT

## 2021-12-30 PROCEDURE — 94060 EVALUATION OF WHEEZING: CPT | Performed by: INTERNAL MEDICINE

## 2021-12-30 RX ORDER — ALBUTEROL SULFATE 2.5 MG/3ML
2.5 SOLUTION RESPIRATORY (INHALATION) ONCE
Status: COMPLETED | OUTPATIENT
Start: 2021-12-30 | End: 2021-12-30

## 2021-12-30 RX ADMIN — ALBUTEROL SULFATE 2.5 MG: 2.5 SOLUTION RESPIRATORY (INHALATION) at 09:42

## 2022-01-18 ENCOUNTER — TELEPHONE (OUTPATIENT)
Dept: GASTROENTEROLOGY | Facility: CLINIC | Age: 57
End: 2022-01-18

## 2022-01-18 NOTE — TELEPHONE ENCOUNTER
Returned voicemail from patient requesting our office to reschedule her missed appointment from 1/17/22. Left voicemail for patient to call our office back to get her rescheduled.

## 2022-01-26 ENCOUNTER — PATIENT ROUNDING (BHMG ONLY) (OUTPATIENT)
Dept: GASTROENTEROLOGY | Facility: CLINIC | Age: 57
End: 2022-01-26

## 2022-01-26 NOTE — PROGRESS NOTES
January 26, 2022    Hello, may I speak with Aida April Kami?    My name is Shanda LAW     I am  with [facility]  [address] [address] [phone].    Before we get started may I verify your date of birth? 1965    I am calling to officially welcome you to our practice and ask about your recent visit. Is this a good time to talk? Had to reschedule     Tell me about your visit with us. What things went well?         We're always looking for ways to make our patients' experiences even better. Do you have recommendations on ways we may improve?      Overall were you satisfied with your first visit to our practice?        I appreciate you taking the time to speak with me today. Is there anything else I can do for you?       Thank you, and have a great day.

## 2022-02-02 ENCOUNTER — TRANSCRIBE ORDERS (OUTPATIENT)
Dept: LAB | Facility: HOSPITAL | Age: 57
End: 2022-02-02

## 2022-02-02 ENCOUNTER — LAB (OUTPATIENT)
Dept: LAB | Facility: HOSPITAL | Age: 57
End: 2022-02-02

## 2022-02-02 DIAGNOSIS — Z79.899 ENCOUNTER FOR LONG-TERM (CURRENT) USE OF OTHER MEDICATIONS: Primary | ICD-10-CM

## 2022-02-02 DIAGNOSIS — N18.4 CHRONIC KIDNEY DISEASE, STAGE IV (SEVERE): ICD-10-CM

## 2022-02-02 DIAGNOSIS — N28.9 RENAL INSUFFICIENCY: ICD-10-CM

## 2022-02-02 DIAGNOSIS — N28.9 RENAL INSUFFICIENCY: Primary | ICD-10-CM

## 2022-02-02 DIAGNOSIS — E89.0 POST-SURGICAL HYPOTHYROIDISM: Primary | ICD-10-CM

## 2022-02-02 DIAGNOSIS — M32.9 SYSTEMIC LUPUS ERYTHEMATOSUS, UNSPECIFIED SLE TYPE, UNSPECIFIED ORGAN INVOLVEMENT STATUS: ICD-10-CM

## 2022-02-02 DIAGNOSIS — E89.0 POST-SURGICAL HYPOTHYROIDISM: ICD-10-CM

## 2022-02-02 DIAGNOSIS — Z79.899 ENCOUNTER FOR LONG-TERM (CURRENT) USE OF OTHER MEDICATIONS: ICD-10-CM

## 2022-02-02 LAB
ALBUMIN SERPL-MCNC: 3.9 G/DL (ref 3.5–5.2)
ALBUMIN/GLOB SERPL: 1.2 G/DL
ALP SERPL-CCNC: 71 U/L (ref 39–117)
ALT SERPL W P-5'-P-CCNC: 7 U/L (ref 1–33)
ANION GAP SERPL CALCULATED.3IONS-SCNC: 7.1 MMOL/L (ref 5–15)
AST SERPL-CCNC: 11 U/L (ref 1–32)
BACTERIA UR QL AUTO: ABNORMAL /HPF
BASOPHILS # BLD AUTO: 0.08 10*3/MM3 (ref 0–0.2)
BASOPHILS NFR BLD AUTO: 1.4 % (ref 0–1.5)
BILIRUB SERPL-MCNC: 0.8 MG/DL (ref 0–1.2)
BILIRUB UR QL STRIP: NEGATIVE
BUN SERPL-MCNC: 14 MG/DL (ref 6–20)
BUN/CREAT SERPL: 12.3 (ref 7–25)
CALCIUM SPEC-SCNC: 8.6 MG/DL (ref 8.6–10.5)
CHLORIDE SERPL-SCNC: 106 MMOL/L (ref 98–107)
CLARITY UR: CLEAR
CO2 SERPL-SCNC: 27.9 MMOL/L (ref 22–29)
COLOR UR: YELLOW
CREAT SERPL-MCNC: 1.14 MG/DL (ref 0.57–1)
CREAT UR-MCNC: 155.5 MG/DL
DEPRECATED RDW RBC AUTO: 49.8 FL (ref 37–54)
EOSINOPHIL # BLD AUTO: 0.12 10*3/MM3 (ref 0–0.4)
EOSINOPHIL NFR BLD AUTO: 2.1 % (ref 0.3–6.2)
ERYTHROCYTE [DISTWIDTH] IN BLOOD BY AUTOMATED COUNT: 15.3 % (ref 12.3–15.4)
ERYTHROCYTE [SEDIMENTATION RATE] IN BLOOD: 4 MM/HR (ref 0–30)
GFR SERPL CREATININE-BSD FRML MDRD: 59 ML/MIN/1.73
GLOBULIN UR ELPH-MCNC: 3.2 GM/DL
GLUCOSE SERPL-MCNC: 75 MG/DL (ref 65–99)
GLUCOSE UR STRIP-MCNC: NEGATIVE MG/DL
HCT VFR BLD AUTO: 39.4 % (ref 34–46.6)
HGB BLD-MCNC: 12.6 G/DL (ref 12–15.9)
HGB UR QL STRIP.AUTO: NEGATIVE
HYALINE CASTS UR QL AUTO: ABNORMAL /LPF
IMM GRANULOCYTES # BLD AUTO: 0.02 10*3/MM3 (ref 0–0.05)
IMM GRANULOCYTES NFR BLD AUTO: 0.4 % (ref 0–0.5)
KETONES UR QL STRIP: NEGATIVE
LEUKOCYTE ESTERASE UR QL STRIP.AUTO: ABNORMAL
LYMPHOCYTES # BLD AUTO: 1.82 10*3/MM3 (ref 0.7–3.1)
LYMPHOCYTES NFR BLD AUTO: 32.1 % (ref 19.6–45.3)
MCH RBC QN AUTO: 28.5 PG (ref 26.6–33)
MCHC RBC AUTO-ENTMCNC: 32 G/DL (ref 31.5–35.7)
MCV RBC AUTO: 89.1 FL (ref 79–97)
MONOCYTES # BLD AUTO: 0.57 10*3/MM3 (ref 0.1–0.9)
MONOCYTES NFR BLD AUTO: 10.1 % (ref 5–12)
NEUTROPHILS NFR BLD AUTO: 3.06 10*3/MM3 (ref 1.7–7)
NEUTROPHILS NFR BLD AUTO: 53.9 % (ref 42.7–76)
NITRITE UR QL STRIP: NEGATIVE
NRBC BLD AUTO-RTO: 0 /100 WBC (ref 0–0.2)
PH UR STRIP.AUTO: 6 [PH] (ref 5–8)
PLATELET # BLD AUTO: 131 10*3/MM3 (ref 140–450)
PMV BLD AUTO: 11.6 FL (ref 6–12)
POTASSIUM SERPL-SCNC: 3.8 MMOL/L (ref 3.5–5.2)
PROT ?TM UR-MCNC: 58.3 MG/DL
PROT SERPL-MCNC: 7.1 G/DL (ref 6–8.5)
PROT UR QL STRIP: ABNORMAL
PROT/CREAT UR: 0.4 MG/G{CREAT}
RBC # BLD AUTO: 4.42 10*6/MM3 (ref 3.77–5.28)
RBC # UR STRIP: ABNORMAL /HPF
REF LAB TEST METHOD: ABNORMAL
SODIUM SERPL-SCNC: 141 MMOL/L (ref 136–145)
SP GR UR STRIP: 1.02 (ref 1–1.03)
SQUAMOUS #/AREA URNS HPF: ABNORMAL /HPF
T4 FREE SERPL-MCNC: 1.58 NG/DL (ref 0.93–1.7)
TSH SERPL DL<=0.05 MIU/L-ACNC: 0.29 UIU/ML (ref 0.27–4.2)
UROBILINOGEN UR QL STRIP: ABNORMAL
WBC # UR STRIP: ABNORMAL /HPF
WBC NRBC COR # BLD: 5.67 10*3/MM3 (ref 3.4–10.8)

## 2022-02-02 PROCEDURE — 80053 COMPREHEN METABOLIC PANEL: CPT

## 2022-02-02 PROCEDURE — 84443 ASSAY THYROID STIM HORMONE: CPT

## 2022-02-02 PROCEDURE — 84439 ASSAY OF FREE THYROXINE: CPT

## 2022-02-02 PROCEDURE — 84156 ASSAY OF PROTEIN URINE: CPT

## 2022-02-02 PROCEDURE — 86225 DNA ANTIBODY NATIVE: CPT

## 2022-02-02 PROCEDURE — 85652 RBC SED RATE AUTOMATED: CPT

## 2022-02-02 PROCEDURE — 81001 URINALYSIS AUTO W/SCOPE: CPT

## 2022-02-02 PROCEDURE — 85025 COMPLETE CBC W/AUTO DIFF WBC: CPT

## 2022-02-02 PROCEDURE — 82570 ASSAY OF URINE CREATININE: CPT

## 2022-02-03 LAB — DSDNA AB SER-ACNC: 2 IU/ML (ref 0–9)

## 2022-02-17 DIAGNOSIS — G89.29 OTHER CHRONIC PAIN: ICD-10-CM

## 2022-02-18 ENCOUNTER — TELEPHONE (OUTPATIENT)
Dept: INTERNAL MEDICINE | Facility: CLINIC | Age: 57
End: 2022-02-18

## 2022-02-18 NOTE — TELEPHONE ENCOUNTER
Medication requested: hydrocodone                                      Gabapentin                                       Atarax  I do not see the hydrocodone in her med list, she stated man ordered it for her and she has been taking it for years.     LOV: 12/15/21  UDS: 12/15/21  Contract: 12/15/21  Next Appt: none

## 2022-02-19 RX ORDER — HYDROCODONE BITARTRATE AND ACETAMINOPHEN 7.5; 325 MG/1; MG/1
1 TABLET ORAL EVERY 12 HOURS PRN
Qty: 60 TABLET | Refills: 0 | Status: SHIPPED | OUTPATIENT
Start: 2022-02-19 | End: 2022-04-06 | Stop reason: SDUPTHER

## 2022-02-19 RX ORDER — HYDROXYZINE HYDROCHLORIDE 25 MG/1
25 TABLET, FILM COATED ORAL 2 TIMES DAILY PRN
Qty: 180 TABLET | Refills: 1 | Status: SHIPPED | OUTPATIENT
Start: 2022-02-19 | End: 2022-05-20

## 2022-02-19 RX ORDER — GABAPENTIN 300 MG/1
300 CAPSULE ORAL 3 TIMES DAILY
Qty: 90 CAPSULE | Refills: 0 | Status: SHIPPED | OUTPATIENT
Start: 2022-02-19 | End: 2022-04-08

## 2022-02-23 ENCOUNTER — TELEPHONE (OUTPATIENT)
Dept: INTERNAL MEDICINE | Facility: CLINIC | Age: 57
End: 2022-02-23

## 2022-02-23 NOTE — TELEPHONE ENCOUNTER
"Red rule verified and correct.    Pt needing her O2 orders to now go to \"[facility]\".    LOV, Order and demographic/INS info sent to [facility]  "

## 2022-02-25 ENCOUNTER — TELEPHONE (OUTPATIENT)
Dept: INTERNAL MEDICINE | Facility: CLINIC | Age: 57
End: 2022-02-25

## 2022-02-25 ENCOUNTER — TELEMEDICINE (OUTPATIENT)
Dept: INTERNAL MEDICINE | Facility: CLINIC | Age: 57
End: 2022-02-25

## 2022-02-25 DIAGNOSIS — R19.7 DIARRHEA, UNSPECIFIED TYPE: ICD-10-CM

## 2022-02-25 DIAGNOSIS — A05.9 FOOD POISONING: Primary | ICD-10-CM

## 2022-02-25 PROCEDURE — 99213 OFFICE O/P EST LOW 20 MIN: CPT | Performed by: PHYSICIAN ASSISTANT

## 2022-02-25 NOTE — ASSESSMENT & PLAN NOTE
Discussed food poisoning likely. Discussed limited evaluation due to telehealth. Since pt improving, no need for in person evaluation unless sx change or worsen, fever, profuse vomiting, confusion, ams, blood in stool. Encouraged BRAT diet. Monitor hydration. Increase fluids, gatorade or pedialyte. Make sure urinating at least every 6 hours. Avoid anti-diarrhea medicines incase ecoli or other bacterial cause present. Pt understands and agrees

## 2022-02-25 NOTE — PROGRESS NOTES
Chief Complaint  Diarrhea     Subjective          Aida April Kami presents to [facility]  Patient presents during the COVID-19 pandemic/federally declared [facility] of public health emergency. Patient agrees to participate in a telemedicine evaluation performed by Seema Rowland PA-C. Patient authorizes the electronic transmission of medical information and/or videoconference session. Patient understands that he/she can still pursue face-to-face consultation if desired. Patient understands that as with any technology, telemedicine does have its limitations. There is no guarantee, therefore, that this telemedicine session will eliminate the need for patient to see a provider in person if the provider feels a physical exams or vital signs are neccessary to care for the patient. Patient understands and would like to proceed with telemedicine visit at this time.    Patient was seen via video visit using [employer]     Pt states she has colossal crab 2/23  That night she woke up with diarrhea at 2 am  She noticed swelling under her eyes, face, and feet.   She has had 7 episodes of loose diarrhea. Denies blood  Diarrhea seems slightly better compared to yesterday   She gets abdominal tightness when using the bathroom.  Denies abdominal pain.  Denies fever  Denies pain with urination. Pt urinating normally.   Denies nausea, vomiting.  Swelling has decreased  Denies swelling of lips, tongue, or throat.   Denies difficulty breathing or worsening sob. She is using o2.   She has not tried anything otc.  Denies chest pain, palpitations, ha, dizziness, syncope.      Past Medical History:   Diagnosis Date   • Anemia    • Annual physical exam 11/06/2017    WILL RESCHEDULE MAMMOGRAM    • Breast lump    • CHF (congestive heart failure) ([facility]) 11/06/2017    FOLLOWS WITH CARDS. CURRENTLY DOING WELL ON LASIX, LISINOPRIL, ALDACTONE, AND PROPANOLOL   • Degeneration of lumbar intervertebral  disc 2012   • Essential hypertension 2017    WELL CONTROLLED ON CURRENT REGIMEN    • GERD (gastroesophageal reflux disease)    • Hamstring injury 2015    BILATERAL HAMSTRING INJURY/PAIN    • Head injury    • Hernia cerebri (HCC)    • Hyperthyroidism    • Hyperthyroidism 2017    CURRENTLY ON METHIMAZOLE, PT REPORT POSSIBLY DUE TO GRAVE'S DISEASE. WILL NEED TO OBTAIN AND REVIEW MEDICAL RECORDS FROM [facility]. WILL REFER TO ENDO    • Hypoxia    • Insomnia 2017    DISCUSSED SLEEP HYGIENE. PT TO TRY AVOIDING BLUE LIGHT AT NIGHT. PT ALSO TO SET ROUTINE PRIOR TO BEDTIME. WILL AVOID MEDS AT THIS TIME    • Interstitial lung disease (HCC)    • Lumbosacral disc herniation 2012    LEFT L5-S1 SMALL NATURE. SHE HAS FAILED ALL CONSERVATIVE MEASURES AND DESIRES SURGERY    • Lupus (HCC)     SEES DR. ANDERSON   • Migraines    • Pulmonary embolism (HCC)    • Respiratory failure with hypoxia (HCC) 2021   • Rheumatoid arthritis (HCC) 2017    CURRENTLY CONTROLLED, BUT WITH RESIDUAL DEFORMITY. WILL FOLLOW WITH DR. ANDERSON    • Tachycardia    • Total knee replacement status, right 2015        Past Surgical History:   Procedure Laterality Date   • ANKLE SURGERY Bilateral    • BREAST BIOPSY     • CATARACT EXTRACTION WITH INTRAOCULAR LENS IMPLANT     •  SECTION     • COLONOSCOPY     • EYE LENS IMPLANT SECONDARY      YES   • FASCIOTOMY      LEFT ANTERIOR ARM    • HAND SURGERY Left    • KNEE SURGERY     • OTHER SURGICAL HISTORY      LYMPH NODE EXCISION   • PORTACATH PLACEMENT      POWER PORT PLACEMENT         Current Outpatient Medications on File Prior to Visit   Medication Sig Dispense Refill   • ascorbic acid (VITAMIN C) 250 MG tablet Take 1 tablet by mouth Daily.     • azaTHIOprine (IMURAN) 50 MG tablet Take 25 mg by mouth Daily.     • Calcium Carb-Cholecalciferol (Caltrate 600+D3 Soft) 600-800 MG-UNIT chewable tablet Chew 1 tablet Daily.     • cyanocobalamin 1000 MCG/ML injection  Inject 1 mL into the appropriate muscle as directed by prescriber Every 30 (Thirty) Days.     • ergocalciferol (ERGOCALCIFEROL) 1.25 MG (72730 UT) capsule Take 1 capsule by mouth Every 7 (Seven) Days.     • fluticasone (FLONASE) 50 MCG/ACT nasal spray SHAKE LIQUID AND USE 2 SPRAYS(100 MCG) IN EACH NOSTRIL EVERY DAY 16 g 3   • furosemide (LASIX) 40 MG tablet Take 40 mg by mouth 2 (Two) Times a Day.     • gabapentin (NEURONTIN) 300 MG capsule Take 1 capsule by mouth 3 (Three) Times a Day for 30 days. 90 capsule 0   • HYDROcodone-acetaminophen (NORCO) 7.5-325 MG per tablet Take 1 tablet by mouth Every 12 (Twelve) Hours As Needed for Moderate Pain  for up to 30 days. 60 tablet 0   • HYDROcodone-acetaminophen (NORCO) 7.5-325 MG per tablet Take 1 tablet by mouth Every 12 (Twelve) Hours As Needed for Moderate Pain  for up to 30 days. 60 tablet 0   • hydroxychloroquine (PLAQUENIL) 200 MG tablet Take 1 tablet by mouth 2 (Two) Times a Day for 360 days. Indications: Systemic Lupus Erythematosus 180 tablet 3   • hydrOXYzine (ATARAX) 25 MG tablet Take 1 tablet by mouth 2 (Two) Times a Day As Needed for Anxiety for up to 90 days. 180 tablet 1   • levothyroxine (SYNTHROID, LEVOTHROID) 137 MCG tablet Take 1 tablet by mouth Daily.     • loratadine (CLARITIN) 10 MG tablet Take 1 tablet by mouth Daily. 90 tablet 0   • magnesium oxide (MAG-OX) 400 MG tablet TK 1 T PO D  1   • PROAIR  (90 Base) MCG/ACT inhaler INHALE 2 PUFFS PO Q 6 H  4   • promethazine (PHENERGAN) 25 MG tablet Take 25 mg by mouth Every 6 (Six) Hours As Needed for Nausea or Vomiting.     • propranolol (INDERAL) 40 MG tablet TK SS T PO TID  1   • rizatriptan (MAXALT) 10 MG tablet Take 1 tablet by mouth As Needed.     • spironolactone (ALDACTONE) 50 MG tablet TK 1 T PO QD  0   • [DISCONTINUED] rOPINIRole (REQUIP) 1 MG tablet TAKE 1 TABLET BY MOUTH ONE TO THREE HOURS BEFORE BEDTIME X 1 WEEK, THEN 1.5 TABLETS X 1 WEEK, THEN 2 TABLETS AT BEDTIME THEREAFTER 90 tablet 1    • [DISCONTINUED] rOPINIRole (REQUIP) 1 MG tablet TAKE 1 TABLET BY MOUTH ONE TO THREE HOURS BEFORE BEDTIME X 1 WEEK, THEN 1.5 TABLETS X 1 WEEK, THEN 2 TABLETS AT BEDTIME THEREAFTER 60 tablet 2     No current facility-administered medications on file prior to visit.        Allergies   Allergen Reactions   • Salsalate Hives and Anaphylaxis   • Aspirin    • Salicylic Acid        Social History     Tobacco Use   Smoking Status Never Smoker   Smokeless Tobacco Never Used          Objective   Vital Signs:   There were no vitals taken for this visit.    Physical Exam  Constitutional:       Appearance: Normal appearance.   HENT:      Head: Normocephalic.   Pulmonary:      Effort: Pulmonary effort is normal.   Neurological:      Mental Status: She is alert and oriented to person, place, and time.   Psychiatric:         Mood and Affect: Mood normal.        Result Review :                 Assessment and Plan    Diagnoses and all orders for this visit:    1. Food poisoning (Primary)  Assessment & Plan:  Discussed food poisoning likely. Discussed limited evaluation due to telehealth. Since pt improving, no need for in person evaluation unless sx change or worsen, fever, profuse vomiting, confusion, ams, blood in stool. Encouraged BRAT diet. Monitor hydration. Increase fluids, gatorade or pedialyte. Make sure urinating at least every 6 hours. Avoid anti-diarrhea medicines incase ecoli or other bacterial cause present. Pt understands and agrees      2. Diarrhea, unspecified type      Follow Up   Return if symptoms worsen or fail to improve.  Patient was given instructions and counseling regarding her condition or for health maintenance advice. Please see specific information pulled into the AVS if appropriate.

## 2022-02-25 NOTE — TELEPHONE ENCOUNTER
Caller: Aida Mcclure April    Relationship: Self    Best call back number: [phone]    What form or medical record are you requesting: DR GARCÍA    Who is requesting this form or medical record from you: EMPLOYER    How would you like to receive the form or medical records (pick-up, mail, fax):     Timeframe paperwork needed: ASAP    Additional notes: PATIENT STATES SHE WOULD LIKE TO  A NOTE FROM THE PRACTICE EXCUSING HER UNTIL Monday THE 28TH OF FEB. PATIENT WOULD LIKE A PHONE CALL WHEN THE NOTE IS READY FOR .

## 2022-02-25 NOTE — PATIENT INSTRUCTIONS
Food Poisoning  Food poisoning is an illness that is caused by eating or drinking contaminated foods or drinks. In most cases, food poisoning is mild and lasts 1-2 days. However, some cases can be serious, especially for people who have weak body defense systems (immune systems), older people, children and infants, and pregnant women.  What are the causes?  This condition is caused by contaminated food. Foods can become contaminated with viruses, bacteria, parasites, or mold due to:  · Poor personal hygiene, such as poor hand-washing practices.  · Storing food improperly, such as not refrigerating raw meat.  · Using unclean surfaces for preparing, serving, and storing food.  · Cooking or eating with unclean utensils.  If contaminated food is eaten, viruses, bacteria, or parasites can harm the intestine. This often causes severe diarrhea. The most common causes of food poisoning include:  · Viruses, such as:  ? Norovirus.  ? Rotavirus.  · Bacteria, such as:  ? Salmonella.  ? Listeria.  ? E. coli (Escherichia coli).  · Parasites, such as:  ? Giardia.  ? Toxoplasma gondii.  What are the signs or symptoms?  Symptoms may take several hours to appear after you consume contaminated food or drink. Symptoms include:  · Nausea.  · Vomiting.  · Cramping.  · Diarrhea.  · Fever and chills.  · Muscle aches.  · Dehydration. Dehydration can cause you to be tired and thirsty, have a dry mouth, and urinate less frequently.  How is this diagnosed?  Your health care provider can diagnose food poisoning with your medical history and a physical exam. This will include asking you what you have recently eaten. You may also have tests, including:  · Blood tests.  · Stool tests.  How is this treated?  Treatment focuses on relieving your symptoms and making sure that you are hydrated. You may also be given medicines. In severe cases, hospitalization may be required and you may need to receive fluids through an IV.  Follow these instructions  at home:  Eating and drinking    · Drink enough fluids to keep your urine pale yellow. You may need to drink small amounts of clear liquids frequently.  · Avoid milk, caffeine, and alcohol.  · Ask your health care provider for specific rehydration instructions.  · Eat small, frequent meals rather than large meals.    Medicines  · Take over-the-counter and prescription medicines only as told by your health care provider. Ask your health care provider if you should continue to take any of your regular prescribed and over-the-counter medicines.  · If you were prescribed an antibiotic medicine, take it as told by your health care provider. Do not stop taking the antibiotic even if you start to feel better.  General instructions    · Wash your hands thoroughly before you prepare food and after you go to the bathroom (use the toilet). Make sure that the people who live with you also wash their hands often.  · Rest at home until you feel better.  · Clean surfaces that you touch with a product that contains chlorine bleach.  · Keep all follow-up visits as told by your health care provider. This is important.    How is this prevented?  · Wash your hands, food preparation surfaces, and utensils thoroughly before and after you handle raw foods.  · Use separate food preparation surfaces and storage spaces for raw meat and for fruits and vegetables.  · Keep refrigerated foods colder than 40°F (5°C).  · Serve hot foods immediately or keep them heated above 140°F (60°C).  · Store dry foods in cool, dry spaces away from excess heat or moisture. Throw out any foods that do not smell right or are in cans that are bulging.  · Follow approved asif procedures.  · Heat canned foods thoroughly before you taste them.  · Drink bottled or sterile water when you travel.  Get help right away if:  · You have difficulty breathing, swallowing, talking, or moving.  · You develop blurred vision.  · You cannot eat or drink without  vomiting.  · You faint.  · Your eyes turn yellow.  · Your vomiting or diarrhea is persistent.  · Abdominal pain develops, increases, or localizes in one small area.  · You have a fever.  · You have blood or mucus in your stools, or your stools look dark black and tarry.  · You have signs of dehydration, such as:  ? Dark urine, very little urine, or no urine.  ? Cracked lips.  ? Not making tears while crying.  ? Dry mouth.  ? Sunken eyes.  ? Sleepiness.  ? Weakness.  ? Dizziness.  These symptoms may represent a serious problem that is an emergency. Do not wait to see if the symptoms will go away. Get medical help right away. Call your local emergency services (911 in the [place] Do not drive yourself to the hospital.  Summary  · Food poisoning is an illness that is caused by eating or drinking contaminated foods or drinks.  · Symptoms may include nausea, vomiting, diarrhea, muscle aches, cramping, fever, chills, and dehydration.  · In most cases, food poisoning is mild and lasts 1-2 days.  · In severe cases, hospitalization may be required.  This information is not intended to replace advice given to you by your health care provider. Make sure you discuss any questions you have with your health care provider.  Document Revised: 10/02/2019 Document Reviewed: 10/02/2019  [employer] Patient Education © 2021 [employer]

## 2022-02-26 RX ORDER — AZATHIOPRINE 50 MG/1
TABLET ORAL
Qty: 15 TABLET | Status: CANCELLED | OUTPATIENT
Start: 2022-02-26

## 2022-02-28 RX ORDER — AZATHIOPRINE 50 MG/1
25 TABLET ORAL DAILY
Qty: 15 TABLET | Refills: 2 | Status: SHIPPED | OUTPATIENT
Start: 2022-02-28 | End: 2022-03-25 | Stop reason: SDUPTHER

## 2022-03-10 ENCOUNTER — TELEPHONE (OUTPATIENT)
Dept: INTERNAL MEDICINE | Facility: CLINIC | Age: 57
End: 2022-03-10

## 2022-03-10 NOTE — TELEPHONE ENCOUNTER
Caller: Marina Mcclurea April    Relationship: Self    Best call back number: [phone]     What is the best time to reach you: ANY     Who are you requesting to speak with (clinical staff, provider,  specific staff member): CLINICAL     What was the call regarding: PATIENT CALLED TO CHECK IN ON THE STATUS OF HER OXYGEN PATIENT STATED IT IS BEING SWITCHED TO [facility] DUE TO PATIENTS DOSAGE BEING INCREASED     Do you require a callback: YES, PLEASE ADVISE

## 2022-03-11 ENCOUNTER — TELEPHONE (OUTPATIENT)
Dept: INTERNAL MEDICINE | Facility: CLINIC | Age: 57
End: 2022-03-11

## 2022-03-11 NOTE — TELEPHONE ENCOUNTER
Discussed concern with Ms. Alvarez who reached to patient today and left voicemail requesting call back. Will have our clinic manager look into this early next week.

## 2022-03-11 NOTE — TELEPHONE ENCOUNTER
Phone Number: [phone]      Reason for Call:     PATIENT WANTS TO ADVISED PCP  [facility]

## 2022-03-17 ENCOUNTER — TELEPHONE (OUTPATIENT)
Dept: INTERNAL MEDICINE | Facility: CLINIC | Age: 57
End: 2022-03-17

## 2022-03-17 RX ORDER — FLUCONAZOLE 150 MG/1
150 TABLET ORAL ONCE
Qty: 1 TABLET | Refills: 0 | Status: SHIPPED | OUTPATIENT
Start: 2022-03-17 | End: 2022-03-17

## 2022-03-17 NOTE — TELEPHONE ENCOUNTER
Caller: Aida Mcclure April    Relationship: Self    Best call back number: [phone]    What medication are you requesting: YEAST INFECTION MEDICATION    What are your current symptoms: VAGINAL ITCHING    How long have you been experiencing symptoms: 24 HOURS    Have you had these symptoms before:    [x] Yes  [] No    Have you been treated for these symptoms before:   [x] Yes  [] No    If a prescription is needed, what is your preferred pharmacy and phone number: [id number] [place] [address] [facility] AT [facility] [phone] [facility] [phone]      Additional notes:    PATIENT WAS SEEN IN URGENT CARE AND PRESCRIBED AMOXICILLIN FOR A SINUS INFECTION, SHE IS STARTING TO GET A YEAST INFECTION DUE TO THE ANTIBIOTIC.

## 2022-03-17 NOTE — TELEPHONE ENCOUNTER
Fluconazole, 150mg tablet, take 1 tablet once, sent to preferred pharmacy. Please notify pt. Thank You.     Merline Enamorado MD

## 2022-03-25 ENCOUNTER — OFFICE VISIT (OUTPATIENT)
Dept: PULMONOLOGY | Facility: CLINIC | Age: 57
End: 2022-03-25

## 2022-03-25 VITALS
BODY MASS INDEX: 32.61 KG/M2 | WEIGHT: 191 LBS | HEART RATE: 78 BPM | RESPIRATION RATE: 14 BRPM | DIASTOLIC BLOOD PRESSURE: 81 MMHG | OXYGEN SATURATION: 98 % | SYSTOLIC BLOOD PRESSURE: 140 MMHG | HEIGHT: 64 IN

## 2022-03-25 DIAGNOSIS — R53.82 CHRONIC FATIGUE: ICD-10-CM

## 2022-03-25 DIAGNOSIS — I50.32 CHRONIC DIASTOLIC CHF (CONGESTIVE HEART FAILURE): ICD-10-CM

## 2022-03-25 DIAGNOSIS — J98.4 RESTRICTIVE LUNG DISEASE: ICD-10-CM

## 2022-03-25 DIAGNOSIS — Z51.81 THERAPEUTIC DRUG MONITORING: ICD-10-CM

## 2022-03-25 DIAGNOSIS — G47.33 OSA (OBSTRUCTIVE SLEEP APNEA): ICD-10-CM

## 2022-03-25 DIAGNOSIS — J84.9 ILD (INTERSTITIAL LUNG DISEASE): Primary | ICD-10-CM

## 2022-03-25 DIAGNOSIS — J96.11 CHRONIC RESPIRATORY FAILURE WITH HYPOXIA: ICD-10-CM

## 2022-03-25 DIAGNOSIS — R05.9 COUGH: ICD-10-CM

## 2022-03-25 DIAGNOSIS — J30.2 SEASONAL ALLERGIES: ICD-10-CM

## 2022-03-25 DIAGNOSIS — R06.09 DOE (DYSPNEA ON EXERTION): ICD-10-CM

## 2022-03-25 DIAGNOSIS — J30.9 ALLERGIC RHINITIS, UNSPECIFIED SEASONALITY, UNSPECIFIED TRIGGER: ICD-10-CM

## 2022-03-25 DIAGNOSIS — M35.1 MIXED CONNECTIVE TISSUE DISEASE: ICD-10-CM

## 2022-03-25 PROCEDURE — 99214 OFFICE O/P EST MOD 30 MIN: CPT | Performed by: NURSE PRACTITIONER

## 2022-03-25 RX ORDER — FLUTICASONE PROPIONATE 50 MCG
1 SPRAY, SUSPENSION (ML) NASAL
COMMUNITY
End: 2022-09-30

## 2022-03-25 RX ORDER — FLUCONAZOLE 150 MG/1
150 TABLET ORAL ONCE
COMMUNITY
Start: 2022-03-17 | End: 2022-04-08 | Stop reason: SDUPTHER

## 2022-03-25 RX ORDER — HYDROXYZINE HYDROCHLORIDE 25 MG/1
1 TABLET, FILM COATED ORAL 3 TIMES DAILY
COMMUNITY
End: 2022-09-07 | Stop reason: SDUPTHER

## 2022-03-25 RX ORDER — AZATHIOPRINE 50 MG/1
25 TABLET ORAL DAILY
Qty: 15 TABLET | Refills: 2 | Status: SHIPPED | OUTPATIENT
Start: 2022-03-25 | End: 2022-04-24

## 2022-03-25 RX ORDER — HYDROXYCHLOROQUINE SULFATE 200 MG/1
1 TABLET, FILM COATED ORAL 2 TIMES DAILY
COMMUNITY

## 2022-03-25 NOTE — PROGRESS NOTES
Primary Care Provider  Merline Enamorado MD     Referring Provider  No ref. provider found     Chief Complaint  Shortness of Breath (Chronic)    Subjective          Aida Mcclure presents to [facility] PULMONARY [facility]ICAL [facility]ARE MEDICINE  History of Present Illness  Aida Mcclure is a 56 y.o. female patient of Dr. Dennis she is here for management of interstitial lung disease secondary to connective tissue disease, fatigue, chronic compensated diastolic heart failure, dyspnea on exertion, and obstructive sleep apnea.       Patient states she is doing okay since her last visit.  She did have a course of antibiotics a few weeks ago for a upper respiratory infection.  She denies any fevers or chills.  She states that at that time she did have some sinus pressure and nasal drainage, and her primary care provider thought it might have been a sinus infection.  She states that she uses her albuterol inhaler approximately once weekly.  She continues to take Claritin and Flonase for seasonal allergies and allergic rhinitis.  Patient continues to follow-up with primary care for management of lower leg swelling and chronic compensated diastolic heart failure.  She continues to wear 3 L of oxygen continuously and also wears her oxygen at night.  Patient states that she does not wear a CPAP machine for her sleep apnea.  Patient did have a CT scan of the chest on 12/30/2021, as ordered by her primary care provider, which was a stable exam showing findings consistent with interstitial lung disease/pulmonary fibrosis.  Patient also had a pulmonary function test on 12/30/2021, which showed moderate restrictive defect and a severely decreased diffusion capacity.  These results were discussed with patient today.  She continues to take Imuran as prescribed and is in need of a refill.  Patient said that she is able to perform her ADLs without difficulty.  She is up-to-date with her flu, pneumonia, and  Covid vaccines.     Her history of smoking is   Tobacco Use: Low Risk    • Smoking Tobacco Use: Never Smoker   • Smokeless Tobacco Use: Never Used   .    Review of Systems   Constitutional: Negative for chills, fatigue, fever, unexpected weight gain and unexpected weight loss.   HENT: Negative for congestion (Nasal), hearing loss, mouth sores, nosebleeds, postnasal drip, sore throat and trouble swallowing.    Eyes: Negative for blurred vision and visual disturbance.   Respiratory: Positive for cough and shortness of breath. Negative for apnea and wheezing.         Negative for Hemoptysis     Cardiovascular: Positive for leg swelling. Negative for chest pain and palpitations.   Gastrointestinal: Negative for abdominal pain, constipation, diarrhea, nausea, vomiting and GERD.        Negative for Jaundice  Negative for Bloating  Negative for Melena   Musculoskeletal: Negative for joint swelling and myalgias.        Negative for Joint pain  Negative for Joint stiffness   Skin: Negative for color change.        Negative for cyanosis   Neurological: Negative for syncope, weakness, numbness and headache.      Sleep: Negative for Excessive daytime sleepiness  Negative for morning headaches  Negative for Snoring    Family History   Problem Relation Age of Onset   • Stroke Father    • Lung cancer Other    • Arthritis Other    • Cancer Other    • Parkinsonism Other    • Diabetes type II Other         Social History     Socioeconomic History   • Marital status:    Tobacco Use   • Smoking status: Never Smoker   • Smokeless tobacco: Never Used   Vaping Use   • Vaping Use: Never used   Substance and Sexual Activity   • Alcohol use: Yes     Comment: occais wine   • Drug use: Never   • Sexual activity: Defer        Past Medical History:   Diagnosis Date   • Anemia    • Annual physical exam 11/06/2017    WILL RESCHEDULE MAMMOGRAM    • Breast lump    • CHF (congestive heart failure) (HCC) 11/06/2017    FOLLOWS WITH CARDS.  CURRENTLY DOING WELL ON LASIX, LISINOPRIL, ALDACTONE, AND PROPANOLOL   • Degeneration of lumbar intervertebral disc 06/12/2012   • Essential hypertension 11/06/2017    WELL CONTROLLED ON CURRENT REGIMEN    • GERD (gastroesophageal reflux disease)    • Hamstring injury 08/17/2015    BILATERAL HAMSTRING INJURY/PAIN    • Head injury    • Hernia cerebri (HCC)    • Hyperthyroidism    • Hyperthyroidism 11/06/2017    CURRENTLY ON METHIMAZOLE, PT REPORT POSSIBLY DUE TO GRAVE'S DISEASE. WILL NEED TO OBTAIN AND REVIEW MEDICAL RECORDS FROM [facility]. WILL REFER TO ENDO    • Hypoxia    • Insomnia 11/06/2017    DISCUSSED SLEEP HYGIENE. PT TO TRY AVOIDING BLUE LIGHT AT NIGHT. PT ALSO TO SET ROUTINE PRIOR TO BEDTIME. WILL AVOID MEDS AT THIS TIME    • Interstitial lung disease (HCC)    • Lumbosacral disc herniation 06/12/2012    LEFT L5-S1 SMALL NATURE. SHE HAS FAILED ALL CONSERVATIVE MEASURES AND DESIRES SURGERY    • Lupus (HCC)     SEES DR. ANDERSON   • Migraines    • Pulmonary embolism (HCC)    • Respiratory failure with hypoxia (HCC) 02/04/2021   • Rheumatoid arthritis (HCC) 11/06/2017    CURRENTLY CONTROLLED, BUT WITH RESIDUAL DEFORMITY. WILL FOLLOW WITH DR. ANDERSON    • Tachycardia    • Total knee replacement status, right 08/17/2015        Immunization History   Administered Date(s) Administered   • COVID-19 (SANDI) 03/29/2021   • COVID-19 (PFIZER) PURPLE CAP 12/16/2021   • Flu Vaccine Intradermal Quad 18-64YR 01/02/2008, 11/10/2008, 11/05/2009   • Flu Vaccine Quad PF >36MO 10/25/2017   • FluLaval/Fluarix/Fluzone >6 11/18/2021   • Hepatitis A 04/30/2018   • Influenza Quad Vaccine (Inpatient) 08/27/2012, 10/15/2013   • Influenza, Unspecified 09/17/2020   • Pneumococcal Conjugate 13-Valent (PCV13) 06/14/2018   • Pneumococcal Polysaccharide (PPSV23) 08/26/2019         Allergies   Allergen Reactions   • Salsalate Hives and Anaphylaxis   • Aspirin    • Salicylic Acid           Current Outpatient Medications:   •  ascorbic acid  (VITAMIN C) 250 MG tablet, Take 1 tablet by mouth Daily., Disp: , Rfl:   •  azaTHIOprine (IMURAN) 50 MG tablet, Take 0.5 tablets by mouth Daily for 30 days., Disp: 15 tablet, Rfl: 2  •  Calcium Carb-Cholecalciferol (Caltrate 600+D3 Soft) 600-800 MG-UNIT chewable tablet, Chew 1 tablet Daily., Disp: , Rfl:   •  Calcium Carbonate 1500 (600 Ca) MG tablet, Take 1 tablet by mouth., Disp: , Rfl:   •  cyanocobalamin 1000 MCG/ML injection, Inject 1 mL into the appropriate muscle as directed by prescriber Every 30 (Thirty) Days., Disp: , Rfl:   •  ergocalciferol (ERGOCALCIFEROL) 1.25 MG (33474 UT) capsule, Take 1 capsule by mouth Every 7 (Seven) Days., Disp: , Rfl:   •  fluticasone (FLONASE) 50 MCG/ACT nasal spray, SHAKE LIQUID AND USE 2 SPRAYS(100 MCG) IN EACH NOSTRIL EVERY DAY, Disp: 16 g, Rfl: 3  •  furosemide (LASIX) 40 MG tablet, Take 40 mg by mouth 2 (Two) Times a Day., Disp: , Rfl:   •  gabapentin (NEURONTIN) 300 MG capsule, Take 1 capsule by mouth 3 (Three) Times a Day for 30 days., Disp: 90 capsule, Rfl: 0  •  HYDROcodone-acetaminophen (NORCO) 7.5-325 MG per tablet, Take 1 tablet by mouth Every 12 (Twelve) Hours As Needed for Moderate Pain  for up to 30 days., Disp: 60 tablet, Rfl: 0  •  hydroxychloroquine (PLAQUENIL) 200 MG tablet, Take 1 tablet by mouth 2 (Two) Times a Day for 360 days. Indications: Systemic Lupus Erythematosus, Disp: 180 tablet, Rfl: 3  •  hydrOXYzine (ATARAX) 25 MG tablet, Take 1 tablet by mouth 2 (Two) Times a Day As Needed for Anxiety for up to 90 days., Disp: 180 tablet, Rfl: 1  •  levothyroxine (SYNTHROID, LEVOTHROID) 137 MCG tablet, Take 1 tablet by mouth Daily., Disp: , Rfl:   •  loratadine (CLARITIN) 10 MG tablet, Take 1 tablet by mouth Daily., Disp: 90 tablet, Rfl: 0  •  magnesium oxide (MAG-OX) 400 MG tablet, TK 1 T PO D, Disp: , Rfl: 1  •  ProAir  (90 Base) MCG/ACT inhaler, Inhale 2 puffs Every 4 (Four) Hours As Needed for Wheezing., Disp: 18 g, Rfl: 11  •  promethazine  (PHENERGAN) 25 MG tablet, Take 25 mg by mouth Every 6 (Six) Hours As Needed for Nausea or Vomiting., Disp: , Rfl:   •  propranolol (INDERAL) 40 MG tablet, TK SS T PO TID, Disp: , Rfl: 1  •  rizatriptan (MAXALT) 10 MG tablet, Take 1 tablet by mouth As Needed., Disp: , Rfl:   •  spironolactone (ALDACTONE) 50 MG tablet, TK 1 T PO QD, Disp: , Rfl: 0  •  fluconazole (DIFLUCAN) 150 MG tablet, Take 150 mg by mouth 1 (One) Time., Disp: , Rfl:   •  fluticasone (FLONASE) 50 MCG/ACT nasal spray, 1 spray into the nostril(s) as directed by provider., Disp: , Rfl:   •  HYDROcodone-acetaminophen (NORCO) 7.5-325 MG per tablet, Take 1 tablet by mouth Every 12 (Twelve) Hours As Needed for Moderate Pain  for up to 30 days., Disp: 60 tablet, Rfl: 0  •  hydroxychloroquine (PLAQUENIL) 200 MG tablet, Take 1 tablet by mouth 2 (Two) Times a Day., Disp: , Rfl:   •  hydrOXYzine (ATARAX) 25 MG tablet, Take 1 tablet by mouth 3 (Three) Times a Day., Disp: , Rfl:      Objective   Physical Exam  Constitutional:       General: She is not in acute distress.     Appearance: Normal appearance. She is overweight.   HENT:      Right Ear: Hearing normal.      Left Ear: Hearing normal.      Nose: No nasal tenderness or congestion.      Mouth/Throat:      Mouth: Mucous membranes are moist. No oral lesions.   Eyes:      Extraocular Movements: Extraocular movements intact.      Pupils: Pupils are equal, round, and reactive to light.   Neck:      Thyroid: No thyroid mass or thyromegaly.   Cardiovascular:      Rate and Rhythm: Normal rate and regular rhythm.      Pulses: Normal pulses.      Heart sounds: Normal heart sounds. No murmur heard.  Pulmonary:      Effort: Pulmonary effort is normal.      Breath sounds: Decreased breath sounds present. No wheezing, rhonchi or rales.      Comments: Patient is on 3 L of oxygen continuously.  She is able to speak full sentences without difficulty.  Velcro-like crackles noted in bilateral bases.  Chest:   Breasts:       "Right: No axillary adenopathy.       Abdominal:      General: Bowel sounds are normal. There is no distension.      Palpations: Abdomen is soft.      Tenderness: There is no abdominal tenderness.   Musculoskeletal:      Cervical back: Neck supple.      Right lower leg: No edema.      Left lower leg: No edema.   Lymphadenopathy:      Cervical: No cervical adenopathy.      Upper Body:      Right upper body: No axillary adenopathy.   Skin:     General: Skin is warm and dry.      Findings: No lesion or rash.   Neurological:      General: No focal deficit present.      Mental Status: She is alert and oriented to person, place, and time.      Cranial Nerves: Cranial nerves are intact.   Psychiatric:         Mood and Affect: Affect normal. Mood is not anxious or depressed.         Vital Signs:   /81   Pulse 78   Resp 14   Ht 162.6 cm (64\")   Wt 86.6 kg (191 lb)   SpO2 98% Comment: on 3liters  BMI 32.79 kg/m²        Result Review :     CMP    CMP 11/24/21 12/16/21 2/2/22   Glucose 80 89 75   BUN 11 13 14   Creatinine 1.08 (A) 1.10 (A) 1.14 (A)   eGFR  Am 63 62 59 (A)   Sodium 140 141 141   Potassium 4.1 4.1 3.8   Chloride 106 106 106   Calcium 8.3 (A) 8.6 8.6   Albumin 4.10  3.90   Total Bilirubin 0.8  0.8   Alkaline Phosphatase 71  71   AST (SGOT) 18  11   ALT (SGPT) 8  7   (A) Abnormal value            CBC w/diff    CBC w/Diff 8/19/21 11/24/21 2/2/22   WBC 4.90 5.62 5.67   RBC 4.22 4.15 4.42   Hemoglobin 12.2 11.9 (A) 12.6   Hematocrit 39.4 37.7 39.4   MCV 93.4 90.8 89.1   MCH 28.9 28.7 28.5   MCHC 31.0 (A) 31.6 32.0   RDW 13.3 13.8 15.3   Platelets 153 150 131 (A)   Neutrophil Rel % 47.6 49.4 53.9   Immature Granulocyte Rel % 0.2 0.2 0.4   Lymphocyte Rel % 36.5 38.3 32.1   Monocyte Rel % 10.6 8.5 10.1   Eosinophil Rel % 3.7 2.5 2.1   Basophil Rel % 1.4 1.1 1.4   (A) Abnormal value            Data reviewed: Radiologic studies Chest CT 12/30/2021, Cardiology studies Echocardiogram 10/25/2021 and " Pulmonary function test 12/30/2021 and Kasey NAGEL last office note   Procedures        Assessment and Plan    Diagnoses and all orders for this visit:    1. ILD (interstitial lung disease) (HCC) (Primary)  -     azaTHIOprine (IMURAN) 50 MG tablet; Take 0.5 tablets by mouth Daily for 30 days.  Dispense: 15 tablet; Refill: 2  -     ProAir  (90 Base) MCG/ACT inhaler; Inhale 2 puffs Every 4 (Four) Hours As Needed for Wheezing.  Dispense: 18 g; Refill: 11    2. SHANELLE (obstructive sleep apnea)    3. Cough  -     azaTHIOprine (IMURAN) 50 MG tablet; Take 0.5 tablets by mouth Daily for 30 days.  Dispense: 15 tablet; Refill: 2  -     ProAir  (90 Base) MCG/ACT inhaler; Inhale 2 puffs Every 4 (Four) Hours As Needed for Wheezing.  Dispense: 18 g; Refill: 11    4. THOMPSON (dyspnea on exertion)    5. Restrictive lung disease  -     ProAir  (90 Base) MCG/ACT inhaler; Inhale 2 puffs Every 4 (Four) Hours As Needed for Wheezing.  Dispense: 18 g; Refill: 11    6. Allergic rhinitis, unspecified seasonality, unspecified trigger    7. Seasonal allergies    8. Chronic fatigue    9. Chronic respiratory failure with hypoxia (HCC)    10. Mixed connective tissue disease (HCC)    11. Chronic diastolic CHF (congestive heart failure) (HCC)    12. Therapeutic drug monitoring  -     Comprehensive Metabolic Panel; Future  -     CBC & Differential; Future    13.  Continue albuterol as needed.  14.  Continue Imuran as prescribed  15.  Continue supplemental oxygen to maintain saturations at or above 89%.  16.  Continue Claritin and Flonase for seasonal allergies and allergic rhinitis.  17.  Encouraged patient to try to stay as active as possible.  Recommended 30 minutes of daily exercise.  18. I will order repeat labs for patient to have done prior to her next office visit.  19.  Continue follow-up with hematology/oncology for management of lupus  20.  Continue follow-up with primary care for management of diuretics in regards  to chronic diastolic heart failure and lower leg edema.  21.  Follow-up with Dr. Dennis in 6 months, sooner if needed.        Follow Up   Return in about 6 months (around 9/25/2022) for Recheck with Sonny.  Patient was given instructions and counseling regarding her condition or for health maintenance advice. Please see specific information pulled into the AVS if appropriate.

## 2022-04-06 ENCOUNTER — TELEPHONE (OUTPATIENT)
Dept: INTERNAL MEDICINE | Facility: CLINIC | Age: 57
End: 2022-04-06

## 2022-04-06 DIAGNOSIS — G89.29 OTHER CHRONIC PAIN: ICD-10-CM

## 2022-04-06 NOTE — TELEPHONE ENCOUNTER
Caller: Aida Mcclure April    Relationship: Self    Best call back number: [phone]    Requested Prescriptions:   Requested Prescriptions     Pending Prescriptions Disp Refills   • HYDROcodone-acetaminophen (NORCO) 7.5-325 MG per tablet 60 tablet 0     Sig: Take 1 tablet by mouth Every 12 (Twelve) Hours As Needed for Moderate Pain  for up to 30 days.        Pharmacy where request should be sent: [id number] [place] [facility] [address] [facility] [phone] [facility] [phone]      Additional details provided by patient:  PATIENT WILL OUT OF MEDICATION TOMORROW 04/07    Does the patient have less than a 3 day supply:  [] Yes  [x] No    Sarah Acuna   04/06/22 15:13 EDT

## 2022-04-08 DIAGNOSIS — G89.29 OTHER CHRONIC PAIN: ICD-10-CM

## 2022-04-08 RX ORDER — GABAPENTIN 300 MG/1
CAPSULE ORAL
Qty: 90 CAPSULE | Refills: 0 | Status: SHIPPED | OUTPATIENT
Start: 2022-04-08 | End: 2022-09-07 | Stop reason: SDUPTHER

## 2022-04-08 NOTE — TELEPHONE ENCOUNTER
Red rule verified and correct.    Pt calling again for refill of Norco.    She has also been placed on an abx for a sinus infection and is starting to get a vaginal yeast infection.  She is requesting a medication for this as well.    Pended.

## 2022-04-10 RX ORDER — HYDROCODONE BITARTRATE AND ACETAMINOPHEN 7.5; 325 MG/1; MG/1
1 TABLET ORAL EVERY 12 HOURS PRN
Qty: 60 TABLET | Refills: 0 | Status: SHIPPED | OUTPATIENT
Start: 2022-04-10 | End: 2022-06-14 | Stop reason: SDUPTHER

## 2022-04-10 RX ORDER — FLUCONAZOLE 150 MG/1
150 TABLET ORAL ONCE
Qty: 1 TABLET | Refills: 0 | Status: SHIPPED | OUTPATIENT
Start: 2022-04-10 | End: 2022-04-10

## 2022-04-10 NOTE — TELEPHONE ENCOUNTER
Candelario reviewed. Last filled 2/19/22. Medication refilled as requested.     Merline Enamorado MD

## 2022-04-11 RX ORDER — PILOCARPINE HYDROCHLORIDE 5 MG/1
TABLET, FILM COATED ORAL
Qty: 90 TABLET | OUTPATIENT
Start: 2022-04-11

## 2022-04-11 NOTE — TELEPHONE ENCOUNTER
Request came from interface. This medication is not her med list.    Please call patient to find out if this is something she requested from her pharmacy.     Thank You.     Merline Enamorado MD

## 2022-04-21 ENCOUNTER — TELEPHONE (OUTPATIENT)
Dept: INTERNAL MEDICINE | Facility: CLINIC | Age: 57
End: 2022-04-21

## 2022-04-21 NOTE — TELEPHONE ENCOUNTER
Message taken off voicemail.    C/o R sided sciatica. Last evening she was in a lot of pain at work.  Req appt tomorrow.

## 2022-05-05 ENCOUNTER — HOSPITAL ENCOUNTER (EMERGENCY)
Facility: HOSPITAL | Age: 57
Discharge: HOME | End: 2022-05-05
Attending: EMERGENCY MEDICINE | Admitting: EMERGENCY MEDICINE

## 2022-05-05 VITALS
SYSTOLIC BLOOD PRESSURE: 145 MMHG | BODY MASS INDEX: 32.33 KG/M2 | OXYGEN SATURATION: 100 % | HEART RATE: 59 BPM | WEIGHT: 189.38 LBS | TEMPERATURE: 97.8 F | RESPIRATION RATE: 18 BRPM | DIASTOLIC BLOOD PRESSURE: 90 MMHG | HEIGHT: 64 IN

## 2022-05-05 DIAGNOSIS — M54.31 SCIATICA OF RIGHT SIDE: Primary | ICD-10-CM

## 2022-05-05 DIAGNOSIS — T14.8XXA MUSCLE STRAIN: ICD-10-CM

## 2022-05-05 PROCEDURE — 63710000001 PREDNISONE PER 5 MG: Performed by: NURSE PRACTITIONER

## 2022-05-05 PROCEDURE — 99283 EMERGENCY DEPT VISIT LOW MDM: CPT

## 2022-05-05 PROCEDURE — 25010000002 KETOROLAC TROMETHAMINE PER 15 MG: Performed by: NURSE PRACTITIONER

## 2022-05-05 PROCEDURE — 96372 THER/PROPH/DIAG INJ SC/IM: CPT

## 2022-05-05 RX ORDER — PREDNISONE 50 MG/1
50 TABLET ORAL ONCE
Status: COMPLETED | OUTPATIENT
Start: 2022-05-05 | End: 2022-05-05

## 2022-05-05 RX ORDER — DICLOFENAC SODIUM 75 MG/1
75 TABLET, DELAYED RELEASE ORAL 2 TIMES DAILY
Qty: 20 TABLET | Refills: 0 | Status: SHIPPED | OUTPATIENT
Start: 2022-05-05 | End: 2022-06-05

## 2022-05-05 RX ORDER — TIZANIDINE HYDROCHLORIDE 4 MG/1
4 CAPSULE, GELATIN COATED ORAL 3 TIMES DAILY
Qty: 21 CAPSULE | Refills: 0 | Status: SHIPPED | OUTPATIENT
Start: 2022-05-05 | End: 2022-09-30

## 2022-05-05 RX ORDER — PREDNISONE 20 MG/1
20 TABLET ORAL DAILY
Qty: 4 TABLET | Refills: 0 | Status: SHIPPED | OUTPATIENT
Start: 2022-05-05 | End: 2022-05-09

## 2022-05-05 RX ORDER — KETOROLAC TROMETHAMINE 10 MG/1
10 TABLET, FILM COATED ORAL EVERY 6 HOURS PRN
Qty: 15 TABLET | Refills: 0 | Status: SHIPPED | OUTPATIENT
Start: 2022-05-05 | End: 2022-05-05

## 2022-05-05 RX ORDER — KETOROLAC TROMETHAMINE 30 MG/ML
60 INJECTION, SOLUTION INTRAMUSCULAR; INTRAVENOUS ONCE
Status: COMPLETED | OUTPATIENT
Start: 2022-05-05 | End: 2022-05-05

## 2022-05-05 RX ADMIN — PREDNISONE 50 MG: 50 TABLET ORAL at 02:49

## 2022-05-05 RX ADMIN — KETOROLAC TROMETHAMINE 60 MG: 60 INJECTION, SOLUTION INTRAMUSCULAR at 02:49

## 2022-05-05 NOTE — DISCHARGE INSTRUCTIONS
Rest, ice with gentle range of motion stretches to the area several times a day.  Take your meds as prescribed.  You may also take acetaminophen with these medications as needed for pain.  Call work well this morning to follow-up with them for further evaluation and treatment.  Return to the emergency department for any acutely developing bladder or bowel incontinence, any neurological symptoms, or any new or worse concerns.

## 2022-05-05 NOTE — ED PROVIDER NOTES
Subjective   The patient presents to the emergency department and states that she was at work tonight when she was placing some frozen bread in the cooler.  She states that she was holding the tray when she twisted causing her to have pain in her lower right lumbar region.  She states that it is tender to palpate and hurts when she leans a certain way.  She states that it is a sharp shooting pain that will cause her to hold her breath when it hits and radiates down into her right buttock..  She denies any shortness of breath.  She denies any cough or hemoptysis.  She reports no recent illnesses or fevers.  She states that the pain is stayed the same since she twisted in the cooler when it started.  Her breath sounds are clear.  Her airway is patent.  She denies any chest pain or shortness of breath.  She denies any leg or calf pain.  She denies any numbness or tingling.  She denies any bladder or bowel incontinence.          Review of Systems   Constitutional: Negative for chills and fever.   HENT: Negative for congestion, ear pain and sore throat.    Eyes: Negative for pain.   Respiratory: Negative for cough, chest tightness, shortness of breath and wheezing.    Cardiovascular: Negative for chest pain.   Gastrointestinal: Negative for abdominal pain, diarrhea, nausea and vomiting.   Genitourinary: Negative for flank pain and hematuria.   Musculoskeletal: Positive for back pain. Negative for joint swelling.   Skin: Negative for pallor and rash.   Neurological: Negative for seizures and headaches.   All other systems reviewed and are negative.      Past Medical History:   Diagnosis Date   • Anemia    • Annual physical exam 11/06/2017    WILL RESCHEDULE MAMMOGRAM    • Breast lump    • CHF (congestive heart failure) ([facility]) 11/06/2017    FOLLOWS WITH CARDS. CURRENTLY DOING WELL ON LASIX, LISINOPRIL, ALDACTONE, AND PROPANOLOL   • Degeneration of lumbar intervertebral disc 06/12/2012   • Essential hypertension 11/06/2017     WELL CONTROLLED ON CURRENT REGIMEN    • GERD (gastroesophageal reflux disease)    • Hamstring injury 2015    BILATERAL HAMSTRING INJURY/PAIN    • Head injury    • Hernia cerebri (HCC)    • Hyperthyroidism    • Hyperthyroidism 2017    CURRENTLY ON METHIMAZOLE, PT REPORT POSSIBLY DUE TO GRAVE'S DISEASE. WILL NEED TO OBTAIN AND REVIEW MEDICAL RECORDS FROM [facility]. WILL REFER TO ENDO    • Hypoxia    • Insomnia 2017    DISCUSSED SLEEP HYGIENE. PT TO TRY AVOIDING BLUE LIGHT AT NIGHT. PT ALSO TO SET ROUTINE PRIOR TO BEDTIME. WILL AVOID MEDS AT THIS TIME    • Interstitial lung disease (HCC)    • Lumbosacral disc herniation 2012    LEFT L5-S1 SMALL NATURE. SHE HAS FAILED ALL CONSERVATIVE MEASURES AND DESIRES SURGERY    • Lupus (HCC)     SEES DR. ANDERSON   • Migraines    • Pulmonary embolism (HCC)    • Respiratory failure with hypoxia (HCC) 2021   • Rheumatoid arthritis (HCC) 2017    CURRENTLY CONTROLLED, BUT WITH RESIDUAL DEFORMITY. WILL FOLLOW WITH DR. ANDERSON    • Tachycardia    • Total knee replacement status, right 2015       Allergies   Allergen Reactions   • Salsalate Hives and Anaphylaxis   • Aspirin    • Salicylic Acid        Past Surgical History:   Procedure Laterality Date   • ANKLE SURGERY Bilateral    • BREAST BIOPSY     • CATARACT EXTRACTION WITH INTRAOCULAR LENS IMPLANT     •  SECTION     • COLONOSCOPY     • EYE LENS IMPLANT SECONDARY      YES   • FASCIOTOMY      LEFT ANTERIOR ARM    • HAND SURGERY Left    • KNEE SURGERY     • OTHER SURGICAL HISTORY      LYMPH NODE EXCISION   • PORTACATH PLACEMENT      POWER PORT PLACEMENT        Family History   Problem Relation Age of Onset   • Stroke Father    • Lung cancer Other    • Arthritis Other    • Cancer Other    • Parkinsonism Other    • Diabetes type II Other        Social History     Socioeconomic History   • Marital status:    Tobacco Use   • Smoking status: Never Smoker   • Smokeless tobacco: Never Used    Vaping Use   • Vaping Use: Never used   Substance and Sexual Activity   • Alcohol use: Yes     Comment: occais wine   • Drug use: Never   • Sexual activity: Defer           Objective   Physical Exam  Vitals and nursing note reviewed.   Constitutional:       General: She is not in acute distress.     Appearance: Normal appearance. She is not toxic-appearing.   HENT:      Head: Normocephalic and atraumatic.   Eyes:      General: No scleral icterus.  Cardiovascular:      Rate and Rhythm: Normal rate and regular rhythm.      Pulses: Normal pulses.   Pulmonary:      Effort: Pulmonary effort is normal. No respiratory distress.      Breath sounds: Normal breath sounds. No wheezing.   Abdominal:      General: Abdomen is flat.   Musculoskeletal:         General: No swelling or tenderness. Normal range of motion.      Cervical back: Normal range of motion and neck supple. No rigidity or tenderness.      Right lower leg: No edema.      Left lower leg: No edema.   Lymphadenopathy:      Cervical: No cervical adenopathy.   Skin:     General: Skin is warm and dry.      Capillary Refill: Capillary refill takes less than 2 seconds.      Findings: No rash.   Neurological:      General: No focal deficit present.      Mental Status: She is alert and oriented to person, place, and time. Mental status is at baseline.   Psychiatric:         Mood and Affect: Mood normal.         Behavior: Behavior normal.         Procedures           ED Course                                                 MDM  Number of Diagnoses or Management Options  Muscle strain: minor  Sciatica of right side: minor  Risk of Complications, Morbidity, and/or Mortality  Presenting problems: minimal  Diagnostic procedures: minimal  Management options: minimal    Patient Progress  Patient progress: stable      Final diagnoses:   Sciatica of right side   Muscle strain       ED Disposition  ED Disposition     ED Disposition   Discharge    Condition   Stable    Comment    --             [facility]  [address]  [facility] [address]  [phone]  Call today  FOR FOLLOW UP         Medication List      New Prescriptions    diclofenac 75 MG EC tablet  Commonly known as: VOLTAREN  Take 1 tablet by mouth 2 (Two) Times a Day.     predniSONE 20 MG tablet  Commonly known as: DELTASONE  Take 1 tablet by mouth Daily.     TiZANidine 4 MG capsule  Commonly known as: Zanaflex  Take 1 capsule by mouth 3 (Three) Times a Day.           Where to Get Your Medications      These medications were sent to [id number] - [place], [place] - [address] AT [facility] - [phone]  - [phone]   [address] [place] [address] [place] [address]    Hours: 24-hours Phone: [phone]   · diclofenac 75 MG EC tablet  · predniSONE 20 MG tablet  · TiZANidine 4 MG capsule          Kayla Goldman, ROBE  05/05/22 0628

## 2022-05-09 ENCOUNTER — TELEPHONE (OUTPATIENT)
Dept: INTERNAL MEDICINE | Facility: CLINIC | Age: 57
End: 2022-05-09

## 2022-05-09 ENCOUNTER — OFFICE VISIT (OUTPATIENT)
Dept: INTERNAL MEDICINE | Facility: CLINIC | Age: 57
End: 2022-05-09

## 2022-05-09 VITALS
OXYGEN SATURATION: 99 % | SYSTOLIC BLOOD PRESSURE: 120 MMHG | HEART RATE: 69 BPM | DIASTOLIC BLOOD PRESSURE: 84 MMHG | TEMPERATURE: 97.9 F

## 2022-05-09 DIAGNOSIS — E66.09 CLASS 1 OBESITY DUE TO EXCESS CALORIES WITH SERIOUS COMORBIDITY AND BODY MASS INDEX (BMI) OF 32.0 TO 32.9 IN ADULT: ICD-10-CM

## 2022-05-09 DIAGNOSIS — T30.0 BURN: ICD-10-CM

## 2022-05-09 DIAGNOSIS — M54.31 SCIATICA, RIGHT SIDE: Primary | ICD-10-CM

## 2022-05-09 PROCEDURE — 99214 OFFICE O/P EST MOD 30 MIN: CPT | Performed by: NURSE PRACTITIONER

## 2022-05-09 RX ORDER — PREDNISONE 20 MG/1
40 TABLET ORAL DAILY
Qty: 6 TABLET | Refills: 0 | Status: SHIPPED | OUTPATIENT
Start: 2022-05-09 | End: 2022-05-12

## 2022-05-09 NOTE — TELEPHONE ENCOUNTER
Caller: FELIX DYER      Relationship to patient: [email or web address]     Best call back number: [phone]    Patient is needing: EREN STATED THAT SHE WAS MAKING CURTSEY CALL TO VERIFY INFORMATION I AM NOT ABLE TO CONFIRM. WANTED TO CONFIRM THAT PATIENT HAS BEEN SEEN FOR HIGHER LEVEL BLOOD PRESSURE CONCERNS 5/9/22. THAT THERE IS NO REAL NEED FOR CALL BACK.

## 2022-05-09 NOTE — PROGRESS NOTES
Chief Complaint  Back Pain and Follow-up (Food poisoning )    Subjective         Aidakael Mcclure presents to [facility] for Follow-up for back pain.  Patient reports that she has been having sciatic type pain for the last couple of weeks, she did eventually go to the emergency room on 5/5/2022, she was diagnosed with sciatica, she was given steroids, diclofenac, muscle relaxer.  Patient has been using those medications since that time, she has had some relief.  She reports that she notices the pain around 3 times a day, it starts in the right low back area and shoots down into the right buttocks and into the posterior thigh.  Pain will resolve typically on its own.  She denies any numbness or tingling.    Patient is also wanting to be referred to a dietitian, she recently spoke with her insurance and they stated that she would have coverage as long as her BMI was above 30.  Patient is wanting to discuss weight loss and was hoping to be able to get some information regarding her dietary intake.    Patient also concerned about a burn on her right forearm, this happened while she was working, she burned it on an open.  She thinks this was on Saturday.    Otherwise, no significant concerns or complaints today.         Review of Systems    Objective   Vital Signs:   /84   Pulse 69   Temp 97.9 °F (36.6 °C)   SpO2 99%     Physical Exam  Vitals and nursing note reviewed.   Constitutional:       Appearance: Normal appearance. She is obese.   HENT:      Head: Normocephalic and atraumatic.      Nose: Nose normal.      Mouth/Throat:      Mouth: Mucous membranes are moist.   Eyes:      Pupils: Pupils are equal, round, and reactive to light.   Cardiovascular:      Rate and Rhythm: Normal rate and regular rhythm.   Pulmonary:      Effort: Pulmonary effort is normal.      Breath sounds: Normal breath sounds.   Skin:     General: Skin is warm and dry.      Comments: Burn noted to right  forearm   Neurological:      Mental Status: She is alert.   Psychiatric:         Mood and Affect: Mood normal.        Result Review :                   Diagnoses and all orders for this visit:    1. Sciatica, right side (Primary)  Comments:  We will increase steroid for 3 days, diclofenac twice daily, tizanidine as needed.  Follow-up if symptoms worsen or persist    2. Class 1 obesity due to excess calories with serious comorbidity and body mass index (BMI) of 32.0 to 32.9 in adult  Comments:  Referral to dietary services    3. Burn  Comments:  Healing well, advised to use antibiotic ointment as needed.    Other orders  -     predniSONE (DELTASONE) 20 MG tablet; Take 2 tablets by mouth Daily for 3 days.  Dispense: 6 tablet; Refill: 0          Follow Up   Return if symptoms worsen or fail to improve.  Patient was given instructions and counseling regarding her condition or for health maintenance advice. Please see specific information pulled into the AVS if appropriate.     Saeid Ng, ROBE  5/9/2022  This note was electronically signed.

## 2022-05-19 ENCOUNTER — APPOINTMENT (OUTPATIENT)
Dept: NUTRITION | Facility: HOSPITAL | Age: 57
End: 2022-05-19

## 2022-05-26 ENCOUNTER — APPOINTMENT (OUTPATIENT)
Dept: NUTRITION | Facility: HOSPITAL | Age: 57
End: 2022-05-26

## 2022-06-05 ENCOUNTER — HOSPITAL ENCOUNTER (EMERGENCY)
Facility: HOSPITAL | Age: 57
Discharge: HOME | End: 2022-06-05
Attending: EMERGENCY MEDICINE | Admitting: EMERGENCY MEDICINE

## 2022-06-05 VITALS
SYSTOLIC BLOOD PRESSURE: 141 MMHG | WEIGHT: 196 LBS | HEART RATE: 81 BPM | HEIGHT: 64 IN | OXYGEN SATURATION: 97 % | TEMPERATURE: 98.6 F | DIASTOLIC BLOOD PRESSURE: 76 MMHG | RESPIRATION RATE: 16 BRPM | BODY MASS INDEX: 33.46 KG/M2

## 2022-06-05 DIAGNOSIS — S01.111A LACERATION OF RIGHT EYEBROW WITHOUT COMPLICATION, INITIAL ENCOUNTER: Primary | ICD-10-CM

## 2022-06-05 PROCEDURE — 99283 EMERGENCY DEPT VISIT LOW MDM: CPT

## 2022-06-05 RX ORDER — LIDOCAINE HYDROCHLORIDE AND EPINEPHRINE 10; 10 MG/ML; UG/ML
10 INJECTION, SOLUTION INFILTRATION; PERINEURAL ONCE
Status: COMPLETED | OUTPATIENT
Start: 2022-06-05 | End: 2022-06-05

## 2022-06-05 RX ORDER — IBUPROFEN 400 MG/1
800 TABLET ORAL ONCE
Status: COMPLETED | OUTPATIENT
Start: 2022-06-05 | End: 2022-06-05

## 2022-06-05 RX ADMIN — LIDOCAINE HYDROCHLORIDE,EPINEPHRINE BITARTRATE 10 ML: 10; .01 INJECTION, SOLUTION INFILTRATION; PERINEURAL at 13:59

## 2022-06-05 RX ADMIN — IBUPROFEN 800 MG: 400 TABLET, FILM COATED ORAL at 13:47

## 2022-06-05 NOTE — DISCHARGE INSTRUCTIONS
Please keep area clean and dry  Please follow-up with your PCP in 3 to 5 days for suture removal  You can take ibuprofen/Tylenol as needed for headache/pain  You can apply ice to area for swelling if needed

## 2022-06-05 NOTE — ED PROVIDER NOTES
Subjective   Patient is a 57-year-old female presenting today due to a laceration above the right eyebrow that occurred earlier today.  Patient states that she was at work and reached up for some pants and about 4 5 dropped on her.  No loss of consciousness no bleeding disorders and no blood thinners.  She denies nausea and vomiting.      History provided by:  Patient   used: No    Head Laceration  Location:  R eyebrow  Associated symptoms: no loss of consciousness, no nausea and no vomiting        Review of Systems   Gastrointestinal: Negative for nausea and vomiting.   Skin: Positive for wound (lac).   Neurological: Negative for loss of consciousness.       Past Medical History:   Diagnosis Date   • Anemia    • Annual physical exam 11/06/2017    WILL RESCHEDULE MAMMOGRAM    • Breast lump    • CHF (congestive heart failure) (HCC) 11/06/2017    FOLLOWS WITH CARDS. CURRENTLY DOING WELL ON LASIX, LISINOPRIL, ALDACTONE, AND PROPANOLOL   • Degeneration of lumbar intervertebral disc 06/12/2012   • Essential hypertension 11/06/2017    WELL CONTROLLED ON CURRENT REGIMEN    • GERD (gastroesophageal reflux disease)    • Hamstring injury 08/17/2015    BILATERAL HAMSTRING INJURY/PAIN    • Head injury    • Hernia cerebri (HCC)    • Hyperthyroidism    • Hyperthyroidism 11/06/2017    CURRENTLY ON METHIMAZOLE, PT REPORT POSSIBLY DUE TO GRAVE'S DISEASE. WILL NEED TO OBTAIN AND REVIEW MEDICAL RECORDS FROM [facility]. WILL REFER TO ENDO    • Hypoxia    • Insomnia 11/06/2017    DISCUSSED SLEEP HYGIENE. PT TO TRY AVOIDING BLUE LIGHT AT NIGHT. PT ALSO TO SET ROUTINE PRIOR TO BEDTIME. WILL AVOID MEDS AT THIS TIME    • Interstitial lung disease ([facility])    • Lumbosacral disc herniation 06/12/2012    LEFT L5-S1 SMALL NATURE. SHE HAS FAILED ALL CONSERVATIVE MEASURES AND DESIRES SURGERY    • Lupus ([facility])     SEES DR. ANDERSON   • Migraines    • Pulmonary embolism ([facility])    • Respiratory failure with hypoxia ([facility]) 02/04/2021   •  Rheumatoid arthritis (HCC) 2017    CURRENTLY CONTROLLED, BUT WITH RESIDUAL DEFORMITY. WILL FOLLOW WITH DR. ANDERSON    • Tachycardia    • Total knee replacement status, right 2015       Allergies   Allergen Reactions   • Salsalate Hives and Anaphylaxis   • Aspirin    • Salicylic Acid        Past Surgical History:   Procedure Laterality Date   • ANKLE SURGERY Bilateral    • BREAST BIOPSY     • CATARACT EXTRACTION WITH INTRAOCULAR LENS IMPLANT     •  SECTION     • COLONOSCOPY     • EYE LENS IMPLANT SECONDARY      YES   • FASCIOTOMY      LEFT ANTERIOR ARM    • HAND SURGERY Left    • KNEE SURGERY     • OTHER SURGICAL HISTORY      LYMPH NODE EXCISION   • PORTACATH PLACEMENT      POWER PORT PLACEMENT        Family History   Problem Relation Age of Onset   • Stroke Father    • Lung cancer Other    • Arthritis Other    • Cancer Other    • Parkinsonism Other    • Diabetes type II Other        Social History     Socioeconomic History   • Marital status:    Tobacco Use   • Smoking status: Never Smoker   • Smokeless tobacco: Never Used   Vaping Use   • Vaping Use: Never used   Substance and Sexual Activity   • Alcohol use: Yes     Comment: occ wine   • Drug use: Never   • Sexual activity: Defer           Objective   Physical Exam  Vitals and nursing note reviewed.   Constitutional:       Appearance: Normal appearance.   HENT:      Head: Normocephalic and atraumatic.        Comments: 1.5 cm laceration above the right eyebrow  Neurovascularly intact       Nose: Nose normal.      Mouth/Throat:      Mouth: Mucous membranes are moist.   Eyes:      Extraocular Movements: Extraocular movements intact.      Conjunctiva/sclera: Conjunctivae normal.      Pupils: Pupils are equal, round, and reactive to light.   Cardiovascular:      Rate and Rhythm: Normal rate and regular rhythm.   Pulmonary:      Effort: Pulmonary effort is normal.      Breath sounds: Normal breath sounds.   Abdominal:      General: Abdomen is  flat. Bowel sounds are normal.      Palpations: Abdomen is soft.   Musculoskeletal:         General: Normal range of motion.      Cervical back: Normal range of motion and neck supple.   Skin:     General: Skin is warm and dry.   Neurological:      General: No focal deficit present.      Mental Status: She is alert and oriented to person, place, and time.   Psychiatric:         Mood and Affect: Mood normal.         Behavior: Behavior normal.         Laceration Repair    Date/Time: 6/5/2022 2:15 PM  Performed by: Lin Lee PA-C  Authorized by: Ruddy Mendoza MD     Consent:     Consent obtained:  Verbal    Consent given by:  Patient    Risks discussed:  Poor cosmetic result, pain and infection  Universal protocol:     Patient identity confirmed:  Verbally with patient  Anesthesia:     Anesthesia method:  Local infiltration    Local anesthetic:  Lidocaine 1% WITH epi  Laceration details:     Location:  Face    Face location:  R eyebrow    Length (cm):  1.5  Exploration:     Hemostasis achieved with:  Direct pressure  Treatment:     Area cleansed with:  Povidone-iodine and saline    Irrigation solution:  Sterile saline    Irrigation volume:  20    Irrigation method:  Syringe    Debridement:  None  Skin repair:     Repair method:  Sutures    Suture size:  5-0    Suture material:  Nylon    Number of sutures:  3  Approximation:     Approximation:  Close  Repair type:     Repair type:  Simple  Post-procedure details:     Dressing:  Open (no dressing)    Procedure completion:  Tolerated well, no immediate complications               ED Course                                                 MDM  Number of Diagnoses or Management Options  Diagnosis management comments: I have spoken with patient. I have explained the patient´s condition, diagnoses and treatment plan based on the information available to me at this time. I have answered the patient's questions and addressed any concerns. The patient has a good   understanding of the patient´s diagnosis, condition, and treatment plan as can be expected at this point. The vital signs have been stable. The patient´s condition is stable and appropriate for discharge from the emergency department.      The patient will pursue further outpatient evaluation with the primary care physician or other designated or consulting physician as outlined in the discharge instructions. They are agreeable to this plan of care and follow-up instructions have been explained in detail. The patient has received these instructions in written format and have expressed an understanding of the discharge instructions. The patient is aware that any significant change in condition or worsening of symptoms should prompt an immediate return to this or the closest emergency department or call to 911.      Risk of Complications, Morbidity, and/or Mortality  Presenting problems: low  Diagnostic procedures: low  Management options: low    Patient Progress  Patient progress: stable      Final diagnoses:   Laceration of right eyebrow without complication, initial encounter       ED Disposition  ED Disposition     ED Disposition   Discharge    Condition   Stable    Comment   --             Merline Enamorado MD  [address] [address]  [phone]      3-5 days, For suture removal         Medication List      No changes were made to your prescriptions during this visit.          Lin Lee PA-C  06/05/22 [id number]

## 2022-06-15 ENCOUNTER — NUTRITION (OUTPATIENT)
Dept: NUTRITION | Facility: HOSPITAL | Age: 57
End: 2022-06-15

## 2022-06-15 VITALS — WEIGHT: 195.33 LBS | BODY MASS INDEX: 33.35 KG/M2 | HEIGHT: 64 IN

## 2022-06-15 PROCEDURE — 97802 MEDICAL NUTRITION INDIV IN: CPT

## 2022-06-15 NOTE — CONSULTS
Nutrition Services    Patient Name: Aida Mcclure  YOB: 1965  MRN: [id number]  Appointment: 06/15/22 14:37 EDT    Nutrition Assessment      Reason for Assessment Aida Mcclure is a 57 y.o. female being seen as initial appointment for Weight management.      H&P:    Past Medical History:   Diagnosis Date   • Anemia    • Annual physical exam 11/06/2017    WILL RESCHEDULE MAMMOGRAM    • Breast lump    • CHF (congestive heart failure) (HCC) 11/06/2017    FOLLOWS WITH CARDS. CURRENTLY DOING WELL ON LASIX, LISINOPRIL, ALDACTONE, AND PROPANOLOL   • Degeneration of lumbar intervertebral disc 06/12/2012   • Essential hypertension 11/06/2017    WELL CONTROLLED ON CURRENT REGIMEN    • GERD (gastroesophageal reflux disease)    • Hamstring injury 08/17/2015    BILATERAL HAMSTRING INJURY/PAIN    • Head injury    • Hernia cerebri ([facility])    • Hyperthyroidism    • Hyperthyroidism 11/06/2017    CURRENTLY ON METHIMAZOLE, PT REPORT POSSIBLY DUE TO GRAVE'S DISEASE. WILL NEED TO OBTAIN AND REVIEW MEDICAL RECORDS FROM [facility]. WILL REFER TO ENDO    • Hypoxia    • Insomnia 11/06/2017    DISCUSSED SLEEP HYGIENE. PT TO TRY AVOIDING BLUE LIGHT AT NIGHT. PT ALSO TO SET ROUTINE PRIOR TO BEDTIME. WILL AVOID MEDS AT THIS TIME    • Interstitial lung disease (formerly [facility])    • Lumbosacral disc herniation 06/12/2012    LEFT L5-S1 SMALL NATURE. SHE HAS FAILED ALL CONSERVATIVE MEASURES AND DESIRES SURGERY    • Lupus (formerly [facility])     SEES DR. ANDERSON   • Migraines    • Pulmonary embolism ([facility])    • Respiratory failure with hypoxia ([facility]) 02/04/2021   • Rheumatoid arthritis ([facility]) 11/06/2017    CURRENTLY CONTROLLED, BUT WITH RESIDUAL DEFORMITY. WILL FOLLOW WITH DR. ANDERSON    • Tachycardia    • Total knee replacement status, right 08/17/2015          Labs/Medications         Pertinent Labs Reviewed.       Coronavirus (COVID-19)   Date Value Ref Range Status   03/17/2021 NOT DETECTED NA Final     Comment:     The SARS-CoV-2 assay is a real-time,  "RT-PCR test intended  for the qualitative detection of nucleic acid from the  SARS-CoV-2 in respiratory specimens from individuals,  testing performed at [facility].       Lab Results   Component Value Date    HGBA1C 5.4 02/23/2021         Pertinent Medications Reviewed.     Nutrition/Diet History         Narrative     Pt expresses desire to maintain healthy weight. Pt hat total thyroidectomy in 2020. Typically consumes one meal daily at work ([employer]) and snacks at home. Works up to 6 days weekly for 8 hours daily. Pt does have one daughter.  Pt typically sleeps 3-4 hours a night.   Usual Intake Skips breakfast    6\" sub sandwich for lunch    Snacks on chips, fruit, yogurt    Water and orange juice   Factors Affecting Intake Pt does have dentures, no issues chewing and swallowing reported   Support System Self   Activity Level Low   Motivation/Barriers Pt is in preparation stage     Anthropometrics         Current Height, Weight Height: 162.6 cm (64\")  Weight: 88.6 kg (195 lb 5.2 oz)    Current BMI Body mass index is 33.53 kg/m².         Weight Hx  Wt Readings from Last 30 Encounters:   06/15/22 1436 88.6 kg (195 lb 5.2 oz)   06/05/22 1254 88.9 kg (196 lb)   05/25/22 1543 85.9 kg (189 lb 6 oz)   05/24/22 1232 85.9 kg (189 lb 6 oz)   05/05/22 0109 85.9 kg (189 lb 6 oz)   04/06/22 1917 86.6 kg (191 lb)   03/25/22 1431 86.6 kg (191 lb)   01/01/22 1718 93.8 kg (206 lb 12.7 oz)   12/15/21 1609 94.1 kg (207 lb 6.4 oz)   11/23/21 1528 95.6 kg (210 lb 12.8 oz)   09/15/21 1021 94.1 kg (207 lb 6 oz)   09/01/21 1404 91.2 kg (201 lb)   07/14/21 0830 92.1 kg (203 lb)   06/29/21 1346 92.1 kg (203 lb)   06/21/21 1111 104 kg (230 lb)   05/26/21 0000 88.9 kg (196 lb)   04/23/21 0000 89 kg (196 lb 4 oz)   04/13/21 0000 86.8 kg (191 lb 6 oz)   03/19/21 0000 87.6 kg (193 lb 2 oz)   02/04/21 0000 86.7 kg (191 lb 2 oz)   01/29/21 0000 88.2 kg (194 lb 6 oz)   01/20/21 0956 88.5 kg (195 lb)   01/19/21 0000 87.1 kg (192 lb 2 oz) "   01/15/21 0000 89.9 kg (198 lb 2 oz)   11/16/20 0000 91.6 kg (202 lb)   11/11/20 0000 92.6 kg (204 lb 4 oz)   09/17/20 0000 91.2 kg (201 lb)   06/30/20 1324 78.9 kg (174 lb)   05/28/20 0000 71.3 kg (157 lb 4 oz)   04/03/20 0000 63.6 kg (140 lb 4 oz)           Wt Change Observation 8lb wt loss in 1 year     Estimated/Assessed Needs        Calories 2215 kcal (25 kcal/kg)    Protein 71-89 gPro (0.8-1.0 g/kg)    Fluid 2215 ml (25 ml/kg)     Nutrition Diagnosis         PES Overweight/Obesity related to lack of prior nutrition related education as evidenced by patient report.       Nutrition Intervention        RD Action Provided Medical Nutrition Therapy for obesity   Goals Pt established the following goals:  1. Increase sleep quality by aiming for 7 hours nightly  2. Increase times protein is consumed throughout the day by having a small amount at dinner and snacks  3. Increase vegetable intake by adding to snacks    Adapted based on patient readiness, skills, resources, culture, and lifestyle    Established intervention with patient collaboration    Offered action strategies and steps to help patient reach nutrition related goals     Medical Nutrition Therapy/Nutrition Education       Learner   Readiness Patient  Eager   Method   Response Explanation, Teachback, Demonstration and Written Material  Verbalizes understanding      Monitor/Evaluation         Copy of current note sent to referring physician, Follow up with [facility] PRN and Pt to self-monitor       Electronically signed by:  Alexandra Azar RD  06/15/22 14:37 EDT  Pt seen from: [id number]

## 2022-06-30 ENCOUNTER — CLINICAL SUPPORT (OUTPATIENT)
Dept: INTERNAL MEDICINE | Facility: CLINIC | Age: 57
End: 2022-06-30

## 2022-06-30 DIAGNOSIS — Z79.899 HIGH RISK MEDICATION USE: Primary | ICD-10-CM

## 2022-06-30 PROCEDURE — 80305 DRUG TEST PRSMV DIR OPT OBS: CPT | Performed by: STUDENT IN AN ORGANIZED HEALTH CARE EDUCATION/TRAINING PROGRAM

## 2022-07-11 ENCOUNTER — LAB (OUTPATIENT)
Dept: LAB | Facility: HOSPITAL | Age: 57
End: 2022-07-11

## 2022-07-11 DIAGNOSIS — Z51.81 THERAPEUTIC DRUG MONITORING: ICD-10-CM

## 2022-07-11 LAB
ALBUMIN SERPL-MCNC: 4 G/DL (ref 3.5–5.2)
ALBUMIN/GLOB SERPL: 1.3 G/DL
ALP SERPL-CCNC: 59 U/L (ref 39–117)
ALT SERPL W P-5'-P-CCNC: 13 U/L (ref 1–33)
ANION GAP SERPL CALCULATED.3IONS-SCNC: 10 MMOL/L (ref 5–15)
AST SERPL-CCNC: 22 U/L (ref 1–32)
BASOPHILS # BLD AUTO: 0.07 10*3/MM3 (ref 0–0.2)
BASOPHILS NFR BLD AUTO: 1.1 % (ref 0–1.5)
BILIRUB SERPL-MCNC: 1 MG/DL (ref 0–1.2)
BUN SERPL-MCNC: 19 MG/DL (ref 6–20)
BUN/CREAT SERPL: 17.4 (ref 7–25)
CALCIUM SPEC-SCNC: 8.5 MG/DL (ref 8.6–10.5)
CHLORIDE SERPL-SCNC: 105 MMOL/L (ref 98–107)
CO2 SERPL-SCNC: 25 MMOL/L (ref 22–29)
CREAT SERPL-MCNC: 1.09 MG/DL (ref 0.57–1)
DEPRECATED RDW RBC AUTO: 42.7 FL (ref 37–54)
EGFRCR SERPLBLD CKD-EPI 2021: 58.6 ML/MIN/1.73
EOSINOPHIL # BLD AUTO: 0.19 10*3/MM3 (ref 0–0.4)
EOSINOPHIL NFR BLD AUTO: 3.1 % (ref 0.3–6.2)
ERYTHROCYTE [DISTWIDTH] IN BLOOD BY AUTOMATED COUNT: 13.6 % (ref 12.3–15.4)
GLOBULIN UR ELPH-MCNC: 3 GM/DL
GLUCOSE SERPL-MCNC: 92 MG/DL (ref 65–99)
HCT VFR BLD AUTO: 36.7 % (ref 34–46.6)
HGB BLD-MCNC: 12.2 G/DL (ref 12–15.9)
LYMPHOCYTES # BLD AUTO: 2.12 10*3/MM3 (ref 0.7–3.1)
LYMPHOCYTES NFR BLD AUTO: 34.4 % (ref 19.6–45.3)
MCH RBC QN AUTO: 29.4 PG (ref 26.6–33)
MCHC RBC AUTO-ENTMCNC: 33.2 G/DL (ref 31.5–35.7)
MCV RBC AUTO: 88.4 FL (ref 79–97)
MONOCYTES # BLD AUTO: 0.5 10*3/MM3 (ref 0.1–0.9)
MONOCYTES NFR BLD AUTO: 8.1 % (ref 5–12)
NEUTROPHILS NFR BLD AUTO: 3.28 10*3/MM3 (ref 1.7–7)
NEUTROPHILS NFR BLD AUTO: 53.1 % (ref 42.7–76)
PLATELET # BLD AUTO: 148 10*3/MM3 (ref 140–450)
PMV BLD AUTO: 13.3 FL (ref 6–12)
POTASSIUM SERPL-SCNC: 4.3 MMOL/L (ref 3.5–5.2)
PROT SERPL-MCNC: 7 G/DL (ref 6–8.5)
RBC # BLD AUTO: 4.15 10*6/MM3 (ref 3.77–5.28)
SODIUM SERPL-SCNC: 140 MMOL/L (ref 136–145)
WBC NRBC COR # BLD: 6.17 10*3/MM3 (ref 3.4–10.8)

## 2022-07-11 PROCEDURE — 80053 COMPREHEN METABOLIC PANEL: CPT

## 2022-07-11 PROCEDURE — 85025 COMPLETE CBC W/AUTO DIFF WBC: CPT

## 2022-07-11 PROCEDURE — 36415 COLL VENOUS BLD VENIPUNCTURE: CPT

## 2022-07-26 ENCOUNTER — OFFICE VISIT (OUTPATIENT)
Dept: INTERNAL MEDICINE | Facility: CLINIC | Age: 57
End: 2022-07-26

## 2022-07-26 VITALS
HEART RATE: 67 BPM | OXYGEN SATURATION: 95 % | TEMPERATURE: 98.2 F | DIASTOLIC BLOOD PRESSURE: 70 MMHG | SYSTOLIC BLOOD PRESSURE: 110 MMHG | BODY MASS INDEX: 34.89 KG/M2 | WEIGHT: 204.4 LBS | HEIGHT: 64 IN

## 2022-07-26 DIAGNOSIS — M54.16 LUMBAR RADICULOPATHY: Primary | ICD-10-CM

## 2022-07-26 PROBLEM — J84.10 POSTINFLAMMATORY PULMONARY FIBROSIS: Status: ACTIVE | Noted: 2022-07-26

## 2022-07-26 PROBLEM — Z79.52 LONG TERM CURRENT USE OF SYSTEMIC STEROIDS: Status: ACTIVE | Noted: 2022-07-26

## 2022-07-26 PROBLEM — M13.0 POLYARTHRITIS: Status: ACTIVE | Noted: 2022-07-26

## 2022-07-26 PROBLEM — N28.9 RENAL IMPAIRMENT: Status: ACTIVE | Noted: 2022-07-26

## 2022-07-26 PROBLEM — N18.4 STAGE 4 CHRONIC KIDNEY DISEASE (HCC): Status: ACTIVE | Noted: 2022-03-10

## 2022-07-26 PROBLEM — E88.09 HYPOALBUMINEMIA: Status: ACTIVE | Noted: 2022-07-26

## 2022-07-26 PROBLEM — I50.32 CHRONIC DIASTOLIC HEART FAILURE: Status: ACTIVE | Noted: 2021-09-01

## 2022-07-26 PROBLEM — Z79.899 OTHER LONG TERM (CURRENT) DRUG THERAPY: Status: ACTIVE | Noted: 2022-07-26

## 2022-07-26 PROBLEM — Z51.81 ENCOUNTER FOR THERAPEUTIC DRUG LEVEL MONITORING: Status: ACTIVE | Noted: 2022-07-26

## 2022-07-26 PROBLEM — J84.9 INTERSTITIAL LUNG DISEASE: Status: ACTIVE | Noted: 2021-06-29

## 2022-07-26 PROBLEM — R80.9 PROTEINURIA: Status: ACTIVE | Noted: 2022-03-10

## 2022-07-26 PROBLEM — D72.819 LEUKOPENIA: Status: ACTIVE | Noted: 2022-07-26

## 2022-07-26 PROBLEM — R60.0 BILATERAL LOWER EXTREMITY EDEMA: Status: ACTIVE | Noted: 2022-07-26

## 2022-07-26 PROBLEM — M35.9 DISORDER OF CONNECTIVE TISSUE: Status: ACTIVE | Noted: 2022-07-26

## 2022-07-26 PROCEDURE — 99213 OFFICE O/P EST LOW 20 MIN: CPT | Performed by: NURSE PRACTITIONER

## 2022-07-26 RX ORDER — PILOCARPINE HYDROCHLORIDE 5 MG/1
TABLET, FILM COATED ORAL EVERY 8 HOURS SCHEDULED
COMMUNITY

## 2022-07-26 RX ORDER — AZATHIOPRINE 50 MG/1
3 TABLET ORAL DAILY
COMMUNITY
End: 2022-10-12 | Stop reason: SDUPTHER

## 2022-07-26 RX ORDER — ROPINIROLE 1 MG/1
TABLET, FILM COATED ORAL
COMMUNITY
Start: 2022-06-26

## 2022-07-26 RX ORDER — ALBUTEROL SULFATE 90 UG/1
1 AEROSOL, METERED RESPIRATORY (INHALATION)
COMMUNITY
End: 2022-09-30

## 2022-07-27 PROBLEM — M54.16 LUMBAR RADICULOPATHY: Status: ACTIVE | Noted: 2022-07-27

## 2022-07-27 NOTE — ASSESSMENT & PLAN NOTE
Patient has been having issues now for greater than 2 months, we will go ahead and get imaging to further evaluate.  Patient will also go ahead and start in physical therapy.  At this point, with the amount of medication that she is on, I do not have any further recommendations as far as pain medication.  Patient will continue her current pain medication regimen, if she has further issues in the meantime she will let us know.
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
## 2022-08-19 ENCOUNTER — HOSPITAL ENCOUNTER (OUTPATIENT)
Dept: MRI IMAGING | Facility: HOSPITAL | Age: 57
Discharge: HOME | End: 2022-08-19
Admitting: NURSE PRACTITIONER

## 2022-08-19 DIAGNOSIS — M54.16 LUMBAR RADICULOPATHY: ICD-10-CM

## 2022-08-19 LAB
CREAT BLDA-MCNC: 1.1 MG/DL
EGFRCR SERPLBLD CKD-EPI 2021: 58 ML/MIN/1.73

## 2022-08-19 PROCEDURE — 72158 MRI LUMBAR SPINE W/O & W/DYE: CPT

## 2022-08-19 PROCEDURE — 0 GADOBENATE DIMEGLUMINE 529 MG/ML SOLUTION: Performed by: NURSE PRACTITIONER

## 2022-08-19 PROCEDURE — A9577 INJ MULTIHANCE: HCPCS | Performed by: NURSE PRACTITIONER

## 2022-08-19 PROCEDURE — 82565 ASSAY OF CREATININE: CPT

## 2022-08-19 RX ORDER — HEPARIN SODIUM (PORCINE) LOCK FLUSH IV SOLN 100 UNIT/ML 100 UNIT/ML
SOLUTION INTRAVENOUS
Status: DISCONTINUED
Start: 2022-08-19 | End: 2022-08-20 | Stop reason: HOSPADM

## 2022-08-19 RX ADMIN — GADOBENATE DIMEGLUMINE 20 ML: 529 INJECTION, SOLUTION INTRAVENOUS at 16:33

## 2022-09-02 ENCOUNTER — TRANSCRIBE ORDERS (OUTPATIENT)
Dept: ADMINISTRATIVE | Facility: HOSPITAL | Age: 57
End: 2022-09-02

## 2022-09-02 ENCOUNTER — LAB (OUTPATIENT)
Dept: LAB | Facility: HOSPITAL | Age: 57
End: 2022-09-02

## 2022-09-02 ENCOUNTER — TREATMENT (OUTPATIENT)
Dept: PHYSICAL THERAPY | Facility: CLINIC | Age: 57
End: 2022-09-02

## 2022-09-02 DIAGNOSIS — E03.9 HYPOTHYROIDISM, UNSPECIFIED TYPE: Primary | ICD-10-CM

## 2022-09-02 DIAGNOSIS — Z79.899 ENCOUNTER FOR LONG-TERM (CURRENT) USE OF OTHER MEDICATIONS: ICD-10-CM

## 2022-09-02 DIAGNOSIS — M54.42 CHRONIC LEFT-SIDED LOW BACK PAIN WITH LEFT-SIDED SCIATICA: Primary | ICD-10-CM

## 2022-09-02 DIAGNOSIS — M32.9 SYSTEMIC LUPUS ERYTHEMATOSUS, UNSPECIFIED SLE TYPE, UNSPECIFIED ORGAN INVOLVEMENT STATUS: Primary | ICD-10-CM

## 2022-09-02 DIAGNOSIS — R29.898 WEAKNESS OF BOTH HIPS: ICD-10-CM

## 2022-09-02 DIAGNOSIS — E03.9 HYPOTHYROIDISM, UNSPECIFIED TYPE: ICD-10-CM

## 2022-09-02 DIAGNOSIS — G89.29 CHRONIC LEFT-SIDED LOW BACK PAIN WITH LEFT-SIDED SCIATICA: Primary | ICD-10-CM

## 2022-09-02 DIAGNOSIS — M53.86 DECREASED RANGE OF MOTION OF INTERVERTEBRAL DISCS OF LUMBAR SPINE: ICD-10-CM

## 2022-09-02 DIAGNOSIS — M54.16 LUMBAR RADICULOPATHY: ICD-10-CM

## 2022-09-02 DIAGNOSIS — M32.9 SYSTEMIC LUPUS ERYTHEMATOSUS, UNSPECIFIED SLE TYPE, UNSPECIFIED ORGAN INVOLVEMENT STATUS: ICD-10-CM

## 2022-09-02 DIAGNOSIS — M54.16 RADICULOPATHY, LUMBAR REGION: ICD-10-CM

## 2022-09-02 LAB
ALBUMIN SERPL-MCNC: 4.5 G/DL (ref 3.5–5.2)
ALBUMIN/GLOB SERPL: 1.5 G/DL
ALP SERPL-CCNC: 88 U/L (ref 39–117)
ALT SERPL W P-5'-P-CCNC: 12 U/L (ref 1–33)
ANION GAP SERPL CALCULATED.3IONS-SCNC: 11.1 MMOL/L (ref 5–15)
AST SERPL-CCNC: 28 U/L (ref 1–32)
BASOPHILS # BLD AUTO: 0.08 10*3/MM3 (ref 0–0.2)
BASOPHILS NFR BLD AUTO: 1.2 % (ref 0–1.5)
BILIRUB SERPL-MCNC: 0.5 MG/DL (ref 0–1.2)
BUN SERPL-MCNC: 28 MG/DL (ref 6–20)
BUN/CREAT SERPL: 19.4 (ref 7–25)
CALCIUM SPEC-SCNC: 8.6 MG/DL (ref 8.6–10.5)
CHLORIDE SERPL-SCNC: 104 MMOL/L (ref 98–107)
CO2 SERPL-SCNC: 25.9 MMOL/L (ref 22–29)
CREAT SERPL-MCNC: 1.44 MG/DL (ref 0.57–1)
DEPRECATED RDW RBC AUTO: 50.7 FL (ref 37–54)
EGFRCR SERPLBLD CKD-EPI 2021: 42 ML/MIN/1.73
EOSINOPHIL # BLD AUTO: 0.3 10*3/MM3 (ref 0–0.4)
EOSINOPHIL NFR BLD AUTO: 4.5 % (ref 0.3–6.2)
ERYTHROCYTE [DISTWIDTH] IN BLOOD BY AUTOMATED COUNT: 15.5 % (ref 12.3–15.4)
ERYTHROCYTE [SEDIMENTATION RATE] IN BLOOD: 6 MM/HR (ref 0–30)
GLOBULIN UR ELPH-MCNC: 3.1 GM/DL
GLUCOSE SERPL-MCNC: 78 MG/DL (ref 65–99)
HCT VFR BLD AUTO: 36.7 % (ref 34–46.6)
HGB BLD-MCNC: 12 G/DL (ref 12–15.9)
IMM GRANULOCYTES # BLD AUTO: 0.01 10*3/MM3 (ref 0–0.05)
IMM GRANULOCYTES NFR BLD AUTO: 0.1 % (ref 0–0.5)
LYMPHOCYTES # BLD AUTO: 2.67 10*3/MM3 (ref 0.7–3.1)
LYMPHOCYTES NFR BLD AUTO: 39.9 % (ref 19.6–45.3)
MCH RBC QN AUTO: 28.8 PG (ref 26.6–33)
MCHC RBC AUTO-ENTMCNC: 32.7 G/DL (ref 31.5–35.7)
MCV RBC AUTO: 88.2 FL (ref 79–97)
MONOCYTES # BLD AUTO: 0.55 10*3/MM3 (ref 0.1–0.9)
MONOCYTES NFR BLD AUTO: 8.2 % (ref 5–12)
NEUTROPHILS NFR BLD AUTO: 3.09 10*3/MM3 (ref 1.7–7)
NEUTROPHILS NFR BLD AUTO: 46.1 % (ref 42.7–76)
NRBC BLD AUTO-RTO: 0 /100 WBC (ref 0–0.2)
PLATELET # BLD AUTO: 135 10*3/MM3 (ref 140–450)
PMV BLD AUTO: 12.4 FL (ref 6–12)
POTASSIUM SERPL-SCNC: 3.8 MMOL/L (ref 3.5–5.2)
PROT SERPL-MCNC: 7.6 G/DL (ref 6–8.5)
RBC # BLD AUTO: 4.16 10*6/MM3 (ref 3.77–5.28)
SODIUM SERPL-SCNC: 141 MMOL/L (ref 136–145)
WBC NRBC COR # BLD: 6.7 10*3/MM3 (ref 3.4–10.8)

## 2022-09-02 PROCEDURE — 84443 ASSAY THYROID STIM HORMONE: CPT

## 2022-09-02 PROCEDURE — 85025 COMPLETE CBC W/AUTO DIFF WBC: CPT

## 2022-09-02 PROCEDURE — 80053 COMPREHEN METABOLIC PANEL: CPT

## 2022-09-02 PROCEDURE — 85652 RBC SED RATE AUTOMATED: CPT

## 2022-09-02 PROCEDURE — 97110 THERAPEUTIC EXERCISES: CPT | Performed by: PHYSICAL THERAPIST

## 2022-09-02 PROCEDURE — G0480 DRUG TEST DEF 1-7 CLASSES: HCPCS

## 2022-09-02 PROCEDURE — 84439 ASSAY OF FREE THYROXINE: CPT

## 2022-09-02 PROCEDURE — 97161 PT EVAL LOW COMPLEX 20 MIN: CPT | Performed by: PHYSICAL THERAPIST

## 2022-09-02 PROCEDURE — 36415 COLL VENOUS BLD VENIPUNCTURE: CPT

## 2022-09-02 NOTE — PROGRESS NOTES
Physical Therapy Initial Evaluation and Plan of Care    Patient: Aida Mcclure   : 1965  Diagnosis/ICD-10 Code:  No primary diagnosis found.  Referring practitioner: ROBE Tang  Date of Initial Visit: 2022  Today's Date: 2022  Patient seen for 1 sessions           Subjective Questionnaire: Oswestry: 17/45 or 38% limited      Subjective Evaluation    History of Present Illness  Mechanism of injury: Pt had had low back pain for many years.  She is a  and did a lot of standing and bending over.  Her pain has progressively gotten worse with arthritis over the last few years.  She states she can't stand for very long.  Pt reports some relief of her pain when she sits.  Pt occasionally gets pain down her L leg and her L hip is also bothering her.  Pt's pain is in the back of her L leg and goes into her calf.  Pt denies numbness/tingling.  Pt was recently told to stop working.  Pt's MRI shows L2/3 anterolisthesis and disc bulges/narrowing at multiple levels.  Pt has not had any injections in her back.    Medical history: lupus, CHF, overactive thyroid, HTN, seizure in  (1x event), B ankle fusion, R TKA, L arm fasciotomy, finger surgery, power port in chest area, 3 L of oxygen 24/7      Precautions and Work Restrictions: 3 L of oxygen 24/7Pain  Current pain ratin  At best pain ratin  At worst pain rating: 10  Quality: dull ache, throbbing, sharp and knife-like  Relieving factors: relaxation and rest  Aggravating factors: ambulation, standing, movement, lifting and repetitive movement    Social Support  Lives in: one-story house  Lives with: alone    Diagnostic Tests  MRI studies: abnormal    Treatments  Previous treatment: medication (hydrocodone and gabapentin for her lupus)  Patient Goals  Patient goals for therapy: decreased pain, increased motion, increased strength, independence with ADLs/IADLs and return to sport/leisure activities             Objective           Static Posture     Head  Forward.    Shoulders  Rounded.    Lumbar Spine   Flattened.     Neurological Testing     Sensation     Ankle/Foot   Left Ankle/Foot   Intact: light touch    Right Ankle/Foot   Intact: light touch     Additional Neurological Details  B LE sensation intact    Active Range of Motion     Additional Active Range of Motion Details  Flexion: WNL and painful  Extension: WNL and painful  B side bendin% and painful  B rotation: 75% and painful    Passive Range of Motion     Additional Passive Range of Motion Details  Hypomobility through lumbar spine with PA mobs; difficulty to assess lumbar spine mobility due to muscle guarding in paraspinals with PAs  Pt has excessive hip internal and external rotation ROM bilaterally    Strength/Myotome Testing     Left Hip   Planes of Motion   Flexion: 3-  Extension: 3-  Abduction: 4-    Right Hip   Planes of Motion   Flexion: 3+  Extension: 4-  Abduction: 4-    Left Knee   Flexion: 5  Extension: 5    Right Knee   Flexion: 5  Extension: 5    Left Ankle/Foot   Dorsiflexion: 5  Plantar flexion: 5  Inversion: 5  Eversion: 5    Right Ankle/Foot   Dorsiflexion: 5  Plantar flexion: 5  Inversion: 5  Eversion: 5    Tests     Lumbar     Left   Negative crossed SLR and passive SLR.     Right   Positive passive SLR.   Negative crossed SLR.     Left Hip   Positive PENG.     Right Hip   Negative PENG.     Lumbar Flexibility Comments:   Normal hamstring mobility bilaterally  Tension in L sciatic nerve  B piriformis tightness    Ambulation     Observational Gait   Decreased walking speed.     Additional Observational Gait Details  Lateral sway during ambulation        See Exercise, Manual, and Modality Logs for complete treatment.       Assessment & Plan     Assessment  Impairments: abnormal or restricted ROM, activity intolerance, impaired physical strength, lacks appropriate home exercise program and pain with function  Functional Limitations: carrying objects,  lifting, walking, uncomfortable because of pain, standing, stooping and unable to perform repetitive tasks  Assessment details: Pt presents with limitations, noted below, that impede her ability to stand and walk for long periods.  The patient presents with a diagnosis of LBP and has low back pain with L LE pain, decreased lumbar spine mobility, and B hip and core weakness and will benefit from therapeutic exercises, manual therapy, and modalities to improve tolerance to functional activities. The skills of a therapist will be required to safely and effectively implement the following treatment plan to restore maximal level of function.  Prognosis: good    Goals  Plan Goals: 1. The patient has complaints of pain.   LTG 1: 12 weeks:  The patient will report lower back pain no greater than 5/10 in order to improve tolerance with activities of daily living and improve sleep quality.   STATUS:  New   STG 1a: 6 weeks:  The patient will report lower back pain no greater than 6/10.    STATUS:  New      2. The patient has limited lumbar spine ROM.   LTG 2: 12 weeks: The patient will improve lumbar spine ROM to 100% in order to improve tolerance with activities of daily living.   STATUS: New   STG 2a: 6 weeks: The patient will improve lumbar spine ROM to 75% in order to improve tolerance with activities of daily living.   STATUS: New      3. The patient demonstrates weakness of the B hips.   LTG 3: 12 weeks:  The patient will demonstrate 4/5 strength for R hip flexion, abduction, and extension and 4-/5 for L hip flexion, abduction, and extension in order to improve hip stability.   STATUS:  New   STG 3a: 6 weeks: The patient will demonstrate 4-/5 strength for R hip flexion, abduction, and extension and 3+/5 for L hip flexion, abduction, and extension in order to improve hip stability. STATUS:  New      4. Mobility: Walking/Moving Around Functional Limitation     LTG 4: 12 weeks:  The patient will demonstrate 17% limitation  by achieving a score of 8/45 on the Oswestry Disability Questionnaire.   STATUS:  New   STG 4a: 6 weeks:  The patient will demonstrate 26% limitation by achieving a score of 12/45 on the Oswestry Disability Quiestionnaire.     STATUS:  New     TREATMENT:  Manual therapy, therapeutic exercise, home exercise instruction, aquatic therapy, pelvic traction, and modalities as needed to include: electrical stimulation, ultrasound, moist heat, and ice.     Plan  Therapy options: will be seen for skilled therapy services  Planned modality interventions: cryotherapy, dry needling, electrical stimulation/Russian stimulation, hydrotherapy, ultrasound and TENS  Planned therapy interventions: abdominal trunk stabilization, body mechanics training, flexibility, functional ROM exercises, gait training, home exercise program, joint mobilization, manual therapy, neuromuscular re-education, postural training, soft tissue mobilization, spinal/joint mobilization, strengthening, stretching and therapeutic activities  Frequency: 3x week  Duration in weeks: 12  Treatment plan discussed with: patient        History # of Personal Factors and/or Comorbidities: MODERATE (1-2)  Examination of Body System(s): # of elements: LOW (1-2)  Clinical Presentation: STABLE   Clinical Decision Making: LOW       Timed:         Manual Therapy:         mins  11227;     Therapeutic Exercise:    8     mins  94737;     Neuromuscular Eric:        mins  80429;    Therapeutic Activity:          mins  25400;     Gait Training:           mins  42863;     Ultrasound:          mins  32148;    Ionto                                   mins   88121  Self Care                            mins   10469  Aquatic Therapy                 mins   29218      Un-Timed:  Electrical Stimulation:         mins  72112 ( );  Dry Needling          mins self-pay  Traction          mins 61873  Low Eval     30     Mins  10581  Mod Eval          Mins  90634  High Eval                             Mins  23709  Re-Eval                               mins  33281  Canalith Repos         mins 34649      Timed Treatment:   8   mins   Total Treatment:     38   mins    PT SIGNATURE: Electronically signed by ARAM Andrew License: [id number]      Initial Certification  Certification Period: 9/2/2022 thru 11/30/2022  I certify that the therapy services are furnished while this patient is under my care.  The services outlined above are required by this patient, and will be reviewed every 90 days.     PHYSICIAN: Saeid Ng APRN  NPI: [id number]                                      DATE:        Please sign and return via fax to [phone].Thank you, [facility].

## 2022-09-03 LAB
T4 FREE SERPL-MCNC: 0.89 NG/DL (ref 0.93–1.7)
TSH SERPL DL<=0.05 MIU/L-ACNC: 58.9 UIU/ML (ref 0.27–4.2)

## 2022-09-07 ENCOUNTER — TREATMENT (OUTPATIENT)
Dept: PHYSICAL THERAPY | Facility: CLINIC | Age: 57
End: 2022-09-07

## 2022-09-07 DIAGNOSIS — G89.29 OTHER CHRONIC PAIN: ICD-10-CM

## 2022-09-07 DIAGNOSIS — G89.29 CHRONIC LEFT-SIDED LOW BACK PAIN WITH LEFT-SIDED SCIATICA: ICD-10-CM

## 2022-09-07 DIAGNOSIS — R29.898 WEAKNESS OF BOTH HIPS: ICD-10-CM

## 2022-09-07 DIAGNOSIS — M53.86 DECREASED RANGE OF MOTION OF INTERVERTEBRAL DISCS OF LUMBAR SPINE: ICD-10-CM

## 2022-09-07 DIAGNOSIS — M54.16 LUMBAR RADICULOPATHY: Primary | ICD-10-CM

## 2022-09-07 DIAGNOSIS — M54.42 CHRONIC LEFT-SIDED LOW BACK PAIN WITH LEFT-SIDED SCIATICA: ICD-10-CM

## 2022-09-07 DIAGNOSIS — M54.16 RADICULOPATHY, LUMBAR REGION: ICD-10-CM

## 2022-09-07 PROCEDURE — 97110 THERAPEUTIC EXERCISES: CPT | Performed by: PHYSICAL THERAPIST

## 2022-09-07 NOTE — TELEPHONE ENCOUNTER
Caller: Aida Mcclure April    Relationship: Self    Best call back number: [phone]    Requested Prescriptions:   Requested Prescriptions     Pending Prescriptions Disp Refills   • HYDROcodone-acetaminophen (NORCO) 7.5-325 MG per tablet 60 tablet 0     Sig: Take 1 tablet by mouth Every 12 (Twelve) Hours As Needed for Moderate Pain for up to 30 days.   • hydrOXYzine (ATARAX) 25 MG tablet       Sig: Take 1 tablet by mouth 3 (Three) Times a Day.   • gabapentin (NEURONTIN) 300 MG capsule 90 capsule 0     Sig: Take 1 capsule by mouth 3 (Three) Times a Day.        Pharmacy where request should be sent: [id number] - [place], [place] - [address] AT [facility] [phone] [facility] [phone] [facility]     Does the patient have less than a 3 day supply:  [x] Yes  [] No    Sarah DECKER   09/07/22 08:52 EDT

## 2022-09-07 NOTE — TELEPHONE ENCOUNTER
LOV was 07/26/22, LRF for Hydrocodone was 07/05/22 #60 & Gabapentin was 04/08/22 #90, UDS completed on 06/30/22, malathi last ran on 07/05/22 and contract signed on 07/07/22.    Is hydroxyzine an appropriate refill?

## 2022-09-07 NOTE — PROGRESS NOTES
Physical Therapy Daily Treatment Note    VISIT#: 2    Subjective   Aida Mcclure reports she is nauseous today and her back pain is a 7/10.  Pain Rating (0-10): 7    Objective     See Exercise, Manual, and Modality Logs for complete treatment.    Assessment/Plan  Lumbar ROM exercises were initiated today with good tolerance.  Low level core/hip strengthening exercises were also initiated.  Pt has difficulty relaxing neck and upper body when performing a posterior pelvic tilt.  She also has a tendency to hold her breath with pelvic tilts.  She will benefit from continuing with PT to improve core and hip strength and decrease LBP.     Progress per Plan of Care and Progress strengthening /stabilization /functional activity            Timed:         Manual Therapy:         mins  17734;     Therapeutic Exercise:    24     mins  39323;     Neuromuscular Eric:        mins  47047;    Therapeutic Activity:          mins  52264;     Gait Training:           mins  71093;     Ultrasound:          mins  72257;    Ionto                                   mins   35927  Self Care                            mins   59365  Aquatic Therapy                 mins   89664    Un-Timed:  Electrical Stimulation:         mins  12621 ( );  Dry Needling          mins self-pay  Traction          mins 48207  Low Eval          Mins  78306  Mod Eval          Mins  98633  High Eval                            Mins  62647  Re-Eval                               mins  43428  Canalith Repos                   mins  89751    Timed Treatment:   24   mins   Total Treatment:     24   mins    Alexandra Gutierres PT, DPT  License Number [id number]

## 2022-09-08 RX ORDER — HYDROCODONE BITARTRATE AND ACETAMINOPHEN 7.5; 325 MG/1; MG/1
1 TABLET ORAL EVERY 12 HOURS PRN
Qty: 60 TABLET | Refills: 0 | Status: SHIPPED | OUTPATIENT
Start: 2022-09-08 | End: 2022-09-30

## 2022-09-08 RX ORDER — GABAPENTIN 300 MG/1
300 CAPSULE ORAL 3 TIMES DAILY
Qty: 90 CAPSULE | Refills: 0 | Status: SHIPPED | OUTPATIENT
Start: 2022-09-08

## 2022-09-08 RX ORDER — HYDROXYZINE HYDROCHLORIDE 25 MG/1
25 TABLET, FILM COATED ORAL 2 TIMES DAILY PRN
Qty: 60 TABLET | Refills: 2 | Status: SHIPPED | OUTPATIENT
Start: 2022-09-08 | End: 2022-10-08

## 2022-09-13 ENCOUNTER — TREATMENT (OUTPATIENT)
Dept: PHYSICAL THERAPY | Facility: CLINIC | Age: 57
End: 2022-09-13

## 2022-09-13 DIAGNOSIS — M54.16 LUMBAR RADICULOPATHY: Primary | ICD-10-CM

## 2022-09-13 DIAGNOSIS — M54.16 RADICULOPATHY, LUMBAR REGION: ICD-10-CM

## 2022-09-13 DIAGNOSIS — G89.29 CHRONIC LEFT-SIDED LOW BACK PAIN WITH LEFT-SIDED SCIATICA: ICD-10-CM

## 2022-09-13 DIAGNOSIS — M53.86 DECREASED RANGE OF MOTION OF INTERVERTEBRAL DISCS OF LUMBAR SPINE: ICD-10-CM

## 2022-09-13 DIAGNOSIS — M54.42 CHRONIC LEFT-SIDED LOW BACK PAIN WITH LEFT-SIDED SCIATICA: ICD-10-CM

## 2022-09-13 DIAGNOSIS — R29.898 WEAKNESS OF BOTH HIPS: ICD-10-CM

## 2022-09-13 LAB — HYDROXYZINE [MASS/VOLUME] IN SERUM OR PLASMA: 23 NG/ML (ref 10–100)

## 2022-09-13 PROCEDURE — 97110 THERAPEUTIC EXERCISES: CPT | Performed by: PHYSICAL THERAPIST

## 2022-09-13 PROCEDURE — 97530 THERAPEUTIC ACTIVITIES: CPT | Performed by: PHYSICAL THERAPIST

## 2022-09-13 NOTE — PROGRESS NOTES
Physical Therapy Daily Treatment Note      Patient: Aida Mcclure   : 1965  Referring practitioner: ROBE Tang  Date of Initial Visit: Type: THERAPY  Noted: 2022  Today's Date: 2022  Patient seen for 3 sessions           Subjective  Aida Mcclure reports: her pain is 7/10.        Objective   See Exercise, Manual, and Modality Logs for complete treatment.       Assessment/Plan  Able to improve Anitha's PPT ability with heel pressures. She demonstrated understanding of postural corrections with VC and tactile prompts. Anitha commented that this provided her with some pain relief. Therapist directed exercise required to attend to deficits outlined in evaluation.     Visit Diagnoses:    ICD-10-CM ICD-9-CM   1. Lumbar radiculopathy  M54.16 724.4   2. Radiculopathy, lumbar region  M54.16 724.4   3. Weakness of both hips  R29.898 729.89   4. Decreased range of motion of intervertebral discs of lumbar spine  M53.86 724.9   5. Chronic left-sided low back pain with left-sided sciatica  M54.42 724.2    G89.29 724.3     338.29       Progress per Plan of Care and Progress strengthening /stabilization /functional activity           Timed:  Manual Therapy:         mins  58966;  Therapeutic Exercise:    17     mins  74283;     Neuromuscular Eric:        mins  48569;    Therapeutic Activity:     10     mins  61962;     Gait Training:           mins  06499;     Ultrasound:          mins  18181;    Electrical Stimulation:         mins  79044 ( );  Aquatics  __   mins   35442    Untimed:  Electrical Stimulation:         mins  05319 ( );  Mechanical Traction:         mins  72231;     Timed Treatment:   27   mins   Total Treatment:     27   mins    Electronically Signed:  Berenice Nava PTA  Physical Therapist Assistant    [place] PTA license [id number]

## 2022-09-15 ENCOUNTER — TREATMENT (OUTPATIENT)
Dept: PHYSICAL THERAPY | Facility: CLINIC | Age: 57
End: 2022-09-15

## 2022-09-15 DIAGNOSIS — M54.42 CHRONIC LEFT-SIDED LOW BACK PAIN WITH LEFT-SIDED SCIATICA: ICD-10-CM

## 2022-09-15 DIAGNOSIS — M54.16 RADICULOPATHY, LUMBAR REGION: ICD-10-CM

## 2022-09-15 DIAGNOSIS — G89.29 CHRONIC LEFT-SIDED LOW BACK PAIN WITH LEFT-SIDED SCIATICA: ICD-10-CM

## 2022-09-15 DIAGNOSIS — M54.16 LUMBAR RADICULOPATHY: Primary | ICD-10-CM

## 2022-09-15 DIAGNOSIS — R29.898 WEAKNESS OF BOTH HIPS: ICD-10-CM

## 2022-09-15 DIAGNOSIS — M53.86 DECREASED RANGE OF MOTION OF INTERVERTEBRAL DISCS OF LUMBAR SPINE: ICD-10-CM

## 2022-09-15 PROCEDURE — 97110 THERAPEUTIC EXERCISES: CPT | Performed by: PHYSICAL THERAPIST

## 2022-09-15 NOTE — PROGRESS NOTES
Physical Therapy Daily Treatment Note    VISIT#: 4    Subjective   Aida Mcclure reports her back isn't too bad today and rates it at a 6/10.  Pain Rating (0-10): 6    Objective     See Exercise, Manual, and Modality Logs for complete treatment.     Assessment/Plan  Pt was re-educated on proper performance of posterior pelvic tilt as she was arching instead of flattening her back during exercises.  Core strengthening exercises were advanced with good tolerance.  Continue with plan of care.  Pt was also provided with hooklying hip abduction, adduction, marches, and bent knee fall out for her home program.    Progress per Plan of Care and Progress strengthening /stabilization /functional activity            Timed:         Manual Therapy:         mins  33748;     Therapeutic Exercise:    30     mins  44774;     Neuromuscular Eric:        mins  17062;    Therapeutic Activity:          mins  28575;     Gait Training:           mins  89489;     Ultrasound:          mins  43852;    Ionto                                   mins   13896  Self Care                            mins   32614  Aquatic Therapy                 mins   76744    Un-Timed:  Electrical Stimulation:         mins  28398 ( );  Dry Needling          mins self-pay  Traction          mins 99208  Low Eval          Mins  07423  Mod Eval          Mins  88576  High Eval                            Mins  38630  Re-Eval                               mins  46342  Canalith Repos                   mins  65620    Timed Treatment:   30   mins   Total Treatment:     32   mins    Alexandra Gutierres PT, DPT  License Number [id number]

## 2022-09-20 ENCOUNTER — TELEPHONE (OUTPATIENT)
Dept: PULMONOLOGY | Facility: CLINIC | Age: 57
End: 2022-09-20

## 2022-09-20 ENCOUNTER — TREATMENT (OUTPATIENT)
Dept: PHYSICAL THERAPY | Facility: CLINIC | Age: 57
End: 2022-09-20

## 2022-09-20 DIAGNOSIS — M54.16 RADICULOPATHY, LUMBAR REGION: ICD-10-CM

## 2022-09-20 DIAGNOSIS — M54.42 CHRONIC LEFT-SIDED LOW BACK PAIN WITH LEFT-SIDED SCIATICA: ICD-10-CM

## 2022-09-20 DIAGNOSIS — G89.29 CHRONIC LEFT-SIDED LOW BACK PAIN WITH LEFT-SIDED SCIATICA: ICD-10-CM

## 2022-09-20 DIAGNOSIS — M54.16 LUMBAR RADICULOPATHY: Primary | ICD-10-CM

## 2022-09-20 DIAGNOSIS — M53.86 DECREASED RANGE OF MOTION OF INTERVERTEBRAL DISCS OF LUMBAR SPINE: ICD-10-CM

## 2022-09-20 DIAGNOSIS — R29.898 WEAKNESS OF BOTH HIPS: ICD-10-CM

## 2022-09-20 PROCEDURE — 97110 THERAPEUTIC EXERCISES: CPT | Performed by: PHYSICAL THERAPIST

## 2022-09-20 PROCEDURE — 97530 THERAPEUTIC ACTIVITIES: CPT | Performed by: PHYSICAL THERAPIST

## 2022-09-20 NOTE — TELEPHONE ENCOUNTER
Patient called stating she has an appointment on 9/27 with you, but just recently started at [employer] [facility]now [employer] Her  was wanting to see while she's in office, if she could get an assessment and a note stating what exercises she could/could not do. Thank you.

## 2022-09-20 NOTE — PROGRESS NOTES
"Physical Therapy Daily Treatment Note      Patient: Aida Mcclure   : 1965  Referring practitioner: ROBE Tang  Date of Initial Visit: Type: THERAPY  Noted: 2022  Today's Date: 2022  Patient seen for 5 sessions           Subjective  Aida Mcclure reports: her pain is 6/10. \"I just started [employer] and they suggested I find out if any exercises may be bad for me. Plan to go 3 times a week.\"       Objective   Aida is successful with PPT when pushing through her heels then john her abdominals.    See Exercise, Manual, and Modality Logs for complete treatment.       Assessment/Plan   Time to include review of education provided at her last 2 sessions. Aida performed additional exercises her to include resistive equipment to fully educate for use at [facility]. She demonstrated ability to correct posture with VC and tactile prompts. She commented on pain down to 5/10 after session. Further therapist directed program necessary to further her (I) with strengthening program for pain reduction and improved functional ability.    Visit Diagnoses:    ICD-10-CM ICD-9-CM   1. Lumbar radiculopathy  M54.16 724.4   2. Radiculopathy, lumbar region  M54.16 724.4   3. Weakness of both hips  R29.898 729.89   4. Decreased range of motion of intervertebral discs of lumbar spine  M53.86 724.9   5. Chronic left-sided low back pain with left-sided sciatica  M54.42 724.2    G89.29 724.3     338.29       Progress per Plan of Care and Progress strengthening /stabilization /functional activity           Timed:  Manual Therapy:         mins  25533;  Therapeutic Exercise:   15     mins  28348;     Neuromuscular Eric:        mins  87836;    Therapeutic Activity:      24    mins  89382;     Gait Training:           mins  99237;     Ultrasound:          mins  79330;    Electrical Stimulation:         mins  15424 ( );  Aquatics  __   mins   15362    Untimed:  Electrical Stimulation:         " mins  68220 ( );  Mechanical Traction:         mins  90003;     Timed Treatment:   39   mins   Total Treatment:     39   mins    Electronically Signed:  Berenice Nava PTA  Physical Therapist Assistant    [facility] license [id number]

## 2022-09-22 ENCOUNTER — TREATMENT (OUTPATIENT)
Dept: PHYSICAL THERAPY | Facility: CLINIC | Age: 57
End: 2022-09-22

## 2022-09-22 DIAGNOSIS — M54.16 LUMBAR RADICULOPATHY: Primary | ICD-10-CM

## 2022-09-22 DIAGNOSIS — M54.16 RADICULOPATHY, LUMBAR REGION: ICD-10-CM

## 2022-09-22 DIAGNOSIS — M53.86 DECREASED RANGE OF MOTION OF INTERVERTEBRAL DISCS OF LUMBAR SPINE: ICD-10-CM

## 2022-09-22 DIAGNOSIS — M54.42 CHRONIC LEFT-SIDED LOW BACK PAIN WITH LEFT-SIDED SCIATICA: ICD-10-CM

## 2022-09-22 DIAGNOSIS — G89.29 CHRONIC LEFT-SIDED LOW BACK PAIN WITH LEFT-SIDED SCIATICA: ICD-10-CM

## 2022-09-22 DIAGNOSIS — R29.898 WEAKNESS OF BOTH HIPS: ICD-10-CM

## 2022-09-22 PROCEDURE — 97530 THERAPEUTIC ACTIVITIES: CPT | Performed by: PHYSICAL THERAPIST

## 2022-09-22 PROCEDURE — 97110 THERAPEUTIC EXERCISES: CPT | Performed by: PHYSICAL THERAPIST

## 2022-09-22 NOTE — PROGRESS NOTES
Physical Therapy Daily Treatment Note      Patient: Aida Mcclure   : 1965  Referring practitioner: ROBE Tang  Date of Initial Visit: Type: THERAPY  Noted: 2022  Today's Date: 2022  Patient seen for 6 sessions           Subjective Questionnaire:     Subjective Evaluation    History of Present Illness    Subjective comment: Pt reports 6/10 back pain today.  Pt reported it has been 7/10 and when she started her back pain was 8/10.Pain  Current pain ratin           Objective   See Exercise, Manual, and Modality Logs for complete treatment.       Assessment & Plan     Assessment    Assessment details: Pt tolerated strengthening well.  Pt exhibits improvement with report of decreased back pain.  Pt ambulates with anterior trunk lean.  Continue with POC.        Visit Diagnoses:    ICD-10-CM ICD-9-CM   1. Lumbar radiculopathy  M54.16 724.4   2. Radiculopathy, lumbar region  M54.16 724.4   3. Weakness of both hips  R29.898 729.89   4. Decreased range of motion of intervertebral discs of lumbar spine  M53.86 724.9   5. Chronic left-sided low back pain with left-sided sciatica  M54.42 724.2    G89.29 724.3     338.29       Progress per Plan of Care and Progress strengthening /stabilization /functional activity           Timed:  Manual Therapy:         mins  34265;  Therapeutic Exercise:    20     mins  75176;     Neuromuscular Eric:        mins  95179;    Therapeutic Activity:     10     mins  34566;     Gait Training:           mins  75260;     Ultrasound:          mins  50775;    Electrical Stimulation:         mins  74997 ( );  Aquatic Therapy          mins  51627    Untimed:  Electrical Stimulation:         mins  13220 ( );  Mechanical Traction:         mins  66806;     Timed Treatment:   30   mins   Total Treatment:     30   mins    Electronically signed    Rosa Beck PTA  Physical Therapist Assistant    [facility] license: [id number]

## 2022-09-26 ENCOUNTER — TELEPHONE (OUTPATIENT)
Dept: INTERNAL MEDICINE | Facility: CLINIC | Age: 57
End: 2022-09-26

## 2022-09-26 NOTE — TELEPHONE ENCOUNTER
HUB TO READ RELAYED TO PATIENT. PATIENT STATES NO REFILLS NEEDED AT THIS TIME. HOWEVER, PATIENT STATES SHE WOULD LIKE A PRESCRIPTION FOR AMBIEN IF POSSIBLE.    PHARMACY:    [id number] - [place], [place] - [address] AT [facility] [phone] [facility] [phone]

## 2022-09-26 NOTE — TELEPHONE ENCOUNTER
Called patient to see if she needed a refill request on a medication. No answer; left Vm for patient to give the office a call back on behalf of the medication.    HUB PLEASE ADVISE  Does the patient need a refill on any of her medications if so what is the name of it?

## 2022-09-28 ENCOUNTER — TELEPHONE (OUTPATIENT)
Dept: PHYSICAL THERAPY | Facility: CLINIC | Age: 57
End: 2022-09-28

## 2022-09-29 NOTE — TELEPHONE ENCOUNTER
Called patient to inform her that she will need an appointment to further the medication Ambien that she is requesting. No answer; left VM for patient to call the office to schedule an appointment.

## 2022-10-04 ENCOUNTER — TELEPHONE (OUTPATIENT)
Dept: PHYSICAL THERAPY | Facility: CLINIC | Age: 57
End: 2022-10-04

## 2022-10-05 ENCOUNTER — TREATMENT (OUTPATIENT)
Dept: PHYSICAL THERAPY | Facility: CLINIC | Age: 57
End: 2022-10-05

## 2022-10-05 DIAGNOSIS — M53.86 DECREASED RANGE OF MOTION OF INTERVERTEBRAL DISCS OF LUMBAR SPINE: ICD-10-CM

## 2022-10-05 DIAGNOSIS — G89.29 CHRONIC LEFT-SIDED LOW BACK PAIN WITH LEFT-SIDED SCIATICA: ICD-10-CM

## 2022-10-05 DIAGNOSIS — M54.42 CHRONIC LEFT-SIDED LOW BACK PAIN WITH LEFT-SIDED SCIATICA: ICD-10-CM

## 2022-10-05 DIAGNOSIS — R29.898 WEAKNESS OF BOTH HIPS: Primary | ICD-10-CM

## 2022-10-05 PROCEDURE — 97140 MANUAL THERAPY 1/> REGIONS: CPT | Performed by: PHYSICAL THERAPIST

## 2022-10-05 PROCEDURE — 97110 THERAPEUTIC EXERCISES: CPT | Performed by: PHYSICAL THERAPIST
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
## 2022-10-06 ENCOUNTER — TELEPHONE (OUTPATIENT)
Dept: PHYSICAL THERAPY | Facility: OTHER | Age: 57
End: 2022-10-06

## 2022-10-08 NOTE — PATIENT INSTRUCTIONS
Pulmonary Fibrosis  Pulmonary fibrosis is a type of lung disease that causes scarring. Over time, the scar tissue builds up in the air sacs of your lungs (alveoli). This makes it hard for you to breathe. Less oxygen can get into your blood.  Scarring from pulmonary fibrosis gets worse over time. This damage is permanent and may lead to other serious health problems.  What are the causes?  There are many different causes of pulmonary fibrosis. Sometimes the cause is not known. This is called idiopathic pulmonary fibrosis. Other causes include:  Exposure to chemicals and substances found in agricultural, farm, construction, or factory work. These include mold, asbestos, silica, metal dusts, and toxic fumes.  Sarcoidosis. In this disease, areas of inflammatory cells (granulomas) form and most often affect the lungs.  Autoimmune diseases. These include diseases such as rheumatoid arthritis, systemic sclerosis, or connective tissue disease.  Taking certain medicines. These include drugs used in radiation therapy or used to treat seizures, heart problems, and some infections.  What increases the risk?  You are more likely to develop this condition if:  You have a family history of the disease.  You are older. The condition is more common in older adults.  You have a history of smoking.  You have a job that exposes you to certain chemicals.  You have gastroesophageal reflux disease (GERD).  What are the signs or symptoms?  Symptoms of this condition include:  Difficulty breathing that gets worse with activity.  Shortness of breath (dyspnea).  Dry, hacking cough.  Rapid, shallow breathing during exercise or while at rest.  Bluish skin and lips.  Loss of appetite.  Weakness.  Weight loss and fatigue.  Rounded and enlarged fingertips (clubbing).  How is this diagnosed?  This condition may be diagnosed based on:  Your symptoms and medical history.  A physical exam.  You may also have tests, including:  A test that involves  looking inside your lungs with an instrument (bronchoscopy).  Imaging studies of your lungs and heart.  Tests to measure how well you are breathing (pulmonary function tests).  Blood tests.  Tests to see how well your lungs work while you are walking (pulmonary stress test).  A procedure to remove a lung tissue sample to look at it under a microscope (biopsy).  How is this treated?  There is no cure for pulmonary fibrosis. Treatment focuses on managing symptoms and preventing scarring from getting worse. This may include:  Medicines, such as:  Steroids to prevent permanent lung changes.  Medicines to suppress your body's defense system (immune system).  Medicines to help with lung function by reducing inflammation or scarring.  Ongoing monitoring with X-rays and lab work.  Oxygen therapy.  Pulmonary rehabilitation.  Surgery. In some cases, a lung transplant is possible.  Follow these instructions at home:     Medicines  Take over-the-counter and prescription medicines only as told by your health care provider.  Keep your vaccinations up to date as recommended by your health care provider.  General instructions  Do not use any products that contain nicotine or tobacco, such as cigarettes and e-cigarettes. If you need help quitting, ask your health care provider.  Get regular exercise, but do not overexert yourself. Ask your health care provider to suggest some activities that are safe for you to do.  If you have physical limitations, you may get exercise by walking, using a stationary bike, or doing chair exercises.  Ask your health care provider about using oxygen while exercising.  If you are exposed to chemicals and substances at work, make sure that you wear a mask or respirator at all times.  Join a pulmonary rehabilitation program or a support group for people with pulmonary fibrosis.  Eat small meals often so you do not get too full. Overeating can make breathing trouble worse.  Maintain a healthy weight. Lose  weight if you need to.  Do breathing exercises as directed by your health care provider.  Keep all follow-up visits as told by your health care provider. This is important.  Contact a health care provider if you:  Have symptoms that do not get better with medicines.  Are not able to be as active as usual.  Have trouble taking a deep breath.  Have a fever or chills.  Have blue lips or skin.  Have clubbing of your fingers.  Get help right away if you:  Have a sudden worsening of your symptoms.  Have chest pain.  Cough up mucus that is dark in color.  Have a lot of headaches.  Get very confused or sleepy.  Summary  Pulmonary fibrosis is a type of lung disease that causes scar tissue to build up in the air sacs of your lungs (alveoli) over time. Less oxygen can get into your blood. This makes it hard for you to breathe.  Scarring from pulmonary fibrosis gets worse over time. This damage is permanent and may lead to other serious health problems.  You are more likely to develop this condition if you have a family history of the condition or a job that exposes you to certain chemicals.  There is no cure for pulmonary fibrosis. Treatment focuses on managing symptoms and preventing scarring from getting worse.  This information is not intended to replace advice given to you by your health care provider. Make sure you discuss any questions you have with your health care provider.  Document Revised: 01/23/2019 Document Reviewed: 01/23/2019  [email or web address] Patient Education © 2022 [email or web address]

## 2022-10-08 NOTE — PROGRESS NOTES
Primary Care Provider  Merline Enamorado MD     Referring Provider  No ref. provider found     Chief Complaint  interstitial lung disease, Cough, Shortness of Breath, Wheezing, and Sleep Apnea (Has questions about a c pap. )    Subjective          History of Presenting Illness  Patient is a 57-year-old -American female, patient of Dr. Flores who presents for management of interstitial lung disease secondary to connective tissue disease and diastolic heart failure who presents for a follow-up visit today.  Patient states over the last several weeks she has been more short of breath having some intermittent coughing and wheezing.  Patient states that she is taking albuterol inhaler as needed.  Patient is also on Imuran and is under the care of Dr. Dan, rheumatologist.  Patient is also under the care of Dr. Boone, hematologist. Patient states that she does have reflux and would like a referral to [facility] in [place], [place] as her daughter lives in [place] to gastroenterologist.  Patient states that she also had a sleep study back in August 2021 and was told that she not have sleep apnea, however still has excessive daytime sleepiness and snoring and would like a second opinion.  Patient would like a referral to sleep medicine at [facility] in [place], [place].  Patient is also requesting a referral to see an allergist for allergy testing.  Patient states that she is currently taking Claritin and Flonase nasal spray for allergies. Patient denies fever, chills, night sweats, swollen glands in the head and neck, unintentional weight loss, hemoptysis, purulent sputum production, dysphagia, chest pain, palpitations, chest tightness, abdominal pain, nausea, vomiting, and diarrhea.  Patient also denies any myalgias, changes in sense of taste and/or smell, sore throat, any other coronavirus or flu-like symptoms.  Patient denies any leg swelling, orthopnea, paroxysmal nocturnal dyspnea.   Patient is able to perform activities of daily living.        Review of Systems   Constitutional: Positive for fatigue. Negative for activity change, appetite change, chills, diaphoresis, fever, unexpected weight gain and unexpected weight loss.        Negative for Insomnia  Positive for snoring   HENT: Positive for congestion (Nasal) and postnasal drip. Negative for mouth sores, nosebleeds, sore throat, swollen glands and trouble swallowing.         Negative for Thrush  Negative for Hoarseness  Negative for Allergies/Hay Fever  Negative for Recent Head injury  Negative for Ear Fullness  Negative for Nasal or Sinus pain  Negative for Dry lips  Negative for Nasal discharge   Respiratory: Positive for cough (Intermittent), shortness of breath and wheezing (Intermittent). Negative for apnea and chest tightness.         Negative for Hemoptysis  Negative for Pleuritic pain   Cardiovascular: Negative for chest pain, palpitations and leg swelling.        Negative for Claudication  Negative for Cyanosis  Negative for Dyspnea on exertion   Gastrointestinal: Positive for GERD. Negative for abdominal pain, diarrhea, nausea and vomiting.   Musculoskeletal: Negative for joint swelling and myalgias.        Negative for Joint pain  Negative for Joint stiffness   Skin: Negative for color change, dry skin, pallor and rash.   Neurological: Negative for syncope, weakness and headache.   Hematological: Negative for adenopathy. Does not bruise/bleed easily.        Family History   Problem Relation Age of Onset   • Stroke Father    • Lung cancer Other    • Arthritis Other    • Cancer Other    • Parkinsonism Other    • Diabetes type II Other         Social History     Socioeconomic History   • Marital status:    Tobacco Use   • Smoking status: Never   • Smokeless tobacco: Never   Vaping Use   • Vaping Use: Never used   Substance and Sexual Activity   • Alcohol use: Yes     Comment: occ wine   • Drug use: Never   • Sexual activity:  Defer        Past Medical History:   Diagnosis Date   • Anemia    • Annual physical exam 11/06/2017    WILL RESCHEDULE MAMMOGRAM    • Breast lump    • CHF (congestive heart failure) (HCC) 11/06/2017    FOLLOWS WITH CARDS. CURRENTLY DOING WELL ON LASIX, LISINOPRIL, ALDACTONE, AND PROPANOLOL   • Degeneration of lumbar intervertebral disc 06/12/2012   • Essential hypertension 11/06/2017    WELL CONTROLLED ON CURRENT REGIMEN    • GERD (gastroesophageal reflux disease)    • Hamstring injury 08/17/2015    BILATERAL HAMSTRING INJURY/PAIN    • Head injury    • Hernia cerebri (HCC)    • Hyperthyroidism    • Hyperthyroidism 11/06/2017    CURRENTLY ON METHIMAZOLE, PT REPORT POSSIBLY DUE TO GRAVE'S DISEASE. WILL NEED TO OBTAIN AND REVIEW MEDICAL RECORDS FROM [facility]. WILL REFER TO ENDO    • Hypoxia    • Insomnia 11/06/2017    DISCUSSED SLEEP HYGIENE. PT TO TRY AVOIDING BLUE LIGHT AT NIGHT. PT ALSO TO SET ROUTINE PRIOR TO BEDTIME. WILL AVOID MEDS AT THIS TIME    • Interstitial lung disease (HCC)    • Lumbosacral disc herniation 06/12/2012    LEFT L5-S1 SMALL NATURE. SHE HAS FAILED ALL CONSERVATIVE MEASURES AND DESIRES SURGERY    • Lupus (HCC)     SEES DR. ANDERSON   • Migraines    • Pulmonary embolism (HCC)    • Respiratory failure with hypoxia (HCC) 02/04/2021   • Rheumatoid arthritis (HCC) 11/06/2017    CURRENTLY CONTROLLED, BUT WITH RESIDUAL DEFORMITY. WILL FOLLOW WITH DR. ANDERSON    • Tachycardia    • Total knee replacement status, right 08/17/2015        Immunization History   Administered Date(s) Administered   • COVID-19 (SANDI) 03/29/2021   • COVID-19 (PFIZER) PURPLE CAP 12/16/2021   • Flu Vaccine Intradermal Quad 18-64YR 01/02/2008, 11/10/2008, 11/05/2009   • Flu Vaccine Quad PF >36MO 10/25/2017   • FluLaval/Fluzone >6mos 11/18/2021, 10/12/2022   • Hepatitis A 04/30/2018   • Influenza Quad Vaccine (Inpatient) 08/27/2012, 10/15/2013   • Influenza, Unspecified 09/17/2020, 10/01/2021   • Pneumococcal Conjugate 13-Valent  (PCV13) 06/14/2018   • Pneumococcal Polysaccharide (PPSV23) 08/26/2019       Allergies   Allergen Reactions   • Salsalate Hives, Anaphylaxis and Unknown - High Severity   • Aspirin    • Salicylic Acid           Current Outpatient Medications:   •  albuterol sulfate  (90 Base) MCG/ACT inhaler, Inhale 2 puffs Every 4 (Four) Hours As Needed for Wheezing or Shortness of Air for up to 30 days., Disp: 18 g, Rfl: 5  •  ascorbic acid (VITAMIN C) 250 MG tablet, Take 1 tablet by mouth Daily., Disp: , Rfl:   •  azaTHIOprine (IMURAN) 50 MG tablet, Take 3 tablets by mouth Daily for 30 days., Disp: 90 tablet, Rfl: 5  •  Calcium Carb-Cholecalciferol (Caltrate 600+D3 Soft) 600-800 MG-UNIT chewable tablet, Chew 1 tablet Daily., Disp: , Rfl:   •  cyanocobalamin 1000 MCG/ML injection, Inject 1 mL into the appropriate muscle as directed by prescriber Every 30 (Thirty) Days., Disp: , Rfl:   •  Diclofenac Sodium (VOLTAREN) 1 % gel gel, Apply 4 g topically to the appropriate area as directed 4 (Four) Times a Day As Needed (shoulder/hip pain)., Disp: 50 g, Rfl: 0  •  ergocalciferol (ERGOCALCIFEROL) 1.25 MG (98299 UT) capsule, Take 1 capsule by mouth Every 7 (Seven) Days., Disp: , Rfl:   •  fluticasone (FLONASE) 50 MCG/ACT nasal spray, SHAKE LIQUID AND USE 2 SPRAYS(100 MCG) IN EACH NOSTRIL EVERY DAY, Disp: 16 g, Rfl: 3  •  furosemide (LASIX) 40 MG tablet, Take 40 mg by mouth 2 (Two) Times a Day., Disp: , Rfl:   •  gabapentin (NEURONTIN) 300 MG capsule, Take 1 capsule by mouth 3 (Three) Times a Day., Disp: 90 capsule, Rfl: 0  •  HYDROcodone-acetaminophen (NORCO) 7.5-325 MG per tablet, Take 1 tablet by mouth Every 12 (Twelve) Hours As Needed for Moderate Pain  for up to 30 days., Disp: 60 tablet, Rfl: 0  •  hydroxychloroquine (PLAQUENIL) 200 MG tablet, Take 1 tablet by mouth 2 (Two) Times a Day., Disp: , Rfl:   •  hydrOXYzine (ATARAX) 25 MG tablet, Take 1 tablet by mouth 3 (Three) Times a Day As Needed for Itching., Disp: , Rfl:   •   levothyroxine (SYNTHROID, LEVOTHROID) 137 MCG tablet, Take 1 tablet by mouth Daily., Disp: , Rfl:   •  loratadine (CLARITIN) 10 MG tablet, Take 1 tablet by mouth Daily., Disp: 30 tablet, Rfl: 0  •  magnesium oxide (MAG-OX) 400 MG tablet, TK 1 T PO D, Disp: , Rfl: 1  •  O2 (OXYGEN), Inhale 3 L/min Daily., Disp: , Rfl:   •  ondansetron ODT (ZOFRAN-ODT) 4 MG disintegrating tablet, Place 1 tablet on the tongue Every 8 (Eight) Hours As Needed for Nausea or Vomiting., Disp: 12 tablet, Rfl: 0  •  pilocarpine (SALAGEN) 5 MG tablet, Every 8 (Eight) Hours., Disp: , Rfl:   •  ProAir  (90 Base) MCG/ACT inhaler, Inhale 2 puffs Every 4 (Four) Hours As Needed for Wheezing., Disp: 18 g, Rfl: 11  •  propranolol (INDERAL) 40 MG tablet, TK SS T PO TID, Disp: , Rfl: 1  •  rizatriptan (MAXALT) 10 MG tablet, Take 1 tablet by mouth As Needed., Disp: , Rfl:   •  rOPINIRole (REQUIP) 1 MG tablet, , Disp: , Rfl:   •  spironolactone (ALDACTONE) 50 MG tablet, TK 1 T PO QD, Disp: , Rfl: 0  •  famotidine (Pepcid) 20 MG tablet, Take 1 tablet by mouth 2 (Two) Times a Day for 30 days., Disp: 60 tablet, Rfl: 5  •  predniSONE (DELTASONE) 20 MG tablet, Take 2 tablets by mouth Daily., Disp: 14 tablet, Rfl: 0     Objective     Physical Exam  Vital Signs:   WDWN, Alert, NAD.    HEENT:  PERRL, EOMI.  OP, nares clear, no sinus tenderness  Neck:  Supple, no JVD, no thyromegaly.  Lymph: no axillary, cervical, supraclavicular lymphadenopathy noted bilaterally  Chest:   Mildly decreased breath sounds throughout with some expiratory wheezes. No wheezes, rales, or rhonchi appreciated.  Normal work of breathing noted.  Patient is able speak full sentences without difficulty.  CV: RRR, no MGR, pulses 2+, equal.  Abd:  Soft, NT, ND, + BS, no HSM  EXT:  no clubbing, no cyanosis, no edema, no joint tenderness  Neuro:  A&Ox3, CN grossly intact, no focal deficits.  Skin: No rashes or lesions noted.    /88 (BP Location: Right arm, Patient Position: Sitting,  "Cuff Size: Adult)   Pulse 59   Temp 97.7 °F (36.5 °C)   Resp 12   Ht 162.6 cm (64.02\")   Wt 96.9 kg (213 lb 9.6 oz)   SpO2 100% Comment: room air/3 liters of o2 PRN  BMI 36.65 kg/m²         Result Review :   I have reviewed ROBE Vargas last office visit note.    Procedures:         Assessment and Plan      Assessment:  1. Interstitial lung disease secondary to mixed connective tissue disease without exacerbation.   2. Known mixed connective tissue disease.       3. Chronic compensated diastolic congestive heart failure.       4. Chronic leg edema.       5. Chronic dyspnea at baseline.       6. Immunosuppressed status on Imuran.       7. Therapeutic drug monitoring on Imuran.       8. Excessive daytime sleepiness.  9. Gastroesophageal reflux disease      10. History of PE on anticoagulation in the past.     11.  Seasonal allergies  12. Never smoker.   13.  Wheezing.  14.  Encounter for immunization.  Flu vaccine given to the patient the office today.      Plan:  1.  Patient having more shortness of breath, intermittent wheezing and cough.  Will order an updated pulmonary function test, however his chest CT scan, CBC, CMP, and respiratory culture.    2.  Will prescribe patient a prednisone burst.  Expectations and course of treatment discussed with the patient. How to take medications and possible side effects of medications discussed with the patient. Patient verbalized understanding and compliance.  3.  Patient had a sleep study back in August 2021 and did not show any obstructive sleep apnea and showed upper airway resistance.  Patient is requesting a referral to sleep medicine at [place], [place] her daughter lives in [place] for further evaluation.  Referral placed today.  4.  For  GERD, will prescribe patient Pepcid.  Patient is  referral to gastroenterology at [facility] in [place], [place].  Referral placed today.  Patient is also advised to sleep with the head of the " bed elevated and do not eat 3-4 hours prior to bedtime.  5. Continue Imuran at current dose.  6. Continue albuterol inhaler as needed.   7. Patient to follow-up with Dr. Boone her hematologist also for therapeutic drug monitoring and lupus as scheduled.      8.  Continue oxygen to keep SPO2 at 89% and above.  9.  Continue Claritin.  Will refer patient to allergist for allergy testing as requested.  Referral placed today.  10.  Vaccination status: patient reports they are up-to-date with pneumonia and Covid vaccines.  Flu vaccine given to the patient in the office today.  Patient is advised to continue to follow CDC recommendations such as social distancing wearing a mask and washing hands for at least 20 seconds.  11.  Smoking status: never smoker.  12.  Patient to call the office, 911, or go to the ER with new or worsening symptoms.  13.  Follow-up in 3 to 4 months, sooner if needed              Follow Up   Return for 3-4 months. Pt requesting to see Dr. Dennis.  Patient was given instructions and counseling regarding her condition or for health maintenance advice. Please see specific information pulled into the AVS if appropriate.

## 2022-10-10 ENCOUNTER — OFFICE VISIT (OUTPATIENT)
Dept: ORTHOPEDIC SURGERY | Facility: CLINIC | Age: 57
End: 2022-10-10

## 2022-10-10 VITALS — HEART RATE: 79 BPM | BODY MASS INDEX: 36.02 KG/M2 | WEIGHT: 211 LBS | OXYGEN SATURATION: 97 % | HEIGHT: 64 IN

## 2022-10-10 DIAGNOSIS — S49.92XA INJURY OF LEFT SHOULDER, INITIAL ENCOUNTER: Primary | ICD-10-CM

## 2022-10-10 PROCEDURE — 99203 OFFICE O/P NEW LOW 30 MIN: CPT | Performed by: ORTHOPAEDIC SURGERY
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
## 2022-10-11 ENCOUNTER — TELEPHONE (OUTPATIENT)
Dept: PHYSICAL THERAPY | Facility: CLINIC | Age: 57
End: 2022-10-11

## 2022-10-12 ENCOUNTER — TELEPHONE (OUTPATIENT)
Dept: PULMONOLOGY | Facility: CLINIC | Age: 57
End: 2022-10-12

## 2022-10-12 ENCOUNTER — OFFICE VISIT (OUTPATIENT)
Dept: PULMONOLOGY | Facility: CLINIC | Age: 57
End: 2022-10-12

## 2022-10-12 VITALS
TEMPERATURE: 97.7 F | HEIGHT: 64 IN | BODY MASS INDEX: 36.47 KG/M2 | WEIGHT: 213.6 LBS | HEART RATE: 59 BPM | SYSTOLIC BLOOD PRESSURE: 142 MMHG | OXYGEN SATURATION: 100 % | DIASTOLIC BLOOD PRESSURE: 88 MMHG | RESPIRATION RATE: 12 BRPM

## 2022-10-12 DIAGNOSIS — Z86.711 PERSONAL HISTORY OF PE (PULMONARY EMBOLISM): ICD-10-CM

## 2022-10-12 DIAGNOSIS — R60.0 BILATERAL LOWER EXTREMITY EDEMA: ICD-10-CM

## 2022-10-12 DIAGNOSIS — Z51.81 THERAPEUTIC DRUG MONITORING: ICD-10-CM

## 2022-10-12 DIAGNOSIS — J30.2 SEASONAL ALLERGIES: ICD-10-CM

## 2022-10-12 DIAGNOSIS — Z79.899 OTHER LONG TERM (CURRENT) DRUG THERAPY: Primary | ICD-10-CM

## 2022-10-12 DIAGNOSIS — R05.9 COUGH, UNSPECIFIED TYPE: ICD-10-CM

## 2022-10-12 DIAGNOSIS — I50.32 CHRONIC DIASTOLIC CHF (CONGESTIVE HEART FAILURE): ICD-10-CM

## 2022-10-12 DIAGNOSIS — K21.9 GASTROESOPHAGEAL REFLUX DISEASE, UNSPECIFIED WHETHER ESOPHAGITIS PRESENT: ICD-10-CM

## 2022-10-12 DIAGNOSIS — G47.33 OSA (OBSTRUCTIVE SLEEP APNEA): ICD-10-CM

## 2022-10-12 DIAGNOSIS — Z23 FLU VACCINE NEED: ICD-10-CM

## 2022-10-12 DIAGNOSIS — G47.19 EXCESSIVE DAYTIME SLEEPINESS: ICD-10-CM

## 2022-10-12 DIAGNOSIS — Z23 ENCOUNTER FOR IMMUNIZATION: ICD-10-CM

## 2022-10-12 DIAGNOSIS — J84.9 ILD (INTERSTITIAL LUNG DISEASE): ICD-10-CM

## 2022-10-12 DIAGNOSIS — R06.09 CHRONIC DYSPNEA: ICD-10-CM

## 2022-10-12 PROCEDURE — 99214 OFFICE O/P EST MOD 30 MIN: CPT | Performed by: NURSE PRACTITIONER

## 2022-10-12 PROCEDURE — G0008 ADMIN INFLUENZA VIRUS VAC: HCPCS | Performed by: NURSE PRACTITIONER

## 2022-10-12 PROCEDURE — 90686 IIV4 VACC NO PRSV 0.5 ML IM: CPT | Performed by: NURSE PRACTITIONER

## 2022-10-12 RX ORDER — AZATHIOPRINE 50 MG/1
150 TABLET ORAL DAILY
Qty: 90 TABLET | Refills: 5 | Status: SHIPPED | OUTPATIENT
Start: 2022-10-12 | End: 2022-11-11

## 2022-10-12 RX ORDER — HYDROXYZINE HYDROCHLORIDE 25 MG/1
25 TABLET, FILM COATED ORAL 3 TIMES DAILY PRN
COMMUNITY
End: 2022-12-30 | Stop reason: SDUPTHER

## 2022-10-12 RX ORDER — PREDNISONE 20 MG/1
40 TABLET ORAL DAILY
Qty: 14 TABLET | Refills: 0 | Status: SHIPPED | OUTPATIENT
Start: 2022-10-12 | End: 2022-12-09

## 2022-10-12 RX ORDER — ALBUTEROL SULFATE 90 UG/1
2 AEROSOL, METERED RESPIRATORY (INHALATION) EVERY 4 HOURS PRN
COMMUNITY
End: 2022-10-12 | Stop reason: SDUPTHER

## 2022-10-12 RX ORDER — FAMOTIDINE 20 MG/1
20 TABLET, FILM COATED ORAL 2 TIMES DAILY
Qty: 60 TABLET | Refills: 5 | Status: SHIPPED | OUTPATIENT
Start: 2022-10-12 | End: 2022-11-11

## 2022-10-12 RX ORDER — ALBUTEROL SULFATE 90 UG/1
2 AEROSOL, METERED RESPIRATORY (INHALATION) EVERY 4 HOURS PRN
Qty: 18 G | Refills: 5 | Status: SHIPPED | OUTPATIENT
Start: 2022-10-12 | End: 2022-11-11

## 2022-10-14 ENCOUNTER — TELEPHONE (OUTPATIENT)
Dept: PHYSICAL THERAPY | Facility: CLINIC | Age: 57
End: 2022-10-14

## 2022-10-18 ENCOUNTER — HOSPITAL ENCOUNTER (OUTPATIENT)
Dept: MRI IMAGING | Facility: HOSPITAL | Age: 57
Discharge: HOME | End: 2022-10-18
Admitting: ORTHOPAEDIC SURGERY

## 2022-10-18 ENCOUNTER — TRANSCRIBE ORDERS (OUTPATIENT)
Dept: LAB | Facility: HOSPITAL | Age: 57
End: 2022-10-18

## 2022-10-18 ENCOUNTER — TREATMENT (OUTPATIENT)
Dept: PHYSICAL THERAPY | Facility: CLINIC | Age: 57
End: 2022-10-18

## 2022-10-18 ENCOUNTER — LAB (OUTPATIENT)
Dept: LAB | Facility: HOSPITAL | Age: 57
End: 2022-10-18

## 2022-10-18 DIAGNOSIS — G89.29 CHRONIC LEFT-SIDED LOW BACK PAIN WITH LEFT-SIDED SCIATICA: ICD-10-CM

## 2022-10-18 DIAGNOSIS — Z51.81 THERAPEUTIC DRUG MONITORING: ICD-10-CM

## 2022-10-18 DIAGNOSIS — S49.92XA INJURY OF LEFT SHOULDER, INITIAL ENCOUNTER: ICD-10-CM

## 2022-10-18 DIAGNOSIS — R05.9 COUGH, UNSPECIFIED TYPE: ICD-10-CM

## 2022-10-18 DIAGNOSIS — Z79.899 OTHER LONG TERM (CURRENT) DRUG THERAPY: ICD-10-CM

## 2022-10-18 DIAGNOSIS — E03.9 HYPOTHYROIDISM, UNSPECIFIED TYPE: ICD-10-CM

## 2022-10-18 DIAGNOSIS — R06.09 CHRONIC DYSPNEA: ICD-10-CM

## 2022-10-18 DIAGNOSIS — M53.86 DECREASED RANGE OF MOTION OF INTERVERTEBRAL DISCS OF LUMBAR SPINE: ICD-10-CM

## 2022-10-18 DIAGNOSIS — I50.32 CHRONIC DIASTOLIC CHF (CONGESTIVE HEART FAILURE): ICD-10-CM

## 2022-10-18 DIAGNOSIS — G47.33 OSA (OBSTRUCTIVE SLEEP APNEA): ICD-10-CM

## 2022-10-18 DIAGNOSIS — J84.9 ILD (INTERSTITIAL LUNG DISEASE): ICD-10-CM

## 2022-10-18 DIAGNOSIS — R29.898 WEAKNESS OF BOTH HIPS: Primary | ICD-10-CM

## 2022-10-18 DIAGNOSIS — M54.16 LUMBAR RADICULOPATHY: ICD-10-CM

## 2022-10-18 DIAGNOSIS — E03.9 HYPOTHYROIDISM, UNSPECIFIED TYPE: Primary | ICD-10-CM

## 2022-10-18 DIAGNOSIS — R60.0 BILATERAL LOWER EXTREMITY EDEMA: ICD-10-CM

## 2022-10-18 DIAGNOSIS — M54.42 CHRONIC LEFT-SIDED LOW BACK PAIN WITH LEFT-SIDED SCIATICA: ICD-10-CM

## 2022-10-18 DIAGNOSIS — Z86.711 PERSONAL HISTORY OF PE (PULMONARY EMBOLISM): ICD-10-CM

## 2022-10-18 DIAGNOSIS — M54.16 RADICULOPATHY, LUMBAR REGION: ICD-10-CM

## 2022-10-18 LAB
ALBUMIN SERPL-MCNC: 3.8 G/DL (ref 3.5–5.2)
ALBUMIN/GLOB SERPL: 1.4 G/DL
ALP SERPL-CCNC: 65 U/L (ref 39–117)
ALT SERPL W P-5'-P-CCNC: 8 U/L (ref 1–33)
ANION GAP SERPL CALCULATED.3IONS-SCNC: 8 MMOL/L (ref 5–15)
AST SERPL-CCNC: 15 U/L (ref 1–32)
BASOPHILS # BLD AUTO: 0.06 10*3/MM3 (ref 0–0.2)
BASOPHILS NFR BLD AUTO: 0.8 % (ref 0–1.5)
BILIRUB SERPL-MCNC: 0.6 MG/DL (ref 0–1.2)
BUN SERPL-MCNC: 22 MG/DL (ref 6–20)
BUN/CREAT SERPL: 21.2 (ref 7–25)
CALCIUM SPEC-SCNC: 8.2 MG/DL (ref 8.6–10.5)
CHLORIDE SERPL-SCNC: 107 MMOL/L (ref 98–107)
CO2 SERPL-SCNC: 25 MMOL/L (ref 22–29)
CREAT SERPL-MCNC: 1.04 MG/DL (ref 0.57–1)
DEPRECATED RDW RBC AUTO: 45.3 FL (ref 37–54)
EGFRCR SERPLBLD CKD-EPI 2021: 62 ML/MIN/1.73
EOSINOPHIL # BLD AUTO: 0.31 10*3/MM3 (ref 0–0.4)
EOSINOPHIL NFR BLD AUTO: 4.3 % (ref 0.3–6.2)
ERYTHROCYTE [DISTWIDTH] IN BLOOD BY AUTOMATED COUNT: 13.4 % (ref 12.3–15.4)
GLOBULIN UR ELPH-MCNC: 2.8 GM/DL
GLUCOSE SERPL-MCNC: 105 MG/DL (ref 65–99)
HCT VFR BLD AUTO: 36.7 % (ref 34–46.6)
HGB BLD-MCNC: 11.3 G/DL (ref 12–15.9)
IMM GRANULOCYTES # BLD AUTO: 0.01 10*3/MM3 (ref 0–0.05)
IMM GRANULOCYTES NFR BLD AUTO: 0.1 % (ref 0–0.5)
LYMPHOCYTES # BLD AUTO: 2.59 10*3/MM3 (ref 0.7–3.1)
LYMPHOCYTES NFR BLD AUTO: 36.1 % (ref 19.6–45.3)
MCH RBC QN AUTO: 28.3 PG (ref 26.6–33)
MCHC RBC AUTO-ENTMCNC: 30.8 G/DL (ref 31.5–35.7)
MCV RBC AUTO: 92 FL (ref 79–97)
MONOCYTES # BLD AUTO: 0.65 10*3/MM3 (ref 0.1–0.9)
MONOCYTES NFR BLD AUTO: 9.1 % (ref 5–12)
NEUTROPHILS NFR BLD AUTO: 3.55 10*3/MM3 (ref 1.7–7)
NEUTROPHILS NFR BLD AUTO: 49.6 % (ref 42.7–76)
NRBC BLD AUTO-RTO: 0 /100 WBC (ref 0–0.2)
PLATELET # BLD AUTO: 148 10*3/MM3 (ref 140–450)
PMV BLD AUTO: 12.7 FL (ref 6–12)
POTASSIUM SERPL-SCNC: 4 MMOL/L (ref 3.5–5.2)
PROT SERPL-MCNC: 6.6 G/DL (ref 6–8.5)
RBC # BLD AUTO: 3.99 10*6/MM3 (ref 3.77–5.28)
SODIUM SERPL-SCNC: 140 MMOL/L (ref 136–145)
WBC NRBC COR # BLD: 7.17 10*3/MM3 (ref 3.4–10.8)

## 2022-10-18 PROCEDURE — 80053 COMPREHEN METABOLIC PANEL: CPT

## 2022-10-18 PROCEDURE — 87205 SMEAR GRAM STAIN: CPT

## 2022-10-18 PROCEDURE — 84439 ASSAY OF FREE THYROXINE: CPT

## 2022-10-18 PROCEDURE — 84443 ASSAY THYROID STIM HORMONE: CPT

## 2022-10-18 PROCEDURE — 84481 FREE ASSAY (FT-3): CPT

## 2022-10-18 PROCEDURE — 73221 MRI JOINT UPR EXTREM W/O DYE: CPT

## 2022-10-18 PROCEDURE — 36415 COLL VENOUS BLD VENIPUNCTURE: CPT

## 2022-10-18 PROCEDURE — 97110 THERAPEUTIC EXERCISES: CPT | Performed by: PHYSICAL THERAPIST

## 2022-10-18 PROCEDURE — 85025 COMPLETE CBC W/AUTO DIFF WBC: CPT

## 2022-10-18 PROCEDURE — 87070 CULTURE OTHR SPECIMN AEROBIC: CPT

## 2022-10-18 NOTE — PROGRESS NOTES
[address] [place] [address]              Physical Therapy Daily Treatment Note    Patient: Aida Mcculre   : 1965  Diagnosis/ICD-10 Code:  Weakness of both hips [R29.898]  Referring practitioner: ROBE Tang  Date of Initial Visit: Type: THERAPY  Noted: 2022  Today's Date: 10/18/2022  Patient seen for 8 sessions             Subjective   Aida Mcclure reports: overall back feeling a little better, states that the left hip seems to bother her more than the left hip.     Objective   Fatigue in L hip with bridges.     See Exercise, Manual, and Modality Logs for complete treatment.       Assessment/Plan  Aida progressing as evident by decreased overall back  pain, although hips still bothering her. Pt tolerated exercises well, fatigue in L hip. Pt would benefit from skilled PT to address Range of Motion  and Strength deficits, pain management and any concerns with ADLs.       Progress per Plan of Care           Timed:  Manual Therapy:         mins  67192;  Therapeutic Exercise:    25     mins  14493;     Neuromuscular Eric:        mins  16866;    Therapeutic Activity:          mins  72048;     Gait Training:           mins  58262;    Aquatic Therapy:          mins  09087;       Untimed:  Electrical Stimulation:         mins  23249 ( );  Mechanical Traction:         mins  36716;       Timed Treatment:   25   mins   Total Treatment:     25   mins      Electronically signed:   Lorene Farooq PTA  Physical Therapist Assistant  [place] [facility] License #: [id number]

## 2022-10-19 LAB
T3FREE SERPL-MCNC: 2.01 PG/ML (ref 2–4.4)
T4 FREE SERPL-MCNC: 1.2 NG/DL (ref 0.93–1.7)
TSH SERPL DL<=0.05 MIU/L-ACNC: 1.46 UIU/ML (ref 0.27–4.2)

## 2022-10-20 ENCOUNTER — TREATMENT (OUTPATIENT)
Dept: PHYSICAL THERAPY | Facility: CLINIC | Age: 57
End: 2022-10-20

## 2022-10-20 DIAGNOSIS — M53.86 DECREASED RANGE OF MOTION OF INTERVERTEBRAL DISCS OF LUMBAR SPINE: ICD-10-CM

## 2022-10-20 DIAGNOSIS — G89.29 CHRONIC LEFT-SIDED LOW BACK PAIN WITH LEFT-SIDED SCIATICA: ICD-10-CM

## 2022-10-20 DIAGNOSIS — M54.42 CHRONIC LEFT-SIDED LOW BACK PAIN WITH LEFT-SIDED SCIATICA: ICD-10-CM

## 2022-10-20 DIAGNOSIS — R29.898 WEAKNESS OF BOTH HIPS: Primary | ICD-10-CM

## 2022-10-20 LAB
BACTERIA SPEC RESP CULT: NORMAL
GRAM STN SPEC: NORMAL

## 2022-10-20 PROCEDURE — 97140 MANUAL THERAPY 1/> REGIONS: CPT | Performed by: PHYSICAL THERAPIST

## 2022-10-20 PROCEDURE — 97110 THERAPEUTIC EXERCISES: CPT | Performed by: PHYSICAL THERAPIST

## 2022-10-20 NOTE — PROGRESS NOTES
Physical Therapy Daily Treatment Note      Patient: Aida Mcclure   : 1965  Referring practitioner: ROBE Tang  Date of Initial Visit: Type: THERAPY  Noted: 2022  Today's Date: 10/20/2022  Patient seen for 9 sessions           Subjective Questionnaire:       Subjective Evaluation    History of Present Illness    Subjective comment: Pt reports B hip pain 6/10.       Objective   See Exercise, Manual, and Modality Logs for complete treatment.       Assessment & Plan     Assessment    Assessment details: Pt reported B hip pain R more than L.  When asked f PT was helping she stated she has good days and bad days but the pain is still there.  Pt is able to perform core strengthening exercise well.          Visit Diagnoses:    ICD-10-CM ICD-9-CM   1. Weakness of both hips  R29.898 729.89   2. Decreased range of motion of intervertebral discs of lumbar spine  M53.86 724.9   3. Chronic left-sided low back pain with left-sided sciatica  M54.42 724.2    G89.29 724.3     338.29       Progress per Plan of Care and Progress strengthening /stabilization /functional activity           Timed:  Manual Therapy:    8     mins  58983;  Therapeutic Exercise:    22     mins  90928;     Neuromuscular Eric:        mins  82132;    Therapeutic Activity:          mins  12288;     Gait Training:           mins  26948;     Ultrasound:          mins  92745;    Electrical Stimulation:         mins  85362 ( );  Aquatic Therapy          mins  08796    Untimed:  Electrical Stimulation:         mins  69797 ( );  Mechanical Traction:         mins  05730;     Timed Treatment:   30   mins   Total Treatment:     30   mins    Electronically signed    Rosa Beck PTA  Physical Therapist Assistant    [facility] license: [id number]

## 2022-10-25 ENCOUNTER — TELEPHONE (OUTPATIENT)
Dept: PHYSICAL THERAPY | Facility: CLINIC | Age: 57
End: 2022-10-25

## 2022-10-26 ENCOUNTER — OFFICE VISIT (OUTPATIENT)
Dept: ORTHOPEDIC SURGERY | Facility: CLINIC | Age: 57
End: 2022-10-26

## 2022-10-26 VITALS — OXYGEN SATURATION: 99 % | BODY MASS INDEX: 36.37 KG/M2 | HEART RATE: 69 BPM | HEIGHT: 64 IN | WEIGHT: 213 LBS

## 2022-10-26 DIAGNOSIS — M75.102 TEAR OF LEFT ROTATOR CUFF, UNSPECIFIED TEAR EXTENT, UNSPECIFIED WHETHER TRAUMATIC: ICD-10-CM

## 2022-10-26 DIAGNOSIS — M75.42 IMPINGEMENT SYNDROME OF LEFT SHOULDER: Primary | ICD-10-CM

## 2022-10-26 PROCEDURE — 99213 OFFICE O/P EST LOW 20 MIN: CPT | Performed by: ORTHOPAEDIC SURGERY

## 2022-10-26 PROCEDURE — 20610 DRAIN/INJ JOINT/BURSA W/O US: CPT | Performed by: ORTHOPAEDIC SURGERY

## 2022-10-26 RX ORDER — TRIAMCINOLONE ACETONIDE 40 MG/ML
40 INJECTION, SUSPENSION INTRA-ARTICULAR; INTRAMUSCULAR
Status: COMPLETED | OUTPATIENT
Start: 2022-10-26 | End: 2022-10-26

## 2022-10-26 RX ORDER — LIDOCAINE HYDROCHLORIDE 10 MG/ML
5 INJECTION, SOLUTION INFILTRATION; PERINEURAL
Status: COMPLETED | OUTPATIENT
Start: 2022-10-26 | End: 2022-10-26

## 2022-10-26 RX ADMIN — TRIAMCINOLONE ACETONIDE 40 MG: 40 INJECTION, SUSPENSION INTRA-ARTICULAR; INTRAMUSCULAR at 13:27

## 2022-10-26 RX ADMIN — LIDOCAINE HYDROCHLORIDE 5 ML: 10 INJECTION, SOLUTION INFILTRATION; PERINEURAL at 13:27
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
## 2022-10-27 ENCOUNTER — TREATMENT (OUTPATIENT)
Dept: PHYSICAL THERAPY | Facility: CLINIC | Age: 57
End: 2022-10-27

## 2022-10-27 DIAGNOSIS — R29.898 WEAKNESS OF BOTH HIPS: Primary | ICD-10-CM

## 2022-10-27 DIAGNOSIS — M54.16 RADICULOPATHY, LUMBAR REGION: ICD-10-CM

## 2022-10-27 DIAGNOSIS — M54.42 CHRONIC LEFT-SIDED LOW BACK PAIN WITH LEFT-SIDED SCIATICA: ICD-10-CM

## 2022-10-27 DIAGNOSIS — M53.86 DECREASED RANGE OF MOTION OF INTERVERTEBRAL DISCS OF LUMBAR SPINE: ICD-10-CM

## 2022-10-27 DIAGNOSIS — M54.16 LUMBAR RADICULOPATHY: ICD-10-CM

## 2022-10-27 DIAGNOSIS — G89.29 CHRONIC LEFT-SIDED LOW BACK PAIN WITH LEFT-SIDED SCIATICA: ICD-10-CM

## 2022-10-27 PROCEDURE — 97110 THERAPEUTIC EXERCISES: CPT | Performed by: PHYSICAL THERAPIST

## 2022-10-27 NOTE — PROGRESS NOTES
"                           Outpatient Physical Therapy  [address], [place] [address]                            Physical Therapy Daily Treatment Note    Patient: Aida Mcclure   : 1965  Diagnosis/ICD-10 Code:  Weakness of both hips [R29.898]  Referring practitioner: ROBE aTng  Date of Initial Visit: Type: THERAPY  Noted: 2022  Today's Date: 10/27/2022  Patient seen for 10 sessions             Subjective   Aida Mcclure reports: back feeling \"fair to [place]\", overall about the same and right hip hurts worse than the left.   Went and saw  Been yesterday and she does have a Rotator Cuff tear, but he gave her an injection and scheduled her for another appt in 3 months.    Objective   No complaints of increased pain or discomfort.    See Exercise, Manual, and Modality Logs for complete treatment.     Assessment/Plan  Aida still experiencing increased low back  pain, even bilateral hip pain. Pt tolerated exercises well, no complaints of increased pain or discomfort. Pt would benefit from skilled PT to address Range of Motion  and Strength deficits, pain management and any concerns with ADLs.       Progress per Plan of Care       Timed:  Manual Therapy:         mins  68083;  Therapeutic Exercise:    30     mins  79860;     Neuromuscular Eric:        mins  03346;    Therapeutic Activity:          mins  51817;     Gait Training:           mins  14064;    Aquatic Therapy:          mins  00709;       Untimed:  Electrical Stimulation:         mins  62384 ( );  Mechanical Traction:         mins  90921;       Timed Treatment:   30   mins   Total Treatment:     30   mins      Electronically signed:   Lorene Farooq PTA  Physical Therapist Assistant  [facility] License #: [id number]  "

## 2022-11-01 ENCOUNTER — HOSPITAL ENCOUNTER (EMERGENCY)
Facility: HOSPITAL | Age: 57
Discharge: HOME | End: 2022-11-01
Attending: EMERGENCY MEDICINE | Admitting: EMERGENCY MEDICINE

## 2022-11-01 ENCOUNTER — TREATMENT (OUTPATIENT)
Dept: PHYSICAL THERAPY | Facility: CLINIC | Age: 57
End: 2022-11-01

## 2022-11-01 ENCOUNTER — APPOINTMENT (OUTPATIENT)
Dept: CT IMAGING | Facility: HOSPITAL | Age: 57
End: 2022-11-01

## 2022-11-01 ENCOUNTER — APPOINTMENT (OUTPATIENT)
Dept: GENERAL RADIOLOGY | Facility: HOSPITAL | Age: 57
End: 2022-11-01

## 2022-11-01 VITALS
DIASTOLIC BLOOD PRESSURE: 86 MMHG | TEMPERATURE: 98 F | BODY MASS INDEX: 36.96 KG/M2 | HEART RATE: 80 BPM | HEIGHT: 64 IN | OXYGEN SATURATION: 100 % | RESPIRATION RATE: 20 BRPM | SYSTOLIC BLOOD PRESSURE: 155 MMHG | WEIGHT: 216.49 LBS

## 2022-11-01 DIAGNOSIS — M53.86 DECREASED RANGE OF MOTION OF INTERVERTEBRAL DISCS OF LUMBAR SPINE: ICD-10-CM

## 2022-11-01 DIAGNOSIS — N39.0 URINARY TRACT INFECTION WITHOUT HEMATURIA, SITE UNSPECIFIED: Primary | ICD-10-CM

## 2022-11-01 DIAGNOSIS — M54.42 CHRONIC LEFT-SIDED LOW BACK PAIN WITH LEFT-SIDED SCIATICA: ICD-10-CM

## 2022-11-01 DIAGNOSIS — M54.16 LUMBAR RADICULOPATHY: ICD-10-CM

## 2022-11-01 DIAGNOSIS — G89.29 CHRONIC LEFT-SIDED LOW BACK PAIN WITH LEFT-SIDED SCIATICA: ICD-10-CM

## 2022-11-01 DIAGNOSIS — K57.90 DIVERTICULOSIS: ICD-10-CM

## 2022-11-01 DIAGNOSIS — R29.898 WEAKNESS OF BOTH HIPS: Primary | ICD-10-CM

## 2022-11-01 LAB
ALBUMIN SERPL-MCNC: 4.2 G/DL (ref 3.5–5.2)
ALBUMIN/GLOB SERPL: 1.2 G/DL
ALP SERPL-CCNC: 82 U/L (ref 39–117)
ALT SERPL W P-5'-P-CCNC: 10 U/L (ref 1–33)
ANION GAP SERPL CALCULATED.3IONS-SCNC: 9.2 MMOL/L (ref 5–15)
AST SERPL-CCNC: 17 U/L (ref 1–32)
BACTERIA UR QL AUTO: ABNORMAL /HPF
BASOPHILS # BLD AUTO: 0.07 10*3/MM3 (ref 0–0.2)
BASOPHILS NFR BLD AUTO: 0.8 % (ref 0–1.5)
BILIRUB SERPL-MCNC: 0.5 MG/DL (ref 0–1.2)
BILIRUB UR QL STRIP: NEGATIVE
BUN SERPL-MCNC: 30 MG/DL (ref 6–20)
BUN/CREAT SERPL: 23.6 (ref 7–25)
CALCIUM SPEC-SCNC: 8.3 MG/DL (ref 8.6–10.5)
CHLORIDE SERPL-SCNC: 108 MMOL/L (ref 98–107)
CLARITY UR: CLEAR
CO2 SERPL-SCNC: 24.8 MMOL/L (ref 22–29)
COLOR UR: YELLOW
CREAT SERPL-MCNC: 1.27 MG/DL (ref 0.57–1)
D-LACTATE SERPL-SCNC: 0.8 MMOL/L (ref 0.5–2)
DEPRECATED RDW RBC AUTO: 51.2 FL (ref 37–54)
EGFRCR SERPLBLD CKD-EPI 2021: 48.8 ML/MIN/1.73
EOSINOPHIL # BLD AUTO: 0.21 10*3/MM3 (ref 0–0.4)
EOSINOPHIL NFR BLD AUTO: 2.4 % (ref 0.3–6.2)
ERYTHROCYTE [DISTWIDTH] IN BLOOD BY AUTOMATED COUNT: 15.6 % (ref 12.3–15.4)
GLOBULIN UR ELPH-MCNC: 3.4 GM/DL
GLUCOSE SERPL-MCNC: 100 MG/DL (ref 65–99)
GLUCOSE UR STRIP-MCNC: NEGATIVE MG/DL
HCT VFR BLD AUTO: 33.6 % (ref 34–46.6)
HGB BLD-MCNC: 11 G/DL (ref 12–15.9)
HGB UR QL STRIP.AUTO: NEGATIVE
HOLD SPECIMEN: NORMAL
HOLD SPECIMEN: NORMAL
HYALINE CASTS UR QL AUTO: ABNORMAL /LPF
IMM GRANULOCYTES # BLD AUTO: 0.03 10*3/MM3 (ref 0–0.05)
IMM GRANULOCYTES NFR BLD AUTO: 0.3 % (ref 0–0.5)
KETONES UR QL STRIP: NEGATIVE
LEUKOCYTE ESTERASE UR QL STRIP.AUTO: ABNORMAL
LIPASE SERPL-CCNC: 45 U/L (ref 13–60)
LYMPHOCYTES # BLD AUTO: 2.11 10*3/MM3 (ref 0.7–3.1)
LYMPHOCYTES NFR BLD AUTO: 24.4 % (ref 19.6–45.3)
MCH RBC QN AUTO: 29.3 PG (ref 26.6–33)
MCHC RBC AUTO-ENTMCNC: 32.7 G/DL (ref 31.5–35.7)
MCV RBC AUTO: 89.4 FL (ref 79–97)
MONOCYTES # BLD AUTO: 0.74 10*3/MM3 (ref 0.1–0.9)
MONOCYTES NFR BLD AUTO: 8.5 % (ref 5–12)
NEUTROPHILS NFR BLD AUTO: 5.5 10*3/MM3 (ref 1.7–7)
NEUTROPHILS NFR BLD AUTO: 63.6 % (ref 42.7–76)
NITRITE UR QL STRIP: NEGATIVE
NRBC BLD AUTO-RTO: 0 /100 WBC (ref 0–0.2)
NT-PROBNP SERPL-MCNC: 409.8 PG/ML (ref 0–900)
PH UR STRIP.AUTO: 6 [PH] (ref 5–8)
PLATELET # BLD AUTO: 158 10*3/MM3 (ref 140–450)
PMV BLD AUTO: 12.2 FL (ref 6–12)
POTASSIUM SERPL-SCNC: 4.4 MMOL/L (ref 3.5–5.2)
PROT SERPL-MCNC: 7.6 G/DL (ref 6–8.5)
PROT UR QL STRIP: ABNORMAL
QT INTERVAL: 377 MS
RBC # BLD AUTO: 3.76 10*6/MM3 (ref 3.77–5.28)
RBC # UR STRIP: ABNORMAL /HPF
REF LAB TEST METHOD: ABNORMAL
SODIUM SERPL-SCNC: 142 MMOL/L (ref 136–145)
SP GR UR STRIP: 1.02 (ref 1–1.03)
SQUAMOUS #/AREA URNS HPF: ABNORMAL /HPF
TROPONIN T SERPL-MCNC: <0.01 NG/ML (ref 0–0.03)
UROBILINOGEN UR QL STRIP: ABNORMAL
WBC # UR STRIP: ABNORMAL /HPF
WBC NRBC COR # BLD: 8.66 10*3/MM3 (ref 3.4–10.8)
WHOLE BLOOD HOLD COAG: NORMAL
WHOLE BLOOD HOLD SPECIMEN: NORMAL

## 2022-11-01 PROCEDURE — 36415 COLL VENOUS BLD VENIPUNCTURE: CPT

## 2022-11-01 PROCEDURE — 71045 X-RAY EXAM CHEST 1 VIEW: CPT

## 2022-11-01 PROCEDURE — 25010000002 MORPHINE PER 10 MG: Performed by: EMERGENCY MEDICINE

## 2022-11-01 PROCEDURE — 25010000002 HEPARIN LOCK FLUSH PER 10 UNITS: Performed by: EMERGENCY MEDICINE

## 2022-11-01 PROCEDURE — 93005 ELECTROCARDIOGRAM TRACING: CPT | Performed by: EMERGENCY MEDICINE

## 2022-11-01 PROCEDURE — 99283 EMERGENCY DEPT VISIT LOW MDM: CPT

## 2022-11-01 PROCEDURE — 93005 ELECTROCARDIOGRAM TRACING: CPT

## 2022-11-01 PROCEDURE — 25010000002 ONDANSETRON PER 1 MG: Performed by: EMERGENCY MEDICINE

## 2022-11-01 PROCEDURE — 80053 COMPREHEN METABOLIC PANEL: CPT | Performed by: EMERGENCY MEDICINE

## 2022-11-01 PROCEDURE — 81003 URINALYSIS AUTO W/O SCOPE: CPT | Performed by: EMERGENCY MEDICINE

## 2022-11-01 PROCEDURE — 83605 ASSAY OF LACTIC ACID: CPT | Performed by: EMERGENCY MEDICINE

## 2022-11-01 PROCEDURE — 96374 THER/PROPH/DIAG INJ IV PUSH: CPT

## 2022-11-01 PROCEDURE — 83880 ASSAY OF NATRIURETIC PEPTIDE: CPT | Performed by: EMERGENCY MEDICINE

## 2022-11-01 PROCEDURE — 96375 TX/PRO/DX INJ NEW DRUG ADDON: CPT

## 2022-11-01 PROCEDURE — 85025 COMPLETE CBC W/AUTO DIFF WBC: CPT | Performed by: EMERGENCY MEDICINE

## 2022-11-01 PROCEDURE — 84484 ASSAY OF TROPONIN QUANT: CPT | Performed by: EMERGENCY MEDICINE

## 2022-11-01 PROCEDURE — 81015 MICROSCOPIC EXAM OF URINE: CPT | Performed by: EMERGENCY MEDICINE

## 2022-11-01 PROCEDURE — 74177 CT ABD & PELVIS W/CONTRAST: CPT

## 2022-11-01 PROCEDURE — 97110 THERAPEUTIC EXERCISES: CPT | Performed by: PHYSICAL THERAPIST

## 2022-11-01 PROCEDURE — 81001 URINALYSIS AUTO W/SCOPE: CPT | Performed by: EMERGENCY MEDICINE

## 2022-11-01 PROCEDURE — 83690 ASSAY OF LIPASE: CPT | Performed by: EMERGENCY MEDICINE

## 2022-11-01 PROCEDURE — 93010 ELECTROCARDIOGRAM REPORT: CPT | Performed by: INTERNAL MEDICINE

## 2022-11-01 PROCEDURE — 0 IOPAMIDOL PER 1 ML: Performed by: EMERGENCY MEDICINE

## 2022-11-01 RX ORDER — SODIUM CHLORIDE 0.9 % (FLUSH) 0.9 %
10 SYRINGE (ML) INJECTION AS NEEDED
Status: DISCONTINUED | OUTPATIENT
Start: 2022-11-01 | End: 2022-11-01 | Stop reason: HOSPADM

## 2022-11-01 RX ORDER — METRONIDAZOLE 500 MG/1
500 TABLET ORAL 3 TIMES DAILY
Qty: 30 TABLET | Refills: 0 | Status: SHIPPED | OUTPATIENT
Start: 2022-11-01 | End: 2022-12-09

## 2022-11-01 RX ORDER — ONDANSETRON 4 MG/1
4 TABLET, ORALLY DISINTEGRATING ORAL EVERY 8 HOURS PRN
Qty: 14 TABLET | Refills: 0 | OUTPATIENT
Start: 2022-11-01 | End: 2022-11-09

## 2022-11-01 RX ORDER — CIPROFLOXACIN 500 MG/1
500 TABLET, FILM COATED ORAL EVERY 12 HOURS
Qty: 20 TABLET | Refills: 0 | Status: SHIPPED | OUTPATIENT
Start: 2022-11-01 | End: 2022-12-14

## 2022-11-01 RX ORDER — HEPARIN SODIUM (PORCINE) LOCK FLUSH IV SOLN 100 UNIT/ML 100 UNIT/ML
500 SOLUTION INTRAVENOUS ONCE
Status: COMPLETED | OUTPATIENT
Start: 2022-11-01 | End: 2022-11-01

## 2022-11-01 RX ORDER — ONDANSETRON 2 MG/ML
4 INJECTION INTRAMUSCULAR; INTRAVENOUS ONCE
Status: COMPLETED | OUTPATIENT
Start: 2022-11-01 | End: 2022-11-01

## 2022-11-01 RX ADMIN — ONDANSETRON 4 MG: 2 INJECTION INTRAMUSCULAR; INTRAVENOUS at 04:23

## 2022-11-01 RX ADMIN — SODIUM CHLORIDE 1000 ML: 9 INJECTION, SOLUTION INTRAVENOUS at 04:22

## 2022-11-01 RX ADMIN — MORPHINE SULFATE 4 MG: 4 INJECTION, SOLUTION INTRAMUSCULAR; INTRAVENOUS at 04:23

## 2022-11-01 RX ADMIN — IOPAMIDOL 100 ML: 755 INJECTION, SOLUTION INTRAVENOUS at 04:12

## 2022-11-01 RX ADMIN — HEPARIN SODIUM (PORCINE) LOCK FLUSH IV SOLN 100 UNIT/ML 500 UNITS: 100 SOLUTION at 06:12

## 2022-11-01 NOTE — ED PROVIDER NOTES
Time: 3:16 AM EDT  Arrived by: private car  Chief Complaint: abdominal pain, blood in stool  History provided by: patient  History is limited by: N/A     History of Present Illness:      Patient is a 57 y.o. year old female who presents to the emergency department with lower abdominal pain and blood in stool. Pt reports an episode of light blood in her stool, and one episode of dark blood in stool. Pt denies nausea and vomiting. Pt denies previous similar symptoms.    Pt reports a colonoscopy years ago that was normal. Pt denies hx of diverticulosis. Pt states her only abdominal surgery was a hysterectomy. Pt reports a history of CHF and systemic overactive thyroid.       History provided by:  Patient   used: No        Similar Symptoms Previously: no  Recently seen: N/A      Patient Care Team  Primary Care Provider: Merline Enamorado MD    Past Medical History:     Allergies   Allergen Reactions   • Salsalate Hives, Anaphylaxis and Unknown - High Severity   • Aspirin    • Salicylic Acid      Past Medical History:   Diagnosis Date   • Anemia    • Annual physical exam 11/06/2017    WILL RESCHEDULE MAMMOGRAM    • Breast lump    • CHF (congestive heart failure) (HCC) 11/06/2017    FOLLOWS WITH CARDS. CURRENTLY DOING WELL ON LASIX, LISINOPRIL, ALDACTONE, AND PROPANOLOL   • Degeneration of lumbar intervertebral disc 06/12/2012   • Essential hypertension 11/06/2017    WELL CONTROLLED ON CURRENT REGIMEN    • GERD (gastroesophageal reflux disease)    • Hamstring injury 08/17/2015    BILATERAL HAMSTRING INJURY/PAIN    • Head injury    • Hernia cerebri (HCC)    • Hyperthyroidism    • Hyperthyroidism 11/06/2017    CURRENTLY ON METHIMAZOLE, PT REPORT POSSIBLY DUE TO GRAVE'S DISEASE. WILL NEED TO OBTAIN AND REVIEW MEDICAL RECORDS FROM [facility]. WILL REFER TO ENDO    • Hypoxia    • Insomnia 11/06/2017    DISCUSSED SLEEP HYGIENE. PT TO TRY AVOIDING BLUE LIGHT AT NIGHT. PT ALSO TO SET ROUTINE PRIOR TO BEDTIME. WILL  AVOID MEDS AT THIS TIME    • Interstitial lung disease (HCC)    • Lumbosacral disc herniation 2012    LEFT L5-S1 SMALL NATURE. SHE HAS FAILED ALL CONSERVATIVE MEASURES AND DESIRES SURGERY    • Lupus (HCC)     SEES DR. ANDERSON   • Migraines    • Pulmonary embolism (HCC)    • Respiratory failure with hypoxia (HCC) 2021   • Rheumatoid arthritis (HCC) 2017    CURRENTLY CONTROLLED, BUT WITH RESIDUAL DEFORMITY. WILL FOLLOW WITH DR. ANDERSON    • Tachycardia    • Total knee replacement status, right 2015     Past Surgical History:   Procedure Laterality Date   • ANKLE SURGERY Bilateral    • BREAST BIOPSY     • CATARACT EXTRACTION WITH INTRAOCULAR LENS IMPLANT     •  SECTION     • COLONOSCOPY     • EYE LENS IMPLANT SECONDARY      YES   • FASCIOTOMY      LEFT ANTERIOR ARM    • HAND SURGERY Left    • KNEE SURGERY     • OTHER SURGICAL HISTORY      LYMPH NODE EXCISION   • PORTACATH PLACEMENT      POWER PORT PLACEMENT      Family History   Problem Relation Age of Onset   • Stroke Father    • Lung cancer Other    • Arthritis Other    • Cancer Other    • Parkinsonism Other    • Diabetes type II Other        Home Medications:  Prior to Admission medications    Medication Sig Start Date End Date Taking? Authorizing Provider   albuterol sulfate  (90 Base) MCG/ACT inhaler Inhale 2 puffs Every 4 (Four) Hours As Needed for Wheezing or Shortness of Air for up to 30 days. 10/12/22 11/11/22  Kasey Desai APRN   ascorbic acid (VITAMIN C) 250 MG tablet Take 1 tablet by mouth Daily. 21   ProviderKriss MD   azaTHIOprine (IMURAN) 50 MG tablet Take 3 tablets by mouth Daily for 30 days. 10/12/22 11/11/22  Kasey Desai APRN   Calcium Carb-Cholecalciferol (Caltrate 600+D3 Soft) 600-800 MG-UNIT chewable tablet Chew 1 tablet Daily.    Kriss Wilson MD   cyanocobalamin 1000 MCG/ML injection Inject 1 mL into the appropriate muscle as directed by prescriber Every 30 (Thirty) Days.  5/10/21   Kriss Wilson MD   Diclofenac Sodium (VOLTAREN) 1 % gel gel Apply 4 g topically to the appropriate area as directed 4 (Four) Times a Day As Needed (shoulder/hip pain). 9/30/22   Jose A Gomez MD   ergocalciferol (ERGOCALCIFEROL) 1.25 MG (76407 UT) capsule Take 1 capsule by mouth Every 7 (Seven) Days. 1/21/21   Kriss Wilson MD   famotidine (Pepcid) 20 MG tablet Take 1 tablet by mouth 2 (Two) Times a Day for 30 days. 10/12/22 11/11/22  Kasey Desai APRN   fluticasone (FLONASE) 50 MCG/ACT nasal spray SHAKE LIQUID AND USE 2 SPRAYS(100 MCG) IN EACH NOSTRIL EVERY DAY 12/28/21   Merline Enamorado MD   furosemide (LASIX) 40 MG tablet Take 40 mg by mouth 2 (Two) Times a Day.    Kriss Wilson MD   gabapentin (NEURONTIN) 300 MG capsule Take 1 capsule by mouth 3 (Three) Times a Day. 9/8/22   Merline Enamorado MD   HYDROcodone-acetaminophen (NORCO) 7.5-325 MG per tablet Take 1 tablet by mouth Every 12 (Twelve) Hours As Needed for Moderate Pain  for up to 30 days. 12/15/21 2/19/23  Merline Enamorado MD   hydroxychloroquine (PLAQUENIL) 200 MG tablet Take 1 tablet by mouth 2 (Two) Times a Day.    Kriss Wilson MD   hydrOXYzine (ATARAX) 25 MG tablet Take 1 tablet by mouth 3 (Three) Times a Day As Needed for Itching.    Kriss Wilson MD   levothyroxine (SYNTHROID, LEVOTHROID) 137 MCG tablet Take 1 tablet by mouth Daily. 6/28/21   Kriss Wilson MD   loratadine (CLARITIN) 10 MG tablet Take 1 tablet by mouth Daily. 5/25/22   Mary Lucia APRN   magnesium oxide (MAG-OX) 400 MG tablet TK 1 T PO D 11/3/17   Kriss Wilson MD   O2 (OXYGEN) Inhale 3 L/min Daily.    Emergency, Nurse Epic, RN   ondansetron ODT (ZOFRAN-ODT) 4 MG disintegrating tablet Place 1 tablet on the tongue Every 8 (Eight) Hours As Needed for Nausea or Vomiting. 5/24/22   Alexandra Richter APRN   pilocarpine (SALAGEN) 5 MG tablet Every 8 (Eight) Hours.    Provider, MD Kriss  "  predniSONE (DELTASONE) 20 MG tablet Take 2 tablets by mouth Daily. 10/12/22   Kasey Desai APRN   ProAir  (90 Base) MCG/ACT inhaler Inhale 2 puffs Every 4 (Four) Hours As Needed for Wheezing. 3/25/22   Gely Hewitt APRN   propranolol (INDERAL) 40 MG tablet TK SS T PO TID 11/3/17   ProviderKriss MD   rizatriptan (MAXALT) 10 MG tablet Take 1 tablet by mouth As Needed. 4/22/21   Provider, MD Kriss   rOPINIRole (REQUIP) 1 MG tablet  6/26/22   Kriss Wilson MD   spironolactone (ALDACTONE) 50 MG tablet TK 1 T PO QD 10/25/17   ProviderKriss MD        Social History:   Social History     Tobacco Use   • Smoking status: Never   • Smokeless tobacco: Never   Vaping Use   • Vaping Use: Never used   Substance Use Topics   • Alcohol use: Yes     Comment: occ wine   • Drug use: Never     Recent travel: not applicable     Review of Systems:  Review of Systems   Constitutional: Negative for chills and fever.   HENT: Negative for congestion, rhinorrhea and sore throat.    Eyes: Negative for pain and visual disturbance.   Respiratory: Negative for apnea, cough, chest tightness and shortness of breath.    Cardiovascular: Negative for chest pain and palpitations.   Gastrointestinal: Positive for abdominal pain and blood in stool. Negative for diarrhea, nausea and vomiting.   Genitourinary: Negative for difficulty urinating and dysuria.   Musculoskeletal: Negative for joint swelling and myalgias.   Skin: Negative for color change.   Neurological: Negative for seizures and headaches.   Psychiatric/Behavioral: Negative.         Physical Exam:  /87 (BP Location: Right arm, Patient Position: Sitting)   Pulse 82   Temp 98 °F (36.7 °C) (Oral)   Resp 20   Ht 162.6 cm (64\")   Wt 98.2 kg (216 lb 7.9 oz)   SpO2 100%   BMI 37.16 kg/m²     Physical Exam  Vitals and nursing note reviewed.   Constitutional:       General: She is not in acute distress.     Appearance: Normal appearance. She " is not toxic-appearing.   HENT:      Head: Normocephalic and atraumatic.      Jaw: There is normal jaw occlusion.   Eyes:      General: Lids are normal.      Extraocular Movements: Extraocular movements intact.      Conjunctiva/sclera: Conjunctivae normal.      Pupils: Pupils are equal, round, and reactive to light.   Cardiovascular:      Rate and Rhythm: Normal rate and regular rhythm.      Pulses: Normal pulses.      Heart sounds: Normal heart sounds.   Pulmonary:      Effort: Pulmonary effort is normal. No respiratory distress.      Breath sounds: Normal breath sounds. No wheezing or rhonchi.   Abdominal:      General: Abdomen is flat. There is no distension.      Palpations: Abdomen is soft.      Tenderness: There is abdominal tenderness (diffuse) in the left lower quadrant. There is no guarding (LLQ) or rebound.   Musculoskeletal:         General: Normal range of motion.      Cervical back: Normal range of motion and neck supple.      Right lower leg: No edema.      Left lower leg: No edema.   Skin:     General: Skin is warm and dry.   Neurological:      Mental Status: She is alert and oriented to person, place, and time. Mental status is at baseline.   Psychiatric:         Mood and Affect: Mood normal.                Medications in the Emergency Department:  Medications   sodium chloride 0.9 % flush 10 mL (has no administration in time range)   sodium chloride 0.9 % flush 10 mL (has no administration in time range)   morphine injection 4 mg (4 mg Intravenous Given 11/1/22 0423)   ondansetron (ZOFRAN) injection 4 mg (4 mg Intravenous Given 11/1/22 0423)   sodium chloride 0.9 % bolus 1,000 mL (1,000 mL Intravenous New Bag 11/1/22 0422)   iopamidol (ISOVUE-370) 76 % injection 100 mL (100 mL Intravenous Given 11/1/22 0412)        Labs  Lab Results (last 24 hours)     Procedure Component Value Units Date/Time    Urinalysis With Microscopic If Indicated (No Culture) - Urine, Clean Catch [[id number]]  (Abnormal)  Collected: 11/01/22 0259    Specimen: Urine, Clean Catch Updated: 11/01/22 0309     Color, UA Yellow     Appearance, UA Clear     pH, UA 6.0     Specific Gravity, UA 1.018     Glucose, UA Negative     Ketones, UA Negative     Bilirubin, UA Negative     Blood, UA Negative     Protein, UA 30 mg/dL (1+)     Leuk Esterase, UA Small (1+)     Nitrite, UA Negative     Urobilinogen, UA 1.0 E.U./dL    Urinalysis, Microscopic Only - Urine, Clean Catch [[id number]]  (Abnormal) Collected: 11/01/22 0259    Specimen: Urine, Clean Catch Updated: 11/01/22 0311     RBC, UA 0-2 /HPF      WBC, UA 6-12 /HPF      Bacteria, UA None Seen /HPF      Squamous Epithelial Cells, UA 0-2 /HPF      Hyaline Casts, UA 0-2 /LPF      Methodology Automated Microscopy    BNP [[id number]]  (Normal) Collected: 11/01/22 0345    Specimen: Blood Updated: 11/01/22 0415     proBNP 409.8 pg/mL     Narrative:      Among patients with dyspnea, NT-proBNP is highly sensitive for the detection of acute congestive heart failure. In addition NT-proBNP of <300 pg/ml effectively rules out acute congestive heart failure with 99% negative predictive value.    Results may be falsely decreased if patient taking Biotin.      Troponin [[id number]]  (Normal) Collected: 11/01/22 0345    Specimen: Blood Updated: 11/01/22 0417     Troponin T <0.010 ng/mL     Narrative:      Troponin T Reference Range:  <= 0.03 ng/mL-   Negative for AMI  >0.03 ng/mL-     Abnormal for myocardial necrosis.  Clinicians would have to utilize clinical acumen, EKG, Troponin and serial changes to determine if it is an Acute Myocardial Infarction or myocardial injury due to an underlying chronic condition.       Results may be falsely decreased if patient taking Biotin.      CBC & Differential [[id number]]  (Abnormal) Collected: 11/01/22 0345    Specimen: Blood Updated: 11/01/22 0351    Narrative:      The following orders were created for panel order CBC & Differential.  Procedure                                Abnormality         Status                     ---------                               -----------         ------                     CBC Auto Differential[[id number]]        Abnormal            Final result                 Please view results for these tests on the individual orders.    Comprehensive Metabolic Panel [[id number]]  (Abnormal) Collected: 11/01/22 0345    Specimen: Blood Updated: 11/01/22 0418     Glucose 100 mg/dL      BUN 30 mg/dL      Creatinine 1.27 mg/dL      Sodium 142 mmol/L      Potassium 4.4 mmol/L      Chloride 108 mmol/L      CO2 24.8 mmol/L      Calcium 8.3 mg/dL      Total Protein 7.6 g/dL      Albumin 4.20 g/dL      ALT (SGPT) 10 U/L      AST (SGOT) 17 U/L      Alkaline Phosphatase 82 U/L      Total Bilirubin 0.5 mg/dL      Globulin 3.4 gm/dL      A/G Ratio 1.2 g/dL      BUN/Creatinine Ratio 23.6     Anion Gap 9.2 mmol/L      eGFR 48.8 mL/min/1.73      Comment: National Kidney Foundation and American Society of Nephrology (ASN) Task Force recommended calculation based on the Chronic Kidney Disease Epidemiology Collaboration (CKD-EPI) equation refit without adjustment for race.       Narrative:      GFR Normal >60  Chronic Kidney Disease <60  Kidney Failure <15      Lipase [[id number]]  (Normal) Collected: 11/01/22 0345    Specimen: Blood Updated: 11/01/22 0418     Lipase 45 U/L     Lactic Acid, Plasma [[id number]]  (Normal) Collected: 11/01/22 0345    Specimen: Blood Updated: 11/01/22 0410     Lactate 0.8 mmol/L     CBC Auto Differential [[id number]]  (Abnormal) Collected: 11/01/22 0345    Specimen: Blood Updated: 11/01/22 0351     WBC 8.66 10*3/mm3      RBC 3.76 10*6/mm3      Hemoglobin 11.0 g/dL      Hematocrit 33.6 %      MCV 89.4 fL      MCH 29.3 pg      MCHC 32.7 g/dL      RDW 15.6 %      RDW-SD 51.2 fl      MPV 12.2 fL      Platelets 158 10*3/mm3      Neutrophil % 63.6 %      Lymphocyte % 24.4 %      Monocyte % 8.5 %      Eosinophil % 2.4 %      Basophil % 0.8 %      Immature Grans %  0.3 %      Neutrophils, Absolute 5.50 10*3/mm3      Lymphocytes, Absolute 2.11 10*3/mm3      Monocytes, Absolute 0.74 10*3/mm3      Eosinophils, Absolute 0.21 10*3/mm3      Basophils, Absolute 0.07 10*3/mm3      Immature Grans, Absolute 0.03 10*3/mm3      nRBC 0.0 /100 WBC            Imaging:  CT Abdomen Pelvis With Contrast    Result Date: 11/1/2022  PROCEDURE: CT ABDOMEN PELVIS W CONTRAST  COMPARISON: [facility], CT, CT CHEST ABD PEL W CONTRAST, 4/30/2019, 18:03.  [facility], CR, XR CHEST 1 VW, 11/01/2022, 3:06.  [facility], CT, CT CHEST ABD PEL W CONTRAST, 1/18/2020, 20:30.  INDICATIONS: PAIN ACROSS LOW ABDOMEN AND BLOOD IN TOOLS X 1 DAY  TECHNIQUE: After obtaining the patient's consent, CT images were created with non-ionic intravenous contrast material.   PROTOCOL:   Standard imaging protocol performed    RADIATION:   DLP: 116.7 mGy*cm   Automated exposure control was utilized to minimize radiation dose. CONTRAST: 100 ccIsovue 370 I.V.  FINDINGS:  Within the lung bases is interstitial lung disease.  The liver, adrenal glands, left kidney, spleen, and pancreas are unremarkable.  There are a couple tiny stones in the gallbladder.  There is a small right renal cyst.  The stomach appears normal.  The small bowel appears normal in caliber and configuration.  There is sigmoid diverticulosis.  The appendix appears normal.  There is no ascites or loculated collection.  No abnormally enlarged lymph nodes are identified.  The rectum, uterus and adnexa, and urinary bladder are unremarkable.  No aggressive osseous lesions are identified.        1. No acute process identified within abdomen/pelvis. 2. Cholelithiasis. 3. Sigmoid diverticulosis.     YONG HART MD       Electronically Signed and Approved By: YONG HART MD on 11/01/2022 at 4:29             XR Chest 1 View    Result Date: 11/1/2022  PROCEDURE: XR CHEST 1 VW  COMPARISON: [facility], CR, XR CHEST  PA AND LATERAL, 11/24/2021, 17:10.  INDICATIONS: SHORTNESS OF BREATH  FINDINGS:  A left subclavian port has its tip at the cavoatrial junction.  Interstitial markings most prominent at the lung bases appear unchanged, likely interstitial lung disease.  No new focal airspace consolidation is identified.  The heart and mediastinal contours appear stable.  The pulmonary vasculature appears normal.  The osseous structures appear intact.        1. No definite acute cardiopulmonary process identified. 2. Stable changes suggesting interstitial lung disease.       YONG HART MD       Electronically Signed and Approved By: YONG HART MD on 11/01/2022 at 3:11               Procedures:  Procedures    Progress                            Medical Decision Making:  MDM  Number of Diagnoses or Management Options  Diverticulosis  Urinary tract infection without hematuria, site unspecified  Diagnosis management comments: In summary this is a 57-year-old female who presents to the emergency department for evaluation of lower abdominal pain.  Urinalysis reveals UTI.  CBC independently reviewed by me and shows no critical abnormalities.  CMP independently reviewed by me and shows no critical abnormalities.  CT scan of the abdomen pelvis reveals tiny cholelithiasis as well as sigmoid diverticulosis.  Of note patient states she did eat popcorn for the first time in a long time 3 days ago and then this pain is started in the last day.  Given the diverticulosis on CT scan this is much more concerning for early diverticulitis and patient will be covered with dual antibiotic therapy including ciprofloxacin and Flagyl.  She is otherwise well-appearing and pain is controlled after treatment in the emergency department.  Very strict return to ER and follow-up instructions have been provided to the patient.         Final diagnoses:   Urinary tract infection without hematuria, site unspecified   Diverticulosis        Disposition:  ED  Disposition     ED Disposition   Discharge    Condition   Stable    Comment   --             This medical record created using voice recognition software.    RAKESH, Charu Delgado, provided documentation assistance for and in the presence of Charu Charles  11/01/22 0320       Luis Fernando Osman MD  11/01/22 9648

## 2022-11-01 NOTE — PROGRESS NOTES
Progress Assessment/Discharge Summary  [facility]: [address] [place] [address]      Patient: Aida Mcclure   : 1965  Diagnosis/ICD-10 Code:  Weakness of both hips [R29.898]  Referring practitioner: ROBE aTng  Date of Initial Visit: Type: THERAPY  Noted: 2022  Today's Date: 2022  Patient seen for 11 sessions      Subjective:   Aida Mcclure reports: she was in the ED this morning because she was having some abdominal pain and blood in her urine and they diagnosed her with a UTI.  Pt reports she doesn't feel her low back pain has improved and she can't tolerate more activity.  She states her current pain is 6/10, 10/10 at worst, and 4/10 at best.    Subjective Questionnaire: Oswestry: 19/50 or 38% limited  Clinical Progress: improved  Home Program Compliance: Yes  Treatment has included: therapeutic exercise    Subjective     Objective   Active Range of Motion     Additional Active Range of Motion Details  Flexion: WNL and painful  Extension: WNL and painful  B side bending: WNL and painful  B rotation: WNL and painful     Strength/Myotome Testing   Left Hip   Planes of Motion   Flexion: 3+  Extension: 4-  Abduction: 4-     Right Hip   Planes of Motion   Flexion: 4-  Extension: 4  Abduction: 4-    Assessment/Plan   Pt has had improvements in her lumbar ROM and hip strength since starting therapy.  Although she has made improvements in her strength and ROM, that is not carrying over to improvements in her low back pain and tolerance to functional activities.  LBP has not improved with therapy.  Due to lack of functional progress, pt will be discharged from PT.  Goals are partially met.    Goals  Plan Goals: 1. The patient has complaints of pain.   LTG 1: 12 weeks:  The patient will report lower back pain no greater than 5/10 in order to improve tolerance with activities of daily living and improve sleep quality.   STATUS: not met  STG 1a: 6 weeks:  The patient will  report lower back pain no greater than 6/10.    STATUS: not met     2. The patient has limited lumbar spine ROM.   LTG 2: 12 weeks: The patient will improve lumbar spine ROM to 100% in order to improve tolerance with activities of daily living.   STATUS: met  STG 2a: 6 weeks: The patient will improve lumbar spine ROM to 75% in order to improve tolerance with activities of daily living.   STATUS: met     3. The patient demonstrates weakness of the B hips.   LTG 3: 12 weeks:  The patient will demonstrate 4/5 strength for R hip flexion, abduction, and extension and 4-/5 for L hip flexion, abduction, and extension in order to improve hip stability.   STATUS: not met  STG 3a: 6 weeks: The patient will demonstrate 4-/5 strength for R hip flexion, abduction, and extension and 3+/5 for L hip flexion, abduction, and extension in order to improve hip stability.   STATUS: met     4. Mobility: Walking/Moving Around Functional Limitation                   LTG 4: 12 weeks:  The patient will demonstrate 17% limitation by achieving a score of 8/45 on the Oswestry Disability Questionnaire.   STATUS: not met  STG 4a: 6 weeks:  The patient will demonstrate 26% limitation by achieving a score of 12/45 on the Oswestry Disability Quiestionnaire.     STATUS:  not met      Progress toward previous goals: Partially Met    See Exercise, Manual, and Modality Logs for complete treatment.         Recommendations: Discharge      PT Signature: Alexandra Gutierres PT, DPT  License Number: [id number]    Based upon review of the patient's progress and continued therapy plan, it is my medical opinion that Aida Mcclure should continue physical therapy treatment at [facility]  [address] [facility] [place] [address] [phone].      Timed:         Manual Therapy:         mins  50549;     Therapeutic Exercise:    23     mins  46554;     Neuromuscular Eric:        mins  41769;    Therapeutic Activity:          mins   92887;     Gait Training:           mins  96001;     Ultrasound:          mins  68369;    Ionto                                   mins   21102  Self Care                            mins   71790  Aquatic                               mins 72844      Un-Timed:  Electrical Stimulation:         mins  53510 ( );  Dry Needling          mins self-pay  Traction          mins 78154  Low Eval          Mins  56925  Mod Eval          Mins  97127  High Eval                            Mins  24568  Re-Eval                               mins  02558  Canalith Repos         mins 50486    Timed Treatment:   23   mins   Total Treatment:     30   mins      I certify that the therapy services are furnished while this patient is under my care.  The services outlined above are required by this patient, and will be reviewed every 90 days.

## 2022-11-04 ENCOUNTER — OFFICE VISIT (OUTPATIENT)
Dept: INTERNAL MEDICINE | Facility: CLINIC | Age: 57
End: 2022-11-04

## 2022-11-04 ENCOUNTER — HOSPITAL ENCOUNTER (OUTPATIENT)
Dept: CT IMAGING | Facility: HOSPITAL | Age: 57
Discharge: HOME | End: 2022-11-04
Admitting: NURSE PRACTITIONER

## 2022-11-04 VITALS
BODY MASS INDEX: 37.22 KG/M2 | WEIGHT: 218 LBS | RESPIRATION RATE: 15 BRPM | SYSTOLIC BLOOD PRESSURE: 136 MMHG | HEART RATE: 70 BPM | TEMPERATURE: 98.7 F | OXYGEN SATURATION: 95 % | HEIGHT: 64 IN | DIASTOLIC BLOOD PRESSURE: 74 MMHG

## 2022-11-04 DIAGNOSIS — J02.9 SORE THROAT: ICD-10-CM

## 2022-11-04 DIAGNOSIS — R60.0 BILATERAL LOWER EXTREMITY EDEMA: ICD-10-CM

## 2022-11-04 DIAGNOSIS — J06.9 VIRAL URI: ICD-10-CM

## 2022-11-04 DIAGNOSIS — I50.32 CHRONIC DIASTOLIC CHF (CONGESTIVE HEART FAILURE): ICD-10-CM

## 2022-11-04 DIAGNOSIS — K57.92 DIVERTICULITIS: ICD-10-CM

## 2022-11-04 DIAGNOSIS — Z79.899 OTHER LONG TERM (CURRENT) DRUG THERAPY: ICD-10-CM

## 2022-11-04 DIAGNOSIS — R06.09 CHRONIC DYSPNEA: ICD-10-CM

## 2022-11-04 DIAGNOSIS — Z86.711 PERSONAL HISTORY OF PE (PULMONARY EMBOLISM): ICD-10-CM

## 2022-11-04 DIAGNOSIS — G47.33 OSA (OBSTRUCTIVE SLEEP APNEA): ICD-10-CM

## 2022-11-04 DIAGNOSIS — R10.84 GENERALIZED ABDOMINAL PAIN: Primary | ICD-10-CM

## 2022-11-04 DIAGNOSIS — K92.1 BLOOD IN STOOL: ICD-10-CM

## 2022-11-04 DIAGNOSIS — J84.9 ILD (INTERSTITIAL LUNG DISEASE): ICD-10-CM

## 2022-11-04 DIAGNOSIS — Z51.81 THERAPEUTIC DRUG MONITORING: ICD-10-CM

## 2022-11-04 PROCEDURE — 87880 STREP A ASSAY W/OPTIC: CPT | Performed by: PHYSICIAN ASSISTANT

## 2022-11-04 PROCEDURE — 71250 CT THORAX DX C-: CPT

## 2022-11-04 PROCEDURE — 99213 OFFICE O/P EST LOW 20 MIN: CPT | Performed by: PHYSICIAN ASSISTANT

## 2022-11-04 PROCEDURE — 87428 SARSCOV & INF VIR A&B AG IA: CPT | Performed by: PHYSICIAN ASSISTANT

## 2022-11-04 NOTE — PROGRESS NOTES
Chief Complaint  Abdominal Pain, blood in stool, Cough, and Sore Throat    Subjective          Aida April Kami presents to [facility]  History of Present Illness  Pt here for ER follow up. Went to ER 11/1 for abd pain, blood in stool. Blood bright red with bm. States she had straining with first bm. Second bm that day also had bright red blood.       UA showed leuk and protein.   CMP showed Cr 1.27H, Ca 8.3L, CBC showed Hbg 11L    Ct abd pelv showed no acute process, cholelithiasis, sigmoid diverticulosis. She admits to eating popcorn the night before sx started.  CXR showed no acute changes. Stable interstitial lung disease.  Pt was given Flagyl and Cipro  She states she started abx last night.   Pt states she had blood 1 additional time but has had bm without blood since then  Denies vomiting  Denies fever.     Sore throat x 3 days   Pt is coughing since she was at the er. Coughing up green pglem.   She has heard wheezing  Denies resp distress.  Denies chest pain, dizziness, syncope  Pt using albuterol 1x/day   Pt currently taking prednisone.           Past Medical History:   Diagnosis Date   • Anemia    • Annual physical exam 11/06/2017    WILL RESCHEDULE MAMMOGRAM    • Breast lump    • CHF (congestive heart failure) (HCC) 11/06/2017    FOLLOWS WITH CARDS. CURRENTLY DOING WELL ON LASIX, LISINOPRIL, ALDACTONE, AND PROPANOLOL   • Degeneration of lumbar intervertebral disc 06/12/2012   • Essential hypertension 11/06/2017    WELL CONTROLLED ON CURRENT REGIMEN    • GERD (gastroesophageal reflux disease)    • Hamstring injury 08/17/2015    BILATERAL HAMSTRING INJURY/PAIN    • Head injury    • Hernia cerebri (HCC)    • Hyperthyroidism    • Hyperthyroidism 11/06/2017    CURRENTLY ON METHIMAZOLE, PT REPORT POSSIBLY DUE TO GRAVE'S DISEASE. WILL NEED TO OBTAIN AND REVIEW MEDICAL RECORDS FROM [facility]. WILL REFER TO ENDO    • Hypoxia    • Insomnia 11/06/2017     DISCUSSED SLEEP HYGIENE. PT TO TRY AVOIDING BLUE LIGHT AT NIGHT. PT ALSO TO SET ROUTINE PRIOR TO BEDTIME. WILL AVOID MEDS AT THIS TIME    • Interstitial lung disease (HCC)    • Lumbosacral disc herniation 2012    LEFT L5-S1 SMALL NATURE. SHE HAS FAILED ALL CONSERVATIVE MEASURES AND DESIRES SURGERY    • Lupus (HCC)     SEES DR. ANDERSON   • Migraines    • Pulmonary embolism (HCC)    • Respiratory failure with hypoxia ([facility]) 2021   • Rheumatoid arthritis (HCC) 2017    CURRENTLY CONTROLLED, BUT WITH RESIDUAL DEFORMITY. WILL FOLLOW WITH DR. ANDERSON    • Tachycardia    • Total knee replacement status, right 2015        Past Surgical History:   Procedure Laterality Date   • ANKLE SURGERY Bilateral    • BREAST BIOPSY     • CATARACT EXTRACTION WITH INTRAOCULAR LENS IMPLANT     •  SECTION     • COLONOSCOPY     • EYE LENS IMPLANT SECONDARY      YES   • FASCIOTOMY      LEFT ANTERIOR ARM    • HAND SURGERY Left    • KNEE SURGERY     • OTHER SURGICAL HISTORY      LYMPH NODE EXCISION   • PORTACATH PLACEMENT      POWER PORT PLACEMENT         Current Outpatient Medications on File Prior to Visit   Medication Sig Dispense Refill   • albuterol sulfate  (90 Base) MCG/ACT inhaler Inhale 2 puffs Every 4 (Four) Hours As Needed for Wheezing or Shortness of Air for up to 30 days. 18 g 5   • ascorbic acid (VITAMIN C) 250 MG tablet Take 1 tablet by mouth Daily.     • azaTHIOprine (IMURAN) 50 MG tablet Take 3 tablets by mouth Daily for 30 days. 90 tablet 5   • Calcium Carb-Cholecalciferol (Caltrate 600+D3 Soft) 600-800 MG-UNIT chewable tablet Chew 1 tablet Daily.     • ciprofloxacin (CIPRO) 500 MG tablet Take 1 tablet by mouth Every 12 (Twelve) Hours. 20 tablet 0   • cyanocobalamin 1000 MCG/ML injection Inject 1 mL into the appropriate muscle as directed by prescriber Every 30 (Thirty) Days.     • Diclofenac Sodium (VOLTAREN) 1 % gel gel Apply 4 g topically to the appropriate area as directed 4 (Four) Times  a Day As Needed (shoulder/hip pain). 50 g 0   • ergocalciferol (ERGOCALCIFEROL) 1.25 MG (82869 UT) capsule Take 1 capsule by mouth Every 7 (Seven) Days.     • famotidine (Pepcid) 20 MG tablet Take 1 tablet by mouth 2 (Two) Times a Day for 30 days. 60 tablet 5   • fluticasone (FLONASE) 50 MCG/ACT nasal spray SHAKE LIQUID AND USE 2 SPRAYS(100 MCG) IN EACH NOSTRIL EVERY DAY 16 g 3   • furosemide (LASIX) 40 MG tablet Take 40 mg by mouth 2 (Two) Times a Day.     • gabapentin (NEURONTIN) 300 MG capsule Take 1 capsule by mouth 3 (Three) Times a Day. 90 capsule 0   • HYDROcodone-acetaminophen (NORCO) 7.5-325 MG per tablet Take 1 tablet by mouth Every 12 (Twelve) Hours As Needed for Moderate Pain  for up to 30 days. 60 tablet 0   • hydroxychloroquine (PLAQUENIL) 200 MG tablet Take 1 tablet by mouth 2 (Two) Times a Day.     • hydrOXYzine (ATARAX) 25 MG tablet Take 1 tablet by mouth 3 (Three) Times a Day As Needed for Itching.     • levothyroxine (SYNTHROID, LEVOTHROID) 137 MCG tablet Take 1 tablet by mouth Daily.     • loratadine (CLARITIN) 10 MG tablet Take 1 tablet by mouth Daily. 30 tablet 0   • magnesium oxide (MAG-OX) 400 MG tablet TK 1 T PO D  1   • metroNIDAZOLE (FLAGYL) 500 MG tablet Take 1 tablet by mouth 3 (Three) Times a Day. 30 tablet 0   • O2 (OXYGEN) Inhale 3 L/min Daily.     • ondansetron ODT (ZOFRAN-ODT) 4 MG disintegrating tablet Place 1 tablet on the tongue Every 8 (Eight) Hours As Needed for Nausea or Vomiting. 12 tablet 0   • ondansetron ODT (ZOFRAN-ODT) 4 MG disintegrating tablet Place 1 tablet on the tongue Every 8 (Eight) Hours As Needed for Nausea or Vomiting. 14 tablet 0   • pilocarpine (SALAGEN) 5 MG tablet Every 8 (Eight) Hours.     • predniSONE (DELTASONE) 20 MG tablet Take 2 tablets by mouth Daily. 14 tablet 0   • ProAir  (90 Base) MCG/ACT inhaler Inhale 2 puffs Every 4 (Four) Hours As Needed for Wheezing. 18 g 11   • propranolol (INDERAL) 40 MG tablet TK SS T PO TID  1   • rizatriptan  "(MAXALT) 10 MG tablet Take 1 tablet by mouth As Needed.     • rOPINIRole (REQUIP) 1 MG tablet      • spironolactone (ALDACTONE) 50 MG tablet TK 1 T PO QD  0     No current facility-administered medications on file prior to visit.        Allergies   Allergen Reactions   • Salsalate Hives, Anaphylaxis and Unknown - High Severity   • Aspirin    • Salicylic Acid        Social History     Tobacco Use   Smoking Status Never   Smokeless Tobacco Never          Objective   Vital Signs:   /74   Pulse 70   Temp 98.7 °F (37.1 °C)   Resp 15   Ht 162.6 cm (64\")   Wt 98.9 kg (218 lb)   SpO2 95%   BMI 37.42 kg/m²     Physical Exam  Vitals reviewed.   Constitutional:       Appearance: Normal appearance.   HENT:      Head: Normocephalic and atraumatic.      Nose: Nose normal.      Mouth/Throat:      Mouth: Mucous membranes are moist.   Eyes:      Extraocular Movements: Extraocular movements intact.      Conjunctiva/sclera: Conjunctivae normal.      Pupils: Pupils are equal, round, and reactive to light.   Cardiovascular:      Rate and Rhythm: Normal rate and regular rhythm.   Pulmonary:      Effort: Pulmonary effort is normal.      Breath sounds: Normal breath sounds.   Abdominal:      General: Abdomen is flat. Bowel sounds are normal.      Palpations: Abdomen is soft.   Musculoskeletal:         General: Normal range of motion.   Neurological:      General: No focal deficit present.      Mental Status: She is alert and oriented to person, place, and time.   Psychiatric:         Mood and Affect: Mood normal.        Result Review :   The following data was reviewed by: Seema Rowland PA-C on 11/04/2022:  Common labs    Common Labs 9/2/22 9/2/22 10/18/22 10/18/22 11/1/22 11/1/22    1417 1417 1554 1554 [id number]   Glucose  78  105 (A)  100 (A)   BUN  28 (A)  22 (A)  30 (A)   Creatinine  1.44 (A)  1.04 (A)  1.27 (A)   Sodium  141  140  142   Potassium  3.8  4.0  4.4   Chloride  104  107  108 (A)   Calcium  8.6  8.2 (A)  8.3 " (A)   Albumin  4.50  3.80  4.20   Total Bilirubin  0.5  0.6  0.5   Alkaline Phosphatase  88  65  82   AST (SGOT)  28  15  17   ALT (SGPT)  12  8  10   WBC 6.70  7.17  8.66    Hemoglobin 12.0  11.3 (A)  11.0 (A)    Hematocrit 36.7  36.7  33.6 (A)    Platelets 135 (A)  148  158    (A) Abnormal value            Data reviewed: Radiologic studies ct abd          Assessment and Plan    Diagnoses and all orders for this visit:    1. Generalized abdominal pain (Primary)  Assessment & Plan:  Discussed ddx abd pain. Reviewed ER note. Encouraged to continue recently prescribed abx to cover for diverticulitis based on history, physical, and imaging. Pt will return to clinic/er if sx worsen. Pt understands and agrees with plan.    Orders:  -     POCT SARS-CoV-2 Antigen JEN    2. Diverticulitis    3. Blood in stool  -     Ambulatory Referral to Gastroenterology    4. Sore throat  Comments:  strep negative. Culture sent to r/o infection.  Orders:  -     POC Rapid Strep A  -     Cancel: POCT AAKASH SARS-CoV-2 Antigen JEN  -     POCT SARS-CoV-2 Antigen JEN    5. Viral URI  Assessment & Plan:  Discussed viral upper respiratory infection. Discussed that viral infections do not require antibiotics for improvement but instead we treat symptoms. Can use Tylenol or Motrin every 4 hours as needed for fever. Antihistamine and/or nasal steroid for drainage. Encouraged hydration by monitoring fluid intake and urine output. Let us know if you have signs of dehydration or are not urinating at least every 6 hours. To ER if symptoms worsen, fever not controlled with medication, signs of respiratory distress or difficulty breathing. Return to clinic for re-evaluation if no improved in 7-10 day or sooner if symptoms worsen or new symptoms develop. Patient understands and agrees with plan.          Follow Up   Return if symptoms worsen or fail to improve, for Next scheduled follow up.  Patient was given instructions and counseling regarding her  condition or for health maintenance advice. Please see specific information pulled into the AVS if appropriate.

## 2022-11-05 DIAGNOSIS — R91.1 LUNG NODULE: ICD-10-CM

## 2022-11-05 DIAGNOSIS — J84.9 ILD (INTERSTITIAL LUNG DISEASE): Primary | ICD-10-CM

## 2022-11-07 PROBLEM — R10.84 GENERALIZED ABDOMINAL PAIN: Status: ACTIVE | Noted: 2022-11-07

## 2022-11-07 PROBLEM — J06.9 VIRAL URI: Status: ACTIVE | Noted: 2022-11-07

## 2022-11-07 PROBLEM — J02.9 SORE THROAT: Status: RESOLVED | Noted: 2021-11-23 | Resolved: 2022-11-07

## 2022-11-07 PROBLEM — J06.9 VIRAL URI: Status: RESOLVED | Noted: 2022-11-07 | Resolved: 2022-11-07

## 2022-11-07 NOTE — ASSESSMENT & PLAN NOTE
Discussed ddx abd pain. Reviewed ER note. Encouraged to continue recently prescribed abx to cover for diverticulitis based on history, physical, and imaging. Pt will return to clinic/er if sx worsen. Pt understands and agrees with plan.

## 2022-11-09 ENCOUNTER — APPOINTMENT (OUTPATIENT)
Dept: ULTRASOUND IMAGING | Facility: HOSPITAL | Age: 57
End: 2022-11-09

## 2022-11-09 ENCOUNTER — HOSPITAL ENCOUNTER (EMERGENCY)
Facility: HOSPITAL | Age: 57
Discharge: HOME | End: 2022-11-09
Attending: EMERGENCY MEDICINE | Admitting: EMERGENCY MEDICINE

## 2022-11-09 ENCOUNTER — APPOINTMENT (OUTPATIENT)
Dept: CT IMAGING | Facility: HOSPITAL | Age: 57
End: 2022-11-09

## 2022-11-09 VITALS
BODY MASS INDEX: 36.7 KG/M2 | WEIGHT: 214.95 LBS | DIASTOLIC BLOOD PRESSURE: 73 MMHG | RESPIRATION RATE: 18 BRPM | HEIGHT: 64 IN | HEART RATE: 69 BPM | OXYGEN SATURATION: 100 % | TEMPERATURE: 98 F | SYSTOLIC BLOOD PRESSURE: 130 MMHG

## 2022-11-09 DIAGNOSIS — D25.9 UTERINE LEIOMYOMA, UNSPECIFIED LOCATION: ICD-10-CM

## 2022-11-09 DIAGNOSIS — K80.20 CALCULUS OF GALLBLADDER WITHOUT CHOLECYSTITIS WITHOUT OBSTRUCTION: ICD-10-CM

## 2022-11-09 DIAGNOSIS — K82.8 SLUDGE IN GALLBLADDER: Primary | ICD-10-CM

## 2022-11-09 DIAGNOSIS — E03.9 HYPOTHYROIDISM, UNSPECIFIED TYPE: ICD-10-CM

## 2022-11-09 LAB
ABO GROUP BLD: NORMAL
ALBUMIN SERPL-MCNC: 4.4 G/DL (ref 3.5–5.2)
ALBUMIN/GLOB SERPL: 1.2 G/DL
ALP SERPL-CCNC: 71 U/L (ref 39–117)
ALT SERPL W P-5'-P-CCNC: 11 U/L (ref 1–33)
ANION GAP SERPL CALCULATED.3IONS-SCNC: 11.7 MMOL/L (ref 5–15)
ANTI-E: NORMAL
ANTI-S: NORMAL
AST SERPL-CCNC: 22 U/L (ref 1–32)
BACTERIA UR QL AUTO: ABNORMAL /HPF
BASOPHILS # BLD AUTO: 0.07 10*3/MM3 (ref 0–0.2)
BASOPHILS NFR BLD AUTO: 0.8 % (ref 0–1.5)
BIG S ANTIGEN: NEGATIVE
BILIRUB SERPL-MCNC: 0.8 MG/DL (ref 0–1.2)
BILIRUB UR QL STRIP: ABNORMAL
BLD GP AB SCN SERPL QL: POSITIVE
BUN SERPL-MCNC: 21 MG/DL (ref 6–20)
BUN/CREAT SERPL: 18.3 (ref 7–25)
CALCIUM SPEC-SCNC: 8.9 MG/DL (ref 8.6–10.5)
CHLORIDE SERPL-SCNC: 105 MMOL/L (ref 98–107)
CLARITY UR: CLEAR
CO2 SERPL-SCNC: 22.3 MMOL/L (ref 22–29)
COLOR UR: ABNORMAL
CREAT SERPL-MCNC: 1.15 MG/DL (ref 0.57–1)
D-LACTATE SERPL-SCNC: 0.8 MMOL/L (ref 0.5–2)
DEPRECATED RDW RBC AUTO: 52.1 FL (ref 37–54)
E AG RBC QL: NEGATIVE
EGFRCR SERPLBLD CKD-EPI 2021: 55 ML/MIN/1.73
EOSINOPHIL # BLD AUTO: 0.2 10*3/MM3 (ref 0–0.4)
EOSINOPHIL NFR BLD AUTO: 2.3 % (ref 0.3–6.2)
ERYTHROCYTE [DISTWIDTH] IN BLOOD BY AUTOMATED COUNT: 15.9 % (ref 12.3–15.4)
GLOBULIN UR ELPH-MCNC: 3.6 GM/DL
GLUCOSE SERPL-MCNC: 88 MG/DL (ref 65–99)
GLUCOSE UR STRIP-MCNC: ABNORMAL MG/DL
HCT VFR BLD AUTO: 34.8 % (ref 34–46.6)
HGB BLD-MCNC: 11.5 G/DL (ref 12–15.9)
HGB UR QL STRIP.AUTO: ABNORMAL
HOLD SPECIMEN: NORMAL
HOLD SPECIMEN: NORMAL
HYALINE CASTS UR QL AUTO: ABNORMAL /LPF
IMM GRANULOCYTES # BLD AUTO: 0.02 10*3/MM3 (ref 0–0.05)
IMM GRANULOCYTES NFR BLD AUTO: 0.2 % (ref 0–0.5)
KETONES UR QL STRIP: ABNORMAL
LEUKOCYTE ESTERASE UR QL STRIP.AUTO: ABNORMAL
LIPASE SERPL-CCNC: 46 U/L (ref 13–60)
LYMPHOCYTES # BLD AUTO: 2.63 10*3/MM3 (ref 0.7–3.1)
LYMPHOCYTES NFR BLD AUTO: 30.8 % (ref 19.6–45.3)
MCH RBC QN AUTO: 29.7 PG (ref 26.6–33)
MCHC RBC AUTO-ENTMCNC: 33 G/DL (ref 31.5–35.7)
MCV RBC AUTO: 89.9 FL (ref 79–97)
MONOCYTES # BLD AUTO: 0.62 10*3/MM3 (ref 0.1–0.9)
MONOCYTES NFR BLD AUTO: 7.3 % (ref 5–12)
NEUTROPHILS NFR BLD AUTO: 4.99 10*3/MM3 (ref 1.7–7)
NEUTROPHILS NFR BLD AUTO: 58.6 % (ref 42.7–76)
NITRITE UR QL STRIP: ABNORMAL
NRBC BLD AUTO-RTO: 0 /100 WBC (ref 0–0.2)
PH UR STRIP.AUTO: ABNORMAL [PH]
PLATELET # BLD AUTO: 169 10*3/MM3 (ref 140–450)
PMV BLD AUTO: 10.9 FL (ref 6–12)
POTASSIUM SERPL-SCNC: 4.6 MMOL/L (ref 3.5–5.2)
PROT SERPL-MCNC: 8 G/DL (ref 6–8.5)
PROT UR QL STRIP: ABNORMAL
RBC # BLD AUTO: 3.87 10*6/MM3 (ref 3.77–5.28)
RBC # UR STRIP: ABNORMAL /HPF
REF LAB TEST METHOD: ABNORMAL
RH BLD: POSITIVE
SODIUM SERPL-SCNC: 139 MMOL/L (ref 136–145)
SP GR UR STRIP: 1.02 (ref 1–1.03)
SQUAMOUS #/AREA URNS HPF: ABNORMAL /HPF
T&S EXPIRATION DATE: NORMAL
T4 FREE SERPL-MCNC: 0.87 NG/DL (ref 0.93–1.7)
TSH SERPL DL<=0.05 MIU/L-ACNC: 28.1 UIU/ML (ref 0.27–4.2)
UROBILINOGEN UR QL STRIP: ABNORMAL
WBC # UR STRIP: ABNORMAL /HPF
WBC NRBC COR # BLD: 8.53 10*3/MM3 (ref 3.4–10.8)
WHOLE BLOOD HOLD COAG: NORMAL
WHOLE BLOOD HOLD SPECIMEN: NORMAL

## 2022-11-09 PROCEDURE — 36415 COLL VENOUS BLD VENIPUNCTURE: CPT

## 2022-11-09 PROCEDURE — 74177 CT ABD & PELVIS W/CONTRAST: CPT

## 2022-11-09 PROCEDURE — 25010000002 MORPHINE PER 10 MG: Performed by: EMERGENCY MEDICINE

## 2022-11-09 PROCEDURE — 84439 ASSAY OF FREE THYROXINE: CPT | Performed by: PHYSICIAN ASSISTANT

## 2022-11-09 PROCEDURE — 86870 RBC ANTIBODY IDENTIFICATION: CPT | Performed by: EMERGENCY MEDICINE

## 2022-11-09 PROCEDURE — 76830 TRANSVAGINAL US NON-OB: CPT

## 2022-11-09 PROCEDURE — 86850 RBC ANTIBODY SCREEN: CPT | Performed by: EMERGENCY MEDICINE

## 2022-11-09 PROCEDURE — 85025 COMPLETE CBC W/AUTO DIFF WBC: CPT | Performed by: EMERGENCY MEDICINE

## 2022-11-09 PROCEDURE — 86900 BLOOD TYPING SEROLOGIC ABO: CPT | Performed by: EMERGENCY MEDICINE

## 2022-11-09 PROCEDURE — 83605 ASSAY OF LACTIC ACID: CPT | Performed by: EMERGENCY MEDICINE

## 2022-11-09 PROCEDURE — 0 IOPAMIDOL PER 1 ML: Performed by: EMERGENCY MEDICINE

## 2022-11-09 PROCEDURE — 99285 EMERGENCY DEPT VISIT HI MDM: CPT

## 2022-11-09 PROCEDURE — 25010000002 ONDANSETRON PER 1 MG: Performed by: EMERGENCY MEDICINE

## 2022-11-09 PROCEDURE — 80053 COMPREHEN METABOLIC PANEL: CPT

## 2022-11-09 PROCEDURE — 84443 ASSAY THYROID STIM HORMONE: CPT | Performed by: PHYSICIAN ASSISTANT

## 2022-11-09 PROCEDURE — 81001 URINALYSIS AUTO W/SCOPE: CPT

## 2022-11-09 PROCEDURE — 96375 TX/PRO/DX INJ NEW DRUG ADDON: CPT

## 2022-11-09 PROCEDURE — 25010000002 HEPARIN LOCK FLUSH PER 10 UNITS: Performed by: EMERGENCY MEDICINE

## 2022-11-09 PROCEDURE — 99284 EMERGENCY DEPT VISIT MOD MDM: CPT

## 2022-11-09 PROCEDURE — 96374 THER/PROPH/DIAG INJ IV PUSH: CPT

## 2022-11-09 PROCEDURE — 76705 ECHO EXAM OF ABDOMEN: CPT

## 2022-11-09 PROCEDURE — 96376 TX/PRO/DX INJ SAME DRUG ADON: CPT

## 2022-11-09 PROCEDURE — 83690 ASSAY OF LIPASE: CPT

## 2022-11-09 PROCEDURE — 86901 BLOOD TYPING SEROLOGIC RH(D): CPT | Performed by: EMERGENCY MEDICINE

## 2022-11-09 PROCEDURE — 86905 BLOOD TYPING RBC ANTIGENS: CPT | Performed by: EMERGENCY MEDICINE

## 2022-11-09 RX ORDER — HEPARIN SODIUM (PORCINE) LOCK FLUSH IV SOLN 100 UNIT/ML 100 UNIT/ML
500 SOLUTION INTRAVENOUS ONCE
Status: COMPLETED | OUTPATIENT
Start: 2022-11-09 | End: 2022-11-09

## 2022-11-09 RX ORDER — ONDANSETRON 2 MG/ML
4 INJECTION INTRAMUSCULAR; INTRAVENOUS ONCE
Status: COMPLETED | OUTPATIENT
Start: 2022-11-09 | End: 2022-11-09

## 2022-11-09 RX ORDER — OXYCODONE HYDROCHLORIDE AND ACETAMINOPHEN 5; 325 MG/1; MG/1
1 TABLET ORAL EVERY 6 HOURS PRN
Qty: 8 TABLET | Refills: 0 | Status: SHIPPED | OUTPATIENT
Start: 2022-11-09 | End: 2022-11-11

## 2022-11-09 RX ORDER — MORPHINE SULFATE 2 MG/ML
2 INJECTION, SOLUTION INTRAMUSCULAR; INTRAVENOUS ONCE
Status: COMPLETED | OUTPATIENT
Start: 2022-11-09 | End: 2022-11-09

## 2022-11-09 RX ORDER — LEVOTHYROXINE SODIUM 0.15 MG/1
150 TABLET ORAL DAILY
Qty: 14 TABLET | Refills: 0 | Status: SHIPPED | OUTPATIENT
Start: 2022-11-09 | End: 2023-03-28

## 2022-11-09 RX ORDER — SODIUM CHLORIDE 0.9 % (FLUSH) 0.9 %
10 SYRINGE (ML) INJECTION AS NEEDED
Status: DISCONTINUED | OUTPATIENT
Start: 2022-11-09 | End: 2022-11-09 | Stop reason: HOSPADM

## 2022-11-09 RX ORDER — ONDANSETRON 4 MG/1
4 TABLET, ORALLY DISINTEGRATING ORAL EVERY 8 HOURS PRN
Qty: 15 TABLET | Refills: 0 | Status: SHIPPED | OUTPATIENT
Start: 2022-11-09 | End: 2022-11-14

## 2022-11-09 RX ADMIN — IOPAMIDOL 100 ML: 755 INJECTION, SOLUTION INTRAVENOUS at 08:41

## 2022-11-09 RX ADMIN — MORPHINE SULFATE 4 MG: 4 INJECTION, SOLUTION INTRAMUSCULAR; INTRAVENOUS at 08:42

## 2022-11-09 RX ADMIN — HEPARIN SODIUM (PORCINE) LOCK FLUSH IV SOLN 100 UNIT/ML 500 UNITS: 100 SOLUTION at 16:16

## 2022-11-09 RX ADMIN — MORPHINE SULFATE 2 MG: 2 INJECTION, SOLUTION INTRAMUSCULAR; INTRAVENOUS at 14:19

## 2022-11-09 RX ADMIN — ONDANSETRON 4 MG: 2 INJECTION INTRAMUSCULAR; INTRAVENOUS at 08:42

## 2022-11-09 NOTE — ED PROVIDER NOTES
Subjective   History of Present Illness  The patient is a 57-year-old female who presents to the emergency department chief complaint of abdominal pain, nausea, diarrhea, abdominal pain, vaginal bleeding.  Was seen in the department on 11/1/2022 and diagnosed with likely early diverticulitis and discharged on Cipro and Flagyl.  She is currently taking these medications.  Abdominal pain has persisted.  Is generalized but more left lower quadrant.  No vomiting.  Extensive diarrhea.  Also started having some vaginal bleeding today.  She is postmenopausal.  Has not had any vaginal bleeding for the past 10 years.    History provided by:  Patient   used: No        Review of Systems   Constitutional: Negative for chills and fever.   HENT: Negative for congestion and sore throat.    Respiratory: Negative for cough and shortness of breath.    Cardiovascular: Negative for chest pain and palpitations.   Gastrointestinal: Positive for abdominal pain, diarrhea and nausea. Negative for vomiting.   Genitourinary: Positive for vaginal bleeding. Negative for dysuria.   Neurological: Negative for headaches.   All other systems reviewed and are negative.      Past Medical History:   Diagnosis Date   • Anemia    • Annual physical exam 11/06/2017    WILL RESCHEDULE MAMMOGRAM    • Breast lump    • CHF (congestive heart failure) (HCC) 11/06/2017    FOLLOWS WITH CARDS. CURRENTLY DOING WELL ON LASIX, LISINOPRIL, ALDACTONE, AND PROPANOLOL   • Degeneration of lumbar intervertebral disc 06/12/2012   • Diverticulitis    • Essential hypertension 11/06/2017    WELL CONTROLLED ON CURRENT REGIMEN    • GERD (gastroesophageal reflux disease)    • Hamstring injury 08/17/2015    BILATERAL HAMSTRING INJURY/PAIN    • Head injury    • Hernia cerebri (HCC)    • Hyperthyroidism    • Hyperthyroidism 11/06/2017    CURRENTLY ON METHIMAZOLE, PT REPORT POSSIBLY DUE TO GRAVE'S DISEASE. WILL NEED TO OBTAIN AND REVIEW MEDICAL RECORDS FROM [place].  WILL REFER TO ENDO    • Hypoxia    • Insomnia 2017    DISCUSSED SLEEP HYGIENE. PT TO TRY AVOIDING BLUE LIGHT AT NIGHT. PT ALSO TO SET ROUTINE PRIOR TO BEDTIME. WILL AVOID MEDS AT THIS TIME    • Interstitial lung disease (HCC)    • Lumbosacral disc herniation 2012    LEFT L5-S1 SMALL NATURE. SHE HAS FAILED ALL CONSERVATIVE MEASURES AND DESIRES SURGERY    • Lupus (HCC)     SEES DR. ANDERSON   • Migraines    • Pulmonary embolism ([facility])    • Respiratory failure with hypoxia ([facility]) 2021   • Rheumatoid arthritis ([facility]) 2017    CURRENTLY CONTROLLED, BUT WITH RESIDUAL DEFORMITY. WILL FOLLOW WITH DR. ANDERSON    • Tachycardia    • Total knee replacement status, right 2015       Allergies   Allergen Reactions   • Salsalate Hives, Anaphylaxis and Unknown - High Severity   • Aspirin    • Salicylic Acid        Past Surgical History:   Procedure Laterality Date   • ANKLE SURGERY Bilateral    • BREAST BIOPSY     • CATARACT EXTRACTION WITH INTRAOCULAR LENS IMPLANT     •  SECTION     • COLONOSCOPY     • EYE LENS IMPLANT SECONDARY      YES   • FASCIOTOMY      LEFT ANTERIOR ARM    • HAND SURGERY Left    • KNEE SURGERY     • OTHER SURGICAL HISTORY      LYMPH NODE EXCISION   • PORTACATH PLACEMENT      POWER PORT PLACEMENT        Family History   Problem Relation Age of Onset   • Stroke Father    • Lung cancer Other    • Arthritis Other    • Cancer Other    • Parkinsonism Other    • Diabetes type II Other        Social History     Socioeconomic History   • Marital status:    Tobacco Use   • Smoking status: Never   • Smokeless tobacco: Never   Vaping Use   • Vaping Use: Never used   Substance and Sexual Activity   • Alcohol use: Yes     Comment: occ wine   • Drug use: Never   • Sexual activity: Defer           Objective   Physical Exam  Vitals and nursing note reviewed. Exam conducted with a chaperone present.   Constitutional:       Appearance: Normal appearance. She is ill-appearing. She is not  toxic-appearing.   HENT:      Head: Normocephalic.      Nose: Nose normal.      Mouth/Throat:      Mouth: Mucous membranes are moist.   Eyes:      Conjunctiva/sclera: Conjunctivae normal.   Cardiovascular:      Rate and Rhythm: Normal rate and regular rhythm.   Pulmonary:      Effort: Pulmonary effort is normal.      Breath sounds: Normal breath sounds.   Abdominal:      General: Abdomen is flat. There is no distension.      Tenderness: There is generalized abdominal tenderness. There is guarding. There is no rebound.   Genitourinary:     Cervix: Cervical bleeding (scant) present. No discharge.   Musculoskeletal:         General: Normal range of motion.      Cervical back: Normal range of motion.   Skin:     General: Skin is warm and dry.      Capillary Refill: Capillary refill takes less than 2 seconds.   Neurological:      Mental Status: She is alert.         Procedures           ED Course                                           MDM      57-year-old female presents to the emergency department with abdominal pain and vaginal bleeding.  Seen on 11/1/2022 and diagnosed with potential early diverticulitis.  Discharged with Cipro and Flagyl.  Currently taking.  Continue to have diarrhea and abdominal pain.  Abdominal pain more so left lower quadrant.  On examination she has generalized abdominal tenderness with guarding.  No rebound or peritoneal signs.   exam does reveal some scant vaginal bleeding.  No discharge.  Vitals are reassuring she is normotensive, afebrile, not tachycardic and oxygen is 100% on home NC.     CBC with hemoglobin 11.5.  1 week ago it was 11.1.  No leukocytosis or neutrophilia.  CMP with creatinine of 1.15 which is improved from 1.27 1 week ago.  Normal LFTs, total bili, alk phos.  No anion gap.  No significant electrolyte abnormality  Lipase WNL  Lactic acid is 0.8  Patient with a history of thyroidectomy.  TSH currently 28.1 with a free T4 of 0.87.  Significant change from 3 weeks ago.   TSH was WNL 3 ago.  States she has been doubled on her Synthroid for 8 days and then cut back in half recently.  Currently on 137 mcg    CT scan shows no significant change from her prior on 11 1 with some diverticulosis but no evidence of diverticulitis.  She does have fibrosis at the lung bases with a 1.2 cm nodule similar to previous scan.  Potential renal scarring with few bilateral renal likely cysts.  She does have cholelithiasis with no evidence of cholecystitis.     pelvic ultrasound obtained which shows a 2 mm endometrium.  2 heterogenous masses in the uterus measuring 2.6 cm likely fibroids.  Ovaries not visualized.    On reexamination the patient has more localized tenderness in right upper quadrant with guarding and positive Coburn sign.  Shared decision made to obtain a dedicated ultrasound of gallbladder.  Ultrasound shows cholelithiasis as well as gallbladder sludge but no gallbladder wall thickening or common bile duct dilatation.  Will refer the patient to general surgery for further evaluation.  We will increase her Synthroid to 150 mcg for 2 weeks and advised to call her endocrinologist tomorrow to discuss these medication changes and close follow-up.  Advised to follow-up with OB/GYN and has a fibroid management.  She is hemodynamically stable and safe for discharge at this time.    The patient is resting comfortably and feels better, is alert and in no distress. Repeat examination is unremarkable and benign; in particular, there's no discomfort at McBurney's point and there is no pulsatile mass. The history, exam, diagnostic testing, and current condition does not suggest acute appendicitis, bowel obstruction, acute cholecystitis, bowel perforation, major gastrointestinal bleeding, severe diverticulitis, abdominal aortic aneurysm, mesenteric ischemia, volvulus, sepsis, or other significant pathology that warrants further testing, continued ED treatment, admission, for surgical evaluation at this  point. The vital signs have been stable. The patient does not have uncontrollable pain, intractable vomiting, or other significant symptoms. The patient's condition is stable and appropriate for discharge from the emergency department.     US Gallbladder   Final Result       1. Evidence for gallbladder stones/sludge.  There are no signs of cholecystitis.  There is no    biliary dilatation.   2. Evidence for multiple right renal cysts.   3. There is a positive sonographic Coburn's sign.                    YONG TAMAYO MD          Electronically Signed and Approved By: YONG TAMAYO MD on 11/09/2022 at 14:17                           US Non-ob Transvaginal   Final Result       1. Endometrium is not thickened measuring 2 millimeters.   2. There are 2 heterogeneous masses in the uterus measuring up to about 2.6 centimeters likely    fibroids.  The smaller mass may be subserosal.   3. Ovaries are not visualized by ultrasound.                PATEL BARROW MD          Electronically Signed and Approved By: PATEL BARROW MD on 11/09/2022 at 11:29                           CT Abdomen Pelvis With Contrast   Final Result       1. No significant change compared with CT scan 11/1/2022.   2. Fibrosis at the lung bases with mild honeycombing.  1.2 centimeter nodular density or nodule    pleural thickening similar to previous chest CT.  Cardiomegaly.   3. Cholelithiasis.   4. There may be renal scarring.  A few bilateral renal lesions some which are likely cysts and    several too small to characterize similar to previous exam.    5. Diverticulosis.     6. Other findings as above.   Given the above history consider pelvic ultrasound versus endoscopy.                PATEL BARROW MD          Electronically Signed and Approved By: PATEL BARROW MD on 11/09/2022 at 9:45                              Labs Reviewed   COMPREHENSIVE METABOLIC PANEL - Abnormal; Notable for the following components:       Result Value     BUN 21 (*)     Creatinine 1.15 (*)     eGFR 55.0 (*)     All other components within normal limits    Narrative:     GFR Normal >60  Chronic Kidney Disease <60  Kidney Failure <15     URINALYSIS W/ MICROSCOPIC IF INDICATED (NO CULTURE) - Abnormal; Notable for the following components:    Color, UA Orange (*)     All other components within normal limits   CBC WITH AUTO DIFFERENTIAL - Abnormal; Notable for the following components:    Hemoglobin 11.5 (*)     RDW 15.9 (*)     All other components within normal limits   URINALYSIS, MICROSCOPIC ONLY - Abnormal; Notable for the following components:    RBC, UA Too Numerous to Count (*)     WBC, UA 6-12 (*)     All other components within normal limits   TSH - Abnormal; Notable for the following components:    TSH 28.100 (*)     All other components within normal limits   T4, FREE - Abnormal; Notable for the following components:    Free T4 0.87 (*)     All other components within normal limits    Narrative:     Results may be falsely increased if patient taking Biotin.     LIPASE - Normal   LACTIC ACID, PLASMA - Normal   CLOSTRIDIOIDES DIFFICILE TOXIN, PCR   GASTROINTESTINAL PANEL, PCR   RAINBOW DRAW    Narrative:     The following orders were created for panel order [place] Draw.  Procedure                               Abnormality         Status                     ---------                               -----------         ------                     Green Top (Gel)[[id number]]                                  Final result               Lavender Top[[id number]]                                     Final result               Gold Top - SST[[id number]]                                   Final result               Light Blue Top[[id number]]                                   Final result                 Please view results for these tests on the individual orders.   TYPE AND SCREEN   ANTIBODY IDENTIFICATION   PATIENT ANTIGEN TYPE   ABORH 2ND SPECIMEN VERIFICATION   CBC AND  DIFFERENTIAL    Narrative:     The following orders were created for panel order CBC & Differential.  Procedure                               Abnormality         Status                     ---------                               -----------         ------                     CBC Auto Differential[[id number]]        Abnormal            Final result                 Please view results for these tests on the individual orders.   GREEN TOP   LAVENDER TOP   GOLD TOP - SST   LIGHT BLUE TOP      Final diagnoses:   Hypothyroidism, unspecified type   Uterine leiomyoma, unspecified location   Calculus of gallbladder without cholecystitis without obstruction   Sludge in gallbladder       ED Disposition  ED Disposition     ED Disposition   Discharge    Condition   Stable    Comment   --             Merline Enamorado MD  [address] [address]  [phone]    Schedule an appointment as soon as possible for a visit       [facility] EMERGENCY ROOM  [address] [facility] [address] [phone]    If symptoms worsen    Your Thyroid provider      call and discuss increase in Synthroid medicaion and schedule follow-up for recheck.    Man Rayo MD  [address] [facility] [id number] [phone]      As needed         Medication List      New Prescriptions    oxyCODONE-acetaminophen 5-325 MG per tablet  Commonly known as: PERCOCET  Take 1 tablet by mouth Every 6 (Six) Hours As Needed for Moderate Pain or Severe Pain for up to 2 days.        Changed    levothyroxine 150 MCG tablet  Commonly known as: SYNTHROID, LEVOTHROID  Take 1 tablet by mouth Daily for 14 days.  What changed:   · medication strength  · how much to take     ondansetron ODT 4 MG disintegrating tablet  Commonly known as: ZOFRAN-ODT  Place 1 tablet on the tongue Every 8 (Eight) Hours As Needed for Nausea or Vomiting for up to 5 days.  What changed: Another medication with the same name was removed. Continue  taking this medication, and follow the directions you see here.           Where to Get Your Medications      These medications were sent to [id number] - [place], [place] - [address] AT [facility] - [phone]  - [phone]   [address] [place] [address] [place] [address]    Hours: 24-hours Phone: [phone]   · levothyroxine 150 MCG tablet  · ondansetron ODT 4 MG disintegrating tablet  · oxyCODONE-acetaminophen 5-325 MG per tablet          Zhang Eisenberg PA-C  11/09/22 [id number]

## 2022-11-09 NOTE — DISCHARGE INSTRUCTIONS
Your thyroid function was off today.  Your TSH was elevated and your free T4 was decreased.  I have increased your Synthroid to the next dose level.  Please call your thyroid provider and discuss this medication change and schedule a follow-up for recheck.  Your ultrasound shows uterine fibroids which can be causing the vaginal bleeding.  Your CT scan showed some gallstones with some gall sludge but no evidence of infection or obstruction.  I have referred you to a general surgeon for follow-up for further evaluation.  Return to the emergency department with severe worsening vaginal bleeding or abdominal pain, unable to keep anything down by mouth or any worsening or concerning symptoms.

## 2022-11-10 DIAGNOSIS — G89.29 OTHER CHRONIC PAIN: ICD-10-CM

## 2022-11-10 NOTE — TELEPHONE ENCOUNTER
called pt for ER f/u call. Pt also a CCM candidate. Pt might benefit from seeing, if not already, urology, OBGYN and /or GI. Called and lmtrc. Pt sees pcp next week.

## 2022-11-11 RX ORDER — HYDROCODONE BITARTRATE AND ACETAMINOPHEN 7.5; 325 MG/1; MG/1
1 TABLET ORAL EVERY 12 HOURS PRN
Qty: 60 TABLET | Refills: 0 | Status: SHIPPED | OUTPATIENT
Start: 2022-11-11 | End: 2023-02-10 | Stop reason: SDUPTHER

## 2022-11-11 NOTE — TELEPHONE ENCOUNTER
[facility] reviewed. Last filled 9/8/22. Medication refilled as requested.     Will hold off referral to discuss w/ pt at her upcoming apt.

## 2022-11-11 NOTE — TELEPHONE ENCOUNTER
PCP appt 11/18  GI appt 11/21 (gallstones and sludge noted on 11-9 images from eR)    Pt does not see OBGYN (uterine cysts noted on recent image from ER) nor urology (renal cysts also found). Would you like to order these two referrals?    Pt c/o moderate abd pain. Has been on Norco chronically for other related pain, it does not help. ER sent in #8 percocet. She has not taken. educated pt on taking percocet alone, not with norco. She will try this. Educated on when to seek emergent medical care for abd pain or s/s. Also told pt she could call on-call provider over weekend. Pt asking for norco refill for her other chronic pain. She agreed to not take with percocet.

## 2022-11-18 ENCOUNTER — OFFICE VISIT (OUTPATIENT)
Dept: INTERNAL MEDICINE | Facility: CLINIC | Age: 57
End: 2022-11-18
Payer: MEDICARE

## 2022-11-18 ENCOUNTER — TELEPHONE (OUTPATIENT)
Dept: INTERNAL MEDICINE | Facility: CLINIC | Age: 57
End: 2022-11-18

## 2022-11-18 VITALS
TEMPERATURE: 97.1 F | RESPIRATION RATE: 18 BRPM | HEART RATE: 88 BPM | BODY MASS INDEX: 36.95 KG/M2 | HEIGHT: 64 IN | WEIGHT: 216.4 LBS | SYSTOLIC BLOOD PRESSURE: 152 MMHG | OXYGEN SATURATION: 95 % | DIASTOLIC BLOOD PRESSURE: 92 MMHG

## 2022-11-18 DIAGNOSIS — R10.84 GENERALIZED ABDOMINAL PAIN: ICD-10-CM

## 2022-11-18 DIAGNOSIS — Z91.81 AT LOW RISK FOR FALL: ICD-10-CM

## 2022-11-18 DIAGNOSIS — N95.0 POST-MENOPAUSAL BLEEDING: ICD-10-CM

## 2022-11-18 DIAGNOSIS — S46.012A STRAIN OF LEFT ROTATOR CUFF CAPSULE, INITIAL ENCOUNTER: ICD-10-CM

## 2022-11-18 DIAGNOSIS — Z79.899 ENCOUNTER FOR LONG-TERM (CURRENT) USE OF MEDICATIONS: ICD-10-CM

## 2022-11-18 DIAGNOSIS — G89.29 OTHER CHRONIC PAIN: ICD-10-CM

## 2022-11-18 DIAGNOSIS — R03.0 ELEVATED BLOOD PRESSURE READING: ICD-10-CM

## 2022-11-18 DIAGNOSIS — J30.9 ALLERGIC RHINITIS, UNSPECIFIED SEASONALITY, UNSPECIFIED TRIGGER: ICD-10-CM

## 2022-11-18 DIAGNOSIS — M51.36 DEGENERATION OF LUMBAR INTERVERTEBRAL DISC: ICD-10-CM

## 2022-11-18 DIAGNOSIS — Z00.00 MEDICARE ANNUAL WELLNESS VISIT, SUBSEQUENT: Primary | ICD-10-CM

## 2022-11-18 DIAGNOSIS — M25.559 HIP PAIN: ICD-10-CM

## 2022-11-18 LAB
AMPHET+METHAMPHET UR QL: NEGATIVE
AMPHETAMINE INTERNAL CONTROL: NORMAL
AMPHETAMINES UR QL: NEGATIVE
BACTERIA UR QL AUTO: ABNORMAL /HPF
BARBITURATE INTERNAL CONTROL: NORMAL
BARBITURATES UR QL SCN: NEGATIVE
BENZODIAZ UR QL SCN: NEGATIVE
BENZODIAZEPINE INTERNAL CONTROL: NORMAL
BILIRUB UR QL STRIP: NEGATIVE
BUPRENORPHINE INTERNAL CONTROL: NORMAL
BUPRENORPHINE SERPL-MCNC: NEGATIVE NG/ML
CANNABINOIDS SERPL QL: NEGATIVE
CLARITY UR: CLEAR
COCAINE INTERNAL CONTROL: NORMAL
COCAINE UR QL: NEGATIVE
COLOR UR: YELLOW
EXPIRATION DATE: NORMAL
GLUCOSE UR STRIP-MCNC: NEGATIVE MG/DL
HGB UR QL STRIP.AUTO: NEGATIVE
HYALINE CASTS UR QL AUTO: ABNORMAL /LPF
KETONES UR QL STRIP: NEGATIVE
LEUKOCYTE ESTERASE UR QL STRIP.AUTO: ABNORMAL
Lab: NORMAL
MDMA (ECSTASY) INTERNAL CONTROL: NORMAL
MDMA UR QL SCN: NEGATIVE
METHADONE INTERNAL CONTROL: NORMAL
METHADONE UR QL SCN: NEGATIVE
METHAMPHETAMINE INTERNAL CONTROL: NORMAL
NITRITE UR QL STRIP: NEGATIVE
OPIATES INTERNAL CONTROL: NORMAL
OPIATES UR QL: NEGATIVE
OXYCODONE INTERNAL CONTROL: NORMAL
OXYCODONE UR QL SCN: NEGATIVE
PCP UR QL SCN: NEGATIVE
PH UR STRIP.AUTO: 5.5 [PH] (ref 5–8)
PHENCYCLIDINE INTERNAL CONTROL: NORMAL
PROT UR QL STRIP: ABNORMAL
RBC # UR STRIP: ABNORMAL /HPF
REF LAB TEST METHOD: ABNORMAL
SP GR UR STRIP: 1.02 (ref 1–1.03)
SQUAMOUS #/AREA URNS HPF: ABNORMAL /HPF
THC INTERNAL CONTROL: NORMAL
UROBILINOGEN UR QL STRIP: ABNORMAL
WBC # UR STRIP: ABNORMAL /HPF

## 2022-11-18 PROCEDURE — 87086 URINE CULTURE/COLONY COUNT: CPT | Performed by: STUDENT IN AN ORGANIZED HEALTH CARE EDUCATION/TRAINING PROGRAM

## 2022-11-18 PROCEDURE — 99214 OFFICE O/P EST MOD 30 MIN: CPT | Performed by: STUDENT IN AN ORGANIZED HEALTH CARE EDUCATION/TRAINING PROGRAM

## 2022-11-18 PROCEDURE — 81001 URINALYSIS AUTO W/SCOPE: CPT | Performed by: STUDENT IN AN ORGANIZED HEALTH CARE EDUCATION/TRAINING PROGRAM

## 2022-11-18 PROCEDURE — 1170F FXNL STATUS ASSESSED: CPT | Performed by: STUDENT IN AN ORGANIZED HEALTH CARE EDUCATION/TRAINING PROGRAM

## 2022-11-18 PROCEDURE — 80305 DRUG TEST PRSMV DIR OPT OBS: CPT | Performed by: STUDENT IN AN ORGANIZED HEALTH CARE EDUCATION/TRAINING PROGRAM

## 2022-11-18 PROCEDURE — G0439 PPPS, SUBSEQ VISIT: HCPCS | Performed by: STUDENT IN AN ORGANIZED HEALTH CARE EDUCATION/TRAINING PROGRAM

## 2022-11-18 PROCEDURE — 1159F MED LIST DOCD IN RCRD: CPT | Performed by: STUDENT IN AN ORGANIZED HEALTH CARE EDUCATION/TRAINING PROGRAM

## 2022-11-18 RX ORDER — DICYCLOMINE HYDROCHLORIDE 10 MG/1
10 CAPSULE ORAL
Qty: 120 CAPSULE | Refills: 1 | Status: SHIPPED | OUTPATIENT
Start: 2022-11-18 | End: 2022-12-18

## 2022-11-18 RX ORDER — LORATADINE 10 MG/1
10 TABLET ORAL DAILY
Qty: 30 TABLET | Refills: 6 | Status: SHIPPED | OUTPATIENT
Start: 2022-11-18

## 2022-11-18 NOTE — PROGRESS NOTES
The ABCs of the Annual Wellness Visit  Subsequent Medicare Wellness Visit    Chief Complaint   Patient presents with   • Medicare Wellness-subsequent   • Abdominal Pain     Went to ER 11/1 and 11/9. Hurts worse now. Diagnosed with diverticulitis and UTI. 11/1 also had rectal bleeding.  11/9 having vaginal bleeding, stopped on Saturday 11/12      Subjective    History of Present Illness:  Aida Mcclure is a 57 y.o. female who presents for a Subsequent Medicare Wellness Visit.    The following portions of the patient's history were reviewed and   updated as appropriate: allergies, current medications, past family history, past medical history, past social history, past surgical history and problem list.    Compared to one year ago, the patient feels her physical   health is better.    Compared to one year ago, the patient feels her mental   health is the same.    Recent Hospitalizations:  She was not admitted to the hospital during the last year.       Current Medical Providers:  Patient Care Team:  Merline Enamorado MD as PCP - General (Internal Medicine)    Outpatient Medications Prior to Visit   Medication Sig Dispense Refill   • ascorbic acid (VITAMIN C) 250 MG tablet Take 1 tablet by mouth Daily.     • fluticasone (FLONASE) 50 MCG/ACT nasal spray SHAKE LIQUID AND USE 2 SPRAYS(100 MCG) IN EACH NOSTRIL EVERY DAY 16 g 3   • furosemide (LASIX) 40 MG tablet Take 40 mg by mouth 2 (Two) Times a Day.     • gabapentin (NEURONTIN) 300 MG capsule Take 1 capsule by mouth 3 (Three) Times a Day. 90 capsule 0   • HYDROcodone-acetaminophen (NORCO) 7.5-325 MG per tablet Take 1 tablet by mouth Every 12 (Twelve) Hours As Needed for Moderate Pain for up to 30 days. 60 tablet 0   • hydroxychloroquine (PLAQUENIL) 200 MG tablet Take 1 tablet by mouth 2 (Two) Times a Day.     • levothyroxine (SYNTHROID, LEVOTHROID) 150 MCG tablet Take 1 tablet by mouth Daily for 14 days. (Patient taking differently: Take 137 mcg by mouth Daily.)  14 tablet 0   • magnesium oxide (MAG-OX) 400 MG tablet TK 1 T PO D  1   • O2 (OXYGEN) Inhale 3 L/min Daily.     • pilocarpine (SALAGEN) 5 MG tablet Every 8 (Eight) Hours.     • ProAir  (90 Base) MCG/ACT inhaler Inhale 2 puffs Every 4 (Four) Hours As Needed for Wheezing. 18 g 11   • propranolol (INDERAL) 40 MG tablet TK SS T PO TID  1   • rizatriptan (MAXALT) 10 MG tablet Take 1 tablet by mouth As Needed.     • rOPINIRole (REQUIP) 1 MG tablet      • spironolactone (ALDACTONE) 50 MG tablet TK 1 T PO QD  0   • Calcium Carb-Cholecalciferol (Caltrate 600+D3 Soft) 600-800 MG-UNIT chewable tablet Chew 1 tablet Daily.     • ciprofloxacin (CIPRO) 500 MG tablet Take 1 tablet by mouth Every 12 (Twelve) Hours. 20 tablet 0   • cyanocobalamin 1000 MCG/ML injection Inject 1 mL into the appropriate muscle as directed by prescriber Every 30 (Thirty) Days.     • Diclofenac Sodium (VOLTAREN) 1 % gel gel Apply 4 g topically to the appropriate area as directed 4 (Four) Times a Day As Needed (shoulder/hip pain). 50 g 0   • hydrOXYzine (ATARAX) 25 MG tablet Take 1 tablet by mouth 3 (Three) Times a Day As Needed for Itching.     • loratadine (CLARITIN) 10 MG tablet Take 1 tablet by mouth Daily. 30 tablet 0   • metroNIDAZOLE (FLAGYL) 500 MG tablet Take 1 tablet by mouth 3 (Three) Times a Day. 30 tablet 0   • predniSONE (DELTASONE) 20 MG tablet Take 2 tablets by mouth Daily. 14 tablet 0   • ergocalciferol (ERGOCALCIFEROL) 1.25 MG (54879 UT) capsule Take 1 capsule by mouth Every 7 (Seven) Days.       No facility-administered medications prior to visit.       No opioid medication identified on active medication list. I have reviewed chart for other potential  high risk medication/s and harmful drug interactions in the elderly.          Aspirin is not on active medication list.  Aspirin use is not indicated based on review of current medical condition/s. Risk of harm outweighs potential benefits.  .    Patient Active Problem List    Diagnosis   • Vitamin D deficiency   • Sleep disturbance   • Heat intolerance   • Palpitations   • Thyrotoxicosis with diffuse goiter and without thyroid storm   • Cold intolerance   • Chronic fatigue   • Premature menopause   • Hyperglycemia   • Abdominal hernia   • Anemia   • Therapeutic drug monitoring   • Breast lump   • CHF (congestive heart failure) (HCC)   • Degeneration of lumbar intervertebral disc   • Displacement of lumbar intervertebral disc without myelopathy   • Esophageal reflux   • Essential hypertension   • Head injury   • Hyperthyroidism   • Hypoxia   • Insomnia   • ILD (interstitial lung disease) (HCC)   • Migraines   • Pulmonary embolism (HCC)   • Respiratory failure with hypoxia (HCC)   • Rheumatoid arthritis (HCC)   • Systemic lupus erythematosus (HCC)   • Tachycardia   • Fatigue   • Chronic dyspnea   • Personal history of PE (pulmonary embolism)   • Mixed connective tissue disease (HCC)   • Leg edema   • SHANELLE (obstructive sleep apnea)   • Chronic diastolic CHF (congestive heart failure) (HCC)   • Cough   • Dyspnea on exertion   • Food poisoning   • Bilateral lower extremity edema   • Hypoalbuminemia   • Leukopenia   • Other long term (current) drug therapy   • Polyarthritis   • Long term current use of systemic steroids   • Postinflammatory pulmonary fibrosis (HCC)   • Proteinuria   • Stage 4 chronic kidney disease (HCC)   • Renal impairment   • Chronic diastolic heart failure (HCC)   • Encounter for immunization   • Interstitial lung disease (HCC)   • Disorder of connective tissue (HCC)   • Lumbar radiculopathy   • Flu vaccine need   • Seasonal allergies   • Excessive daytime sleepiness   • Generalized abdominal pain     Advance Care Planning  Advance Directive is on file.  ACP discussion was held with the patient during this visit. Patient has an advance directive in EMR which is still valid.  ACP at [facility] ([facility])          Objective    Vitals:    11/18/22 1137   BP: 152/92   BP  "Location: Right arm   Patient Position: Sitting   Cuff Size: Adult   Pulse: 88   Resp: 18   Temp: 97.1 °F (36.2 °C)   SpO2: 95%   Weight: 98.2 kg (216 lb 6.4 oz)   Height: 162.6 cm (64.02\")     Estimated body mass index is 37.13 kg/m² as calculated from the following:    Height as of this encounter: 162.6 cm (64.02\").    Weight as of this encounter: 98.2 kg (216 lb 6.4 oz).    Class 2 Severe Obesity (BMI >=35 and <=39.9). Obesity-related health conditions include the following: hypertension and osteoarthritis. Obesity is unchanged. BMI is is above average; BMI management plan is completed. We discussed portion control and increasing exercise.      Does the patient have evidence of cognitive impairment? No    Physical Exam  Lab Results   Component Value Date    TRIG 71 01/05/2023    HDL 57 01/05/2023    LDL 80 01/05/2023    VLDL 14 01/05/2023            HEALTH RISK ASSESSMENT    Smoking Status:  Social History     Tobacco Use   Smoking Status Never   Smokeless Tobacco Never     Alcohol Consumption:  Social History     Substance and Sexual Activity   Alcohol Use Yes    Comment: occ wine     Fall Risk Screen:    STEADI Fall Risk Assessment has not been completed.    Depression Screening:  PHQ-2/PHQ-9 Depression Screening 11/18/2022   Retired PHQ-9 Total Score -   Retired Total Score -   Little Interest or Pleasure in Doing Things 0-->not at all   Feeling Down, Depressed or Hopeless 0-->not at all   PHQ-9: Brief Depression Severity Measure Score 0       Health Habits and Functional and Cognitive Screening:  Functional & Cognitive Status 11/18/2022   Do you have difficulty preparing food and eating? No   Do you have difficulty bathing yourself, getting dressed or grooming yourself? No   Do you have difficulty using the toilet? No   Do you have difficulty moving around from place to place? No   Do you have trouble with steps or getting out of a bed or a chair? No   Current Diet Well Balanced Diet   Dental Exam Up to date "   Eye Exam Up to date   Exercise (times per week) 1 times per week   Current Exercises Include Home Fitness Gym   Do you need help using the phone?  No   Are you deaf or do you have serious difficulty hearing?  No   Do you need help with transportation? No   Do you need help shopping? No   Do you need help preparing meals?  No   Do you need help with housework?  No   Do you need help with laundry? No   Do you need help taking your medications? No   Do you need help managing money? No   Do you ever drive or ride in a car without wearing a seat belt? No   Have you felt unusual stress, anger or loneliness in the last month? Yes   Who do you live with? Alone   If you need help, do you have trouble finding someone available to you? No   Have you been bothered in the last four weeks by sexual problems? No   Do you have difficulty concentrating, remembering or making decisions? Yes       Age-appropriate Screening Schedule:  Refer to the list below for future screening recommendations based on patient's age, sex and/or medical conditions. Orders for these recommended tests are listed in the plan section. The patient has been provided with a written plan.    Health Maintenance   Topic Date Due   • TDAP/TD VACCINES (1 - Tdap) Never done   • MAMMOGRAM  10/15/2022   • ZOSTER VACCINE (2 of 2) 02/24/2023   • LIPID PANEL  01/05/2024   • PAP SMEAR  12/14/2025   • INFLUENZA VACCINE  Completed              Assessment & Plan   CMS Preventative Services Quick Reference  Risk Factors Identified During Encounter  Inactivity/Sedentary: Patient was advised to exercise at least 150 minutes a week per CDC recommendations.  The above risks/problems have been discussed with the patient.  Follow up actions/plans if indicated are seen below in the Assessment/Plan Section.  Pertinent information has been shared with the patient in the After Visit Summary.    Diagnoses and all orders for this visit:    1. Medicare annual wellness visit, subsequent  (Primary)  Comments:  pt is at baseline state of health. Acute and chronic needs addressed below.     2. Generalized abdominal pain  Comments:  Chronic w/ recent exacerbation w/ concern for possible IBS vs UTI given localization over lower abdomen. UA ordered. Bentyl sent in.   Orders:  -     dicyclomine (BENTYL) 10 MG capsule; Take 1 capsule by mouth 4 (Four) Times a Day Before Meals & at Bedtime for 30 days.  Dispense: 120 capsule; Refill: 1  -     Urinalysis With Culture If Indicated - Urine, Clean Catch  -     Urinalysis, Microscopic Only - Urine, Clean Catch  -     Urine Culture - Urine, Urine, Clean Catch    3. Strain of left rotator cuff capsule, initial encounter  Comments:  Chronic w/ mri findings per above. Followign w/ ortho. Refilled voltaren gel.   Orders:  -     Discontinue: Diclofenac Sodium (VOLTAREN) 1 % gel gel; Apply 4 g topically to the appropriate area as directed 4 (Four) Times a Day As Needed (shoulder/hip pain).  Dispense: 100 g; Refill: 3    4. Allergic rhinitis, unspecified seasonality, unspecified trigger  Comments:  Chronic, refilled claritin.  Orders:  -     loratadine (CLARITIN) 10 MG tablet; Take 1 tablet by mouth Daily.  Dispense: 30 tablet; Refill: 6    5. Post-menopausal bleeding  Comments:  Acute, most likely due to fibroid, however given age and years away from menopause referring to ob/gyn for further evaluation and tx plan.   Orders:  -     Ambulatory Referral to Obstetrics / Gynecology  -     Urinalysis With Culture If Indicated - Urine, Clean Catch  -     Urinalysis, Microscopic Only - Urine, Clean Catch  -     Urine Culture - Urine, Urine, Clean Catch    6. Other chronic pain  Comments:  Chronic hip pain. She is in pt for her shoulder and need referral for her hip and back. New referral placed.   Orders:  -     Ambulatory Referral to Physical Therapy Evaluate and treat    7. Degeneration of lumbar intervertebral disc  Comments:  Chronic w/ current exacerbation. Referring to  physical therapy.   Orders:  -     Ambulatory Referral to Physical Therapy Evaluate and treat    8. Hip pain  Comments:  chronic and active. Referring to physical therapy for evaluation and tx.   Orders:  -     Ambulatory Referral to Physical Therapy Evaluate and treat    9. At low risk for fall  Comments:  Reviewed home safety measures     10. Elevated blood pressure reading  Comments:  Elevated bp in office today. pt encouraged to check bp at home, keep log and bring for review at f/u in 6 wks.     11. Encounter for long-term (current) use of medications  Comments:  due for poc UDS for controlled substances.   Orders:  -     POC Urine Drug Screen [facility]        Follow Up:   Return in about 6 weeks (around 12/30/2022) for HTN.     An After Visit Summary and PPPS were made available to the patient.
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
## 2022-11-20 LAB — BACTERIA SPEC AEROBE CULT: NO GROWTH

## 2022-11-22 ENCOUNTER — TELEPHONE (OUTPATIENT)
Dept: INTERNAL MEDICINE | Facility: CLINIC | Age: 57
End: 2022-11-22

## 2022-11-22 DIAGNOSIS — B37.9 YEAST INFECTION: Primary | ICD-10-CM

## 2022-11-22 NOTE — TELEPHONE ENCOUNTER
Caller: Aida Mcclure April    Relationship: Self    Best call back number: [phone]    What medication are you requesting: DIFLUCAN     What are your current symptoms: YEAST INFECTION     How long have you been experiencing symptoms: WHEN SHE WAS IN TO SEE PCP ON 11/21    Have you had these symptoms before:    [x] Yes  [] No    Have you been treated for these symptoms before:   [x] Yes  [] No    If a prescription is needed, what is your preferred pharmacy and phone number:  [id number] - [place], [place] - [address] AT [facility] [phone] [facility] [phone]      Additional notes:

## 2022-11-22 NOTE — TELEPHONE ENCOUNTER
Caller: Aida Nuñez April    Relationship: Self    Best call back number: [phone]    What is the best time to reach you: ANYTIME     Who are you requesting to speak with (clinical staff, provider,  specific staff member): NURSE         What was the call regarding: PATIENT IS CALLING STATING THAT THE REFERRAL THAT WAS WRITTEN WAS TO A DIFFERENT DOCTOR THAN WHAT SHE WAS PREVIOUSLY SEEING WANTED TO SEE IF SHE COULD USE THIS REFERRAL AND GO TO [facility] OR IF A NEW REFERRAL WOULD HAVE TO BE WRITTEN     Do you require a callback: YES

## 2022-11-22 NOTE — TELEPHONE ENCOUNTER
Front Office: Can you guys help with this? I think she should be able to use the same referral but not sure.

## 2022-11-28 ENCOUNTER — PREP FOR SURGERY (OUTPATIENT)
Dept: OTHER | Facility: HOSPITAL | Age: 57
End: 2022-11-28

## 2022-11-28 ENCOUNTER — TELEPHONE (OUTPATIENT)
Dept: GASTROENTEROLOGY | Facility: CLINIC | Age: 57
End: 2022-11-28

## 2022-11-28 ENCOUNTER — TELEPHONE (OUTPATIENT)
Dept: OBSTETRICS AND GYNECOLOGY | Facility: CLINIC | Age: 57
End: 2022-11-28

## 2022-11-28 ENCOUNTER — CLINICAL SUPPORT (OUTPATIENT)
Dept: GASTROENTEROLOGY | Facility: CLINIC | Age: 57
End: 2022-11-28

## 2022-11-28 DIAGNOSIS — K21.9 GASTROESOPHAGEAL REFLUX DISEASE, UNSPECIFIED WHETHER ESOPHAGITIS PRESENT: Primary | ICD-10-CM

## 2022-11-28 RX ORDER — FLUCONAZOLE 150 MG/1
150 TABLET ORAL ONCE
Qty: 1 TABLET | Refills: 0 | Status: SHIPPED | OUTPATIENT
Start: 2022-11-28 | End: 2022-11-28

## 2022-11-28 NOTE — TELEPHONE ENCOUNTER
Gloria Esteban, Practice Manager has been made aware that request for clearance is being submitted directly to physicians and should be submitted through the practice prior authorization coordinator.

## 2022-11-28 NOTE — TELEPHONE ENCOUNTER
Caller: Aida Mcclure April    Relationship: Self    Best call back number: [phone]    Who are you requesting to speak with (clinical staff, provider,  specific staff member): MEDICAL STAFF    What was the call regarding: PATIENT WAS SUPPOSED TO HAVE A MEDICATION SENT IN FOR A URINARY TRACT INFECTION. THE PHARMACY HAS NOT RECEIVED THE MEDICATION. SHE WOULD LIKE THE MEDICATION CALLED IN.  
Urine culture was negative and did not warrant treatment. Patient called and updated    
English

## 2022-11-28 NOTE — TELEPHONE ENCOUNTER
Called pt, states she is still having vaginal itching. Denies discharge.   States she always gets a yeast infection post abx.     Diflucan sent in.

## 2022-11-28 NOTE — PROGRESS NOTES
Aida April Mcclure  REASON FOR CALL Screening for EGD   SENT IN PREP None needed   Patient denies taking a blood thinner at this time   Patient states she is currently being treated by a cardiologist- Dr. Ann  Patient states she is currently being treated by a pulmonologist- Dr. Crawford     Past Medical History:   Diagnosis Date   • Anemia    • Annual physical exam 11/06/2017    WILL RESCHEDULE MAMMOGRAM    • Breast lump    • CHF (congestive heart failure) ([facility]) 11/06/2017    FOLLOWS WITH CARDS. CURRENTLY DOING WELL ON LASIX, LISINOPRIL, ALDACTONE, AND PROPANOLOL   • Degeneration of lumbar intervertebral disc 06/12/2012   • Diverticulitis    • Essential hypertension 11/06/2017    WELL CONTROLLED ON CURRENT REGIMEN    • GERD (gastroesophageal reflux disease)    • Hamstring injury 08/17/2015    BILATERAL HAMSTRING INJURY/PAIN    • Head injury    • Hernia cerebri ([facility])    • Hyperthyroidism    • Hyperthyroidism 11/06/2017    CURRENTLY ON METHIMAZOLE, PT REPORT POSSIBLY DUE TO GRAVE'S DISEASE. WILL NEED TO OBTAIN AND REVIEW MEDICAL RECORDS FROM [facility]. WILL REFER TO ENDO    • Hypoxia    • Insomnia 11/06/2017    DISCUSSED SLEEP HYGIENE. PT TO TRY AVOIDING BLUE LIGHT AT NIGHT. PT ALSO TO SET ROUTINE PRIOR TO BEDTIME. WILL AVOID MEDS AT THIS TIME    • Interstitial lung disease ([facility])    • Lumbosacral disc herniation 06/12/2012    LEFT L5-S1 SMALL NATURE. SHE HAS FAILED ALL CONSERVATIVE MEASURES AND DESIRES SURGERY    • Lupus ([facility])     SEES DR. ANDERSON   • Migraines    • Pulmonary embolism ([facility])    • Respiratory failure with hypoxia ([facility]) 02/04/2021   • Rheumatoid arthritis ([facility]) 11/06/2017    CURRENTLY CONTROLLED, BUT WITH RESIDUAL DEFORMITY. WILL FOLLOW WITH DR. ANDERSON    • Tachycardia    • Total knee replacement status, right 08/17/2015     Allergies   Allergen Reactions   • Salsalate Hives, Anaphylaxis and Unknown - High Severity   • Aspirin    • Salicylic Acid      Past Surgical History:   Procedure Laterality Date   •  ANKLE SURGERY Bilateral    • BREAST BIOPSY     • CATARACT EXTRACTION WITH INTRAOCULAR LENS IMPLANT     •  SECTION     • COLONOSCOPY     • EYE LENS IMPLANT SECONDARY      YES   • FASCIOTOMY      LEFT ANTERIOR ARM    • HAND SURGERY Left    • KNEE SURGERY     • OTHER SURGICAL HISTORY      LYMPH NODE EXCISION   • PORTACATH PLACEMENT      POWER PORT PLACEMENT      Social History     Socioeconomic History   • Marital status:    Tobacco Use   • Smoking status: Never   • Smokeless tobacco: Never   Vaping Use   • Vaping Use: Never used   Substance and Sexual Activity   • Alcohol use: Yes     Comment: occ wine   • Drug use: Never   • Sexual activity: Defer     Family History   Problem Relation Age of Onset   • Stroke Father    • Lung cancer Other    • Arthritis Other    • Cancer Other    • Parkinsonism Other    • Diabetes type II Other        Current Outpatient Medications:   •  ascorbic acid (VITAMIN C) 250 MG tablet, Take 1 tablet by mouth Daily., Disp: , Rfl:   •  Calcium Carb-Cholecalciferol (Caltrate 600+D3 Soft) 600-800 MG-UNIT chewable tablet, Chew 1 tablet Daily., Disp: , Rfl:   •  ciprofloxacin (CIPRO) 500 MG tablet, Take 1 tablet by mouth Every 12 (Twelve) Hours., Disp: 20 tablet, Rfl: 0  •  cyanocobalamin 1000 MCG/ML injection, Inject 1 mL into the appropriate muscle as directed by prescriber Every 30 (Thirty) Days., Disp: , Rfl:   •  Diclofenac Sodium (VOLTAREN) 1 % gel gel, Apply 4 g topically to the appropriate area as directed 4 (Four) Times a Day As Needed (shoulder/hip pain)., Disp: 100 g, Rfl: 3  •  ergocalciferol (ERGOCALCIFEROL) 1.25 MG (54265 UT) capsule, Take 1 capsule by mouth Every 7 (Seven) Days., Disp: , Rfl:   •  fluticasone (FLONASE) 50 MCG/ACT nasal spray, SHAKE LIQUID AND USE 2 SPRAYS(100 MCG) IN EACH NOSTRIL EVERY DAY, Disp: 16 g, Rfl: 3  •  furosemide (LASIX) 40 MG tablet, Take 40 mg by mouth 2 (Two) Times a Day., Disp: , Rfl:   •  gabapentin (NEURONTIN) 300 MG capsule, Take 1  capsule by mouth 3 (Three) Times a Day., Disp: 90 capsule, Rfl: 0  •  HYDROcodone-acetaminophen (NORCO) 7.5-325 MG per tablet, Take 1 tablet by mouth Every 12 (Twelve) Hours As Needed for Moderate Pain for up to 30 days., Disp: 60 tablet, Rfl: 0  •  hydroxychloroquine (PLAQUENIL) 200 MG tablet, Take 1 tablet by mouth 2 (Two) Times a Day., Disp: , Rfl:   •  hydrOXYzine (ATARAX) 25 MG tablet, Take 1 tablet by mouth 3 (Three) Times a Day As Needed for Itching., Disp: , Rfl:   •  loratadine (CLARITIN) 10 MG tablet, Take 1 tablet by mouth Daily., Disp: 30 tablet, Rfl: 6  •  magnesium oxide (MAG-OX) 400 MG tablet, TK 1 T PO D, Disp: , Rfl: 1  •  metroNIDAZOLE (FLAGYL) 500 MG tablet, Take 1 tablet by mouth 3 (Three) Times a Day., Disp: 30 tablet, Rfl: 0  •  O2 (OXYGEN), Inhale 3 L/min Daily., Disp: , Rfl:   •  pilocarpine (SALAGEN) 5 MG tablet, Every 8 (Eight) Hours., Disp: , Rfl:   •  ProAir  (90 Base) MCG/ACT inhaler, Inhale 2 puffs Every 4 (Four) Hours As Needed for Wheezing., Disp: 18 g, Rfl: 11  •  propranolol (INDERAL) 40 MG tablet, TK SS T PO TID, Disp: , Rfl: 1  •  rizatriptan (MAXALT) 10 MG tablet, Take 1 tablet by mouth As Needed., Disp: , Rfl:   •  rOPINIRole (REQUIP) 1 MG tablet, , Disp: , Rfl:   •  spironolactone (ALDACTONE) 50 MG tablet, TK 1 T PO QD, Disp: , Rfl: 0  •  dicyclomine (BENTYL) 10 MG capsule, Take 1 capsule by mouth 4 (Four) Times a Day Before Meals & at Bedtime for 30 days., Disp: 120 capsule, Rfl: 1  •  levothyroxine (SYNTHROID, LEVOTHROID) 150 MCG tablet, Take 1 tablet by mouth Daily for 14 days., Disp: 14 tablet, Rfl: 0  •  predniSONE (DELTASONE) 20 MG tablet, Take 2 tablets by mouth Daily., Disp: 14 tablet, Rfl: 0

## 2022-11-28 NOTE — TELEPHONE ENCOUNTER
Left message for patient to return my call. PCP referred to office, need more information to see how soon she needs to be scheduled (PMB).

## 2022-11-28 NOTE — TELEPHONE ENCOUNTER
----- Message from Saeid Dennis MD sent at 11/28/2022 10:16 AM EST -----  I approve my patient from a Pulmonary standpoint.    Electronically signed by Saeid Dennis MD, 11/28/22, 10:16 AM EST.    ----- Message -----  From: Sheridan Villafana MA  Sent: 11/28/2022   9:47 AM EST  To: Saeid Dennis MD

## 2022-12-09 ENCOUNTER — OFFICE VISIT (OUTPATIENT)
Dept: ORTHOPEDIC SURGERY | Facility: CLINIC | Age: 57
End: 2022-12-09

## 2022-12-09 VITALS — BODY MASS INDEX: 36.88 KG/M2 | HEIGHT: 64 IN | WEIGHT: 216 LBS

## 2022-12-09 DIAGNOSIS — M75.42 IMPINGEMENT SYNDROME OF LEFT SHOULDER: Primary | ICD-10-CM

## 2022-12-09 DIAGNOSIS — M75.102 TEAR OF LEFT ROTATOR CUFF, UNSPECIFIED TEAR EXTENT, UNSPECIFIED WHETHER TRAUMATIC: ICD-10-CM

## 2022-12-09 PROCEDURE — 99213 OFFICE O/P EST LOW 20 MIN: CPT | Performed by: PHYSICIAN ASSISTANT

## 2022-12-09 NOTE — PROGRESS NOTES
"Chief Complaint  Follow-up of the Left Shoulder    Subjective      Aida Mcclure presents to [facility] for follow-up of left shoulder impingement syndrome and rotator cuff tear, last seen in office on 10/26/2022.  Patient received a left shoulder steroid injection.  She presents today for follow-up, reporting minimal improvement in left shoulder pain or range of motion.  She has been performing home exercises.  Patient would like to pursue outpatient physical therapy at this time.  She does have a history of systemic lupus erythematosus and chronic diastolic CHF, along with multiple other chronic medical conditions.    Objective   Allergies   Allergen Reactions   • Salsalate Hives, Anaphylaxis and Unknown - High Severity   • Aspirin    • Salicylic Acid        Vital Signs:   Ht 162.6 cm (64\")   Wt 98 kg (216 lb)   BMI 37.08 kg/m²       Physical Exam    Constitutional: Awake, alert. Well nourished appearance.    Integumentary: Warm, dry, intact. No obvious rashes.    HENT: Atraumatic, normocephalic.   Respiratory: Non labored respirations .   Cardiovascular: Intact peripheral pulses.    Psychiatric: Normal mood and affect. A&O X3    Ortho Exam  Left shoulder: Pain reported with active shoulder forward flexion to 55 degrees and abduction to 75 degrees.  Internal rotation to back pocket.  Tenderness to palpation of subacromial bursa and AC joint.  Full flexion and extension of the wrist and elbow.  Full pronation and supination.  Patient is able to form a full fist.  Sensation intact light touch.  Distal neurovascular intact.    Imaging Results (Most Recent)     None                    Assessment and Plan   Problem List Items Addressed This Visit    None  Visit Diagnoses     Impingement syndrome of left shoulder    -  Primary    Tear of left rotator cuff, unspecified tear extent, unspecified whether traumatic              Follow Up   Return in about 7 weeks (around " 1/27/2023).  Educated on risk of smoking. Discussed options for smoking cessation.    Patient Instructions   Patient with minimal improvement in pain or ROM after steroid injection. She is agreeable to trial of PT. PT referral placed today. Continue topical voltaren gel as needed. Caution on heavy/overhead lifting, pushing, or pulling.     Follow up on/after 1/27/2023. Consider repeat steroid injection, if needed. No imaging.     Patient was given instructions and counseling regarding her condition or for health maintenance advice. Please see specific information pulled into the AVS if appropriate.

## 2022-12-09 NOTE — PATIENT INSTRUCTIONS
Patient with minimal improvement in pain or ROM after steroid injection. She is agreeable to trial of PT. PT referral placed today. Continue topical voltaren gel as needed. Caution on heavy/overhead lifting, pushing, or pulling.     Follow up on/after 1/27/2023. Consider repeat steroid injection, if needed. No imaging.

## 2022-12-14 ENCOUNTER — OFFICE VISIT (OUTPATIENT)
Dept: OBSTETRICS AND GYNECOLOGY | Facility: CLINIC | Age: 57
End: 2022-12-14

## 2022-12-14 VITALS
BODY MASS INDEX: 37.22 KG/M2 | DIASTOLIC BLOOD PRESSURE: 84 MMHG | WEIGHT: 218 LBS | HEART RATE: 64 BPM | HEIGHT: 64 IN | SYSTOLIC BLOOD PRESSURE: 132 MMHG

## 2022-12-14 DIAGNOSIS — Z01.419 WELL WOMAN EXAM WITH ROUTINE GYNECOLOGICAL EXAM: ICD-10-CM

## 2022-12-14 DIAGNOSIS — Z01.419 WELL WOMAN EXAM: Primary | ICD-10-CM

## 2022-12-14 DIAGNOSIS — Z01.419 ENCOUNTER FOR GYNECOLOGICAL EXAMINATION WITHOUT ABNORMAL FINDING: ICD-10-CM

## 2022-12-14 LAB
CANDIDA SPECIES: NEGATIVE
GARDNERELLA VAGINALIS: NEGATIVE
T VAGINALIS DNA VAG QL PROBE+SIG AMP: NEGATIVE

## 2022-12-14 PROCEDURE — 87624 HPV HI-RISK TYP POOLED RSLT: CPT | Performed by: STUDENT IN AN ORGANIZED HEALTH CARE EDUCATION/TRAINING PROGRAM

## 2022-12-14 PROCEDURE — G0123 SCREEN CERV/VAG THIN LAYER: HCPCS | Performed by: STUDENT IN AN ORGANIZED HEALTH CARE EDUCATION/TRAINING PROGRAM

## 2022-12-14 PROCEDURE — 99386 PREV VISIT NEW AGE 40-64: CPT | Performed by: STUDENT IN AN ORGANIZED HEALTH CARE EDUCATION/TRAINING PROGRAM

## 2022-12-14 PROCEDURE — 3008F BODY MASS INDEX DOCD: CPT | Performed by: STUDENT IN AN ORGANIZED HEALTH CARE EDUCATION/TRAINING PROGRAM

## 2022-12-14 PROCEDURE — 87480 CANDIDA DNA DIR PROBE: CPT | Performed by: STUDENT IN AN ORGANIZED HEALTH CARE EDUCATION/TRAINING PROGRAM

## 2022-12-14 PROCEDURE — 2014F MENTAL STATUS ASSESS: CPT | Performed by: STUDENT IN AN ORGANIZED HEALTH CARE EDUCATION/TRAINING PROGRAM

## 2022-12-14 PROCEDURE — 87660 TRICHOMONAS VAGIN DIR PROBE: CPT | Performed by: STUDENT IN AN ORGANIZED HEALTH CARE EDUCATION/TRAINING PROGRAM

## 2022-12-14 PROCEDURE — 87510 GARDNER VAG DNA DIR PROBE: CPT | Performed by: STUDENT IN AN ORGANIZED HEALTH CARE EDUCATION/TRAINING PROGRAM

## 2022-12-14 NOTE — PROGRESS NOTES
Well Woman Visit    Chief Complaint   Patient presents with   • Gynecologic Exam     PMB was seen in the er            HPI  Aida Mcclure is a 57 y.o. female, , who presents for annual well woman exam. No LMP recorded. Patient is postmenopausal..      Aida Mcclure presents to the clinic today to establish care as a new gynecology patient. Ms. Mcclure recalls that around 2022, she experienced some abdominal pain and went to the emergency room. This pain was accompanied by rectal bleeding. She had x-rays and an ultrasound performed, and was advised that she had diverticulitis. She was also advised that her rectal bleeding was likely a blood clot. Approximately 3 days later, she had the same pain, but it was harder. She states she experienced vaginal bleeding as well, but she has not menstruated in over 10 years, so she was concerned. She reports that the bleeding lasted for approximately 3 days. She denies experiencing dizziness or lightheadedness associated with the bleeding. She continues to experience the diverticular pain. The patient had an ultrasound obtained, as well as a pelvic examination, and was advised to be seen by her primary care physician and a gynecologist. She denies any concerns for vaginal discharge or irritation. She is unsure of when she went through menopause. Ms. Mcclure  denies experiencing any bleeding within the last year prior to the episode in November. She denies any concern for external lesions. She has not been sexually active since 10/2011. She has not had a gynecological examination or a Pap smear obtained in quite some time. Ms. Mcclure has annual mammograms obtained, but she did not have one performed in . She denies having any concerns with her breasts. She does perform breast examinations at home.     She is going to be having a colonoscopy, and will be seeing a gastroenterologist. The patient utilizes oxygen. She states she was diagnosed with systemic lupus  in , congestive heart failure in 2015, and an overactive thyroid in 2019. She is currently taking vitamin C, vitamin D3, Bentyl, Flonase, Lasix, gabapentin, Plaquenil, Atarax, Claritin, calcium, magnesium, pilocarpine for dry mouth, Maxalt, propranolol, ropinirole, spironolactone, and Synthroid 137 mcg. She receives vitamin b12 injections. She is not aware of having any allergies to antibiotics. She has diclofenac gel for arthritis. Her primary care physician manages her medications.     She is not a current smoker.  The patient has had a fasciotomy. She had a compartment syndrome of the left forearm, and was hospitalized for a couple of weeks. She states she came home from work, and was taking a shower late in the evening. She states the next thing she remembers is waking up in the hospital. Her daughter told her that when she got home, she was on her knees in the shower with the water running on her. She has also had bilateral ankle fusions.      Additional OB/GYN History   Last Pap :   Last Completed Pap Smear     This patient has no relevant Health Maintenance data.        Result: Unknown  History of abnormal Pap smear: no  Last MMG :   Last Completed Mammogram     This patient has no relevant Health Maintenance data.         Last Colonoscopy :   Last Completed Colonoscopy     This patient has no relevant Health Maintenance data.          AllergiesSalsalate, Aspirin, and Salicylic acid   Family history of uterine, colon, breast, or ovarian cancer: no  Tobacco Usage?: No   OB History        1    Para   1    Term   1            AB        Living   1       SAB        IAB        Ectopic        Molar        Multiple        Live Births   1                The additional following portions of the patient's history were reviewed and updated as appropriate: allergies, current medications, past family history, past medical history, past social history, past surgical history and problem list.    Review of  "Systems   Constitutional: Negative for fatigue and fever.   Respiratory: Negative for cough and shortness of breath.    Cardiovascular: Negative for chest pain.   Gastrointestinal: Positive for abdominal pain. Negative for abdominal distention.   Genitourinary: Positive for amenorrhea. Negative for breast discharge, breast lump, breast pain, difficulty urinating, dysuria, vaginal bleeding and vaginal pain.       I have reviewed and agree with the HPI, ROS, and historical information as entered above. Sergio Fish MD    Objective   /84   Pulse 64   Ht 162.6 cm (64\")   Wt 98.9 kg (218 lb)   BMI 37.42 kg/m²     Physical Exam  Vitals and nursing note reviewed. Exam conducted with a chaperone present.   Constitutional:       General: She is not in acute distress.     Appearance: Normal appearance. She is not toxic-appearing.   HENT:      Head: Normocephalic and atraumatic.   Eyes:      Extraocular Movements: Extraocular movements intact.      Conjunctiva/sclera: Conjunctivae normal.   Neck:        Comments: Well healed incision from thyroidectomy   Cardiovascular:      Pulses: Normal pulses.   Pulmonary:      Effort: Pulmonary effort is normal.   Abdominal:      General: There is no distension.      Palpations: Abdomen is soft.      Tenderness: There is no abdominal tenderness.   Genitourinary:     General: Normal vulva.      Exam position: Lithotomy position.      Labia:         Right: No tenderness, lesion or injury.         Left: No tenderness, lesion or injury.       Vagina: Normal.      Cervix: Normal.      Uterus: Normal.       Adnexa: Right adnexa normal and left adnexa normal.   Musculoskeletal:      Left forearm: Deformity and laceration present.      Cervical back: Normal range of motion.      Right lower leg: Laceration present.      Left lower leg: Laceration present.      Right ankle: Laceration present.      Left ankle: Laceration present.   Skin:     General: Skin is warm and dry. "   Neurological:      Mental Status: She is alert and oriented to person, place, and time.   Psychiatric:         Mood and Affect: Mood normal.         Behavior: Behavior normal.         Thought Content: Thought content normal.            Assessment and Plan  Aida Mcclure is a 57 y.o.  presenting for follow-up for vaginal bleeding.  Episode 1 time in November.  No bleeding since then.  Review of transvaginal ultrasound with normal-appearing uterus with endometrial stripe of 2 mm.  Given endometrial lining <4mm and no further bleeding episodes will hold on endometrial biopsy today. Patient instructed if a second bleeding episode would recommend biopsy.   Vaginitis and Pap smear collected today. Mammogram ordered.     WWE and Problem Visit    Diagnoses and all orders for this visit:    1. Well woman exam (Primary)  -     IgP, Aptima HPV  -     Gardnerella vaginalis, Trichomonas vaginalis, Candida albicans, DNA - Swab, Vagina    2. Well woman exam with routine gynecological exam  -     Mammo Screening Digital Tomosynthesis Bilateral With CAD; Future    3. Encounter for gynecological examination without abnormal finding  -     Mammo Screening Digital Tomosynthesis Bilateral With CAD; Future      A pelvic examination was obtained and a pap smear and a vaginitis panel were collected in the office today.   The patient was instructed to return to the clinic in 1 year, unless she needs to be seen sooner.    Counseling:  • PMB precautions    Preventative:  • MMG  • PAP smear      She understands the importance of having the above performed in a timely fashion.  The risks of not performing them include, but are not limited to, advanced cancer stages, bone loss from osteoporosis and/or subsequent increase in morbidity and/or mortality.  She is encouraged to review her results online and/or contact or office if she has questions.     Follow Up:  Return in about 1 year (around 2023), or if symptoms worsen or  fail to improve, for Annual physical.      Sergio Fish MD  12/14/2022     Transcribed from ambient dictation for Sergio Fish MD by Shannan Salgado.  12/14/22   18:56 EST    Patient or patient representative verbalized consent to the visit recording.  I have personally performed the services described in this document as transcribed by the above individual, and it is both accurate and complete.

## 2022-12-20 ENCOUNTER — TREATMENT (OUTPATIENT)
Dept: PHYSICAL THERAPY | Facility: CLINIC | Age: 57
End: 2022-12-20

## 2022-12-20 DIAGNOSIS — G89.29 CHRONIC LEFT-SIDED LOW BACK PAIN WITHOUT SCIATICA: Primary | ICD-10-CM

## 2022-12-20 DIAGNOSIS — M54.50 CHRONIC LEFT-SIDED LOW BACK PAIN WITHOUT SCIATICA: Primary | ICD-10-CM

## 2022-12-20 DIAGNOSIS — M53.86 DECREASED RANGE OF MOTION OF INTERVERTEBRAL DISCS OF LUMBAR SPINE: ICD-10-CM

## 2022-12-20 DIAGNOSIS — R29.898 WEAKNESS OF BOTH HIPS: ICD-10-CM

## 2022-12-20 PROCEDURE — 97110 THERAPEUTIC EXERCISES: CPT

## 2022-12-20 PROCEDURE — 97161 PT EVAL LOW COMPLEX 20 MIN: CPT

## 2022-12-20 NOTE — PROGRESS NOTES
Physical Therapy Initial Evaluation and Plan of Care      [place] [facility]: [address] [place] [address]      Patient: Aida Mcclure   : 1965  Diagnosis/ICD-10 Code:  Chronic left-sided low back pain without sciatica [M54.50, G89.29]  Referring practitioner: Merline Enamorado MD  Date of Initial Visit: 2022  Today's Date: 2022  Patient seen for 1 sessions           Subjective Questionnaire: Oswestry:       Subjective   Pt reports they were at work on 2022 and was pushing a cart into a walk in cooler and twisted their back. Pt reports they had some pain in their L LE in the front, back, and side. this pain did not go past their knee. Pt states they can have pain on either side of their back, but primarily on the L. Pt did have some pain in their groin and stomach, but have been having GI issues. This is being followed up on currently.  Today, pt does not have these symptoms as often. Pt reports they can aggravate these symptoms by turning to quickly, bending, squatting, and they cannot sit longer than 1 hour without their pain going up to a 9/10. Pt states their current pain is a 5/10 pain. Pt reports their pain can get down to a 4/10 and this is achieved with laying down on their back with pillows propping them up as well as some medications that they take daily anyway (gabapentin, hydrocodone).      Pt occupation: retired; [employer]     Current Pain: 5/10  Best Pain: 4/10  Worst Pain: 9/10  Quality of pain: burning, stinging, shooting     Aggravating Factors: twisting, squatting, lifting, sitting for longer than 1 hour  Easing Factors: medication, recumbent position in bed.      Past Medical Hx:   Systemic lupus, CHF, SOB, HA, diverticulitis, hyperthyroidism, PE in the past, RA, R TKA in 2015.   Pt uses O2, has titration orders for exercise. Pt is currently on 1L.  98% saturation;   HR:60 BPM      Objective          Static Posture     Comments  Pt has tendency to sit in  flexed posture and  an over extended posture. Pt is able to change their pain w/ posture at this time. In seated position, pt has decreased pain w/ extendend posture w/ both compression and traction.     Tenderness     Lumbar Spine  Tenderness in the spinous process.     Left Hip   Tenderness in the PSIS.     Right Hip   Tenderness in the PSIS.     Additional Tenderness Details  Pt has increased pain w/ PA at L1, L3-L5. Pain increases in intensity in lower lumbar spine. Pt has increased familiar pain as well at B PSIS.     Neurological Testing     Additional Neurological Details  Sensation of 'needles' on B LEs w/  R>L w/ light touch.  R: consistent w/ L4, S1 dermatomes  L: consistent w/ L4, L5 dermatomes    Can have some pain in L thigh (shooting) not present today.    Strength/Myotome Testing     Left Hip   Planes of Motion   Flexion: 4+  Extension: 3-  Abduction: 4-  Adduction: 4-    Right Hip   Planes of Motion   Flexion: 4  Extension: 3-  Abduction: 4-  Adduction: 4-    Left Knee   Flexion: 4+  Extension: 5    Right Knee   Flexion: 4  Extension: 5    Left Ankle/Foot   Dorsiflexion: 5    Right Ankle/Foot   Dorsiflexion: 5    Additional Strength Details  Pulling sensation in the back w/ L knee flexion.     Tests       Thoracic   Negative slump.     Additional Tests Details  Pt has decreased pain w/ PA testing in lumbar spine w/ core activiation.     Ambulation     Comments   Pt ambulates without an AD today. Pt does have a limp favoring their L LE today.         See Exercise, Manual, and Modality Logs for complete treatment.       Assessment & Plan     Assessment  Impairments: abnormal coordination, abnormal gait, abnormal muscle firing, abnormal or restricted ROM, activity intolerance, impaired balance, impaired physical strength, lacks appropriate home exercise program and pain with function  Functional Limitations: carrying objects, lifting, sleeping, walking, pulling, pushing, uncomfortable because of  pain, moving in bed, sitting, standing, stooping, reaching behind back and reaching overhead  Assessment details: Pt reports to physical therapy w/ complaints of chronic low back pain that can have L LE symptoms. Pt presents w/ impairments in strength, ROM, balance, posture, and pain. Pt has decreased tolerance to activities such as bending, twisting, and lifting. Pt has familiar pain w/ motion testing of their lumbar spine. Pt also has self perceived deficits described by DASHAWN. Pt will benefit from skilled physical therapy to address these impairments in order to improve upon their functional mobility, gait, and ability to perform ADLs independently and safely without limitation.   Prognosis: fair    Goals  Plan Goals: LOW BACK PROBLEMS:    1. The patient complains of low back pain.  LTG 1: 12 weeks:  The patient will report a pain rating of 3/10 or better in order to improve  tolerance to activities of daily living and to be able to put groceries and laundry away.  STATUS:  New  STG 1a: 6 weeks:  The patient will report a pain rating of 4/10 or better.  STATUS:  New  TREATMENT:  Therapeutic exercises, manual therapy, aquatic therapy, home exercise   instruction, and modalities as needed for pain to include:  electrical stimulation, moist heat, and ice.      2. The patient demonstrates weakness of the B hip.  LTG 2: 12 weeks:  The patient will demonstrate 4+ /5 strength for B hip flexion, abduction,  and extension in order to improve hip stability.  STATUS:  New  STG 2a: 6 weeks:  The patient will demonstrate 4 /5 strength for B hip flexion, abduction,  and extension.  STATUS:  New  TREATMENT: Therapeutic exercises, manual therapy, aquatic therapy, home exercise instruction,  and modalities as needed for pain to include:  electrical stimulation, moist heat, and ice.    3. The patient has gait dysfunction.  LTG 3: 12 weeks:  The patient will ambulate independently, for   community distances, with minimal limp to the  L lower extremity in order to improve mobility and allow   patient to perform activities such as shopping and returning to the gym with greater ease.  STATUS:  New  STG 3a: 6 weeks: The patient will be able to demonstrate independence with their HEP.     STATUS:  New  TREATMENT: Gait training, aquatic therapy, therapeutic exercise, and home exercise instruction.      4. Mobility: Walking/Moving Around Functional Limitation    LTG 4: 12 weeks:  The patient will demonstrate 20 % limitation by achieving a score of 10 on the DASHAWN.  STATUS:  New  STG 4 a: 6 weeks:  The patient will demonstrate 30 % limitation by achieving a score of 15 on the DASHAWN.    STATUS:  New  TREATMENT:  Manual therapy, therapeutic exercise, home exercise instruction, and modalities as needed to include: moist heat, electrical stimulation, and ultrasound.          PLAN:  Therapy options: will receive skilled therapy services  Planned modality interventions: Cryotherapy and Heat  Planned therapy interventions:balance/weight-bearing training, ADL retraining, soft tissue mobilization, strengthening, stretching, therapeutic activities, manual therapy, joint mobilization, home exercise program/patient education, gait training, functional ROM exercises, flexibility, body mechanics training, postural training and neuromuscular re-education, and aquatic therapy  Frequency: 2x per week  Duration in weeks: 12  Treatment plan discussed with: patient      Visit Diagnoses:    ICD-10-CM ICD-9-CM   1. Chronic left-sided low back pain without sciatica  M54.50 724.2    G89.29 338.29   2. Weakness of both hips  R29.898 729.89   3. Decreased range of motion of intervertebral discs of lumbar spine  M53.86 724.9       History # of Personal Factors and/or Comorbidities: MODERATE (1-2)  Examination of Body System(s): # of elements: LOW (1-2)  Clinical Presentation: STABLE   Clinical Decision Making: LOW       Timed:         Manual Therapy:    0     mins  73662;      Therapeutic Exercise:    25     mins  81262;     Neuromuscular Eric:    0    mins  68843;    Therapeutic Activity:     0     mins  01694;     Gait Trainin     mins  09710;     Ultrasound:     0     mins  94568;    Ionto                               0    mins   28275  Self Care                       0     mins   00294  Canalith Repos    0     mins 87831      Un-Timed:  Electrical Stimulation:    0     mins  42811 ( );  Dry Needling     0     mins self-pay  Traction     0     mins 31306  Low Eval     35     Mins  49579  Mod Eval     0     Mins  96523  High Eval                       0     Mins  54001  Re-Eval                           0    mins  39948    Timed Treatment:  25   mins   Total Treatment:     60   mins    PT SIGNATURE: Freedom Atwood PT, DPT    Electronically signed 2022    [place] License: PT - [id number]    Supervised by Kristian Mancini PT DPT [place] License PT - [id number]    Initial Certification  Certification Period: 2022 thru 3/19/2023  I certify that the therapy services are furnished while this patient is under my care.  The services outlined above are required by this patient, and will be reviewed every 90 days.     PHYSICIAN: Merline Enamorado MD  NPI: [id number]      DATE:     Please sign and return via fax to [phone]. Thank you, [facility].

## 2022-12-27 ENCOUNTER — TREATMENT (OUTPATIENT)
Dept: PHYSICAL THERAPY | Facility: CLINIC | Age: 57
End: 2022-12-27

## 2022-12-27 DIAGNOSIS — M53.86 DECREASED RANGE OF MOTION OF INTERVERTEBRAL DISCS OF LUMBAR SPINE: ICD-10-CM

## 2022-12-27 DIAGNOSIS — G89.29 CHRONIC LEFT-SIDED LOW BACK PAIN WITHOUT SCIATICA: Primary | ICD-10-CM

## 2022-12-27 DIAGNOSIS — R29.898 WEAKNESS OF BOTH HIPS: ICD-10-CM

## 2022-12-27 DIAGNOSIS — M54.50 CHRONIC LEFT-SIDED LOW BACK PAIN WITHOUT SCIATICA: Primary | ICD-10-CM

## 2022-12-27 PROCEDURE — 97530 THERAPEUTIC ACTIVITIES: CPT

## 2022-12-27 PROCEDURE — 97110 THERAPEUTIC EXERCISES: CPT

## 2022-12-27 NOTE — PROGRESS NOTES
Physical Therapy Daily Treatment Note  [place] [facility]: [address] [place] [address]      Patient: Aida Mcclure   : 1965  Diagnosis/ICD-10 Code:  Chronic left-sided low back pain without sciatica [M54.50, G89.29]  Referring practitioner: Jennifer Shelton PA-C  Date of Initial Visit: Type: THERAPY  Noted: 2022  Today's Date: 2022  Patient seen for 2 sessions           Subjective   The patient reported they were not having any symptoms in their LEs still. Pt states they are having some increased stiffness in their back and attribute this to the weather at this time.     Pt reports some R lateral knee pain today.    Objective   See Exercise, Manual, and Modality Logs for complete treatment.     Assessment/Plan  Pt has some pain in their knee w/ quadruped activities today. Plan to continue to monitor this knee pain. Pt has no increased back pain today w/ therapy session. Patient will continue to benefit from skilled physical therapy to further address their deficits in strength and ROM in order to improve upon their functional mobility and to meet their personal goals.           Timed:  Manual Therapy:    0     mins  05639;  Therapeutic Exercise:    23     mins  91978;     Neuromuscular Eric:   0    mins  88787;    Therapeutic Activity:     10     mins  26732;     Gait Trainin     mins  01155;     Aquatics                         0      mins  50261    Un-timed:  Mechanical Traction      0     mins  99401  Electrical Stimulation:    0     mins  99484 ( );      Timed Treatment:   33   mins   Total Treatment:     33   mins    Freedom Atwood PT, DPT    Electronically signed 2022    [place] License: PT - [id number]     Supervised by Tushar Louie PT, DPT [place] License PT - [id number]

## 2022-12-28 LAB
CYTOLOGIST CVX/VAG CYTO: NORMAL
CYTOLOGY CVX/VAG DOC CYTO: NORMAL
CYTOLOGY CVX/VAG DOC THIN PREP: NORMAL
DX ICD CODE: NORMAL
HIV 1 & 2 AB SER-IMP: NORMAL
HPV I/H RISK 4 DNA CVX QL PROBE+SIG AMP: NEGATIVE
Lab: NORMAL
OTHER STN SPEC: NORMAL
STAT OF ADQ CVX/VAG CYTO-IMP: NORMAL

## 2022-12-29 ENCOUNTER — TREATMENT (OUTPATIENT)
Dept: PHYSICAL THERAPY | Facility: CLINIC | Age: 57
End: 2022-12-29

## 2022-12-29 DIAGNOSIS — M54.16 RADICULOPATHY, LUMBAR REGION: ICD-10-CM

## 2022-12-29 DIAGNOSIS — R29.898 WEAKNESS OF BOTH HIPS: ICD-10-CM

## 2022-12-29 DIAGNOSIS — G89.29 CHRONIC LEFT-SIDED LOW BACK PAIN WITH LEFT-SIDED SCIATICA: ICD-10-CM

## 2022-12-29 DIAGNOSIS — M53.86 DECREASED RANGE OF MOTION OF INTERVERTEBRAL DISCS OF LUMBAR SPINE: ICD-10-CM

## 2022-12-29 DIAGNOSIS — M54.50 CHRONIC LEFT-SIDED LOW BACK PAIN WITHOUT SCIATICA: Primary | ICD-10-CM

## 2022-12-29 DIAGNOSIS — M54.42 CHRONIC LEFT-SIDED LOW BACK PAIN WITH LEFT-SIDED SCIATICA: ICD-10-CM

## 2022-12-29 DIAGNOSIS — G89.29 CHRONIC LEFT-SIDED LOW BACK PAIN WITHOUT SCIATICA: Primary | ICD-10-CM

## 2022-12-29 DIAGNOSIS — M54.16 LUMBAR RADICULOPATHY: ICD-10-CM

## 2022-12-29 PROCEDURE — 97110 THERAPEUTIC EXERCISES: CPT | Performed by: PHYSICAL THERAPIST

## 2022-12-29 PROCEDURE — 97530 THERAPEUTIC ACTIVITIES: CPT | Performed by: PHYSICAL THERAPIST

## 2022-12-29 NOTE — PROGRESS NOTES
Outpatient Physical Therapy  [address], [place] [address]                 Physical Therapy Daily Treatment Note    Patient: Aida Mcclure   : 1965  Diagnosis/ICD-10 Code:  Chronic left-sided low back pain without sciatica [M54.50, G89.29]  Referring practitioner: No ref. provider found  Date of Initial Visit: Type: THERAPY  Noted: 2022  Today's Date: 2022  Patient seen for 3 sessions             Subjective   Aida Mcclure reports: 7/10 pain in her back at time of arrival, pain was elevated to 8/10 pain with exercise today    Objective     See Exercise, Manual, and Modality Logs for complete treatment.     Assessment/Plan  Aida still experiencing increased back pain, especially with planks and cat/camel exercises. Pt did feel a stretch in her low back with ball roll outs and ball curl ups that did not increase her pain. Pt would benefit from skilled PT to address Range of Motion and Strength deficits, pain management and any concerns with ADLs.     Progress per Plan of Care       Timed:  Manual Therapy:    0     mins  99269;  Therapeutic Exercise:    23     mins  78345;     Neuromuscular Eric:    0    mins  71262;    Therapeutic Activity:     9     mins  30495;     Gait Trainin     mins  29446;    Aquatic Therapy:     0     mins  84501;       Untimed:  Electrical Stimulation:    0     mins  14496 ( );  Mechanical Traction:    0     mins  24063;       Timed Treatment:   32   mins   Total Treatment:     32   mins      Electronically signed:   Abi Salas PTA  Physical Therapist Assistant  Joann BONDS License #: [id number]

## 2022-12-30 ENCOUNTER — OFFICE VISIT (OUTPATIENT)
Dept: INTERNAL MEDICINE | Facility: CLINIC | Age: 57
End: 2022-12-30
Payer: MEDICARE

## 2022-12-30 VITALS
SYSTOLIC BLOOD PRESSURE: 128 MMHG | WEIGHT: 215 LBS | BODY MASS INDEX: 36.7 KG/M2 | HEART RATE: 66 BPM | TEMPERATURE: 98 F | HEIGHT: 64 IN | OXYGEN SATURATION: 97 % | DIASTOLIC BLOOD PRESSURE: 76 MMHG

## 2022-12-30 DIAGNOSIS — R10.84 GENERALIZED ABDOMINAL PAIN: ICD-10-CM

## 2022-12-30 DIAGNOSIS — D64.9 MILD ANEMIA: ICD-10-CM

## 2022-12-30 DIAGNOSIS — S46.012A STRAIN OF LEFT ROTATOR CUFF CAPSULE, INITIAL ENCOUNTER: ICD-10-CM

## 2022-12-30 DIAGNOSIS — R19.5 CHANGE IN STOOL: Primary | ICD-10-CM

## 2022-12-30 DIAGNOSIS — E03.9 HYPOTHYROIDISM, UNSPECIFIED TYPE: ICD-10-CM

## 2022-12-30 DIAGNOSIS — N18.31 STAGE 3A CHRONIC KIDNEY DISEASE: ICD-10-CM

## 2022-12-30 DIAGNOSIS — Z86.39 HISTORY OF IRON DEFICIENCY: ICD-10-CM

## 2022-12-30 DIAGNOSIS — E55.9 VITAMIN D DEFICIENCY: ICD-10-CM

## 2022-12-30 DIAGNOSIS — E78.5 HYPERLIPIDEMIA, UNSPECIFIED HYPERLIPIDEMIA TYPE: ICD-10-CM

## 2022-12-30 DIAGNOSIS — Z71.85 VACCINE COUNSELING: ICD-10-CM

## 2022-12-30 DIAGNOSIS — B34.9 VIRAL ILLNESS: ICD-10-CM

## 2022-12-30 DIAGNOSIS — R09.81 NASAL CONGESTION: ICD-10-CM

## 2022-12-30 DIAGNOSIS — E53.8 VITAMIN B12 DEFICIENCY: ICD-10-CM

## 2022-12-30 PROCEDURE — 90471 IMMUNIZATION ADMIN: CPT | Performed by: STUDENT IN AN ORGANIZED HEALTH CARE EDUCATION/TRAINING PROGRAM

## 2022-12-30 PROCEDURE — 90750 HZV VACC RECOMBINANT IM: CPT | Performed by: STUDENT IN AN ORGANIZED HEALTH CARE EDUCATION/TRAINING PROGRAM

## 2022-12-30 PROCEDURE — 99214 OFFICE O/P EST MOD 30 MIN: CPT | Performed by: STUDENT IN AN ORGANIZED HEALTH CARE EDUCATION/TRAINING PROGRAM

## 2022-12-30 RX ORDER — ONDANSETRON 4 MG/1
TABLET, FILM COATED ORAL
COMMUNITY

## 2022-12-30 RX ORDER — HYDROXYZINE HYDROCHLORIDE 25 MG/1
25 TABLET, FILM COATED ORAL 3 TIMES DAILY PRN
Qty: 90 TABLET | Refills: 3 | Status: SHIPPED | OUTPATIENT
Start: 2022-12-30 | End: 2023-01-29

## 2022-12-30 RX ORDER — CYANOCOBALAMIN 1000 UG/ML
1000 INJECTION, SOLUTION INTRAMUSCULAR; SUBCUTANEOUS
Qty: 3 ML | Refills: 3 | Status: SHIPPED | OUTPATIENT
Start: 2022-12-30 | End: 2023-03-01
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
## 2023-01-05 ENCOUNTER — LAB (OUTPATIENT)
Dept: LAB | Facility: HOSPITAL | Age: 58
End: 2023-01-05
Payer: MEDICARE

## 2023-01-05 ENCOUNTER — TREATMENT (OUTPATIENT)
Dept: PHYSICAL THERAPY | Facility: CLINIC | Age: 58
End: 2023-01-05
Payer: OTHER MISCELLANEOUS

## 2023-01-05 DIAGNOSIS — M54.42 CHRONIC LEFT-SIDED LOW BACK PAIN WITH LEFT-SIDED SCIATICA: ICD-10-CM

## 2023-01-05 DIAGNOSIS — G89.29 CHRONIC LEFT-SIDED LOW BACK PAIN WITHOUT SCIATICA: Primary | ICD-10-CM

## 2023-01-05 DIAGNOSIS — M54.16 LUMBAR RADICULOPATHY: ICD-10-CM

## 2023-01-05 DIAGNOSIS — M53.86 DECREASED RANGE OF MOTION OF INTERVERTEBRAL DISCS OF LUMBAR SPINE: ICD-10-CM

## 2023-01-05 DIAGNOSIS — G89.29 CHRONIC LEFT-SIDED LOW BACK PAIN WITH LEFT-SIDED SCIATICA: ICD-10-CM

## 2023-01-05 DIAGNOSIS — M54.16 RADICULOPATHY, LUMBAR REGION: ICD-10-CM

## 2023-01-05 DIAGNOSIS — R29.898 WEAKNESS OF BOTH HIPS: ICD-10-CM

## 2023-01-05 DIAGNOSIS — M54.50 CHRONIC LEFT-SIDED LOW BACK PAIN WITHOUT SCIATICA: Primary | ICD-10-CM

## 2023-01-05 LAB
25(OH)D3 SERPL-MCNC: 19.6 NG/ML (ref 30–100)
ALBUMIN SERPL-MCNC: 4 G/DL (ref 3.5–5.2)
ALBUMIN/GLOB SERPL: 1.4 G/DL
ALP SERPL-CCNC: 64 U/L (ref 39–117)
ALT SERPL W P-5'-P-CCNC: 11 U/L (ref 1–33)
ANION GAP SERPL CALCULATED.3IONS-SCNC: 6 MMOL/L (ref 5–15)
AST SERPL-CCNC: 20 U/L (ref 1–32)
BASOPHILS # BLD AUTO: 0.08 10*3/MM3 (ref 0–0.2)
BASOPHILS NFR BLD AUTO: 1.3 % (ref 0–1.5)
BILIRUB SERPL-MCNC: 0.7 MG/DL (ref 0–1.2)
BUN SERPL-MCNC: 12 MG/DL (ref 6–20)
BUN/CREAT SERPL: 11.2 (ref 7–25)
CALCIUM SPEC-SCNC: 8.3 MG/DL (ref 8.6–10.5)
CHLORIDE SERPL-SCNC: 108 MMOL/L (ref 98–107)
CHOLEST SERPL-MCNC: 151 MG/DL (ref 0–200)
CO2 SERPL-SCNC: 28 MMOL/L (ref 22–29)
CREAT SERPL-MCNC: 1.07 MG/DL (ref 0.57–1)
DEPRECATED RDW RBC AUTO: 51.2 FL (ref 37–54)
EGFRCR SERPLBLD CKD-EPI 2021: 60 ML/MIN/1.73
EOSINOPHIL # BLD AUTO: 0.19 10*3/MM3 (ref 0–0.4)
EOSINOPHIL NFR BLD AUTO: 3.1 % (ref 0.3–6.2)
ERYTHROCYTE [DISTWIDTH] IN BLOOD BY AUTOMATED COUNT: 14.6 % (ref 12.3–15.4)
FERRITIN SERPL-MCNC: 69.9 NG/ML (ref 13–150)
FOLATE SERPL-MCNC: 3.16 NG/ML (ref 4.78–24.2)
GLOBULIN UR ELPH-MCNC: 2.8 GM/DL
GLUCOSE SERPL-MCNC: 86 MG/DL (ref 65–99)
HCT VFR BLD AUTO: 38.1 % (ref 34–46.6)
HDLC SERPL-MCNC: 57 MG/DL (ref 40–60)
HGB BLD-MCNC: 11.9 G/DL (ref 12–15.9)
IMM GRANULOCYTES # BLD AUTO: 0.02 10*3/MM3 (ref 0–0.05)
IMM GRANULOCYTES NFR BLD AUTO: 0.3 % (ref 0–0.5)
IRON 24H UR-MRATE: 92 MCG/DL (ref 37–145)
IRON SATN MFR SERPL: 31 % (ref 20–50)
LDLC SERPL CALC-MCNC: 80 MG/DL (ref 0–100)
LDLC/HDLC SERPL: 1.4 {RATIO}
LYMPHOCYTES # BLD AUTO: 2.14 10*3/MM3 (ref 0.7–3.1)
LYMPHOCYTES NFR BLD AUTO: 34.8 % (ref 19.6–45.3)
MCH RBC QN AUTO: 29.4 PG (ref 26.6–33)
MCHC RBC AUTO-ENTMCNC: 31.2 G/DL (ref 31.5–35.7)
MCV RBC AUTO: 94.1 FL (ref 79–97)
MONOCYTES # BLD AUTO: 0.57 10*3/MM3 (ref 0.1–0.9)
MONOCYTES NFR BLD AUTO: 9.3 % (ref 5–12)
NEUTROPHILS NFR BLD AUTO: 3.15 10*3/MM3 (ref 1.7–7)
NEUTROPHILS NFR BLD AUTO: 51.2 % (ref 42.7–76)
NRBC BLD AUTO-RTO: 0 /100 WBC (ref 0–0.2)
PLATELET # BLD AUTO: 155 10*3/MM3 (ref 140–450)
PMV BLD AUTO: 12.3 FL (ref 6–12)
POTASSIUM SERPL-SCNC: 3.8 MMOL/L (ref 3.5–5.2)
PROT SERPL-MCNC: 6.8 G/DL (ref 6–8.5)
RBC # BLD AUTO: 4.05 10*6/MM3 (ref 3.77–5.28)
SODIUM SERPL-SCNC: 142 MMOL/L (ref 136–145)
T4 FREE SERPL-MCNC: 0.52 NG/DL (ref 0.93–1.7)
TIBC SERPL-MCNC: 298 MCG/DL (ref 298–536)
TRANSFERRIN SERPL-MCNC: 200 MG/DL (ref 200–360)
TRIGL SERPL-MCNC: 71 MG/DL (ref 0–150)
TSH SERPL DL<=0.05 MIU/L-ACNC: 57.8 UIU/ML (ref 0.27–4.2)
VIT B12 BLD-MCNC: 789 PG/ML (ref 211–946)
VLDLC SERPL-MCNC: 14 MG/DL (ref 5–40)
WBC NRBC COR # BLD: 6.15 10*3/MM3 (ref 3.4–10.8)

## 2023-01-05 PROCEDURE — 80061 LIPID PANEL: CPT | Performed by: STUDENT IN AN ORGANIZED HEALTH CARE EDUCATION/TRAINING PROGRAM

## 2023-01-05 PROCEDURE — 82746 ASSAY OF FOLIC ACID SERUM: CPT | Performed by: STUDENT IN AN ORGANIZED HEALTH CARE EDUCATION/TRAINING PROGRAM

## 2023-01-05 PROCEDURE — 82728 ASSAY OF FERRITIN: CPT | Performed by: STUDENT IN AN ORGANIZED HEALTH CARE EDUCATION/TRAINING PROGRAM

## 2023-01-05 PROCEDURE — 85025 COMPLETE CBC W/AUTO DIFF WBC: CPT | Performed by: STUDENT IN AN ORGANIZED HEALTH CARE EDUCATION/TRAINING PROGRAM

## 2023-01-05 PROCEDURE — 82607 VITAMIN B-12: CPT | Performed by: STUDENT IN AN ORGANIZED HEALTH CARE EDUCATION/TRAINING PROGRAM

## 2023-01-05 PROCEDURE — 97530 THERAPEUTIC ACTIVITIES: CPT | Performed by: PHYSICAL THERAPIST

## 2023-01-05 PROCEDURE — 80053 COMPREHEN METABOLIC PANEL: CPT | Performed by: STUDENT IN AN ORGANIZED HEALTH CARE EDUCATION/TRAINING PROGRAM

## 2023-01-05 PROCEDURE — 97110 THERAPEUTIC EXERCISES: CPT | Performed by: PHYSICAL THERAPIST

## 2023-01-05 PROCEDURE — 84439 ASSAY OF FREE THYROXINE: CPT | Performed by: STUDENT IN AN ORGANIZED HEALTH CARE EDUCATION/TRAINING PROGRAM

## 2023-01-05 PROCEDURE — 84443 ASSAY THYROID STIM HORMONE: CPT | Performed by: STUDENT IN AN ORGANIZED HEALTH CARE EDUCATION/TRAINING PROGRAM

## 2023-01-05 PROCEDURE — 83540 ASSAY OF IRON: CPT | Performed by: STUDENT IN AN ORGANIZED HEALTH CARE EDUCATION/TRAINING PROGRAM

## 2023-01-05 PROCEDURE — 82306 VITAMIN D 25 HYDROXY: CPT | Performed by: STUDENT IN AN ORGANIZED HEALTH CARE EDUCATION/TRAINING PROGRAM

## 2023-01-05 PROCEDURE — 84466 ASSAY OF TRANSFERRIN: CPT | Performed by: STUDENT IN AN ORGANIZED HEALTH CARE EDUCATION/TRAINING PROGRAM
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
## 2023-01-06 DIAGNOSIS — E53.8 LOW FOLATE: ICD-10-CM

## 2023-01-06 DIAGNOSIS — E83.51 LOW CALCIUM LEVELS: Primary | ICD-10-CM

## 2023-01-06 DIAGNOSIS — R79.89 ABNORMAL THYROID STIMULATING HORMONE LEVEL: ICD-10-CM

## 2023-01-06 LAB
027 TOXIN: NORMAL
C DIFF TOX GENS STL QL NAA+PROBE: NEGATIVE

## 2023-01-06 PROCEDURE — 87493 C DIFF AMPLIFIED PROBE: CPT | Performed by: EMERGENCY MEDICINE

## 2023-01-06 PROCEDURE — 87505 NFCT AGENT DETECTION GI: CPT | Performed by: STUDENT IN AN ORGANIZED HEALTH CARE EDUCATION/TRAINING PROGRAM

## 2023-01-07 LAB
C COLI+JEJ+UPSA DNA STL QL NAA+NON-PROBE: NOT DETECTED
EC STX1+STX2 GENES STL QL NAA+NON-PROBE: NOT DETECTED
S ENT+BONG DNA STL QL NAA+NON-PROBE: NOT DETECTED
SHIGELLA SP+EIEC IPAH ST NAA+NON-PROBE: NOT DETECTED

## 2023-01-18 ENCOUNTER — PROCEDURE VISIT (OUTPATIENT)
Dept: CARDIAC REHAB | Facility: HOSPITAL | Age: 58
End: 2023-01-18
Payer: MEDICARE

## 2023-01-18 ENCOUNTER — HOSPITAL ENCOUNTER (OUTPATIENT)
Dept: RESPIRATORY THERAPY | Facility: HOSPITAL | Age: 58
Discharge: HOME | End: 2023-01-18
Payer: MEDICARE

## 2023-01-18 DIAGNOSIS — I50.32 CHRONIC DIASTOLIC CHF (CONGESTIVE HEART FAILURE): ICD-10-CM

## 2023-01-18 DIAGNOSIS — R06.09 CHRONIC DYSPNEA: ICD-10-CM

## 2023-01-18 DIAGNOSIS — Z51.81 THERAPEUTIC DRUG MONITORING: ICD-10-CM

## 2023-01-18 DIAGNOSIS — G47.33 OSA (OBSTRUCTIVE SLEEP APNEA): ICD-10-CM

## 2023-01-18 DIAGNOSIS — Z79.899 OTHER LONG TERM (CURRENT) DRUG THERAPY: ICD-10-CM

## 2023-01-18 DIAGNOSIS — Z86.711 PERSONAL HISTORY OF PE (PULMONARY EMBOLISM): ICD-10-CM

## 2023-01-18 DIAGNOSIS — R60.0 BILATERAL LOWER EXTREMITY EDEMA: ICD-10-CM

## 2023-01-18 DIAGNOSIS — J84.9 ILD (INTERSTITIAL LUNG DISEASE): ICD-10-CM

## 2023-01-18 DIAGNOSIS — J84.9 ILD (INTERSTITIAL LUNG DISEASE): Primary | ICD-10-CM

## 2023-01-18 PROCEDURE — 94729 DIFFUSING CAPACITY: CPT

## 2023-01-18 PROCEDURE — 94060 EVALUATION OF WHEEZING: CPT | Performed by: INTERNAL MEDICINE

## 2023-01-18 PROCEDURE — 94726 PLETHYSMOGRAPHY LUNG VOLUMES: CPT | Performed by: INTERNAL MEDICINE

## 2023-01-18 PROCEDURE — 94618 PULMONARY STRESS TESTING: CPT

## 2023-01-18 PROCEDURE — 94060 EVALUATION OF WHEEZING: CPT

## 2023-01-18 PROCEDURE — 94729 DIFFUSING CAPACITY: CPT | Performed by: INTERNAL MEDICINE

## 2023-01-18 PROCEDURE — 94726 PLETHYSMOGRAPHY LUNG VOLUMES: CPT

## 2023-01-18 RX ORDER — ALBUTEROL SULFATE 2.5 MG/3ML
2.5 SOLUTION RESPIRATORY (INHALATION) ONCE
Status: COMPLETED | OUTPATIENT
Start: 2023-01-18 | End: 2023-01-18

## 2023-01-18 RX ADMIN — ALBUTEROL SULFATE 2.5 MG: 2.5 SOLUTION RESPIRATORY (INHALATION) at 13:24

## 2023-01-19 ENCOUNTER — LAB (OUTPATIENT)
Dept: LAB | Facility: HOSPITAL | Age: 58
End: 2023-01-19
Payer: MEDICARE

## 2023-01-19 ENCOUNTER — TELEPHONE (OUTPATIENT)
Dept: PHYSICAL THERAPY | Facility: CLINIC | Age: 58
End: 2023-01-19

## 2023-01-19 ENCOUNTER — TRANSCRIBE ORDERS (OUTPATIENT)
Dept: LAB | Facility: HOSPITAL | Age: 58
End: 2023-01-19
Payer: MEDICARE

## 2023-01-19 ENCOUNTER — OFFICE VISIT (OUTPATIENT)
Dept: SLEEP MEDICINE | Facility: HOSPITAL | Age: 58
End: 2023-01-19
Payer: MEDICARE

## 2023-01-19 VITALS — HEART RATE: 85 BPM | WEIGHT: 216.1 LBS | BODY MASS INDEX: 36.89 KG/M2 | HEIGHT: 64 IN | OXYGEN SATURATION: 84 %

## 2023-01-19 DIAGNOSIS — G47.30 SLEEP APNEA, UNSPECIFIED TYPE: Primary | ICD-10-CM

## 2023-01-19 DIAGNOSIS — E53.8 LOW FOLATE: ICD-10-CM

## 2023-01-19 DIAGNOSIS — G47.8 NON-RESTORATIVE SLEEP: ICD-10-CM

## 2023-01-19 DIAGNOSIS — Z99.81 SUPPLEMENTAL OXYGEN DEPENDENT: ICD-10-CM

## 2023-01-19 DIAGNOSIS — N06.9 ISOLATED PROTEINURIA WITH MORPHOLOGIC LESION: Primary | ICD-10-CM

## 2023-01-19 DIAGNOSIS — R06.83 SNORING: ICD-10-CM

## 2023-01-19 DIAGNOSIS — G47.10 HYPERSOMNIA: ICD-10-CM

## 2023-01-19 DIAGNOSIS — N06.9 ISOLATED PROTEINURIA WITH MORPHOLOGIC LESION: ICD-10-CM

## 2023-01-19 DIAGNOSIS — R79.89 ABNORMAL THYROID STIMULATING HORMONE LEVEL: ICD-10-CM

## 2023-01-19 DIAGNOSIS — G25.81 RESTLESS LEGS: ICD-10-CM

## 2023-01-19 DIAGNOSIS — E66.01 MORBID OBESITY: ICD-10-CM

## 2023-01-19 DIAGNOSIS — E83.51 LOW CALCIUM LEVELS: ICD-10-CM

## 2023-01-19 LAB
ANION GAP SERPL CALCULATED.3IONS-SCNC: 9.6 MMOL/L (ref 5–15)
BUN SERPL-MCNC: 19 MG/DL (ref 6–20)
BUN/CREAT SERPL: 18.1 (ref 7–25)
CA-I BLDA-SCNC: 1.05 MMOL/L (ref 1.13–1.32)
CALCIUM SPEC-SCNC: 8.9 MG/DL (ref 8.6–10.5)
CHLORIDE SERPL-SCNC: 104 MMOL/L (ref 98–107)
CO2 SERPL-SCNC: 26.4 MMOL/L (ref 22–29)
CREAT SERPL-MCNC: 1.05 MG/DL (ref 0.57–1)
EGFRCR SERPLBLD CKD-EPI 2021: 61.3 ML/MIN/1.73
FERRITIN SERPL-MCNC: 101 NG/ML (ref 13–150)
GLUCOSE SERPL-MCNC: 70 MG/DL (ref 65–99)
POTASSIUM SERPL-SCNC: 4.2 MMOL/L (ref 3.5–5.2)
SODIUM SERPL-SCNC: 140 MMOL/L (ref 136–145)
T4 FREE SERPL-MCNC: 1.07 NG/DL (ref 0.93–1.7)
TSH SERPL DL<=0.05 MIU/L-ACNC: 30.9 UIU/ML (ref 0.27–4.2)

## 2023-01-19 PROCEDURE — 80048 BASIC METABOLIC PNL TOTAL CA: CPT

## 2023-01-19 PROCEDURE — 82746 ASSAY OF FOLIC ACID SERUM: CPT

## 2023-01-19 PROCEDURE — 36415 COLL VENOUS BLD VENIPUNCTURE: CPT

## 2023-01-19 PROCEDURE — 99204 OFFICE O/P NEW MOD 45 MIN: CPT | Performed by: FAMILY MEDICINE

## 2023-01-19 PROCEDURE — 84443 ASSAY THYROID STIM HORMONE: CPT

## 2023-01-19 PROCEDURE — G0463 HOSPITAL OUTPT CLINIC VISIT: HCPCS | Performed by: FAMILY MEDICINE

## 2023-01-19 PROCEDURE — 82330 ASSAY OF CALCIUM: CPT

## 2023-01-19 PROCEDURE — 82784 ASSAY IGA/IGD/IGG/IGM EACH: CPT

## 2023-01-19 PROCEDURE — 86334 IMMUNOFIX E-PHORESIS SERUM: CPT

## 2023-01-19 PROCEDURE — 84439 ASSAY OF FREE THYROXINE: CPT

## 2023-01-19 PROCEDURE — 82728 ASSAY OF FERRITIN: CPT | Performed by: FAMILY MEDICINE

## 2023-01-19 RX ORDER — LEVOTHYROXINE SODIUM 137 UG/1
TABLET ORAL
COMMUNITY
Start: 2023-01-19

## 2023-01-19 NOTE — PROGRESS NOTES
[facility] New Patient/Consultation       Reason for Consultation: sleep disturbances      Patient Care Team:  Merline Enamorado MD as PCP - General (Internal Medicine)  Juan Carlos Nunez MD as Consulting Physician (Sleep Medicine)      History of present illness:  Thank you for asking me to see your patient.  The patient is a 57 y.o. female  with hypertension lupus congestive heart failure overactive thyroid restless legs migraines arthritis history of seizure 1995 presents today to discuss sleep disturbances.  Patient reports history of sleep study.  Reviewed copy of PSG from 8/25/2021 at which time patient weighed 203 pounds; this showed overall AHI of 3.6 events per hour REM AHI 30.4 events per hour.  PLM with arousal index 0.3 events per hour.  Lowest oxygen saturation 84% average oxygen saturation for the night 94%.  Today patient reports he is on supplemental oxygen 3 L/min 24/7.  Reports difficulty falling asleep and staying asleep at night.  Today patient weighs 216 pounds.  Has had about 15 pound weight gain since last sleep study.  Patient reports hypersomnia nonrestorative sleep sometimes difficulty driving due to sleepiness weight changes over the past 5 years snoring witnessed apneas waking up gasping for breath waking up coughing/choking morning headaches waking with dry mouth leg jerking at night urge sensations pain disrupting sleep sweating during sleep visual hallucinations during sleep nocturia to 3 times a night more sleepy when she increase her sleep time restless sleep.  There is a family history of restless legs.  Obesity my 37.1.    Bedtime 3 AM to 4 AM sleep latency hours wake time 8 AM to 11 AM weekdays 12 PM to 1 PM weekends sleeps 4 to 7 hours during the week 4 to 12 hours on the weekends 1 nap no rotating shifts.    Medications include ropinirole 1 mg nightly gabapentin 300 mg 3 times daily hydroxyzine 25 mg 3 times daily as needed for itching.    Insomnia 2/2 stress maybe and  some chronic pain issues; RLS is a big part of it; described as urge sensations and cramping which wakes her from sleep at least 3x a night.     RLS since 6 months; started on Requip 1mg QHS which did not make a change. Has been on Gabapentin for 2-3 years.     Snoring and witnessed apneas resolved since starting O2 however does wake up gasping for breath still or coughing.     Social History: No tobacco use wine coolers daily 2/week 1-3 caffeine beverages a day no drug use    Allergies:  Salsalate and Aspirin    Family History: SHANELLE no       Current Outpatient Medications:   •  ascorbic acid (VITAMIN C) 250 MG tablet, Take 1 tablet by mouth Daily., Disp: , Rfl:   •  Calcium Carbonate-Vitamin D (CALTRATE 600+D PO), , Disp: , Rfl:   •  cyanocobalamin 1000 MCG/ML injection, Inject 1 mL into the appropriate muscle as directed by prescriber Every 30 (Thirty) Days for 3 doses., Disp: 3 mL, Rfl: 3  •  Diclofenac Sodium (VOLTAREN) 1 % gel gel, Apply 4 g topically to the appropriate area as directed 4 (Four) Times a Day As Needed (shoulder/hip pain)., Disp: 100 g, Rfl: 3  •  ergocalciferol (ERGOCALCIFEROL) 1.25 MG (52514 UT) capsule, Take 1 capsule by mouth Every 7 (Seven) Days., Disp: , Rfl:   •  fluticasone (FLONASE) 50 MCG/ACT nasal spray, SHAKE LIQUID AND USE 2 SPRAYS(100 MCG) IN EACH NOSTRIL EVERY DAY, Disp: 16 g, Rfl: 3  •  furosemide (LASIX) 40 MG tablet, Take 40 mg by mouth 2 (Two) Times a Day., Disp: , Rfl:   •  gabapentin (NEURONTIN) 300 MG capsule, Take 1 capsule by mouth 3 (Three) Times a Day., Disp: 90 capsule, Rfl: 0  •  HYDROcodone-acetaminophen (NORCO) 7.5-325 MG per tablet, Take 1 tablet by mouth Every 12 (Twelve) Hours As Needed for Moderate Pain for up to 30 days., Disp: 60 tablet, Rfl: 0  •  hydroxychloroquine (PLAQUENIL) 200 MG tablet, Take 1 tablet by mouth 2 (Two) Times a Day., Disp: , Rfl:   •  hydrOXYzine (ATARAX) 25 MG tablet, Take 1 tablet by mouth 3 (Three) Times a Day As Needed for Itching for up  "to 30 days., Disp: 90 tablet, Rfl: 3  •  levothyroxine (SYNTHROID, LEVOTHROID) 137 MCG tablet, , Disp: , Rfl:   •  loratadine (CLARITIN) 10 MG tablet, Take 1 tablet by mouth Daily., Disp: 30 tablet, Rfl: 6  •  magnesium oxide (MAG-OX) 400 MG tablet, TK 1 T PO D, Disp: , Rfl: 1  •  O2 (OXYGEN), Inhale 3 L/min Daily., Disp: , Rfl:   •  ondansetron (ZOFRAN) 4 MG tablet, , Disp: , Rfl:   •  pilocarpine (SALAGEN) 5 MG tablet, Every 8 (Eight) Hours., Disp: , Rfl:   •  ProAir  (90 Base) MCG/ACT inhaler, Inhale 2 puffs Every 4 (Four) Hours As Needed for Wheezing., Disp: 18 g, Rfl: 11  •  propranolol (INDERAL) 40 MG tablet, TK SS T PO TID, Disp: , Rfl: 1  •  rizatriptan (MAXALT) 10 MG tablet, Take 1 tablet by mouth As Needed., Disp: , Rfl:   •  rOPINIRole (REQUIP) 1 MG tablet, , Disp: , Rfl:   •  spironolactone (ALDACTONE) 50 MG tablet, TK 1 T PO QD, Disp: , Rfl: 0  •  levothyroxine (SYNTHROID, LEVOTHROID) 150 MCG tablet, Take 1 tablet by mouth Daily for 14 days. (Patient taking differently: Take 137 mcg by mouth Daily.), Disp: 14 tablet, Rfl: 0    Vital Signs:    Vitals:    01/19/23 1300   Pulse: 85   SpO2: (!) 84%   Weight: 98 kg (216 lb 1.6 oz)   Height: 162.6 cm (64\")      Body mass index is 37.09 kg/m².  Neck Circumference: 14 inches      REVIEW OF SYSTEMS.  Full review of systems available on the intake form which is scanned in the media tab.  The relevant positive are noted below  1. Daytime excessive sleepiness with [place] Sleepiness Scale :Total score: 15   2. Snoring  3. Fatigue frequent urination shortness of breath swollen ankles      Physical exam:  Vitals:    01/19/23 1300   Pulse: 85   SpO2: (!) 84%   Weight: 98 kg (216 lb 1.6 oz)   Height: 162.6 cm (64\")    Body mass index is 37.09 kg/m². Neck Circumference: 14 inches  HEENT: Head is atraumatic, normocephalic  Eyes: pupils are round equal and reacting to light and accommodation, conjunctiva normal  NECK:Neck Circumference: 14 inches  RESPIRATORY " SYSTEM: Regular respirations  CARDIOVASULAR SYSTEM: Regular rate  EXTREMITES: No cyanosis, clubbing  NEUROLOGICAL SYSTEM: Oriented x 3, no gross motor defects, gait normal      Impression:  1. Sleep apnea, unspecified type    2. Hypersomnia    3. Non-restorative sleep    4. Morbid obesity (HCC)    5. Snoring    6. Supplemental oxygen dependent    7. Restless legs        Plan:    Good sleep hygiene measures should be maintained.  Weight loss would be beneficial in this patient who is obese BMI 37.1.    I discussed the pathophysiology of obstructive sleep apnea with the patient.  We discussed the adverse outcomes associated with untreated sleep-disordered breathing. Sleep study will be scheduled to establish definitive diagnosis of sleep disorder breathing.  Weight loss will be strongly beneficial in order to reduce the severity of sleep-disordered breathing.  Caution during activities that require prolonged concentration is strongly advised.  Patient will be notified of sleep study results after sleep study is completed.     If ferritin less than 45 consider iron supplementation to help with restless leg symptoms.  Advised to discuss leg cramping with PCP as it is not related to restless leg syndrome.    Discussed may have sleep apnea contributing to RLS given 15 pound weight gain since last sleep study.  We will need to do sleep study in the sleep lab as patient is dependent on supplemental oxygen.  Patient also has congestive heart failure.  We will hold off on changing medications right now in terms of increasing ropinirole until they get ferritin checked and sleep study done; increased risk of sedation given that she is on pain medications and dependent on 3 L of supplemental oxygen.    Thank you for allowing me to participate in your patient's care.    Juan Carlos Nunez MD  Sleep Medicine  01/19/23  14:06 EST

## 2023-01-20 LAB
FOLATE SERPL-MCNC: 5.48 NG/ML (ref 4.78–24.2)
IGA SERPL-MCNC: 241 MG/DL (ref 87–352)
IGG SERPL-MCNC: 1940 MG/DL (ref 586–1602)
IGM SERPL-MCNC: 18 MG/DL (ref 26–217)
PROT PATTERN SERPL IFE-IMP: ABNORMAL

## 2023-01-25 DIAGNOSIS — E03.9 HYPOTHYROIDISM, UNSPECIFIED TYPE: ICD-10-CM

## 2023-01-25 DIAGNOSIS — E55.9 VITAMIN D DEFICIENCY: Primary | ICD-10-CM

## 2023-01-25 DIAGNOSIS — E83.51 LOW CALCIUM LEVELS: ICD-10-CM

## 2023-01-27 ENCOUNTER — OFFICE VISIT (OUTPATIENT)
Dept: ORTHOPEDIC SURGERY | Facility: CLINIC | Age: 58
End: 2023-01-27
Payer: COMMERCIAL

## 2023-01-27 VITALS — WEIGHT: 216 LBS | BODY MASS INDEX: 36.88 KG/M2 | HEIGHT: 64 IN

## 2023-01-27 DIAGNOSIS — M75.102 TEAR OF LEFT ROTATOR CUFF, UNSPECIFIED TEAR EXTENT, UNSPECIFIED WHETHER TRAUMATIC: ICD-10-CM

## 2023-01-27 DIAGNOSIS — M75.42 IMPINGEMENT SYNDROME OF LEFT SHOULDER: Primary | ICD-10-CM

## 2023-01-27 PROCEDURE — 20610 DRAIN/INJ JOINT/BURSA W/O US: CPT | Performed by: PHYSICIAN ASSISTANT

## 2023-01-27 RX ADMIN — TRIAMCINOLONE ACETONIDE 40 MG: 40 INJECTION, SUSPENSION INTRA-ARTICULAR; INTRAMUSCULAR at 16:01

## 2023-01-27 RX ADMIN — LIDOCAINE HYDROCHLORIDE 5 ML: 10 INJECTION, SOLUTION INFILTRATION; PERINEURAL at 16:01

## 2023-01-27 NOTE — PATIENT INSTRUCTIONS
Left shoulder steroid injection administered in office today. Advised on 3 month duration between injections. Advised to continue PT to progress ROM and strength. Caution on heavy/overhead lifting, pushing, or pulling.    Follow up in 6 weeks. Call with changes or concerns. No imaging.

## 2023-01-27 NOTE — PROGRESS NOTES
"Chief Complaint  Pain and Follow-up of the Left Shoulder    Subjective      Aida Mcclure presents to [facility] for follow-up of left shoulder impingement syndrome, rotator cuff tear.  She had previously received left shoulder steroid injection in office on 10/26/2022.  She was most recently seen in office on 12/9/2022 and received order for outpatient physical therapy.  She presents today for follow-up reporting that she is doing \"a little better\".  Reports \"the pain is better, not like it was before\".  She has a complicated medical history including systemic lupus erythematosus, chronic diastolic CHF, currently requiring continuous oxygen and would like to continue attempted conservative management of her left shoulder.  She has started at outpatient physical therapy at [facility], reporting improvement in pain and slow improvement in shoulder range of motion.  She would like to repeat left shoulder steroid injection in office today.    Objective   Allergies   Allergen Reactions   • Salsalate Hives, Anaphylaxis, Unknown - High Severity and Shortness Of Breath   • Aspirin        Vital Signs:   Ht 162.6 cm (64\")   Wt 98 kg (216 lb)   BMI 37.08 kg/m²       Physical Exam    Constitutional: Awake, alert. Well nourished appearance.    Integumentary: Warm, dry, intact. No obvious rashes.    HENT: Atraumatic, normocephalic.   Respiratory: Non labored respirations .   Cardiovascular: Intact peripheral pulses.    Psychiatric: Normal mood and affect. A&O X3    Ortho Exam  Left shoulder: Skin is warm, dry, and intact.  Active shoulder forward flexion to 95 degrees and abduction to 90 degrees.  Tenderness to palpation of subacromial bursa and AC joint.  Full flexion and extension of the wrist and elbow.  Full supination and pronation.  Sensation is intact to light touch.  Distal neurovascular intact.    Imaging Results (Most Recent)     None           Large Joint Arthrocentesis  Date/Time: " 1/27/2023 4:01 PM  Consent given by: patient  Site marked: site marked  Timeout: Immediately prior to procedure a time out was called to verify the correct patient, procedure, equipment, support staff and site/side marked as required   Supporting Documentation  Indications: pain   Procedure Details  Location: shoulder (left ) -   Needle gauge: 21g.  Medications administered: 40 mg triamcinolone acetonide 40 MG/ML; 5 mL lidocaine 1 %  Patient tolerance: patient tolerated the procedure well with no immediate complications              Assessment and Plan   Problem List Items Addressed This Visit    None  Visit Diagnoses     Impingement syndrome of left shoulder    -  Primary    Relevant Orders    Ambulatory Referral to Physical Therapy Evaluate and treat (Completed)    Tear of left rotator cuff, unspecified tear extent, unspecified whether traumatic        Relevant Orders    Ambulatory Referral to Physical Therapy Evaluate and treat (Completed)          Follow Up   Return in about 6 weeks (around 3/10/2023).  Patient is a non-smoker.  Did not discuss options for smoking cessation.    Patient Instructions   Left shoulder steroid injection administered in office today. Advised on 3 month duration between injections. Advised to continue PT to progress ROM and strength. Caution on heavy/overhead lifting, pushing, or pulling.    Follow up in 6 weeks. Call with changes or concerns. No imaging.     Patient was given instructions and counseling regarding her condition or for health maintenance advice. Please see specific information pulled into the AVS if appropriate.

## 2023-01-29 RX ORDER — LIDOCAINE HYDROCHLORIDE 10 MG/ML
5 INJECTION, SOLUTION INFILTRATION; PERINEURAL
Status: COMPLETED | OUTPATIENT
Start: 2023-01-27 | End: 2023-01-27

## 2023-01-29 RX ORDER — TRIAMCINOLONE ACETONIDE 40 MG/ML
40 INJECTION, SUSPENSION INTRA-ARTICULAR; INTRAMUSCULAR
Status: COMPLETED | OUTPATIENT
Start: 2023-01-27 | End: 2023-01-27

## 2023-02-06 NOTE — PROGRESS NOTES
Ferritin normal.  Recommend in lab split to assess for obstructive sleep apnea.  Needs to be in lab because patient is on supplemental oxygen nocturnally.

## 2023-02-09 DIAGNOSIS — G89.29 OTHER CHRONIC PAIN: ICD-10-CM

## 2023-02-09 RX ORDER — HYDROCODONE BITARTRATE AND ACETAMINOPHEN 7.5; 325 MG/1; MG/1
1 TABLET ORAL EVERY 12 HOURS PRN
Qty: 60 TABLET | Refills: 0 | Status: CANCELLED | OUTPATIENT
Start: 2023-02-09 | End: 2023-03-11

## 2023-02-10 ENCOUNTER — OFFICE VISIT (OUTPATIENT)
Dept: INTERNAL MEDICINE | Facility: CLINIC | Age: 58
End: 2023-02-10
Payer: MEDICARE

## 2023-02-10 VITALS
TEMPERATURE: 97.2 F | WEIGHT: 220.2 LBS | BODY MASS INDEX: 37.8 KG/M2 | DIASTOLIC BLOOD PRESSURE: 82 MMHG | HEART RATE: 85 BPM | OXYGEN SATURATION: 99 % | RESPIRATION RATE: 18 BRPM | SYSTOLIC BLOOD PRESSURE: 124 MMHG

## 2023-02-10 DIAGNOSIS — G89.29 OTHER CHRONIC PAIN: ICD-10-CM

## 2023-02-10 DIAGNOSIS — M54.41 CHRONIC BILATERAL LOW BACK PAIN WITH RIGHT-SIDED SCIATICA: ICD-10-CM

## 2023-02-10 DIAGNOSIS — I10 PRIMARY HYPERTENSION: Primary | ICD-10-CM

## 2023-02-10 DIAGNOSIS — G89.29 CHRONIC BILATERAL LOW BACK PAIN WITH RIGHT-SIDED SCIATICA: ICD-10-CM

## 2023-02-10 DIAGNOSIS — G47.00 INSOMNIA, UNSPECIFIED TYPE: ICD-10-CM

## 2023-02-10 PROCEDURE — 99213 OFFICE O/P EST LOW 20 MIN: CPT | Performed by: STUDENT IN AN ORGANIZED HEALTH CARE EDUCATION/TRAINING PROGRAM

## 2023-02-10 PROCEDURE — 80305 DRUG TEST PRSMV DIR OPT OBS: CPT | Performed by: STUDENT IN AN ORGANIZED HEALTH CARE EDUCATION/TRAINING PROGRAM

## 2023-02-10 RX ORDER — HYDROCODONE BITARTRATE AND ACETAMINOPHEN 7.5; 325 MG/1; MG/1
1 TABLET ORAL EVERY 12 HOURS PRN
Qty: 60 TABLET | Refills: 0 | Status: SHIPPED | OUTPATIENT
Start: 2023-02-10 | End: 2023-03-20 | Stop reason: SDUPTHER

## 2023-02-16 ENCOUNTER — OFFICE VISIT (OUTPATIENT)
Dept: PULMONOLOGY | Facility: CLINIC | Age: 58
End: 2023-02-16
Payer: MEDICARE

## 2023-02-16 VITALS
HEART RATE: 88 BPM | DIASTOLIC BLOOD PRESSURE: 64 MMHG | OXYGEN SATURATION: 99 % | RESPIRATION RATE: 18 BRPM | HEIGHT: 64 IN | SYSTOLIC BLOOD PRESSURE: 136 MMHG | BODY MASS INDEX: 37.56 KG/M2 | WEIGHT: 220 LBS

## 2023-02-16 DIAGNOSIS — J96.11 CHRONIC RESPIRATORY FAILURE WITH HYPOXIA: ICD-10-CM

## 2023-02-16 DIAGNOSIS — I50.32 CHRONIC DIASTOLIC CHF (CONGESTIVE HEART FAILURE): ICD-10-CM

## 2023-02-16 DIAGNOSIS — E66.9 OBESITY (BMI 30-39.9): ICD-10-CM

## 2023-02-16 DIAGNOSIS — J84.9 ILD (INTERSTITIAL LUNG DISEASE): Primary | ICD-10-CM

## 2023-02-16 DIAGNOSIS — G47.33 OSA (OBSTRUCTIVE SLEEP APNEA): ICD-10-CM

## 2023-02-16 PROCEDURE — 99214 OFFICE O/P EST MOD 30 MIN: CPT | Performed by: INTERNAL MEDICINE

## 2023-02-16 NOTE — PROGRESS NOTES
Primary Care Provider  Merline Enamorado MD     Referring Provider  No ref. 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  Patient has been on 3 L of oxygen for the past year.  We did do a 6-minute walk test today on room air and she quickly desaturated into the 80s.  She is on oxygen continuously around-the-clock.  She is back to work and does have some weight gain and leg swelling which is led to more dyspnea.  She is short of breath walking about 800 feet.  Dyspnea is moderate severity, worse with activity relieved with rest.  She does take her Lasix every day but has to dial back the doses on the day she is at work otherwise she is peeing all day and not working.  She uses her inhalers as prescribed and feels that she does have some benefit from them. Patient is also on Imuran and is under the care of Dr. Dan, rheumatologist.  Patient is also under the care of Dr. Boone, hematologist.   Patient states that she is currently taking Claritin and Flonase nasal spray for allergies. Patient denies fever, chills, night sweats, swollen glands in the head and neck, unintentional weight loss, hemoptysis, purulent sputum production, dysphagia, chest pain, palpitations, chest tightness, abdominal pain, nausea, vomiting, and diarrhea.  Patient also denies any myalgias, changes in sense of taste and/or smell, sore throat, any other coronavirus or flu-like symptoms.  Patient denies any paroxysmal nocturnal dyspnea.  Patient is able to perform activities of daily living.      Family History   Problem Relation Age of Onset    • Stroke Father    • Lung cancer Other    • Arthritis Other    • Cancer Other    • Parkinsonism Other    • Diabetes type II Other         Social History     Socioeconomic History   • Marital status:    Tobacco Use   • Smoking status: Never   • Smokeless tobacco: Never   Vaping Use   • Vaping Use: Never used   Substance and Sexual Activity   • Alcohol use: Yes     Comment: occ wine   • Drug use: Never   • Sexual activity: Not Currently     Birth control/protection: Tubal ligation        Past Medical History:   Diagnosis Date   • Anemia    • Annual physical exam 11/06/2017    WILL RESCHEDULE MAMMOGRAM    • Breast lump    • CHF (congestive heart failure) (Formerly [facility]) 11/06/2017    FOLLOWS WITH CARDS. CURRENTLY DOING WELL ON LASIX, LISINOPRIL, ALDACTONE, AND PROPANOLOL   • Degeneration of lumbar intervertebral disc 06/12/2012   • Diverticulitis    • Essential hypertension 11/06/2017    WELL CONTROLLED ON CURRENT REGIMEN    • GERD (gastroesophageal reflux disease)    • Hamstring injury 08/17/2015    BILATERAL HAMSTRING INJURY/PAIN    • Head injury    • Hernia cerebri ([facility])    • Hyperthyroidism    • Hyperthyroidism 11/06/2017    CURRENTLY ON METHIMAZOLE, PT REPORT POSSIBLY DUE TO GRAVE'S DISEASE. WILL NEED TO OBTAIN AND REVIEW MEDICAL RECORDS FROM [facility]. WILL REFER TO ENDO    • Hypoxia    • Insomnia 11/06/2017    DISCUSSED SLEEP HYGIENE. PT TO TRY AVOIDING BLUE LIGHT AT NIGHT. PT ALSO TO SET ROUTINE PRIOR TO BEDTIME. WILL AVOID MEDS AT THIS TIME    • Interstitial lung disease (Former[facility])    • Lumbosacral disc herniation 06/12/2012    LEFT L5-S1 SMALL NATURE. SHE HAS FAILED ALL CONSERVATIVE MEASURES AND DESIRES SURGERY    • Lupus ([facility])     SEES DR. ANDERSON   • Migraines    • Pulmonary embolism ([facility])    • Respiratory failure with hypoxia ([facility]) 02/04/2021   • Rheumatoid arthritis ([facility]) 11/06/2017    CURRENTLY CONTROLLED, BUT WITH RESIDUAL DEFORMITY. WILL FOLLOW WITH DR. ANDERSON    • Tachycardia    • Total knee replacement status,  right 08/17/2015        Immunization History   Administered Date(s) Administered   • COVID-19 (SANDI) 03/29/2021   • COVID-19 (PFIZER) PURPLE CAP 12/16/2021   • Flu Vaccine Intradermal Quad 18-64YR 01/02/2008, 11/10/2008, 11/05/2009   • Flu Vaccine Quad PF >36MO 10/25/2017   • FluLaval/Fluzone >6mos 11/18/2021, 10/12/2022   • Hepatitis A 04/30/2018   • Influenza Quad Vaccine (Inpatient) 08/27/2012, 10/15/2013   • Influenza, Unspecified 09/17/2020, 10/01/2021, 10/12/2022   • Pneumococcal Conjugate 13-Valent (PCV13) 06/14/2018   • Pneumococcal Polysaccharide (PPSV23) 08/26/2019   • Shingrix 12/30/2022       Allergies   Allergen Reactions   • Salsalate Hives, Anaphylaxis, Unknown - High Severity and Shortness Of Breath   • Aspirin           Current Outpatient Medications:   •  ascorbic acid (VITAMIN C) 250 MG tablet, Take 1 tablet by mouth Daily., Disp: , Rfl:   •  Calcium Carbonate-Vitamin D (CALTRATE 600+D PO), , Disp: , Rfl:   •  cyanocobalamin 1000 MCG/ML injection, Inject 1 mL into the appropriate muscle as directed by prescriber Every 30 (Thirty) Days for 3 doses., Disp: 3 mL, Rfl: 3  •  Diclofenac Sodium (VOLTAREN) 1 % gel gel, Apply 4 g topically to the appropriate area as directed 4 (Four) Times a Day As Needed (shoulder/hip pain)., Disp: 100 g, Rfl: 3  •  fluticasone (FLONASE) 50 MCG/ACT nasal spray, SHAKE LIQUID AND USE 2 SPRAYS(100 MCG) IN EACH NOSTRIL EVERY DAY, Disp: 16 g, Rfl: 3  •  furosemide (LASIX) 40 MG tablet, Take 40 mg by mouth 2 (Two) Times a Day., Disp: , Rfl:   •  gabapentin (NEURONTIN) 300 MG capsule, Take 1 capsule by mouth 3 (Three) Times a Day., Disp: 90 capsule, Rfl: 0  •  HYDROcodone-acetaminophen (NORCO) 7.5-325 MG per tablet, Take 1 tablet by mouth Every 12 (Twelve) Hours As Needed for Moderate Pain for up to 30 days., Disp: 60 tablet, Rfl: 0  •  hydroxychloroquine (PLAQUENIL) 200 MG tablet, Take 1 tablet by mouth 2 (Two) Times a Day., Disp: , Rfl:   •  levothyroxine (SYNTHROID,  "LEVOTHROID) 137 MCG tablet, , Disp: , Rfl:   •  loratadine (CLARITIN) 10 MG tablet, Take 1 tablet by mouth Daily., Disp: 30 tablet, Rfl: 6  •  magnesium oxide (MAG-OX) 400 MG tablet, TK 1 T PO D, Disp: , Rfl: 1  •  O2 (OXYGEN), Inhale 3 L/min Daily., Disp: , Rfl:   •  ondansetron (ZOFRAN) 4 MG tablet, , Disp: , Rfl:   •  pilocarpine (SALAGEN) 5 MG tablet, Every 8 (Eight) Hours., Disp: , Rfl:   •  ProAir  (90 Base) MCG/ACT inhaler, Inhale 2 puffs Every 4 (Four) Hours As Needed for Wheezing., Disp: 18 g, Rfl: 11  •  propranolol (INDERAL) 40 MG tablet, TK SS T PO TID, Disp: , Rfl: 1  •  rizatriptan (MAXALT) 10 MG tablet, Take 1 tablet by mouth As Needed., Disp: , Rfl:   •  rOPINIRole (REQUIP) 1 MG tablet, , Disp: , Rfl:   •  spironolactone (ALDACTONE) 50 MG tablet, TK 1 T PO QD, Disp: , Rfl: 0  •  ergocalciferol (ERGOCALCIFEROL) 1.25 MG (81072 UT) capsule, Take 1 capsule by mouth Every 7 (Seven) Days., Disp: , Rfl:   •  levothyroxine (SYNTHROID, LEVOTHROID) 150 MCG tablet, Take 1 tablet by mouth Daily for 14 days. (Patient taking differently: Take 137 mcg by mouth Daily.), Disp: 14 tablet, Rfl: 0     Objective     Physical Exam  Vital Signs:   WDWN, Alert, NAD.    HEENT:  PERRL, EOMI.  OP, nares clear  Chest:   Mildly decreased breath sounds throughout with trace crackles bilaterally.  Normal work of breathing noted.  Patient is able speak full sentences without difficulty.  CV: RRR, no MGR, pulses 2+, equal.  Abd:  Soft, NT, ND, + BS, no HSM, obese  EXT:  no clubbing, no cyanosis, BLE edema  Neuro:  A&Ox3, CN grossly intact, no focal deficits.  Skin: No rashes or lesions noted.    /64 (BP Location: Right arm, Patient Position: Sitting, Cuff Size: Large Adult)   Pulse 88   Resp 18   Ht 162.6 cm (64\")   Wt 99.8 kg (220 lb)   SpO2 99% Comment: 3 L  BMI 37.76 kg/m²         Result Review :   I reviewed Kasey Desai's last office note.  PFT was also personally showing a moderate restrictive lung " defect.         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   • Cold intolerance   • Chronic fatigue   • Premature menopause   • Hyperglycemia   • Abdominal hernia   • Anemia   • Therapeutic drug monitoring   • Breast lump   • CHF (congestive heart failure) (HCC)   • Degeneration of lumbar intervertebral disc   • Displacement of lumbar intervertebral disc without myelopathy   • Esophageal reflux   • Essential hypertension   • Head injury   • Hyperthyroidism   • Hypoxia   • Insomnia   • ILD (interstitial lung disease) (HCC)   • Migraines   • Pulmonary embolism (HCC)   • Respiratory failure with hypoxia (HCC)   • Rheumatoid arthritis (HCC)   • Systemic lupus erythematosus (HCC)   • Tachycardia   • Fatigue   • Chronic dyspnea   • Personal history of PE (pulmonary embolism)   • Mixed connective tissue disease (HCC)   • Leg edema   • SHANELLE (obstructive sleep apnea)   • Chronic diastolic CHF (congestive heart failure) (HCC)   • Cough   • Dyspnea on exertion   • Food poisoning   • Bilateral lower extremity edema   • Hypoalbuminemia   • Leukopenia   • Other long term (current) drug therapy   • Polyarthritis   • Long term current use of systemic steroids   • Postinflammatory pulmonary fibrosis (HCC)   • Proteinuria   • Stage 4 chronic kidney disease (HCC)   • Renal impairment   • Chronic diastolic heart failure (HCC)   • Encounter for immunization   • Interstitial lung disease (HCC)   • Disorder of connective tissue (HCC)   • Lumbar radiculopathy   • Flu vaccine need   • Seasonal allergies   • Excessive daytime sleepiness   • Generalized abdominal pain       Assessment:  Chronic hypoxemic respiratory failure  Interstitial lung disease secondary to mixed connective tissue disease without exacerbation.   Known mixed connective tissue disease.       Chronic  compensated diastolic congestive heart failure.       Chronic leg edema.       Chronic dyspnea at baseline.       Immunosuppressed status on Imuran.       Therapeutic drug monitoring on Imuran.       History of PE on anticoagulation in the past  Seasonal allergies well controlled    Plan:  Did do a 6-minute walk test today on room air.  She rapidly drop below 88%.  Recommend continuous oxygen at 2 L/min at this time  Continue Lasix encouraged her to take it daily to help improve volume status and respiratory status.  Her dyspnea and hypoxia is likely due to a combination of pulmonary fibrosis plus pulmonary edema  Continue follow-up with rheumatology as prescribed  Continue Imuran at current dose.  Continue albuterol inhaler as needed.   Patient to follow-up with Dr. Boone her hematologist also for therapeutic drug monitoring and lupus as scheduled.      Continue Claritin.    Diet and exercise counseling provided today.  Recommended 30 minutes daily exercise well is an 1800 -calorie/day diet.  Patient verbalized understanding.  Total time counseling the patient today was 4 minutes  Vaccination status: patient reports they are up-to-date with pneumonia and Covid vaccines.    Never smoker    Follow Up   Return in about 3 months (around 5/16/2023).  Patient was given instructions and counseling regarding her condition or for health maintenance advice. Please see specific information pulled into the AVS if appropriate.     Electronically signed by Saeid Dennis MD, 02/16/23, 4:29 PM EST.

## 2023-02-22 ENCOUNTER — TELEPHONE (OUTPATIENT)
Dept: GASTROENTEROLOGY | Facility: CLINIC | Age: 58
End: 2023-02-22
Payer: MEDICARE

## 2023-02-22 NOTE — TELEPHONE ENCOUNTER
Spoke with Amy at 's office who states  would like this patient to a stress test before she can be cleared. Patient is currently scheduled for 3/6/2023 to have this done. Will follow up on 3/7/2023

## 2023-02-24 ENCOUNTER — LAB (OUTPATIENT)
Dept: LAB | Facility: HOSPITAL | Age: 58
End: 2023-02-24
Payer: MEDICARE

## 2023-02-24 DIAGNOSIS — E83.51 LOW CALCIUM LEVELS: ICD-10-CM

## 2023-02-24 DIAGNOSIS — E03.9 HYPOTHYROIDISM, UNSPECIFIED TYPE: ICD-10-CM

## 2023-02-24 DIAGNOSIS — E55.9 VITAMIN D DEFICIENCY: ICD-10-CM

## 2023-02-24 LAB
ANION GAP SERPL CALCULATED.3IONS-SCNC: 12 MMOL/L (ref 5–15)
BUN SERPL-MCNC: 16 MG/DL (ref 6–20)
BUN/CREAT SERPL: 14.8 (ref 7–25)
CALCIUM SPEC-SCNC: 8.9 MG/DL (ref 8.6–10.5)
CHLORIDE SERPL-SCNC: 105 MMOL/L (ref 98–107)
CO2 SERPL-SCNC: 20 MMOL/L (ref 22–29)
CREAT SERPL-MCNC: 1.08 MG/DL (ref 0.57–1)
EGFRCR SERPLBLD CKD-EPI 2021: 59.3 ML/MIN/1.73
GLUCOSE SERPL-MCNC: 86 MG/DL (ref 65–99)
POTASSIUM SERPL-SCNC: 4.3 MMOL/L (ref 3.5–5.2)
SODIUM SERPL-SCNC: 137 MMOL/L (ref 136–145)
T4 FREE SERPL-MCNC: 1.44 NG/DL (ref 0.93–1.7)
TSH SERPL DL<=0.05 MIU/L-ACNC: 5.13 UIU/ML (ref 0.27–4.2)

## 2023-02-24 PROCEDURE — 86335 IMMUNFIX E-PHORSIS/URINE/CSF: CPT

## 2023-02-24 PROCEDURE — 84443 ASSAY THYROID STIM HORMONE: CPT

## 2023-02-24 PROCEDURE — 84439 ASSAY OF FREE THYROXINE: CPT

## 2023-02-24 PROCEDURE — 82306 VITAMIN D 25 HYDROXY: CPT

## 2023-02-24 PROCEDURE — 80048 BASIC METABOLIC PNL TOTAL CA: CPT

## 2023-02-25 LAB — 25(OH)D3 SERPL-MCNC: 20.1 NG/ML (ref 30–100)

## 2023-02-27 LAB — INTERPRETATION UR IFE-IMP: NORMAL

## 2023-03-07 ENCOUNTER — HOSPITAL ENCOUNTER (OUTPATIENT)
Dept: SLEEP MEDICINE | Facility: HOSPITAL | Age: 58
Discharge: HOME | End: 2023-03-07
Admitting: FAMILY MEDICINE
Payer: MEDICARE

## 2023-03-07 PROCEDURE — 95810 POLYSOM 6/> YRS 4/> PARAM: CPT

## 2023-03-07 PROCEDURE — 95810 POLYSOM 6/> YRS 4/> PARAM: CPT | Performed by: INTERNAL MEDICINE

## 2023-03-13 ENCOUNTER — OFFICE VISIT (OUTPATIENT)
Dept: ORTHOPEDIC SURGERY | Facility: CLINIC | Age: 58
End: 2023-03-13
Payer: MEDICARE

## 2023-03-13 VITALS — WEIGHT: 215.6 LBS | BODY MASS INDEX: 36.81 KG/M2 | HEIGHT: 64 IN

## 2023-03-13 DIAGNOSIS — M75.42 IMPINGEMENT SYNDROME OF LEFT SHOULDER: ICD-10-CM

## 2023-03-13 DIAGNOSIS — M75.102 TEAR OF LEFT ROTATOR CUFF, UNSPECIFIED TEAR EXTENT, UNSPECIFIED WHETHER TRAUMATIC: Primary | ICD-10-CM

## 2023-03-13 PROCEDURE — 1160F RVW MEDS BY RX/DR IN RCRD: CPT | Performed by: PHYSICIAN ASSISTANT

## 2023-03-13 PROCEDURE — 1159F MED LIST DOCD IN RCRD: CPT | Performed by: PHYSICIAN ASSISTANT

## 2023-03-13 PROCEDURE — 99213 OFFICE O/P EST LOW 20 MIN: CPT | Performed by: PHYSICIAN ASSISTANT

## 2023-03-13 NOTE — PATIENT INSTRUCTIONS
Patient with much improvement following steroid injection and with PT. Advised to continue PT to completion. Continue home exercises.     Follow up on/after 4/27/23 for repeat shoulder steroid injection, if needed. Call with changes or concerns.

## 2023-03-13 NOTE — PROGRESS NOTES
"Chief Complaint  Follow-up of the Left Shoulder    Subjective      Aida Mcclure presents to [facility] for follow-up of left shoulder impingement syndrome and known rotator cuff tear.  She was last seen in office on 1/27/2023 for repeat left shoulder steroid injection.  Reports today for follow-up stating that her range of motion has improved significantly.  States significant relief following steroid injection.  She remains in outpatient physical therapy, reporting continued progression.    Objective   Allergies   Allergen Reactions   • Salsalate Hives, Anaphylaxis, Unknown - High Severity and Shortness Of Breath   • Aspirin        Vital Signs:   Ht 162.6 cm (64\")   Wt 97.8 kg (215 lb 9.6 oz)   BMI 37.01 kg/m²       Physical Exam    Constitutional: Awake, alert. Well nourished appearance.    Integumentary: Warm, dry, intact. No obvious rashes.    HENT: Atraumatic, normocephalic.   Respiratory: Non labored respirations .   Cardiovascular: Intact peripheral pulses.    Psychiatric: Normal mood and affect. A&O X3    Ortho Exam  Left shoulder: Skin is warm, dry, and intact.  Active shoulder forward flexion 145 degrees, abduction 140 degrees, and internal rotation to L5.  Full flexion and extension of the wrist and elbow.  Full pronation and supination.  Patient is able to form a full fist.  Sensation intact light touch.  Distal neurovascular intact.    Imaging Results (Most Recent)     None                  Assessment and Plan   Problem List Items Addressed This Visit    None  Visit Diagnoses     Tear of left rotator cuff, unspecified tear extent, unspecified whether traumatic    -  Primary    Impingement syndrome of left shoulder            Follow Up   Return in about 6 weeks (around 4/27/2023).  Patient is a non-smoker.  Did not discuss options for smoking cessation.    Patient Instructions   Patient with much improvement following steroid injection and with PT. Advised to " continue PT to completion. Continue home exercises.     Follow up on/after 4/27/23 for repeat shoulder steroid injection, if needed. Call with changes or concerns.     Patient was given instructions and counseling regarding her condition or for health maintenance advice. Please see specific information pulled into the AVS if appropriate.

## 2023-03-14 ENCOUNTER — TELEPHONE (OUTPATIENT)
Dept: SLEEP MEDICINE | Facility: HOSPITAL | Age: 58
End: 2023-03-14
Payer: MEDICARE

## 2023-03-16 ENCOUNTER — TELEPHONE (OUTPATIENT)
Dept: GASTROENTEROLOGY | Facility: CLINIC | Age: 58
End: 2023-03-16
Payer: MEDICARE

## 2023-03-16 NOTE — TELEPHONE ENCOUNTER
I have called and spoke to patient with an upcoming procedure reminder. Patient confirmed.     Has pt received cardiac clearance?

## 2023-03-16 NOTE — TELEPHONE ENCOUNTER
Dr Ann's office called back and left a message with the office. Patient is scheduled for an ECHO on Tuesday 03/21/2023 after that test they will be able to provide us information about the clearance.

## 2023-03-20 DIAGNOSIS — G89.29 OTHER CHRONIC PAIN: ICD-10-CM

## 2023-03-20 NOTE — TELEPHONE ENCOUNTER
Caller: Aida Mcclure April    Relationship: Self    Best call back number: [phone]    Requested Prescriptions:   Requested Prescriptions     Pending Prescriptions Disp Refills   • HYDROcodone-acetaminophen (NORCO) 7.5-325 MG per tablet 60 tablet 0     Sig: Take 1 tablet by mouth Every 12 (Twelve) Hours As Needed for Moderate Pain for up to 30 days.        Pharmacy where request should be sent: [id number] [place] - [address] [facility] AT [facility] [phone] [facility] [phone]      Additional details provided by patient:     Does the patient have less than a 3 day supply:  [] Yes  [x] No    Would you like a call back once the refill request has been completed: [] Yes [x] No    If the office needs to give you a call back, can they leave a voicemail: [] Yes [x] No    Sarah Morris   03/20/23 12:03 EDT

## 2023-03-20 NOTE — TELEPHONE ENCOUNTER
Last follow up visit date: 2/10/23    Last urine drug screen date: 3/1/23    Last consent/contract date: 2/10/23    Last rx: 2/10/23

## 2023-03-21 RX ORDER — HYDROCODONE BITARTRATE AND ACETAMINOPHEN 7.5; 325 MG/1; MG/1
1 TABLET ORAL EVERY 12 HOURS PRN
Qty: 60 TABLET | Refills: 0 | Status: SHIPPED | OUTPATIENT
Start: 2023-03-21 | End: 2023-04-20

## 2023-03-22 NOTE — TELEPHONE ENCOUNTER
Tried calling pt no answer LMOM    Hub to Read: per -Refilled meds as resquested. Please notify caller/patient.  Thank You

## 2023-03-22 NOTE — TELEPHONE ENCOUNTER
HUB READ FOLLOWING MESSAGE:     Hub to Read: per -Refilled meds as resquested. Please notify caller/patient.  Thank You     PATIENT EXPRESSED  UNDERSTADING

## 2023-03-28 ENCOUNTER — TELEPHONE (OUTPATIENT)
Dept: URGENT CARE | Facility: CLINIC | Age: 58
End: 2023-03-28
Payer: MEDICARE

## 2023-03-29 ENCOUNTER — TRANSCRIBE ORDERS (OUTPATIENT)
Dept: ADMINISTRATIVE | Facility: HOSPITAL | Age: 58
End: 2023-03-29
Payer: MEDICARE

## 2023-03-29 DIAGNOSIS — R07.9 CHEST PAIN, UNSPECIFIED TYPE: Primary | ICD-10-CM

## 2023-03-29 NOTE — TELEPHONE ENCOUNTER
Called patient and relayed results from right shoulder x-ray, degenerative changes consistent with osteoarthritis.  Patient expresses understanding.  Informed that she may follow with her orthopedist for further evaluation and management of this.  Patient expresses understanding and agreement.  Inquired if patient had any further questions at this time, patient states none.  Stated patient may call us back during business hours weekdays if she has questions.  Patient expresses understanding.     -John Cooksey, PA-C

## 2023-04-04 ENCOUNTER — TELEPHONE (OUTPATIENT)
Dept: INTERNAL MEDICINE | Facility: CLINIC | Age: 58
End: 2023-04-04
Payer: MEDICARE

## 2023-04-04 NOTE — TELEPHONE ENCOUNTER
Caller: Mcclure Aida April    Relationship: Self    Best call back number: [phone]    What is the best time to reach you: ANY    Who are you requesting to speak with (clinical staff, provider,  specific staff member): CLINICAL STAFF    What was the call regarding: PATIENT CALLED STATING THAT SHE NEEDS A PRIOR AUTHORIZATION FOR HER HYDROcodone-acetaminophen (NORCO) 7.5-325 MG per tablet    Do you require a callback: YES

## 2023-04-10 ENCOUNTER — OFFICE VISIT (OUTPATIENT)
Dept: INTERNAL MEDICINE | Facility: CLINIC | Age: 58
End: 2023-04-10
Payer: MEDICARE

## 2023-04-10 VITALS — WEIGHT: 212 LBS | TEMPERATURE: 97.5 F | HEART RATE: 62 BPM | OXYGEN SATURATION: 98 % | BODY MASS INDEX: 36.39 KG/M2

## 2023-04-10 DIAGNOSIS — M25.511 ACUTE PAIN OF RIGHT SHOULDER: Primary | ICD-10-CM

## 2023-04-10 PROCEDURE — 99213 OFFICE O/P EST LOW 20 MIN: CPT | Performed by: PHYSICIAN ASSISTANT

## 2023-04-10 RX ORDER — COVID-19 MOLECULAR TEST ASSAY
KIT MISCELLANEOUS
COMMUNITY
Start: 2023-04-05 | End: 2023-04-20

## 2023-04-10 RX ORDER — KETOROLAC TROMETHAMINE 30 MG/ML
30 INJECTION, SOLUTION INTRAMUSCULAR; INTRAVENOUS ONCE
Status: SHIPPED | OUTPATIENT
Start: 2023-04-10 | End: 2023-04-15

## 2023-04-10 RX ORDER — CYANOCOBALAMIN 1000 UG/ML
INJECTION, SOLUTION INTRAMUSCULAR; SUBCUTANEOUS
COMMUNITY
Start: 2023-03-28

## 2023-04-10 NOTE — PROGRESS NOTES
"Chief Complaint  Shoulder Pain (Pain started 3/28/23 while pt was at work. No known injury, sharp shooting pain from shoulder to hand. Seen at  on 3/28/23 for pain.)    Subjective          Aida Mcclure presents to [facility]  History of Present Illness  Right elbow/arm pain- symptoms started when she was reaching for something overhead at work.  Patient felt a \"pop\" and has had worsening pain since.  Patient has had a throbbing pain in her shoulder as well.  She has been using voltaren gel.  Patient states that the pain in worse in the shoulder joint and she has noticed some weakness in her arm.  She has noticed some soreness in her elbow and radiating down her arm.       Objective   Vital Signs:   Pulse 62   Temp 97.5 °F (36.4 °C)   Wt 96.2 kg (212 lb)   SpO2 98%   BMI 36.39 kg/m²     Physical Exam  Constitutional:       Appearance: Normal appearance.   HENT:      Head: Normocephalic and atraumatic.   Eyes:      Extraocular Movements: Extraocular movements intact.      Conjunctiva/sclera: Conjunctivae normal.      Pupils: Pupils are equal, round, and reactive to light.   Cardiovascular:      Rate and Rhythm: Normal rate and regular rhythm.      Pulses: Normal pulses.      Heart sounds: Normal heart sounds.   Pulmonary:      Effort: Pulmonary effort is normal. No respiratory distress.      Breath sounds: Normal breath sounds.   Musculoskeletal:      Cervical back: Normal range of motion and neck supple. No rigidity.      Comments: Significant pain with active range of motion of shoulder, patient with severe TTP from cervical region along right scapula and shoulder joint   Skin:     General: Skin is warm and dry.      Coloration: Skin is not pale.   Neurological:      General: No focal deficit present.      Mental Status: She is alert and oriented to person, place, and time. Mental status is at baseline.      Gait: Gait normal.   Psychiatric:         Mood " and Affect: Mood normal.         Behavior: Behavior normal.        Result Review :          Procedures      Assessment and Plan    Diagnoses and all orders for this visit:    1. Acute pain of right shoulder (Primary)  Assessment & Plan:  Patient with significant pain of right shoulder with significant decrease in active range of motion.  Will go ahead and order MRI of shoulder since patient has had x-ray of shoulder.  We will also place order for physical therapy.  Toradol 30 mg injection given in office.  Will discuss with Dr. Rick possibility for other medication to help with pain.  Call for any questions or concerns.    Orders:  -     MRI Shoulder Right Without Contrast; Future  -     Ambulatory Referral to Physical Therapy Evaluate and treat  -     ketorolac (TORADOL) injection 30 mg            Follow Up   No follow-ups on file.  Patient was given instructions and counseling regarding her condition or for health maintenance advice. Please see specific information pulled into the AVS if appropriate.

## 2023-04-10 NOTE — ASSESSMENT & PLAN NOTE
Patient with significant pain of right shoulder with significant decrease in active range of motion.  Will go ahead and order MRI of shoulder since patient has had x-ray of shoulder.  We will also place order for physical therapy.  Toradol 30 mg injection given in office.  Will discuss with Dr. Rick possibility for other medication to help with pain.  Call for any questions or concerns.

## 2023-04-13 ENCOUNTER — TELEPHONE (OUTPATIENT)
Dept: INTERNAL MEDICINE | Facility: CLINIC | Age: 58
End: 2023-04-13

## 2023-04-13 NOTE — TELEPHONE ENCOUNTER
Caller: Aida Mcclure April    Relationship: Self    Best call back number: [phone]    What medication are you requesting: FLEXIRIL OR SOMETHING ELSE TO HELP    What are your current symptoms: PAIN IN RIGHT SHOULDER WORSENING    Have you had these symptoms before:    [x] Yes  [] No    Have you been treated for these symptoms before:   [x] Yes  [] No    If a prescription is needed, what is your preferred pharmacy and phone number: [id number] [place] [facility] [address] [facility] [phone] [facility] [phone]      Additional notes:    PATIENT'S SHOULDER PAIN HAS WORSENED SINCE 04/10/2023 APPOINTMENT.

## 2023-04-13 NOTE — TELEPHONE ENCOUNTER
Caller: Aida Mcclure April    Relationship: Self    Best call back number: [phone]    What medication are you requesting: ZPACK    What are your current symptoms: COUGH, LOSE OF VOICE, GREEN PHLEGM, CONGESTION, BODY ACHES      How long have you been experiencing symptoms: 24 HOURS    Have you had these symptoms before:    [x] Yes  [] No    Have you been treated for these symptoms before:   [x] Yes  [] No    If a prescription is needed, what is your preferred pharmacy and phone number:  [id number] - [place] [facility] [address] [facility] [phone] [facility] [phone] FX

## 2023-04-14 ENCOUNTER — PREP FOR SURGERY (OUTPATIENT)
Dept: OTHER | Facility: HOSPITAL | Age: 58
End: 2023-04-14
Payer: MEDICARE

## 2023-04-14 RX ORDER — CYCLOBENZAPRINE HCL 5 MG
5 TABLET ORAL 2 TIMES DAILY PRN
Qty: 30 TABLET | Refills: 1 | Status: SHIPPED | OUTPATIENT
Start: 2023-04-14

## 2023-04-20 PROCEDURE — U0004 COV-19 TEST NON-CDC HGH THRU: HCPCS | Performed by: FAMILY MEDICINE

## 2023-04-25 ENCOUNTER — DOCUMENTATION (OUTPATIENT)
Dept: PHYSICAL THERAPY | Facility: CLINIC | Age: 58
End: 2023-04-25

## 2023-04-25 NOTE — PROGRESS NOTES
Discharge Summary  Discharge Summary from Physical Therapy Report    [place] [facility]: [address] [facility] [place] [address]      Date Range of  PT visits: 12/20/2022-01/05/2023  Number of Visits: 4     Comments: Pt has been unable to participate in therapy at this time due to authorization issues for therapy services. Pt's goals will be addressed at this time to reflect their progress made in therapy. Pt will be discharged from physical therapy due to decreased participation.     Goals  Plan Goals: LOW BACK PROBLEMS:    1. The patient complains of low back pain.  LTG 1: 12 weeks:  The patient will report a pain rating of 3/10 or better in order to improve  tolerance to activities of daily living and to be able to put groceries and laundry away.  STATUS:  Not met  STG 1a: 6 weeks:  The patient will report a pain rating of 4/10 or better.  STATUS: Not met  TREATMENT:  Therapeutic exercises, manual therapy, aquatic therapy, home exercise   instruction, and modalities as needed for pain to include:  electrical stimulation, moist heat, and ice.      2. The patient demonstrates weakness of the B hip.  LTG 2: 12 weeks:  The patient will demonstrate 4+ /5 strength for B hip flexion, abduction,  and extension in order to improve hip stability.  STATUS:  Not met  STG 2a: 6 weeks:  The patient will demonstrate 4 /5 strength for B hip flexion, abduction,  and extension.  STATUS:  Not met  TREATMENT: Therapeutic exercises, manual therapy, aquatic therapy, home exercise instruction,  and modalities as needed for pain to include:  electrical stimulation, moist heat, and ice.    3. The patient has gait dysfunction.  LTG 3: 12 weeks:  The patient will ambulate independently, for   community distances, with minimal limp to the L lower extremity in order to improve mobility and allow   patient to perform activities such as shopping and returning to the gym with greater ease.  STATUS:  Not met  STG 3a: 6 weeks: The patient will be  able to demonstrate independence with their HEP.     STATUS:  Not met  TREATMENT: Gait training, aquatic therapy, therapeutic exercise, and home exercise instruction.      4. Mobility: Walking/Moving Around Functional Limitation                   LTG 4: 12 weeks:  The patient will demonstrate 20 % limitation by achieving a score of 10 on the DASHAWN.  STATUS:  Not met  STG 4 a: 6 weeks:  The patient will demonstrate 30 % limitation by achieving a score of 15 on the DASHAWN.    STATUS:  Not met  TREATMENT:  Manual therapy, therapeutic exercise, home exercise instruction, and modalities as needed to include: moist heat, electrical stimulation, and ultrasound.    Goals: Not Met    Discharge Plan: Patient to return to referring/providing physician    Date of Discharge 4/25/2023      Freedom Atwood PT  Physical Therapist    Electronically signed 4/25/2023    [place] License: PT - [id number]

## 2023-05-02 ENCOUNTER — HOSPITAL ENCOUNTER (OUTPATIENT)
Dept: CT IMAGING | Facility: HOSPITAL | Age: 58
Discharge: HOME | End: 2023-05-02
Admitting: NURSE PRACTITIONER
Payer: MEDICARE

## 2023-05-02 ENCOUNTER — OFFICE VISIT (OUTPATIENT)
Dept: INTERNAL MEDICINE | Facility: CLINIC | Age: 58
End: 2023-05-02
Payer: MEDICARE

## 2023-05-02 VITALS
BODY MASS INDEX: 36.06 KG/M2 | DIASTOLIC BLOOD PRESSURE: 70 MMHG | OXYGEN SATURATION: 91 % | SYSTOLIC BLOOD PRESSURE: 146 MMHG | TEMPERATURE: 97.4 F | HEIGHT: 64 IN | RESPIRATION RATE: 19 BRPM | HEART RATE: 89 BPM | WEIGHT: 211.2 LBS

## 2023-05-02 DIAGNOSIS — J98.4 RESTRICTIVE LUNG DISEASE: ICD-10-CM

## 2023-05-02 DIAGNOSIS — M25.511 ACUTE PAIN OF RIGHT SHOULDER: Primary | ICD-10-CM

## 2023-05-02 DIAGNOSIS — Z76.0 MEDICATION REFILL: ICD-10-CM

## 2023-05-02 DIAGNOSIS — R91.1 LUNG NODULE: ICD-10-CM

## 2023-05-02 DIAGNOSIS — J84.9 ILD (INTERSTITIAL LUNG DISEASE): ICD-10-CM

## 2023-05-02 DIAGNOSIS — G89.29 OTHER CHRONIC PAIN: ICD-10-CM

## 2023-05-02 DIAGNOSIS — E03.9 HYPOTHYROIDISM, UNSPECIFIED TYPE: ICD-10-CM

## 2023-05-02 DIAGNOSIS — J30.9 ALLERGIC RHINITIS, UNSPECIFIED SEASONALITY, UNSPECIFIED TRIGGER: ICD-10-CM

## 2023-05-02 PROCEDURE — 71250 CT THORAX DX C-: CPT

## 2023-05-02 PROCEDURE — 99214 OFFICE O/P EST MOD 30 MIN: CPT | Performed by: STUDENT IN AN ORGANIZED HEALTH CARE EDUCATION/TRAINING PROGRAM

## 2023-05-02 RX ORDER — SPIRONOLACTONE 50 MG/1
50 TABLET, FILM COATED ORAL DAILY
Qty: 90 TABLET | Refills: 1 | Status: SHIPPED | OUTPATIENT
Start: 2023-05-02 | End: 2023-07-31

## 2023-05-02 RX ORDER — PILOCARPINE HYDROCHLORIDE 5 MG/1
5 TABLET, FILM COATED ORAL 3 TIMES DAILY
Qty: 270 TABLET | Refills: 0 | Status: SHIPPED | OUTPATIENT
Start: 2023-05-02 | End: 2023-07-31

## 2023-05-02 RX ORDER — HYDROXYCHLOROQUINE SULFATE 200 MG/1
200 TABLET, FILM COATED ORAL 2 TIMES DAILY
Status: CANCELLED | OUTPATIENT
Start: 2023-05-02

## 2023-05-02 RX ORDER — HYDROCODONE BITARTRATE AND ACETAMINOPHEN 7.5; 325 MG/1; MG/1
1 TABLET ORAL EVERY 12 HOURS PRN
Qty: 60 TABLET | Refills: 0 | Status: SHIPPED | OUTPATIENT
Start: 2023-05-02 | End: 2023-06-01

## 2023-05-02 RX ORDER — FUROSEMIDE 40 MG/1
40 TABLET ORAL DAILY
Qty: 90 TABLET | Refills: 1 | Status: SHIPPED | OUTPATIENT
Start: 2023-05-02 | End: 2023-07-31

## 2023-05-02 RX ORDER — LEVOTHYROXINE SODIUM 137 UG/1
137 TABLET ORAL DAILY
Qty: 90 TABLET | Refills: 1 | Status: SHIPPED | OUTPATIENT
Start: 2023-05-02 | End: 2023-07-31

## 2023-05-02 RX ORDER — GABAPENTIN 300 MG/1
300 CAPSULE ORAL 3 TIMES DAILY
Qty: 90 CAPSULE | Refills: 0 | Status: SHIPPED | OUTPATIENT
Start: 2023-05-02 | End: 2023-06-01

## 2023-05-02 RX ORDER — RIZATRIPTAN BENZOATE 10 MG/1
10 TABLET ORAL AS NEEDED
Qty: 9 TABLET | Refills: 3 | Status: SHIPPED | OUTPATIENT
Start: 2023-05-02

## 2023-05-02 RX ORDER — CYCLOBENZAPRINE HCL 10 MG
10 TABLET ORAL 2 TIMES DAILY PRN
Qty: 30 TABLET | Refills: 1 | Status: SHIPPED | OUTPATIENT
Start: 2023-05-02

## 2023-05-02 RX ORDER — CYCLOBENZAPRINE HCL 5 MG
5 TABLET ORAL 2 TIMES DAILY PRN
Qty: 30 TABLET | Refills: 3 | Status: SHIPPED | OUTPATIENT
Start: 2023-05-02 | End: 2023-05-02

## 2023-05-02 RX ORDER — LORATADINE 10 MG/1
10 TABLET ORAL DAILY
Qty: 30 TABLET | Refills: 6 | Status: SHIPPED | OUTPATIENT
Start: 2023-05-02

## 2023-05-02 NOTE — PROGRESS NOTES
"Chief Complaint  Hypothyroidism (Follow up ), Med Refill, and Shoulder Pain (Right shoulder pain. Reached up to grab a tray at work and felt like a needle going through her arm. Hurts to raise arm to shoulder height. Is able to lift arm with other hand straight up without pain but to raise and lower hurts. Pain is shooting down into elbow now. Constant throbbing pain in shoulder. Every now and then feels like are is going to fall off and has to hang it to relieve it. Causing lack of sleep and wakes her up sometimes.)    Subjective            Aida Yaquelin Mcclure presents to [facility]  History of Present Illness    Hypothyroidism:  Chronic, typically well controlled.   TSH was elevated at last check (Feb) w/ plan to repeat at her next visit. Pt had to cancel due to her daughter's health. Will plan on rechecking today.     Right Shoulder pain:   Noted a about 2 weeks ago at work.   She works at [employer], went to reach for a tray and experienced a sharp pain over the medial aspect of her shoulder joint, \"felt like a needle went through it\". Symptoms improved a little but experienced this again while sitting down at home. States pain is now sharp and constant, wakes her up out of her sleep, and feels like someone is tearing her arm out. States if she holds her RUE w/ her left hand and raise it up above her head then she has no pain. Bringing her arm down to her side is painful. Denies any numbness or tingling down in her fingers. States that when she feels her right elbow she experiences shooting pain up into her right arm which goes into her shoulder. Denies any fall on that side.   States flexeril helped sometimes.     Of note patient was seen for right shoulder pain, due to \"pop\" like sensation in her right shoulder on 4/10 after reaching for something overhead at work for which XR was done showing arthritis. She was referred to physical therapy and has MRI of " right shoulder scheduled for May 16th.        Need medication refilled.       Past Medical History:   Diagnosis Date   • Anemia    • Annual physical exam 2017    WILL RESCHEDULE MAMMOGRAM    • Breast lump    • CHF (congestive heart failure) 2017    FOLLOWS WITH CARDS. CURRENTLY DOING WELL ON LASIX, LISINOPRIL, ALDACTONE, AND PROPANOLOL   • Degeneration of lumbar intervertebral disc 2012   • Diverticulitis    • Essential hypertension 2017    WELL CONTROLLED ON CURRENT REGIMEN    • GERD (gastroesophageal reflux disease)    • Hamstring injury 2015    BILATERAL HAMSTRING INJURY/PAIN    • Head injury    • Hernia cerebri    • Hyperthyroidism    • Hyperthyroidism 2017    CURRENTLY ON METHIMAZOLE, PT REPORT POSSIBLY DUE TO GRAVE'S DISEASE. WILL NEED TO OBTAIN AND REVIEW MEDICAL RECORDS FROM [facility]. WILL REFER TO ENDO    • Hypoxia    • Insomnia 2017    DISCUSSED SLEEP HYGIENE. PT TO TRY AVOIDING BLUE LIGHT AT NIGHT. PT ALSO TO SET ROUTINE PRIOR TO BEDTIME. WILL AVOID MEDS AT THIS TIME    • Interstitial lung disease    • Lumbosacral disc herniation 2012    LEFT L5-S1 SMALL NATURE. SHE HAS FAILED ALL CONSERVATIVE MEASURES AND DESIRES SURGERY    • Lupus     SEES DR. ANDERSON   • Migraines    • Pulmonary embolism    • Respiratory failure with hypoxia 2021   • Rheumatoid arthritis 2017    CURRENTLY CONTROLLED, BUT WITH RESIDUAL DEFORMITY. WILL FOLLOW WITH DR. ANDERSON    • Tachycardia    • Total knee replacement status, right 2015       Allergies:   Allergies   Allergen Reactions   • Salsalate Hives, Anaphylaxis, Unknown - High Severity and Shortness Of Breath   • Aspirin           Past Surgical History:   Procedure Laterality Date   • ANKLE SURGERY Bilateral    • BREAST BIOPSY     • CATARACT EXTRACTION WITH INTRAOCULAR LENS IMPLANT     •  SECTION     • COLONOSCOPY     • EYE LENS IMPLANT SECONDARY      YES   • FASCIOTOMY      LEFT ANTERIOR ARM    • HAND SURGERY  Left    • KNEE SURGERY     • OTHER SURGICAL HISTORY      LYMPH NODE EXCISION   • PORTACATH PLACEMENT      POWER PORT PLACEMENT    • TUBAL ABDOMINAL LIGATION     • UPPER GASTROINTESTINAL ENDOSCOPY            Social History     Socioeconomic History   • Marital status:    Tobacco Use   • Smoking status: Never     Passive exposure: Past   • Smokeless tobacco: Never   Vaping Use   • Vaping Use: Never used   Substance and Sexual Activity   • Alcohol use: Yes     Comment: occ wine   • Drug use: Never   • Sexual activity: Not Currently     Birth control/protection: Tubal ligation         Family History   Problem Relation Age of Onset   • Stroke Father    • Lung cancer Other    • Arthritis Other    • Cancer Other    • Parkinsonism Other    • Diabetes type II Other           Health Maintenance Due   Topic Date Due   • COLORECTAL CANCER SCREENING  Never done   • TDAP/TD VACCINES (1 - Tdap) Never done   • COVID-19 Vaccine (3 - Booster for Ana series) 02/10/2022   • MAMMOGRAM  10/15/2022   • ZOSTER VACCINE (2 of 2) 02/24/2023            Current Outpatient Medications:   •  ascorbic acid (VITAMIN C) 250 MG tablet, Take 1 tablet by mouth Daily., Disp: , Rfl:   •  Calcium Carbonate-Vitamin D (CALTRATE 600+D PO), , Disp: , Rfl:   •  cyanocobalamin 1000 MCG/ML injection, ADMINISTER 1 ML IN THE MUSCLE EVERY 30 DAYS FOR 3 DOSES AS DIRECTED BY PRESCRIBER, Disp: , Rfl:   •  Diclofenac Sodium (VOLTAREN) 1 % gel gel, Apply 4 g topically to the appropriate area as directed 4 (Four) Times a Day As Needed (shoulder/hip pain)., Disp: 100 g, Rfl: 3  •  fluticasone (FLONASE) 50 MCG/ACT nasal spray, SHAKE LIQUID AND USE 2 SPRAYS(100 MCG) IN EACH NOSTRIL EVERY DAY, Disp: 16 g, Rfl: 3  •  furosemide (LASIX) 40 MG tablet, Take 1 tablet by mouth Daily for 90 days., Disp: 90 tablet, Rfl: 1  •  gabapentin (NEURONTIN) 300 MG capsule, Take 1 capsule by mouth 3 (Three) Times a Day for 30 days., Disp: 90 capsule, Rfl: 0  •   HYDROcodone-acetaminophen (NORCO) 7.5-325 MG per tablet, Take 1 tablet by mouth Every 12 (Twelve) Hours As Needed for Moderate Pain for up to 30 days., Disp: 60 tablet, Rfl: 0  •  hydroxychloroquine (PLAQUENIL) 200 MG tablet, Take 1 tablet by mouth 2 (Two) Times a Day., Disp: , Rfl:   •  levothyroxine (SYNTHROID, LEVOTHROID) 137 MCG tablet, Take 1 tablet by mouth Daily for 90 days., Disp: 90 tablet, Rfl: 1  •  loratadine (CLARITIN) 10 MG tablet, Take 1 tablet by mouth Daily., Disp: 30 tablet, Rfl: 6  •  magnesium oxide (MAG-OX) 400 MG tablet, TK 1 T PO D, Disp: , Rfl: 1  •  O2 (OXYGEN), Inhale 3 L/min Daily., Disp: , Rfl:   •  ondansetron (ZOFRAN) 4 MG tablet, , Disp: , Rfl:   •  pilocarpine (SALAGEN) 5 MG tablet, Take 1 tablet by mouth 3 (Three) Times a Day for 90 days., Disp: 270 tablet, Rfl: 0  •  ProAir  (90 Base) MCG/ACT inhaler, Inhale 2 puffs Every 4 (Four) Hours As Needed for Wheezing., Disp: 18 g, Rfl: 11  •  propranolol (INDERAL) 40 MG tablet, TK SS T PO TID, Disp: , Rfl: 1  •  rizatriptan (MAXALT) 10 MG tablet, Take 1 tablet by mouth As Needed (9) for up to 9 doses., Disp: 9 tablet, Rfl: 3  •  rOPINIRole (REQUIP) 1 MG tablet, , Disp: , Rfl:   •  spironolactone (ALDACTONE) 50 MG tablet, Take 1 tablet by mouth Daily for 90 days., Disp: 90 tablet, Rfl: 1  •  cyclobenzaprine (FLEXERIL) 10 MG tablet, Take 1 tablet by mouth 2 (Two) Times a Day As Needed for Muscle Spasms for up to 30 doses., Disp: 30 tablet, Rfl: 1  •  levothyroxine (SYNTHROID, LEVOTHROID) 150 MCG tablet, Take 1 tablet by mouth Daily for 14 days. (Patient not taking: Reported on 5/2/2023), Disp: 14 tablet, Rfl: 0      Immunization History   Administered Date(s) Administered   • COVID-19 (IntelePeer) 03/29/2021   • COVID-19 (PFIZER) Purple Cap Monovalent 12/16/2021   • Flu Vaccine Intradermal Quad 18-64YR 01/02/2008, 11/10/2008, 11/05/2009   • Flu Vaccine Quad PF >36MO 10/25/2017   • FluLaval/Fluzone >6mos 10/25/2017, 11/18/2021, 10/12/2022  "  • Hepatitis A 04/30/2018   • Influenza Quad Vaccine (Inpatient) 08/27/2012, 10/15/2013   • Influenza Seasonal Injectable 01/02/2008, 11/10/2008, 11/05/2009   • Influenza, Unspecified 09/17/2020, 10/01/2021, 10/12/2022   • Pneumococcal Conjugate 13-Valent (PCV13) 06/14/2018   • Pneumococcal Polysaccharide (PPSV23) 08/26/2019   • Shingrix 12/30/2022         Review of Systems       Objective       Vitals:    05/02/23 1113   BP: 146/70   BP Location: Right arm   Patient Position: Sitting   Cuff Size: Adult   Pulse: 89   Resp: 19   Temp: 97.4 °F (36.3 °C)   SpO2: 91%   Weight: 95.8 kg (211 lb 3.2 oz)   Height: 162.6 cm (64.02\")     Body mass index is 36.23 kg/m².      Physical Exam  Vitals reviewed.   Constitutional:       Appearance: Normal appearance.   HENT:      Head: Normocephalic and atraumatic.      Nose: Nose normal.   Eyes:      Extraocular Movements: Extraocular movements intact.      Conjunctiva/sclera: Conjunctivae normal.   Cardiovascular:      Rate and Rhythm: Normal rate and regular rhythm.      Pulses: Normal pulses.      Heart sounds: Normal heart sounds.   Pulmonary:      Effort: Pulmonary effort is normal. No respiratory distress.   Musculoskeletal:         General: Tenderness (over the right shoulder, lateral and medial aspect of the right shoulder. ) present.      Comments: ROM Of RUE is significantly limited: right shoulder abduction to 45 degrees only. Rt shoulder flexion is also limited.  strength is diminished symmetrically, related to deforming arthritis of her hands.     Decrease sensation over the LUE (forearm) compared to right however this is chronic, had a fasciectomy in her left forearm.    Skin:     General: Skin is warm and dry.   Neurological:      General: No focal deficit present.      Mental Status: She is alert and oriented to person, place, and time.      Cranial Nerves: No cranial nerve deficit.   Psychiatric:         Mood and Affect: Mood normal.         Behavior: Behavior " normal.         Thought Content: Thought content normal.             Result Review :                           Assessment and Plan      Diagnoses and all orders for this visit:    1. Acute pain of right shoulder (Primary)  Comments:  Subacute w/ interval worsening and concerning for possible rotator cuff dysfunction. Will have office staff look into moving up previously ordered MRI of rt shoulder. Recommend she reaches out and schedule physical therapy; referred at her last apt on 4/10.     2. Other chronic pain  Comments:  Chronic and stable. Due for refill of norco.     Orders:  -     gabapentin (NEURONTIN) 300 MG capsule; Take 1 capsule by mouth 3 (Three) Times a Day for 30 days.  Dispense: 90 capsule; Refill: 0  -     HYDROcodone-acetaminophen (NORCO) 7.5-325 MG per tablet; Take 1 tablet by mouth Every 12 (Twelve) Hours As Needed for Moderate Pain for up to 30 days.  Dispense: 60 tablet; Refill: 0    3. Allergic rhinitis, unspecified seasonality, unspecified trigger  Comments:  Chronic, refilled claritin.  Orders:  -     loratadine (CLARITIN) 10 MG tablet; Take 1 tablet by mouth Daily.  Dispense: 30 tablet; Refill: 6    4. ILD (interstitial lung disease)  Comments:  Chronic, following w/ pulm. Refille inhaler.   Orders:  -     ProAir  (90 Base) MCG/ACT inhaler; Inhale 2 puffs Every 4 (Four) Hours As Needed for Wheezing.  Dispense: 18 g; Refill: 11    5. Restrictive lung disease  -     ProAir  (90 Base) MCG/ACT inhaler; Inhale 2 puffs Every 4 (Four) Hours As Needed for Wheezing.  Dispense: 18 g; Refill: 11    6. Hypothyroidism, unspecified type  Comments:  Chronic, due for labs w/ elevated but improved TSH from last check.   Orders:  -     levothyroxine (SYNTHROID, LEVOTHROID) 137 MCG tablet; Take 1 tablet by mouth Daily for 90 days.  Dispense: 90 tablet; Refill: 1  -     TSH  -     T4, Free; Future  -     T4, Free    7. Medication refill  -     furosemide (LASIX) 40 MG tablet; Take 1 tablet by  mouth Daily for 90 days.  Dispense: 90 tablet; Refill: 1  -     pilocarpine (SALAGEN) 5 MG tablet; Take 1 tablet by mouth 3 (Three) Times a Day for 90 days.  Dispense: 270 tablet; Refill: 0  -     rizatriptan (MAXALT) 10 MG tablet; Take 1 tablet by mouth As Needed (9) for up to 9 doses.  Dispense: 9 tablet; Refill: 3  -     spironolactone (ALDACTONE) 50 MG tablet; Take 1 tablet by mouth Daily for 90 days.  Dispense: 90 tablet; Refill: 1  -     cyclobenzaprine (FLEXERIL) 10 MG tablet; Take 1 tablet by mouth 2 (Two) Times a Day As Needed for Muscle Spasms for up to 30 doses.  Dispense: 30 tablet; Refill: 1    Other orders  -     Discontinue: cyclobenzaprine (FLEXERIL) 5 MG tablet; Take 1 tablet by mouth 2 (Two) Times a Day As Needed for Muscle Spasms.  Dispense: 30 tablet; Refill: 3                  Follow Up     Return in about 6 weeks (around 6/13/2023) for right shoulder pain .    Patient was given instructions and counseling regarding her condition or for health maintenance advice. Please see specific information pulled into the AVS if appropriate.     Merline Enamorado MD   Internal Medicine-Pediatrics

## 2023-05-03 ENCOUNTER — TELEPHONE (OUTPATIENT)
Dept: OBSTETRICS AND GYNECOLOGY | Facility: CLINIC | Age: 58
End: 2023-05-03
Payer: MEDICARE

## 2023-05-03 NOTE — TELEPHONE ENCOUNTER
I spoke with the patient due to overdue results on her mammogram.  She said she missed her appointment on 04/26/23 due to illness and would like the number to reschedule.  I gave her the number and she is going to call and make another appointment.

## 2023-05-10 DIAGNOSIS — G89.29 OTHER CHRONIC PAIN: ICD-10-CM

## 2023-05-10 NOTE — TELEPHONE ENCOUNTER
Caller: Aida Mcclure April    Relationship: Self    Best call back number: [phone]    Requested Prescriptions:   Requested Prescriptions     Pending Prescriptions Disp Refills   • HYDROcodone-acetaminophen (NORCO) 7.5-325 MG per tablet 60 tablet 0     Sig: Take 1 tablet by mouth Every 12 (Twelve) Hours As Needed for Moderate Pain for up to 30 days.        Pharm[facility] where request should be sent[id number] [place] [facility] [address] [facility] [phone] [facility] [phone]      Last office visit with prescribing clinician: 5/2/2023   Last telemedicine visit with prescribing clinician: 5/2/2023   Next office visit with prescribing clinician: 6/22/2023     Additional details provided by patient: PATIENT STATES THIS MEDICATION WAS SENT TO [facility] AND THEY ARE BACK ORDERED AND PATIENT WOULD LIKE THIS SENT TO THE PHARMACY LISTED ABOVE SO SHE CAN GET THIS FILLED.     Does the patient have less than a 3 day supply:  [x] Yes  [] No    Would you like a call back once the refill request has been completed: [] Yes [] No    If the office needs to give you a call back, can they leave a voicemail: [] Yes [] No    Ariela Moss, PCT   05/10/23 10:12 EDT

## 2023-05-11 RX ORDER — HYDROCODONE BITARTRATE AND ACETAMINOPHEN 7.5; 325 MG/1; MG/1
1 TABLET ORAL EVERY 12 HOURS PRN
Qty: 60 TABLET | Refills: 0 | Status: SHIPPED | OUTPATIENT
Start: 2023-05-11 | End: 2023-06-10

## 2023-05-11 NOTE — TELEPHONE ENCOUNTER
Last follow up visit date: 5/2/23    Last urine drug screen date: 3/1/23    Last consent/contract date:2/14/23    Last fill date:5/2/23 (wanting to change pharmacy)

## 2023-05-12 ENCOUNTER — HOSPITAL ENCOUNTER (OUTPATIENT)
Dept: NUCLEAR MEDICINE | Facility: HOSPITAL | Age: 58
Discharge: HOME | End: 2023-05-12
Payer: MEDICARE

## 2023-05-12 ENCOUNTER — LAB (OUTPATIENT)
Dept: LAB | Facility: HOSPITAL | Age: 58
End: 2023-05-12
Payer: MEDICARE

## 2023-05-12 DIAGNOSIS — R07.9 CHEST PAIN, UNSPECIFIED TYPE: ICD-10-CM

## 2023-05-12 LAB
BH CV IMMEDIATE POST TECH DATA BLOOD PRESSURE: NORMAL MMHG
BH CV IMMEDIATE POST TECH DATA HEART RATE: 84 BPM
BH CV IMMEDIATE POST TECH DATA OXYGEN SATS: 99 %
BH CV REST NUCLEAR ISOTOPE DOSE: 9 MCI
BH CV SIX MINUTE RECOVERY TECH DATA BLOOD PRESSURE: NORMAL
BH CV SIX MINUTE RECOVERY TECH DATA HEART RATE: 82 BPM
BH CV SIX MINUTE RECOVERY TECH DATA OXYGEN SATURATION: 99 %
BH CV STRESS BP STAGE 1: NORMAL
BH CV STRESS COMMENTS STAGE 1: NORMAL
BH CV STRESS DOSE REGADENOSON STAGE 1: 0.4
BH CV STRESS DURATION MIN STAGE 1: 0
BH CV STRESS DURATION SEC STAGE 1: 10
BH CV STRESS HR STAGE 1: 90
BH CV STRESS NUCLEAR ISOTOPE DOSE: 36.4 MCI
BH CV STRESS O2 STAGE 1: 99
BH CV STRESS PROTOCOL 1: NORMAL
BH CV STRESS RECOVERY BP: NORMAL MMHG
BH CV STRESS RECOVERY HR: 82 BPM
BH CV STRESS RECOVERY O2: 99 %
BH CV STRESS STAGE 1: 1
BH CV THREE MINUTE POST TECH DATA BLOOD PRESSURE: NORMAL MMHG
BH CV THREE MINUTE POST TECH DATA HEART RATE: 78 BPM
BH CV THREE MINUTE POST TECH DATA OXYGEN SATURATION: 98 %
LV EF NUC BP: 40 %
MAXIMAL PREDICTED HEART RATE: 160 BPM
PERCENT MAX PREDICTED HR: 56.25 %
STRESS BASELINE BP: NORMAL MMHG
STRESS BASELINE HR: 76 BPM
STRESS O2 SAT REST: 99 %
STRESS PERCENT HR: 66 %
STRESS POST O2 SAT PEAK: 99 %
STRESS POST PEAK BP: NORMAL MMHG
STRESS POST PEAK HR: 90 BPM
STRESS TARGET HR: 136 BPM
T4 FREE SERPL-MCNC: 1.21 NG/DL (ref 0.93–1.7)
TSH SERPL DL<=0.05 MIU/L-ACNC: 7.6 UIU/ML (ref 0.27–4.2)

## 2023-05-12 PROCEDURE — 78451 HT MUSCLE IMAGE SPECT SING: CPT

## 2023-05-12 PROCEDURE — A9502 TC99M TETROFOSMIN: HCPCS | Performed by: SPECIALIST

## 2023-05-12 PROCEDURE — 0 TECHNETIUM TETROFOSMIN KIT: Performed by: SPECIALIST

## 2023-05-12 PROCEDURE — 93017 CV STRESS TEST TRACING ONLY: CPT

## 2023-05-12 PROCEDURE — 84439 ASSAY OF FREE THYROXINE: CPT | Performed by: STUDENT IN AN ORGANIZED HEALTH CARE EDUCATION/TRAINING PROGRAM

## 2023-05-12 PROCEDURE — 25010000002 REGADENOSON 0.4 MG/5ML SOLUTION: Performed by: SPECIALIST

## 2023-05-12 PROCEDURE — 84443 ASSAY THYROID STIM HORMONE: CPT | Performed by: STUDENT IN AN ORGANIZED HEALTH CARE EDUCATION/TRAINING PROGRAM

## 2023-05-12 RX ORDER — REGADENOSON 0.08 MG/ML
0.4 INJECTION, SOLUTION INTRAVENOUS
Status: COMPLETED | OUTPATIENT
Start: 2023-05-12 | End: 2023-05-12

## 2023-05-12 RX ADMIN — TETROFOSMIN 1 DOSE: 1.38 INJECTION, POWDER, LYOPHILIZED, FOR SOLUTION INTRAVENOUS at 10:21

## 2023-05-12 RX ADMIN — TETROFOSMIN 1 DOSE: 1.38 INJECTION, POWDER, LYOPHILIZED, FOR SOLUTION INTRAVENOUS at 08:30

## 2023-05-12 RX ADMIN — REGADENOSON 0.4 MG: 0.08 INJECTION, SOLUTION INTRAVENOUS at 10:21

## 2023-05-18 ENCOUNTER — TELEPHONE (OUTPATIENT)
Dept: INTERNAL MEDICINE | Facility: CLINIC | Age: 58
End: 2023-05-18
Payer: MEDICARE

## 2023-05-18 NOTE — TELEPHONE ENCOUNTER
Caller: Aida Mcclure April    Relationship: Self    Best call back number: [phone]    What specialty or service is being requested: PHYSICAL THERAPY    What is the provider, practice or medical service name: [facility]    What is the office location: [facility] - ANYWHERE    Any additional details: PATIENT STATES SHE WENT TO PHYSICAL THERAPY APPOINTMENT 05.18.2023. PATIENT STATES THAT WHEN SHE GOT TO APPOINTMENT, THE OFFICE DID NOT ACCEPT HER INSURANCE AND THAT SHE WOULD NEED TO HAVE REFERRAL MADE OUT TO ANOTHER FACILITY THAT ACCEPTED HER INSURANCE. PATIENT WOULD LIKE A PHONE CALL WHEN NEW REFERRAL IS MADE. PLEASE ADVISE.     PATIENT STATES THAT A DETAILED VOICE MESSAGE CAN BE LEFT IF SHE IS UNABLE TO ANSWER.

## 2023-05-23 NOTE — PROGRESS NOTES
Primary Care Provider  Merline Enamorado MD   Referring Provider  No ref. provider found      Patient Complaint  Follow-up (3 month follow-up)      Subjective          Aida Mcclure presents to [facility]      History of Presenting Illness  Aida Mcclure is a 58 y.o. female patient of Dr. Dennis here for management of interstitial lung disease, pulmonary fibrosis, chronic hypoxic respiratory failure, mixed connective tissue disease, obstructive sleep apnea, history of PE, CHF, seasonal allergies/allergic rhinitis, and multiple pulmonary nodule, here for 3-month follow-up.    Patient states she is doing well since her last visit, continues to have her baseline shortness of breath with exertion.  She reports that she feels like her breathing has gotten slightly worse since her last visit.  Patient denies using any antibiotics or steroids for her lungs, denies any fevers or chills.  She currently uses her ProAir rescue inhaler as needed, no maintenance inhalers at this time.  Patient takes Claritin and Flonase for her seasonal allergies and allergic rhinitis.  She wears 2 to 3 L supplemental oxygen continuously.  At today's visit, we discussed patient's recent chest CT, which showed severe interstitial lung disease/pulmonary fibrosis and possible pulmonary hypertension.  After consulting with Dr. Dennis, we will start patient on Ofev.  I discussed this medication in depth with the patient, including potential side effects and labs that will need to be done upon initiation of treatment.  I also spoke with her about the need to continue with annual chest CTs and PFTs for monitoring for ILD progression.  Patient also has bilateral upper lobe pulmonary nodules which have been stable, we will continue to monitor annually.  She is a never smoker.  Patient denies any productive cough, hemoptysis, swollen lymph nodes, weight loss, or night sweats.  Overall,  patient is doing well and has no additional concerns at this time.  Patient is able to perform ADLs without difficulty.  I have personally reviewed the review of systems, past family, social, medical and surgical histories; and agree with their findings.      Review of Systems    Review of Systems   Constitutional: Negative for activity change, chills, fatigue, fever, unexpected weight gain and unexpected weight loss.   HENT: Negative for congestion, ear discharge, ear pain, mouth sores, postnasal drip, rhinorrhea, sinus pressure, sore throat, swollen glands and trouble swallowing.    Eyes: Negative for blurred vision, pain, discharge, itching and visual disturbance.   Respiratory: Positive for shortness of breath (with exertion). Negative for apnea, cough, chest tightness, wheezing and stridor.    Cardiovascular: Negative for chest pain, palpitations and leg swelling.   Gastrointestinal: Negative for abdominal distention, abdominal pain, constipation, diarrhea, nausea, vomiting, GERD and indigestion.   Musculoskeletal: Negative for arthralgias, joint swelling and myalgias.   Skin: Negative for color change.   Neurological: Negative for dizziness, weakness, light-headedness and headache.      Sleep: Negative for Excessive daytime sleepiness  Negative for morning headaches  Negative for Snoring      Family History   Problem Relation Age of Onset   • Stroke Father    • Lung cancer Other    • Arthritis Other    • Cancer Other    • Parkinsonism Other    • Diabetes type II Other         Social History     Socioeconomic History   • Marital status:    Tobacco Use   • Smoking status: Never     Passive exposure: Past   • Smokeless tobacco: Never   Vaping Use   • Vaping Use: Never used   Substance and Sexual Activity   • Alcohol use: Yes     Comment: occ wine   • Drug use: Never   • Sexual activity: Not Currently     Birth control/protection: Tubal ligation        Past Medical History:   Diagnosis Date   • Anemia    •  Annual physical exam 11/06/2017    WILL RESCHEDULE MAMMOGRAM    • Breast lump    • CHF (congestive heart failure) 11/06/2017    FOLLOWS WITH CARDS. CURRENTLY DOING WELL ON LASIX, LISINOPRIL, ALDACTONE, AND PROPANOLOL   • Degeneration of lumbar intervertebral disc 06/12/2012   • Diverticulitis    • Essential hypertension 11/06/2017    WELL CONTROLLED ON CURRENT REGIMEN    • GERD (gastroesophageal reflux disease)    • Hamstring injury 08/17/2015    BILATERAL HAMSTRING INJURY/PAIN    • Head injury    • Hernia cerebri    • Hyperthyroidism    • Hyperthyroidism 11/06/2017    CURRENTLY ON METHIMAZOLE, PT REPORT POSSIBLY DUE TO GRAVE'S DISEASE. WILL NEED TO OBTAIN AND REVIEW MEDICAL RECORDS FROM [facility]. WILL REFER TO ENDO    • Hypoxia    • Insomnia 11/06/2017    DISCUSSED SLEEP HYGIENE. PT TO TRY AVOIDING BLUE LIGHT AT NIGHT. PT ALSO TO SET ROUTINE PRIOR TO BEDTIME. WILL AVOID MEDS AT THIS TIME    • Interstitial lung disease    • Lumbosacral disc herniation 06/12/2012    LEFT L5-S1 SMALL NATURE. SHE HAS FAILED ALL CONSERVATIVE MEASURES AND DESIRES SURGERY    • Lupus     SEES DR. ANDERSON   • Migraines    • Pulmonary embolism    • Respiratory failure with hypoxia 02/04/2021   • Rheumatoid arthritis 11/06/2017    CURRENTLY CONTROLLED, BUT WITH RESIDUAL DEFORMITY. WILL FOLLOW WITH DR. ANDERSON    • Tachycardia    • Total knee replacement status, right 08/17/2015        Immunization History   Administered Date(s) Administered   • COVID-19 (SANDI) 03/29/2021   • COVID-19 (PFIZER) Purple Cap Monovalent 12/16/2021   • Flu Vaccine Intradermal Quad 18-64YR 01/02/2008, 11/10/2008, 11/05/2009   • Flu Vaccine Quad PF >36MO 10/25/2017   • FluLaval/Fluzone >6mos 10/25/2017, 11/18/2021, 10/12/2022   • Hepatitis A 04/30/2018   • Influenza Quad Vaccine (Inpatient) 08/27/2012, 10/15/2013   • Influenza Seasonal Injectable 01/02/2008, 11/10/2008, 11/05/2009   • Influenza, Unspecified 09/17/2020, 10/01/2021, 10/12/2022   • Pneumococcal Conjugate  13-Valent (PCV13) 06/14/2018   • Pneumococcal Polysaccharide (PPSV23) 08/26/2019   • Shingrix 12/30/2022       Allergies   Allergen Reactions   • Aspirin Anaphylaxis     In high doses   • Salsalate Hives, Anaphylaxis, Unknown - High Severity and Shortness Of Breath          Current Outpatient Medications:   •  ascorbic acid (VITAMIN C) 250 MG tablet, Take 1 tablet by mouth Daily., Disp: , Rfl:   •  Calcium Carbonate-Vitamin D (CALTRATE 600+D PO), , Disp: , Rfl:   •  cyanocobalamin 1000 MCG/ML injection, ADMINISTER 1 ML IN THE MUSCLE EVERY 30 DAYS FOR 3 DOSES AS DIRECTED BY PRESCRIBER, Disp: , Rfl:   •  cyclobenzaprine (FLEXERIL) 10 MG tablet, Take 1 tablet by mouth 2 (Two) Times a Day As Needed for Muscle Spasms for up to 30 doses., Disp: 30 tablet, Rfl: 1  •  cyclobenzaprine (FLEXERIL) 5 MG tablet, Take 1 tablet by mouth 2 (Two) Times a Day As Needed., Disp: , Rfl:   •  Diclofenac Sodium (VOLTAREN) 1 % gel gel, Apply 4 g topically to the appropriate area as directed 4 (Four) Times a Day As Needed (shoulder/hip pain)., Disp: 100 g, Rfl: 3  •  fluticasone (FLONASE) 50 MCG/ACT nasal spray, SHAKE LIQUID AND USE 2 SPRAYS(100 MCG) IN EACH NOSTRIL EVERY DAY, Disp: 16 g, Rfl: 3  •  furosemide (LASIX) 40 MG tablet, Take 1 tablet by mouth Daily for 90 days., Disp: 90 tablet, Rfl: 1  •  gabapentin (NEURONTIN) 300 MG capsule, Take 1 capsule by mouth 3 (Three) Times a Day for 30 days., Disp: 90 capsule, Rfl: 0  •  HYDROcodone-acetaminophen (NORCO) 7.5-325 MG per tablet, Take 1 tablet by mouth Every 12 (Twelve) Hours As Needed for Moderate Pain for up to 30 days., Disp: 60 tablet, Rfl: 0  •  hydroxychloroquine (PLAQUENIL) 200 MG tablet, Take 1 tablet by mouth 2 (Two) Times a Day., Disp: , Rfl:   •  hydrOXYzine (ATARAX) 25 MG tablet, Take 1 tablet by mouth 3 (Three) Times a Day As Needed., Disp: , Rfl:   •  levothyroxine (SYNTHROID, LEVOTHROID) 137 MCG tablet, Take 1 tablet by mouth Daily for 90 days., Disp: 90 tablet, Rfl: 1  •   "loratadine (CLARITIN) 10 MG tablet, Take 1 tablet by mouth Daily., Disp: 30 tablet, Rfl: 6  •  magnesium oxide (MAG-OX) 400 MG tablet, TK 1 T PO D, Disp: , Rfl: 1  •  O2 (OXYGEN), Inhale 3 L/min Daily., Disp: , Rfl:   •  ondansetron (ZOFRAN) 4 MG tablet, , Disp: , Rfl:   •  pilocarpine (SALAGEN) 5 MG tablet, Take 1 tablet by mouth 3 (Three) Times a Day for 90 days., Disp: 270 tablet, Rfl: 0  •  ProAir  (90 Base) MCG/ACT inhaler, Inhale 2 puffs Every 4 (Four) Hours As Needed for Wheezing., Disp: 18 g, Rfl: 11  •  propranolol (INDERAL) 40 MG tablet, TK SS T PO TID, Disp: , Rfl: 1  •  rizatriptan (MAXALT) 10 MG tablet, Take 1 tablet by mouth As Needed (9) for up to 9 doses., Disp: 9 tablet, Rfl: 3  •  rOPINIRole (REQUIP) 1 MG tablet, , Disp: , Rfl:   •  spironolactone (ALDACTONE) 50 MG tablet, Take 1 tablet by mouth Daily for 90 days., Disp: 90 tablet, Rfl: 1  •  levothyroxine (SYNTHROID, LEVOTHROID) 150 MCG tablet, Take 1 tablet by mouth Daily for 14 days. (Patient not taking: Reported on 5/2/2023), Disp: 14 tablet, Rfl: 0  •  Nintedanib Esylate (Ofev) 150 MG capsule, Take 150 mg by mouth 2 (Two) Times a Day., Disp: 60 capsule, Rfl: 5     Objective     Vital Signs:   /61 (BP Location: Right arm, Patient Position: Sitting, Cuff Size: Large Adult)   Pulse 91   Temp 98 °F (36.7 °C) (Tympanic)   Resp 18   Ht 162.6 cm (64.02\")   Wt 93.4 kg (206 lb)   SpO2 98% Comment: 3L NASAL CANNULA  BMI 35.34 kg/m²     Objective   Physical Exam  Constitutional:       General: She is not in acute distress.     Appearance: Normal appearance. She is obese. She is not ill-appearing.   HENT:      Right Ear: Tympanic membrane and ear canal normal.      Left Ear: Tympanic membrane and ear canal normal.      Nose: Nose normal.      Mouth/Throat:      Mouth: Mucous membranes are moist.      Pharynx: Oropharynx is clear.   Eyes:      Extraocular Movements: Extraocular movements intact.      Conjunctiva/sclera: Conjunctivae " normal.      Pupils: Pupils are equal, round, and reactive to light.   Cardiovascular:      Rate and Rhythm: Normal rate and regular rhythm.      Pulses: Normal pulses.      Heart sounds: Normal heart sounds.   Pulmonary:      Effort: Pulmonary effort is normal. No respiratory distress.      Breath sounds: No stridor. No wheezing, rhonchi or rales.      Comments: Velcro-like crackles bilateral lower lobes  Abdominal:      General: Bowel sounds are normal.      Palpations: Abdomen is soft.   Musculoskeletal:         General: No swelling. Normal range of motion.      Cervical back: Normal range of motion and neck supple.      Right lower leg: No edema.      Left lower leg: No edema.   Skin:     General: Skin is warm and dry.   Neurological:      General: No focal deficit present.      Mental Status: She is alert and oriented to person, place, and time.      Motor: No weakness.   Psychiatric:         Mood and Affect: Mood normal.         Behavior: Behavior normal.       Result Review :   I have personally reviewed patient's labs and images.  I also reviewed Dr. Dennis's last office note 2/16/2023.            Diagnoses and all orders for this visit:    1. ILD (interstitial lung disease) (Primary)  -     Nintedanib Esylate (Ofev) 150 MG capsule; Take 150 mg by mouth 2 (Two) Times a Day.  Dispense: 60 capsule; Refill: 5  -     Comprehensive Metabolic Panel; Future    2. Pulmonary fibrosis  -     Comprehensive Metabolic Panel; Future    3. Chronic respiratory failure with hypoxia    4. Mixed connective tissue disease    5. Multiple pulmonary nodules    6. Personal history of PE (pulmonary embolism)    7. SHANELLE (obstructive sleep apnea)    8. Chronic dyspnea    9. Chronic fatigue    10. Seasonal allergies    11. Allergic rhinitis, unspecified seasonality, unspecified trigger    12. Chronic diastolic CHF (congestive heart failure)    13. Therapeutic drug monitoring  -     Comprehensive Metabolic Panel; Future  -     CBC &  Differential; Future  -     Comprehensive Metabolic Panel; Future    14. Other long term (current) drug therapy  -     Comprehensive Metabolic Panel; Future  -     CBC & Differential; Future  -     Comprehensive Metabolic Panel; Future    15. Obesity (BMI 30-39.9)       Impression and Plan    -Per Dr. Dennis's recommendation, we will start patient on Ofev 150 mg twice daily, baseline CBC and CMP ordered today.  Discussed common side effects of this medication, including diarrhea.  Also advised patient that we will check kidney and liver function 1 month after initiation of treatment, then as needed going forward.  -Chest CT 5/2/2023 showed stable 3 mm bilateral upper lobe nodules, chronic pulmonary fibrosis worst in the bases with end-stage honeycombing similar to prior imaging, and cardiomegaly with dilatation of the central pulmonary arteries suggesting pulmonary hypertension.  -PFTs 1/18/2023 showed no obstructive lung defect, no significant response to bronchodilator.  Moderate restrictive lung disease noted, TLC 56% of predicted.  No hyperinflation or air trapping present.  DLCO moderately reduced 46% of predicted.  -We will continue with annual PFTs and chest CTs for monitoring for progression of ILD and pulmonary fibrosis  -Cardiac stress test 5/12/2023 showed reduced EF 40%, no significant defects that may indicate ischemia  -Polysomnography 3/8/2023 normal, AHI 2.0, lowest desaturation 89%, average SpO2 96%  -Continue wearing supplemental oxygen 2 to 3 L to keep O2 saturation at or greater than 89%  -Continue using ProAir rescue inhaler as needed  -Continue taking Claritin, Flonase for seasonal allergies and allergic rhinitis  -Spent 5 minutes counseling patient on diet and exercise.  Recommended a low-fat, low-calorie diet.  Also recommend 30 minutes of daily exercise.  Patient verbalizes understanding.  -Continue following up with cardiology for management of congestive heart failure on Lasix 40 mg  daily  -Follow-up with Dr. Dennis or Kasey in 2 months to see how patient is doing on Ofev, may return sooner if needed    Smoking status: Reviewed  Vaccination status: Patient reports she is up-to-date with her flu, pneumonia, and Covid vaccines.  Patient is advised to continue to follow CDC recommendations such as social distancing wearing a mask and washing hands for at least 20 seconds.  Medications personally reviewed    Follow Up   Return in about 2 months (around 7/30/2023).  Patient was given instructions and counseling regarding her condition or for health maintenance advice. Please see specific information pulled into the AVS if appropriate.

## 2023-05-30 ENCOUNTER — TELEPHONE (OUTPATIENT)
Dept: PULMONOLOGY | Facility: CLINIC | Age: 58
End: 2023-05-30

## 2023-05-30 ENCOUNTER — OFFICE VISIT (OUTPATIENT)
Dept: PULMONOLOGY | Facility: CLINIC | Age: 58
End: 2023-05-30

## 2023-05-30 ENCOUNTER — LAB (OUTPATIENT)
Dept: LAB | Facility: HOSPITAL | Age: 58
End: 2023-05-30

## 2023-05-30 VITALS
SYSTOLIC BLOOD PRESSURE: 115 MMHG | RESPIRATION RATE: 18 BRPM | HEART RATE: 91 BPM | OXYGEN SATURATION: 98 % | DIASTOLIC BLOOD PRESSURE: 61 MMHG | TEMPERATURE: 98 F | HEIGHT: 64 IN | WEIGHT: 206 LBS | BODY MASS INDEX: 35.17 KG/M2

## 2023-05-30 DIAGNOSIS — R91.8 MULTIPLE PULMONARY NODULES: ICD-10-CM

## 2023-05-30 DIAGNOSIS — J84.10 PULMONARY FIBROSIS: ICD-10-CM

## 2023-05-30 DIAGNOSIS — Z86.711 PERSONAL HISTORY OF PE (PULMONARY EMBOLISM): ICD-10-CM

## 2023-05-30 DIAGNOSIS — Z79.899 OTHER LONG TERM (CURRENT) DRUG THERAPY: ICD-10-CM

## 2023-05-30 DIAGNOSIS — I50.32 CHRONIC DIASTOLIC CHF (CONGESTIVE HEART FAILURE): ICD-10-CM

## 2023-05-30 DIAGNOSIS — G47.33 OSA (OBSTRUCTIVE SLEEP APNEA): ICD-10-CM

## 2023-05-30 DIAGNOSIS — M35.1 MIXED CONNECTIVE TISSUE DISEASE: ICD-10-CM

## 2023-05-30 DIAGNOSIS — R06.09 CHRONIC DYSPNEA: ICD-10-CM

## 2023-05-30 DIAGNOSIS — J84.9 ILD (INTERSTITIAL LUNG DISEASE): Primary | ICD-10-CM

## 2023-05-30 DIAGNOSIS — J30.2 SEASONAL ALLERGIES: ICD-10-CM

## 2023-05-30 DIAGNOSIS — J96.11 CHRONIC RESPIRATORY FAILURE WITH HYPOXIA: ICD-10-CM

## 2023-05-30 DIAGNOSIS — J30.9 ALLERGIC RHINITIS, UNSPECIFIED SEASONALITY, UNSPECIFIED TRIGGER: ICD-10-CM

## 2023-05-30 DIAGNOSIS — E66.9 OBESITY (BMI 30-39.9): ICD-10-CM

## 2023-05-30 DIAGNOSIS — R53.82 CHRONIC FATIGUE: ICD-10-CM

## 2023-05-30 DIAGNOSIS — Z51.81 THERAPEUTIC DRUG MONITORING: ICD-10-CM

## 2023-05-30 DIAGNOSIS — J84.9 ILD (INTERSTITIAL LUNG DISEASE): ICD-10-CM

## 2023-05-30 LAB
BASOPHILS # BLD AUTO: 0.07 10*3/MM3 (ref 0–0.2)
BASOPHILS NFR BLD AUTO: 1.8 % (ref 0–1.5)
DEPRECATED RDW RBC AUTO: 46.8 FL (ref 37–54)
EOSINOPHIL # BLD AUTO: 0.26 10*3/MM3 (ref 0–0.4)
EOSINOPHIL NFR BLD AUTO: 6.8 % (ref 0.3–6.2)
ERYTHROCYTE [DISTWIDTH] IN BLOOD BY AUTOMATED COUNT: 13.9 % (ref 12.3–15.4)
HCT VFR BLD AUTO: 44.6 % (ref 34–46.6)
HGB BLD-MCNC: 13.6 G/DL (ref 12–15.9)
IMM GRANULOCYTES # BLD AUTO: 0 10*3/MM3 (ref 0–0.05)
IMM GRANULOCYTES NFR BLD AUTO: 0 % (ref 0–0.5)
LYMPHOCYTES # BLD AUTO: 0.95 10*3/MM3 (ref 0.7–3.1)
LYMPHOCYTES NFR BLD AUTO: 24.7 % (ref 19.6–45.3)
MCH RBC QN AUTO: 28.3 PG (ref 26.6–33)
MCHC RBC AUTO-ENTMCNC: 30.5 G/DL (ref 31.5–35.7)
MCV RBC AUTO: 92.9 FL (ref 79–97)
MONOCYTES # BLD AUTO: 0.6 10*3/MM3 (ref 0.1–0.9)
MONOCYTES NFR BLD AUTO: 15.6 % (ref 5–12)
NEUTROPHILS NFR BLD AUTO: 1.96 10*3/MM3 (ref 1.7–7)
NEUTROPHILS NFR BLD AUTO: 51.1 % (ref 42.7–76)
NRBC BLD AUTO-RTO: 0 /100 WBC (ref 0–0.2)
PLATELET # BLD AUTO: 156 10*3/MM3 (ref 140–450)
PMV BLD AUTO: 12.7 FL (ref 6–12)
RBC # BLD AUTO: 4.8 10*6/MM3 (ref 3.77–5.28)
WBC NRBC COR # BLD: 3.84 10*3/MM3 (ref 3.4–10.8)

## 2023-05-30 PROCEDURE — 85025 COMPLETE CBC W/AUTO DIFF WBC: CPT

## 2023-05-30 PROCEDURE — 36415 COLL VENOUS BLD VENIPUNCTURE: CPT

## 2023-05-30 RX ORDER — NINTEDANIB 150 MG/1
150 CAPSULE ORAL 2 TIMES DAILY
Qty: 60 CAPSULE | Refills: 5 | Status: SHIPPED | OUTPATIENT
Start: 2023-05-30 | End: 2023-05-30

## 2023-05-30 RX ORDER — HYDROXYZINE HYDROCHLORIDE 25 MG/1
25 TABLET, FILM COATED ORAL 3 TIMES DAILY PRN
COMMUNITY
Start: 2023-05-09 | End: 2023-05-31 | Stop reason: SDUPTHER

## 2023-05-30 RX ORDER — CYCLOBENZAPRINE HCL 5 MG
5 TABLET ORAL 2 TIMES DAILY PRN
COMMUNITY
Start: 2023-05-02

## 2023-05-30 RX ORDER — NINTEDANIB 150 MG/1
150 CAPSULE ORAL 2 TIMES DAILY
Qty: 60 CAPSULE | Refills: 5 | Status: SHIPPED | OUTPATIENT
Start: 2023-05-30

## 2023-05-30 NOTE — TELEPHONE ENCOUNTER
I called and spoke to both maci and [facility] in [place] asking that they disregard the prescription for OFEV due to it being sent to a [facility] specialty pharmacy in [place]. I called patient and notified her that prescription was sent to a specialty pharmacy. I notified patient that BENTLEY Langston would be working with pharmacy to get medication approved for her and I informed her she would be receiving several calls in regards to medication and it is important that she answers the calls so there is no delay in getting her medication. I informed patient that she can call us if she has any questions and wants any updates.

## 2023-05-30 NOTE — TELEPHONE ENCOUNTER
Ms. Mcclure called and asked that her prescriptions be called in to [facility] on [address] because [facility] is out of stock. Please call her at [phone]

## 2023-06-05 ENCOUNTER — TELEPHONE (OUTPATIENT)
Dept: INTERNAL MEDICINE | Facility: CLINIC | Age: 58
End: 2023-06-05
Payer: MEDICARE

## 2023-06-05 NOTE — TELEPHONE ENCOUNTER
Caller: Aida Mcclure April    Relationship: Self    Best call back number: [phone] (Mobile)     What is the best time to reach you: ANY    Who are you requesting to speak with (clinical staff, provider,  specific staff member): CLINICAL    What was the call regarding: PATIENT IS CALLING BACK TO REPORT SHE IS NOT FEELING ANY BETTER FROM HER LAST VISIT ON 05.31.2023 SHE STILL HAS A BAD COUGH, EXPERIENCED A FEVER AND COUGHING UP YELLOW MUCUS.

## 2023-06-06 ENCOUNTER — HOSPITAL ENCOUNTER (OUTPATIENT)
Dept: MAMMOGRAPHY | Facility: HOSPITAL | Age: 58
Discharge: HOME | End: 2023-06-06
Admitting: STUDENT IN AN ORGANIZED HEALTH CARE EDUCATION/TRAINING PROGRAM
Payer: MEDICARE

## 2023-06-06 DIAGNOSIS — Z01.419 WELL WOMAN EXAM WITH ROUTINE GYNECOLOGICAL EXAM: ICD-10-CM

## 2023-06-06 DIAGNOSIS — Z01.419 ENCOUNTER FOR GYNECOLOGICAL EXAMINATION WITHOUT ABNORMAL FINDING: ICD-10-CM

## 2023-06-06 PROCEDURE — 77067 SCR MAMMO BI INCL CAD: CPT

## 2023-06-06 PROCEDURE — 77063 BREAST TOMOSYNTHESIS BI: CPT

## 2023-06-08 ENCOUNTER — OFFICE VISIT (OUTPATIENT)
Dept: INTERNAL MEDICINE | Facility: CLINIC | Age: 58
End: 2023-06-08
Payer: MEDICARE

## 2023-06-08 VITALS
WEIGHT: 207.4 LBS | DIASTOLIC BLOOD PRESSURE: 80 MMHG | OXYGEN SATURATION: 96 % | TEMPERATURE: 98 F | SYSTOLIC BLOOD PRESSURE: 120 MMHG | BODY MASS INDEX: 35.41 KG/M2 | HEIGHT: 64 IN | HEART RATE: 65 BPM

## 2023-06-08 DIAGNOSIS — R06.89 ABNORMAL BREATH SOUNDS: ICD-10-CM

## 2023-06-08 DIAGNOSIS — R06.02 SHORTNESS OF BREATH: Primary | ICD-10-CM

## 2023-06-08 DIAGNOSIS — J06.9 UPPER RESPIRATORY TRACT INFECTION, UNSPECIFIED TYPE: ICD-10-CM

## 2023-06-08 DIAGNOSIS — R05.2 SUBACUTE COUGH: ICD-10-CM

## 2023-06-08 RX ORDER — AMOXICILLIN AND CLAVULANATE POTASSIUM 875; 125 MG/1; MG/1
1 TABLET, FILM COATED ORAL 2 TIMES DAILY
Qty: 20 TABLET | Refills: 0 | Status: SHIPPED | OUTPATIENT
Start: 2023-06-08 | End: 2023-06-18

## 2023-06-08 RX ORDER — DEXTROMETHORPHAN HYDROBROMIDE AND PROMETHAZINE HYDROCHLORIDE 15; 6.25 MG/5ML; MG/5ML
5 SYRUP ORAL EVERY 6 HOURS PRN
Qty: 240 ML | Refills: 0 | Status: SHIPPED | OUTPATIENT
Start: 2023-06-08

## 2023-06-08 NOTE — PROGRESS NOTES
"Chief Complaint  Cough and congestion (Pt states she was seen last week with cold symptoms was given [id number] was told to come back in if not feeling better)    Subjective        Aida Mcclure presents to [facility] for concerns for cough and congestion.  Patient with interstitial lung disease, longstanding, on continuous oxygen, has been having significant cough and chest congestion for the last couple of weeks.  Was prescribed azithromycin on 5/31/2023, she has completed that, no improvement.  She does use inhalers, which have not been helping.  Significant cough during conversation today.  She feels like she had a fever last night, subjective.  Feeling somewhat fatigued, otherwise no other symptoms reported.    Objective   Vital Signs:   /80 (BP Location: Right arm, Patient Position: Sitting, Cuff Size: Large Adult)   Pulse 65   Temp 98 °F (36.7 °C) (Temporal)   Ht 162.6 cm (64.02\")   Wt 94.1 kg (207 lb 6.4 oz)   SpO2 96%   BMI 35.58 kg/m²     Physical Exam  Vitals and nursing note reviewed.   Constitutional:       Appearance: Normal appearance.   HENT:      Head: Normocephalic and atraumatic.      Right Ear: External ear normal.      Left Ear: External ear normal.      Nose: Nose normal.      Mouth/Throat:      Mouth: Mucous membranes are moist.   Eyes:      Pupils: Pupils are equal, round, and reactive to light.   Cardiovascular:      Rate and Rhythm: Normal rate.      Pulses: Normal pulses.   Pulmonary:      Effort: Pulmonary effort is normal.      Breath sounds: Rhonchi present.   Skin:     Capillary Refill: Capillary refill takes less than 2 seconds.   Neurological:      Mental Status: She is alert.      Result Review :  {The following data was reviewed by ROBE Tang on 06/08/23            Stress test with myocardial perfusion one day    Result Date: 5/12/2023  Narrative:   Left ventricular ejection fraction is mildly reduced (Calculated EF = 40%).   No " significant defects that may indicate ischemia, mild to moderate LV dysfunction noted in this study..     XR Chest PA & Lateral    Result Date: 6/8/2023  Narrative: PROCEDURE: XR CHEST PA AND LATERAL  COMPARISON: [facility], CR, XR CHEST 2 VW, 4/20/2023, 19:58.  INDICATIONS: Shortness of breath, coughing  FINDINGS:  There is a left subclavian Lemrzu-P-Griz catheter in place with the tip in the superior vena cava.  The heart is borderline enlarged.  There is tortuosity of thoracic aorta.  Chronic interstitial disease is stable.  There are no focal consolidations to suggest superimposed pneumonia.      Impression:  Chronic interstitial changes are stable.  No focal infiltrates.     ROYAL CALZADA MD       Electronically Signed and Approved By: ROYAL CALZADA MD on 6/08/2023 at 9:56             Mammo Screening Digital Tomosynthesis Bilateral With CAD    Result Date: 6/6/2023  Narrative: PROCEDURE: MAMMO SCREENING DIGITAL TOMOSYNTHESIS BILATERAL W CAD  COMPARISON: [facility], [place], DIG SCREENING BILAT SABA W 3D FUAD, 10/15/2020, 14:33.  VIEWS:  BILATERAL CC AND MLO VIEWS WERE OBTAINED UTILIZING 3D TOMOSYNTHESIS AND R2 CAD SOFTWARE  INDICATIONS: screening mammogram  FINDINGS:  No suspicious mass, area of architectural distortion or suspicious microcalcification is identified.      Impression:  Benign mammogram. Suggest routine mammographic screening.  RECOMMENDATION(S):  ROUTINE MAMMOGRAM AND CLINICAL EVALUATION IN 12 MONTHS.   BIRADS:  DIAGNOSTIC CATEGORY 1--NEGATIVE.   BREAST COMPOSITION: Scattered areas fibroglandular density.  PLEASE NOTE:  A NORMAL MAMMOGRAM DOES NOT EXCLUDE THE POSSIBILITY OF BREAST CANCER. ANY CLINICALLY SUSPICIOUS PALPABLE LUMP SHOULD BE BIOPSIED.      ROYAL CALZADA MD       Electronically Signed and Approved By: ROYAL CALZADA MD on 6/06/2023 at 16:11                Diagnoses and all orders for this visit:    1. Shortness of breath (Primary)  -     XR Chest PA & Lateral    2. Subacute  cough  -     XR Chest PA & Lateral    3. Abnormal breath sounds  -     XR Chest PA & Lateral    4. Upper respiratory tract infection, unspecified type    Other orders  -     amoxicillin-clavulanate (AUGMENTIN) 875-125 MG per tablet; Take 1 tablet by mouth 2 (Two) Times a Day for 10 days.  Dispense: 20 tablet; Refill: 0  -     promethazine-dextromethorphan (PROMETHAZINE-DM) 6.25-15 MG/5ML syrup; Take 5 mL by mouth Every 6 (Six) Hours As Needed for Cough.  Dispense: 240 mL; Refill: 0    Given patient's symptoms, severity, underlying disease, we will go ahead and treat with Augmentin.  X-ray did not reveal any acute pneumonia.  Stable chronic interstitial disease.  We will send in Promethazine DM for her symptoms while starting the medication.  Follow-up if symptoms worsen or persist.      Follow Up   No follow-ups on file.  Patient was given instructions and counseling regarding her condition or for health maintenance advice. Please see specific information pulled into the AVS if appropriate.     Saeid Ng, ROBE  6/8/2023  This note was electronically signed.

## 2023-06-13 ENCOUNTER — HOSPITAL ENCOUNTER (OUTPATIENT)
Dept: MRI IMAGING | Facility: HOSPITAL | Age: 58
Discharge: HOME | End: 2023-06-13
Admitting: PHYSICIAN ASSISTANT
Payer: MEDICARE

## 2023-06-13 DIAGNOSIS — M25.511 ACUTE PAIN OF RIGHT SHOULDER: ICD-10-CM

## 2023-06-13 PROCEDURE — 73221 MRI JOINT UPR EXTREM W/O DYE: CPT

## 2023-06-15 ENCOUNTER — DOCUMENTATION (OUTPATIENT)
Dept: INTERNAL MEDICINE | Facility: CLINIC | Age: 58
End: 2023-06-15
Payer: MEDICARE

## 2023-06-15 ENCOUNTER — TELEPHONE (OUTPATIENT)
Dept: GASTROENTEROLOGY | Facility: CLINIC | Age: 58
End: 2023-06-15
Payer: MEDICARE

## 2023-06-15 DIAGNOSIS — G89.29 CHRONIC RIGHT SHOULDER PAIN: Primary | ICD-10-CM

## 2023-06-15 DIAGNOSIS — M25.511 CHRONIC RIGHT SHOULDER PAIN: Primary | ICD-10-CM

## 2023-06-22 ENCOUNTER — OFFICE VISIT (OUTPATIENT)
Dept: INTERNAL MEDICINE | Facility: CLINIC | Age: 58
End: 2023-06-22
Payer: MEDICARE

## 2023-06-22 VITALS
BODY MASS INDEX: 33.84 KG/M2 | HEART RATE: 84 BPM | WEIGHT: 198.2 LBS | RESPIRATION RATE: 19 BRPM | HEIGHT: 64 IN | SYSTOLIC BLOOD PRESSURE: 136 MMHG | OXYGEN SATURATION: 94 % | DIASTOLIC BLOOD PRESSURE: 88 MMHG | TEMPERATURE: 96.3 F

## 2023-06-22 DIAGNOSIS — J34.89 NASAL DRAINAGE: ICD-10-CM

## 2023-06-22 DIAGNOSIS — G89.29 OTHER CHRONIC PAIN: ICD-10-CM

## 2023-06-22 DIAGNOSIS — R05.2 SUBACUTE COUGH: Primary | ICD-10-CM

## 2023-06-22 DIAGNOSIS — K52.9 FREQUENT STOOLS: ICD-10-CM

## 2023-06-22 DIAGNOSIS — S46.811A TEAR OF RIGHT INFRASPINATUS TENDON, INITIAL ENCOUNTER: ICD-10-CM

## 2023-06-22 DIAGNOSIS — M75.101 TEAR OF RIGHT SUPRASPINATUS TENDON: ICD-10-CM

## 2023-06-22 PROCEDURE — 3079F DIAST BP 80-89 MM HG: CPT | Performed by: STUDENT IN AN ORGANIZED HEALTH CARE EDUCATION/TRAINING PROGRAM

## 2023-06-22 PROCEDURE — 3075F SYST BP GE 130 - 139MM HG: CPT | Performed by: STUDENT IN AN ORGANIZED HEALTH CARE EDUCATION/TRAINING PROGRAM

## 2023-06-22 PROCEDURE — 99214 OFFICE O/P EST MOD 30 MIN: CPT | Performed by: STUDENT IN AN ORGANIZED HEALTH CARE EDUCATION/TRAINING PROGRAM

## 2023-06-22 RX ORDER — GABAPENTIN 300 MG/1
300 CAPSULE ORAL 3 TIMES DAILY
Qty: 90 CAPSULE | Refills: 0 | Status: SHIPPED | OUTPATIENT
Start: 2023-06-22 | End: 2023-07-22

## 2023-06-22 RX ORDER — CETIRIZINE HYDROCHLORIDE 10 MG/1
10 TABLET ORAL DAILY
Qty: 30 TABLET | Refills: 1 | Status: SHIPPED | OUTPATIENT
Start: 2023-06-22

## 2023-06-22 RX ORDER — MONTELUKAST SODIUM 10 MG/1
10 TABLET ORAL NIGHTLY
Qty: 30 TABLET | Refills: 1 | Status: SHIPPED | OUTPATIENT
Start: 2023-06-22 | End: 2023-07-17

## 2023-06-22 NOTE — PROGRESS NOTES
"Chief Complaint  Shoulder Pain (Right shoulder, 6 week follow up), cold (Has had this for 3 weeks. Finished the z-pack, came in on Thursday and saw Saeid, was prescribed Augmentin now mucus is turning green again ), and Irritable Bowel Syndrome (Discussed before, now every time she eats has to go to the bathroom and has to use preporation H because of how much it burns.)    Subjective            Aidakael Mcclure presents to [facility]  History of Present Illness    Cough with sputum production:  Mucous improved in color with z-pack.  however mucous and runny nose persisted which led to pt presenting for repeat eval last week and was prescribed Augmentin as pt was also having fever (temp of 100.6). Denies noticing any changes in her symptoms with the augmentin. Cough productive of green sputum; states runny nose is constent, also experiencing sneezing.   She is on claritin daily, flonase which have not helped.     Frequent stool:  States every time she eats she has to go to the bathroom for the past 1.5 mos.  States stools have been more liquidy.   Endorses blood in her stool which she believes may be related to hemorrhoids as she feels \"something that feels like rocks down there\" when she wipes. Has been using preparation H.   States she has been eating more salad lately, but otherwise has not had any other changes in her diet.   States she typically has to go the bathroom about 45minutes to 1hr after eating.      Right shoulder pain:  Chronic, presenting for result review of recent MRI.       Past Medical History:   Diagnosis Date    Anemia     Annual physical exam 11/06/2017    WILL RESCHEDULE MAMMOGRAM     Breast lump     CHF (congestive heart failure) 11/06/2017    FOLLOWS WITH CARDS. CURRENTLY DOING WELL ON LASIX, LISINOPRIL, ALDACTONE, AND PROPANOLOL    Degeneration of lumbar intervertebral disc 06/12/2012    Diverticulitis     Essential hypertension " 2017    WELL CONTROLLED ON CURRENT REGIMEN     GERD (gastroesophageal reflux disease)     Hamstring injury 2015    BILATERAL HAMSTRING INJURY/PAIN     Head injury     Hernia cerebri     Hyperthyroidism     Hyperthyroidism 2017    CURRENTLY ON METHIMAZOLE, PT REPORT POSSIBLY DUE TO GRAVE'S DISEASE. WILL NEED TO OBTAIN AND REVIEW MEDICAL RECORDS FROM [facility]. WILL REFER TO ENDO     Hypoxia     Insomnia 2017    DISCUSSED SLEEP HYGIENE. PT TO TRY AVOIDING BLUE LIGHT AT NIGHT. PT ALSO TO SET ROUTINE PRIOR TO BEDTIME. WILL AVOID MEDS AT THIS TIME     Interstitial lung disease     Lumbosacral disc herniation 2012    LEFT L5-S1 SMALL NATURE. SHE HAS FAILED ALL CONSERVATIVE MEASURES AND DESIRES SURGERY     Lupus     SEES DR. ANDERSON    Migraines     Pulmonary embolism     Respiratory failure with hypoxia 2021    Rheumatoid arthritis 2017    CURRENTLY CONTROLLED, BUT WITH RESIDUAL DEFORMITY. WILL FOLLOW WITH DR. ANDERSON     Tachycardia     Total knee replacement status, right 2015       Allergies:   Allergies   Allergen Reactions    Aspirin Anaphylaxis     In high doses    Salsalate Hives, Anaphylaxis, Unknown - High Severity and Shortness Of Breath          Past Surgical History:   Procedure Laterality Date    ANKLE SURGERY Bilateral     BREAST BIOPSY      CATARACT EXTRACTION WITH INTRAOCULAR LENS IMPLANT       SECTION      COLONOSCOPY      ENDOSCOPY N/A 2023    Procedure: ESOPHAGOGASTRODUODENOSCOPY WITH BIOPSY;  Surgeon: Shama Martin MD;  Location: [facility] ENDOSCOPY;  Service: Gastroenterology;  Laterality: N/A;  NORMAL EGD    EYE LENS IMPLANT SECONDARY      YES    FASCIOTOMY      LEFT ANTERIOR ARM     HAND SURGERY Left     KNEE SURGERY      OTHER SURGICAL HISTORY      LYMPH NODE EXCISION    PORTACATH PLACEMENT      POWER PORT PLACEMENT     TUBAL ABDOMINAL LIGATION      UPPER GASTROINTESTINAL ENDOSCOPY            Social History     Socioeconomic History     Marital status:    Tobacco Use    Smoking status: Never     Passive exposure: Past    Smokeless tobacco: Never   Vaping Use    Vaping Use: Never used   Substance and Sexual Activity    Alcohol use: Yes     Alcohol/week: 2.0 standard drinks     Types: 2 Standard drinks or equivalent per week     Comment: 2 wine coolers    Drug use: Never    Sexual activity: Not Currently     Birth control/protection: Tubal ligation         Family History   Problem Relation Age of Onset    Stroke Father     Lung cancer Other     Arthritis Other     Cancer Other     Parkinsonism Other     Diabetes type II Other           Health Maintenance Due   Topic Date Due    COLORECTAL CANCER SCREENING  Never done    TDAP/TD VACCINES (1 - Tdap) Never done    ZOSTER VACCINE (2 of 2) 02/24/2023            Current Outpatient Medications:     ascorbic acid (VITAMIN C) 250 MG tablet, Take 1 tablet by mouth Daily., Disp: , Rfl:     Calcium Carbonate-Vitamin D (CALTRATE 600+D PO), , Disp: , Rfl:     cyanocobalamin 1000 MCG/ML injection, ADMINISTER 1 ML IN THE MUSCLE EVERY 30 DAYS FOR 3 DOSES AS DIRECTED BY PRESCRIBER, Disp: , Rfl:     cyclobenzaprine (FLEXERIL) 10 MG tablet, TAKE 1 TABLET BY MOUTH TWICE A DAY AS NEEDED FOR MUSCLE SPASMS, Disp: 30 tablet, Rfl: 1    cyclobenzaprine (FLEXERIL) 5 MG tablet, Take 1 tablet by mouth 2 (Two) Times a Day As Needed., Disp: , Rfl:     Diclofenac Sodium (VOLTAREN) 1 % gel gel, Apply 4 g topically to the appropriate area as directed 4 (Four) Times a Day As Needed (shoulder/hip pain)., Disp: 100 g, Rfl: 3    fluticasone (FLONASE) 50 MCG/ACT nasal spray, SHAKE LIQUID AND USE 2 SPRAYS(100 MCG) IN EACH NOSTRIL EVERY DAY, Disp: 16 g, Rfl: 3    furosemide (LASIX) 40 MG tablet, Take 1 tablet by mouth Daily for 90 days., Disp: 90 tablet, Rfl: 1    gabapentin (NEURONTIN) 300 MG capsule, Take 1 capsule by mouth 3 (Three) Times a Day for 30 days. (Patient taking differently: Take 1 capsule by mouth Every Other Day.),  Disp: 90 capsule, Rfl: 0    HYDROcodone-acetaminophen (NORCO) 7.5-325 MG per tablet, Take 1 tablet by mouth Every 12 (Twelve) Hours As Needed for Moderate Pain for up to 30 days., Disp: 60 tablet, Rfl: 0    hydroxychloroquine (PLAQUENIL) 200 MG tablet, Take 1 tablet by mouth 2 (Two) Times a Day., Disp: , Rfl:     levothyroxine (SYNTHROID, LEVOTHROID) 137 MCG tablet, Take 1 tablet by mouth Daily for 90 days., Disp: 90 tablet, Rfl: 1    magnesium oxide (MAG-OX) 400 MG tablet, Take 1 tablet by mouth Daily., Disp: 90 tablet, Rfl: 1    O2 (OXYGEN), Inhale 3 L/min Daily., Disp: , Rfl:     ondansetron (ZOFRAN) 4 MG tablet, As Needed., Disp: , Rfl:     pilocarpine (SALAGEN) 5 MG tablet, Take 1 tablet by mouth 3 (Three) Times a Day for 90 days., Disp: 270 tablet, Rfl: 1    ProAir  (90 Base) MCG/ACT inhaler, Inhale 2 puffs Every 4 (Four) Hours As Needed for Wheezing., Disp: 18 g, Rfl: 11    propranolol (INDERAL) 40 MG tablet, TK SS T PO TID, Disp: , Rfl: 1    rizatriptan (MAXALT) 10 MG tablet, Take 1 tablet by mouth As Needed (9) for up to 9 doses., Disp: 9 tablet, Rfl: 3    rOPINIRole (REQUIP) 1 MG tablet, , Disp: , Rfl:     spironolactone (ALDACTONE) 50 MG tablet, Take 1 tablet by mouth Daily for 90 days., Disp: 90 tablet, Rfl: 1    azaTHIOprine (Imuran) 50 MG tablet, Take 3 tablets by mouth Daily for 30 days., Disp: 90 tablet, Rfl: 5    cetirizine (zyrTEC) 10 MG tablet, Take 1 tablet by mouth Daily., Disp: 30 tablet, Rfl: 1    hydrOXYzine (ATARAX) 25 MG tablet, TAKE 1 TABLET BY MOUTH THREE TIMES DAILY AS NEEDED FOR ITCHING, Disp: 90 tablet, Rfl: 1    loratadine (CLARITIN) 10 MG tablet, Take 1 tablet by mouth Daily., Disp: , Rfl:     montelukast (SINGULAIR) 10 MG tablet, TAKE 1 TABLET BY MOUTH EVERY NIGHT, Disp: 30 tablet, Rfl: 1    Nintedanib Esylate (Ofev) 150 MG capsule, Take 150 mg by mouth 2 (Two) Times a Day., Disp: 60 capsule, Rfl: 5      Immunization History   Administered Date(s) Administered    COVID-19  [email or web address]) 03/29/2021    COVID-19 (PFIZER) Purple Cap Monovalent 12/16/2021    Flu Vaccine Intradermal Quad 18-64YR 01/02/2008, 11/10/2008, 11/05/2009    Flu Vaccine Quad PF >36MO 10/25/2017    FluLaval/Fluzone >6mos 10/25/2017, 11/18/2021, 10/12/2022    Hepatitis A 04/30/2018    Influenza Quad Vaccine (Inpatient) 08/27/2012, 10/15/2013    Influenza Seasonal Injectable 01/02/2008, 11/10/2008, 11/05/2009    Influenza, Unspecified 09/17/2020, 10/01/2021, 10/12/2022    Pneumococcal Conjugate 13-Valent (PCV13) 06/14/2018    Pneumococcal Polysaccharide (PPSV23) 08/26/2019    Shingrix 12/30/2022         Review of Systems       Objective       Vitals:    06/22/23 1520   BP: 136/88   BP Location: Left arm   Patient Position: Sitting   Cuff Size: Adult   Pulse: 84   Resp: 19   Temp: 96.3 °F (35.7 °C)   SpO2: 94%   Weight: 89.9 kg (198 lb 3.2 oz)   Height: 162.6 cm (64.02\")     Body mass index is 34 kg/m².      Physical Exam  Vitals reviewed.   Constitutional:       Appearance: Normal appearance.   HENT:      Head: Normocephalic and atraumatic.      Nose: Congestion and rhinorrhea present.      Mouth/Throat:      Mouth: Mucous membranes are moist.      Pharynx: No oropharyngeal exudate or posterior oropharyngeal erythema.   Eyes:      Extraocular Movements: Extraocular movements intact.      Conjunctiva/sclera: Conjunctivae normal.   Cardiovascular:      Rate and Rhythm: Normal rate and regular rhythm.      Pulses: Normal pulses.      Heart sounds: Normal heart sounds.   Pulmonary:      Effort: Pulmonary effort is normal. No respiratory distress.      Breath sounds: Normal breath sounds.   Musculoskeletal:      Comments: Restriction of ROM of right shoulder secondary to pain      Skin:     General: Skin is warm and dry.   Neurological:      General: No focal deficit present.      Mental Status: She is alert and oriented to person, place, and time.      Cranial Nerves: No cranial nerve deficit.   Psychiatric:         Mood and " Affect: Mood normal.         Behavior: Behavior normal.         Thought Content: Thought content normal.           Result Review :     The following data was reviewed by: Merline Enamorado MD on 06/22/2023:    Common labs          2/24/2023    18:06 5/30/2023    09:29 7/20/2023    15:57   Common Labs   Glucose 86      BUN 16      Creatinine 1.08      Sodium 137      Potassium 4.3      Chloride 105      Calcium 8.9      WBC  3.84  5.20    Hemoglobin  13.6  12.3    Hematocrit  44.6  39.1    Platelets  156  149        Data reviewed : MRI of right shoulder                Assessment and Plan      Diagnoses and all orders for this visit:    1. Subacute cough (Primary)  Comments:  Subacute to chronic, likely related to allergies vs underlying ipf. Singulair added to regimen.    2. Nasal drainage  Comments:  subacute, concerning for allergic rhinitis. Pt advised to use otc nasal saline spray. adding singulair per above.  Orders:  -     cetirizine (zyrTEC) 10 MG tablet; Take 1 tablet by mouth Daily.  Dispense: 30 tablet; Refill: 1  -     Discontinue: montelukast (Singulair) 10 MG tablet; Take 1 tablet by mouth Every Night for 30 days.  Dispense: 30 tablet; Refill: 1    3. Tear of right supraspinatus tendon  4. Tear of right infraspinatus tendon, initial encounter  Noted on recent MRI of right shoulder. Referred to ortho for further eval and has upcoming apt.    5. Frequent stools  Comments:  subacute, no red flags on hx or exam. Pt is already established w/ GI. Recommend increasing fiber, and probiotic.    6. Other chronic pain  Comments:  Chronic and stable. Due for refill of norco.     Orders:  -     gabapentin (NEURONTIN) 300 MG capsule; Take 1 capsule by mouth 3 (Three) Times a Day for 30 days. (Patient taking differently: Take 1 capsule by mouth Every Other Day.)  Dispense: 90 capsule; Refill: 0                  Follow Up     Return in about 4 weeks (around 7/20/2023) for cough, nasal drainage,.    Patient was given  instructions and counseling regarding her condition or for health maintenance advice. Please see specific information pulled into the AVS if appropriate.     Merline Enamorado MD   Internal Medicine-Pediatrics

## 2023-07-20 PROCEDURE — 85025 COMPLETE CBC W/AUTO DIFF WBC: CPT | Performed by: STUDENT IN AN ORGANIZED HEALTH CARE EDUCATION/TRAINING PROGRAM

## 2023-07-26 ENCOUNTER — TELEPHONE (OUTPATIENT)
Dept: INTERNAL MEDICINE | Facility: CLINIC | Age: 58
End: 2023-07-26
Payer: MEDICARE

## 2023-07-26 NOTE — TELEPHONE ENCOUNTER
Caller: Aida Mcclure April    Relationship: Self    Best call back number: [phone]    What was the call regarding: PATIENT IS REQUESTING INFORMATION ABOUT WHICH PHYSICAL THERAPY OFFICE SHE WAS REFERRED TO BY SILVIA.

## 2023-07-27 ENCOUNTER — TELEPHONE (OUTPATIENT)
Dept: INTERNAL MEDICINE | Facility: CLINIC | Age: 58
End: 2023-07-27
Payer: MEDICARE

## 2023-07-27 NOTE — TELEPHONE ENCOUNTER
Caller: Aida Mcclure April    Relationship: Self    Best call back number: [phone]    What is the best time to reach you: ANY     Who are you requesting to speak with (clinical staff, provider,  specific staff member): CLINICAL     Do you know the name of the person who called: AIDA     What was the call regarding: PATIENT IS REQUESTING STATUS ON INFORMATION ABOUT WHICH PHYSICAL THERAPY OFFICE SHE WAS REFERRED TO BY SILVIA.      Is it okay if the provider responds through MyChart: DAY SOOD

## 2023-08-05 DIAGNOSIS — J30.9 ALLERGIC RHINITIS, UNSPECIFIED: ICD-10-CM

## 2023-08-07 RX ORDER — LORATADINE 10 MG/1
TABLET ORAL
Qty: 90 TABLET | Refills: 2 | Status: SHIPPED | OUTPATIENT
Start: 2023-08-07

## 2023-08-12 DIAGNOSIS — J34.89 NASAL DRAINAGE: ICD-10-CM

## 2023-08-15 RX ORDER — CETIRIZINE HYDROCHLORIDE 10 MG/1
TABLET ORAL
Qty: 30 TABLET | Refills: 1 | Status: SHIPPED | OUTPATIENT
Start: 2023-08-15

## 2023-08-16 NOTE — PROGRESS NOTES
Primary Care Provider  Merline Enamorado MD     Referring Provider  No ref. provider found     Chief Complaint  Follow-up    Subjective          History of Presenting Illness  Patient is a 58-year-old -American female, patient of Dr. Flores who presents for management of interstitial lung disease secondary to connective tissue disease and diastolic heart failure who presents for a follow-up visit today.    Patient states that since last visit her breathing is at baseline.  Patient states at times she does get short of breath that is worse with exertion, mild in severity, and improved with rest.  Patient states that she does have an albuterol inhaler to take as needed.  Patient states that she is taking Imuran, however admits that time she only takes 2 tablets instead of 3 tablets daily.  Patient states that she is taking Ofev twice daily as prescribed.  Patient states that she is tolerating medication well. Patient is on 3 L of oxygen per minute via nasal cannula and receives her oxygen through [employer].  Patient is taking Singulair, Claritin, and Flonase nasal spray for seasonal allergies. Patient denies fever, chills, night sweats, swollen glands in the head and neck, unintentional weight loss, hemoptysis, purulent sputum production, dysphagia, chest pain, palpitations, chest tightness, abdominal pain, nausea, vomiting, and diarrhea.  Patient also denies any myalgias, changes in sense of taste and/or smell, sore throat, any other coronavirus or flu-like symptoms.  Patient denies any leg swelling, orthopnea, paroxysmal nocturnal dyspnea.  Patient is able to perform activities of daily living.        Review of Systems   Constitutional:  Negative for activity change, appetite change, chills, diaphoresis, fatigue, fever, unexpected weight gain and unexpected weight loss.        Negative for Insomnia   HENT:  Negative for congestion (Nasal), mouth sores, nosebleeds, postnasal drip, sore throat, swollen glands  and trouble swallowing.         Negative for Thrush  Negative for Hoarseness  Negative for Allergies/Hay Fever  Negative for Recent Head injury  Negative for Ear Fullness  Negative for Nasal or Sinus pain  Negative for Dry lips  Negative for Nasal discharge   Respiratory:  Positive for shortness of breath. Negative for apnea, cough, chest tightness and wheezing.         Negative for Hemoptysis  Negative for Pleuritic pain   Cardiovascular:  Negative for chest pain, palpitations and leg swelling.        Negative for Claudication  Negative for Cyanosis  Negative for Dyspnea on exertion   Gastrointestinal:  Negative for abdominal pain, diarrhea, nausea, vomiting and GERD.   Musculoskeletal:  Negative for joint swelling and myalgias.        Negative for Joint pain  Negative for Joint stiffness   Skin:  Negative for color change, dry skin, pallor and rash.   Neurological:  Negative for syncope, weakness and headache.   Hematological:  Negative for adenopathy. Does not bruise/bleed easily.      Family History   Problem Relation Age of Onset    Stroke Father     Lung cancer Other     Arthritis Other     Cancer Other     Parkinsonism Other     Diabetes type II Other         Social History     Socioeconomic History    Marital status:    Tobacco Use    Smoking status: Never     Passive exposure: Past    Smokeless tobacco: Never   Vaping Use    Vaping Use: Never used   Substance and Sexual Activity    Alcohol use: Yes     Alcohol/week: 2.0 standard drinks     Types: 2 Standard drinks or equivalent per week     Comment: 2 wine coolers    Drug use: Never    Sexual activity: Not Currently     Birth control/protection: Tubal ligation        Past Medical History:   Diagnosis Date    Anemia     Annual physical exam 11/06/2017    WILL RESCHEDULE MAMMOGRAM     Breast lump     CHF (congestive heart failure) 11/06/2017    FOLLOWS WITH CARDS. CURRENTLY DOING WELL ON LASIX, LISINOPRIL, ALDACTONE, AND PROPANOLOL    Degeneration of  lumbar intervertebral disc 06/12/2012    Diverticulitis     Essential hypertension 11/06/2017    WELL CONTROLLED ON CURRENT REGIMEN     GERD (gastroesophageal reflux disease)     Hamstring injury 08/17/2015    BILATERAL HAMSTRING INJURY/PAIN     Head injury     Hernia cerebri     Hyperthyroidism     Hyperthyroidism 11/06/2017    CURRENTLY ON METHIMAZOLE, PT REPORT POSSIBLY DUE TO GRAVE'S DISEASE. WILL NEED TO OBTAIN AND REVIEW MEDICAL RECORDS FROM [facility]. WILL REFER TO ENDO     Hypoxia     Insomnia 11/06/2017    DISCUSSED SLEEP HYGIENE. PT TO TRY AVOIDING BLUE LIGHT AT NIGHT. PT ALSO TO SET ROUTINE PRIOR TO BEDTIME. WILL AVOID MEDS AT THIS TIME     Interstitial lung disease     Lumbosacral disc herniation 06/12/2012    LEFT L5-S1 SMALL NATURE. SHE HAS FAILED ALL CONSERVATIVE MEASURES AND DESIRES SURGERY     Lupus     SEES DR. ANDERSON    Migraines     Pulmonary embolism     Respiratory failure with hypoxia 02/04/2021    Rheumatoid arthritis 11/06/2017    CURRENTLY CONTROLLED, BUT WITH RESIDUAL DEFORMITY. WILL FOLLOW WITH DR. ANDERSON     Tachycardia     Total knee replacement status, right 08/17/2015        Immunization History   Administered Date(s) Administered    COVID-19 (Talko) 03/29/2021    COVID-19 (PFIZER) Purple Cap Monovalent 12/16/2021    Flu Vaccine Intradermal Quad 18-64YR 01/02/2008, 11/10/2008, 11/05/2009    Flu Vaccine Quad PF >36MO 10/25/2017    Fluzone >6mos 10/25/2017, 11/18/2021, 10/12/2022    Hepatitis A 04/30/2018    Influenza Quad Vaccine (Inpatient) 08/27/2012, 10/15/2013    Influenza Seasonal Injectable 01/02/2008, 11/10/2008, 11/05/2009    Influenza, Unspecified 09/17/2020, 10/01/2021, 10/12/2022    Pneumococcal Conjugate 13-Valent (PCV13) 06/14/2018    Pneumococcal Polysaccharide (PPSV23) 08/26/2019    Shingrix 12/30/2022       Allergies   Allergen Reactions    Aspirin Anaphylaxis     In high doses    Salsalate Hives, Anaphylaxis, Unknown - High Severity and Shortness Of Breath           Current Outpatient Medications:     ascorbic acid (VITAMIN C) 250 MG tablet, Take 1 tablet by mouth Daily., Disp: , Rfl:     azaTHIOprine (IMURAN) 50 MG tablet, Take 3 tablets by mouth Daily for 30 days., Disp: 90 tablet, Rfl: 5    Calcium Carbonate-Vitamin D (CALTRATE 600+D PO), , Disp: , Rfl:     cetirizine (zyrTEC) 10 MG tablet, TAKE 1 TABLET BY MOUTH EVERY DAY, Disp: 30 tablet, Rfl: 1    cyanocobalamin 1000 MCG/ML injection, ADMINISTER 1 ML IN THE MUSCLE EVERY 30 DAYS FOR 3 DOSES AS DIRECTED BY PRESCRIBER, Disp: , Rfl:     cyclobenzaprine (FLEXERIL) 10 MG tablet, TAKE 1 TABLET BY MOUTH TWICE A DAY AS NEEDED FOR MUSCLE SPASMS, Disp: 30 tablet, Rfl: 1    cyclobenzaprine (FLEXERIL) 5 MG tablet, Take 1 tablet by mouth 2 (Two) Times a Day As Needed., Disp: , Rfl:     Diclofenac Sodium (VOLTAREN) 1 % gel gel, Apply 4 g topically to the appropriate area as directed 4 (Four) Times a Day As Needed (shoulder/hip pain)., Disp: 100 g, Rfl: 3    fluticasone (FLONASE) 50 MCG/ACT nasal spray, SHAKE LIQUID AND USE 2 SPRAYS(100 MCG) IN EACH NOSTRIL EVERY DAY, Disp: 16 g, Rfl: 3    furosemide (LASIX) 40 MG tablet, Take 1 tablet by mouth Daily for 90 days., Disp: 90 tablet, Rfl: 1    gabapentin (NEURONTIN) 300 MG capsule, Take 1 capsule by mouth 3 (Three) Times a Day for 30 days. (Patient taking differently: Take 1 capsule by mouth Every Other Day.), Disp: 90 capsule, Rfl: 0    HYDROcodone-acetaminophen (NORCO) 7.5-325 MG per tablet, Take 1 tablet by mouth Every 12 (Twelve) Hours As Needed for Moderate Pain for up to 30 days., Disp: 60 tablet, Rfl: 0    hydroxychloroquine (PLAQUENIL) 200 MG tablet, Take 1 tablet by mouth 2 (Two) Times a Day., Disp: , Rfl:     hydrOXYzine (ATARAX) 25 MG tablet, TAKE 1 TABLET BY MOUTH THREE TIMES DAILY AS NEEDED FOR ITCHING, Disp: 90 tablet, Rfl: 1    levothyroxine (SYNTHROID, LEVOTHROID) 137 MCG tablet, Take 1 tablet by mouth Daily for 90 days., Disp: 90 tablet, Rfl: 1    loratadine  "(CLARITIN) 10 MG tablet, TAKE 1 TABLET BY MOUTH EVERY DAY, Disp: 90 tablet, Rfl: 2    magnesium oxide (MAG-OX) 400 MG tablet, Take 1 tablet by mouth Daily., Disp: 90 tablet, Rfl: 1    montelukast (SINGULAIR) 10 MG tablet, TAKE 1 TABLET BY MOUTH EVERY NIGHT, Disp: 30 tablet, Rfl: 1    Nintedanib Esylate (Ofev) 150 MG capsule, Take 150 mg by mouth 2 (Two) Times a Day., Disp: 60 capsule, Rfl: 5    O2 (OXYGEN), Inhale 3 L/min Daily., Disp: , Rfl:     ondansetron (ZOFRAN) 4 MG tablet, As Needed., Disp: , Rfl:     ProAir  (90 Base) MCG/ACT inhaler, Inhale 2 puffs Every 4 (Four) Hours As Needed for Wheezing., Disp: 18 g, Rfl: 11    propranolol (INDERAL) 40 MG tablet, TK SS T PO TID, Disp: , Rfl: 1    rizatriptan (MAXALT) 10 MG tablet, TAKE 1 TABLET BY MOUTH AS NEEDED (9) FOR UP TO 9 DOSES., Disp: 9 tablet, Rfl: 3    rOPINIRole (REQUIP) 1 MG tablet, , Disp: , Rfl:     spironolactone (ALDACTONE) 50 MG tablet, Take 1 tablet by mouth Daily for 90 days., Disp: 90 tablet, Rfl: 1     Objective     Physical Exam  Vital Signs:   WDWN, Alert, NAD.    HEENT:  PERRL, EOMI.  OP, nares clear, no sinus tenderness  Neck:  Supple, no JVD, no thyromegaly.  Lymph: no axillary, cervical, supraclavicular lymphadenopathy noted bilaterally  Chest: Mildly decreased breath sounds throughout with inspiratory Velcro-like crackles at the bases. Normal work of breathing noted. Patient is able speak full sentences without difficulty.  Patient is on 3 L of oxygen per minute via nasal cannula.  CV: RRR, no MGR, pulses 2+, equal.  Abd:  Soft, NT, ND, + BS, no HSM  EXT:  no clubbing, no cyanosis, no edema, no joint tenderness  Neuro:  A&Ox3, CN grossly intact, no focal deficits.  Skin: No rashes or lesions noted.    /64 (BP Location: Right arm, Patient Position: Sitting, Cuff Size: Large Adult)   Pulse 86   Resp 16   Ht 162.6 cm (64.02\")   Wt 89.6 kg (197 lb 8 oz)   SpO2 99% Comment: room air  BMI 33.88 kg/mý         Result Review :   I " have reviewed my last office visit note.    Procedures:                    Assessment and Plan      Assessment:  1. Interstitial lung disease secondary to mixed connective tissue disease without exacerbation.   2. Known mixed connective tissue disease.       3. Chronic compensated diastolic congestive heart failure.   4. Chronic dyspnea at baseline.       5. Immunosuppressed status on Imuran.       6. Therapeutic drug monitoring on Imuran.       7. History of PE on anticoagulation in the past.     8.  Lung nodules.  9.  Seasonal allergies.  10.  Lupus: Patient is under the care of Dr. Boone.  11. Never smoker.          Plan:  1.  Continue Ofev as prescribed.  2.  Patient states that she is still only taking  time she only takes 2 pills of Imuran daily instead of 3.  Patient is advised to take Imuran 3 tablets daily as prescribed.  Imuran sent to the pharmacy today.  3.  Will order CBC and CMP for medication monitoring.  4.  Continue Claritin, Singulair, and Flonase nasal spray for seasonal allergies.  5.  Continue albuterol inhaler as needed.  6.  Continue oxygen to keep SPO2 at 89% and above.  7.  Patient will be due for repeat pulmonary function test and a 6-minute walk test in January 2024.  Orders already placed.  8.  Patient will be due for repeat chest CT scan in May 2024.  Orders already placed.  9.  Follow-up with Dr. Boone, hematologist also for therapeutic drug monitoring and lupus as scheduled.  10.  Follow-up with cardiology as scheduled.  11.  Vaccination status: patient reports they are up-to-date with flu, pneumonia, and Covid vaccines.  Patient is advised to continue to follow CDC recommendations such as social distancing wearing a mask and washing hands for at least 20 seconds.  12.  Smoking status: Never smoker.  13.  Patient to call the office, 911, or go to the ER with new or worsening symptoms.  14.  Follow-up in January 2024, sooner if needed.          Follow Up   Return for January 2024  with Dr. Dennis.  Patient was given instructions and counseling regarding her condition or for health maintenance advice. Please see specific information pulled into the AVS if appropriate.

## 2023-08-16 NOTE — PATIENT INSTRUCTIONS
Pulmonary Fibrosis    Pulmonary fibrosis is a type of lung disease that causes scarring. Over time, the scar tissue builds up in the air sacs of your lungs (alveoli). This makes it hard for you to breathe because less oxygen gets into your bloodstream.  Scarring from pulmonary fibrosis is permanent and may lead to other serious health problems.  What are the causes?  There are many different causes of pulmonary fibrosis. In some cases, the cause is not known. This is called idiopathic pulmonary fibrosis. Other causes include:  Exposure to chemicals and substances found in agricultural, farm, construction, or factory work. These include mold, asbestos, silica, metal dusts, and toxic fumes.  Sarcoidosis. In this disease, areas of inflammatory cells (granulomas) form and most often affect the lungs.  Autoimmune diseases. These include diseases such as rheumatoid arthritis, systemic sclerosis, or connective tissue disease.  Taking certain medicines. These include drugs used in radiation therapy or used to treat seizures, heart problems, and some infections.  What increases the risk?  You are more likely to develop this condition if:  You have a family history of the disease.  You are an older person. The condition is more common in older adults.  You have a history of smoking.  You have a job that exposes you to certain chemicals.  You have gastroesophageal reflux disease (GERD).  What are the signs or symptoms?  Symptoms of this condition include:  Difficulty breathing that gets worse with activity.  Shortness of breath (dyspnea).  Dry, hacking cough.  Rapid, shallow breathing during exercise or while at rest.  Other symptoms may include:  Loss of appetite or weight loss  Tiredness (fatigue) or weakness.  Bluish skin and lips.  Rounded and enlarged fingertips (clubbing).  How is this diagnosed?  This condition may be diagnosed based on:  Your symptoms and medical history.  A physical exam.  You may also have tests,  including:  A test that involves looking inside your lungs with an instrument (bronchoscopy).  Imaging studies of your lungs and heart.  Tests to measure how well you are breathing (pulmonary function tests).  Blood tests.  Tests to see how well your lungs work while you are walking (pulmonary stress test).  A procedure to remove a lung tissue sample to look at it under a microscope (biopsy).  How is this treated?  There is no cure for pulmonary fibrosis. Treatment focuses on managing symptoms and preventing scarring from getting worse. This may include:  Medicines, such as:  Steroids to prevent permanent lung changes.  Medicines to suppress your body's defense system (immune system).  Medicines to help with lung function by reducing inflammation or scarring.  Ongoing monitoring with X-rays and lab work.  Oxygen therapy.  Pulmonary rehabilitation.  Surgery. In some cases, a lung transplant is possible.  Follow these instructions at home:    Medicines  Take over-the-counter and prescription medicines only as told by your health care provider.  Keep your vaccinations up to date as recommended by your health care provider.  Activity  Get regular exercise, but do not pick activities that are too strenuous for you. Ask your health care provider what activities are safe for you.  If you have physical limitations, you may get exercise by walking, using a stationary bike, or doing chair exercises.  Ask your health care provider about using oxygen while exercising.  Do breathing exercises as told by your health care provider.  Plan rest periods when you get tired.  General instructions  Do not use any products that contain nicotine or tobacco. These products include cigarettes, chewing tobacco, and vaping devices, such as e-cigarettes. If you need help quitting, ask your health care provider.  If you are exposed to chemicals and substances at work, make sure that you wear a mask or respirator at all times.  Learn to manage  stress. If you need help to do this, ask your health care provider.  Join a pulmonary rehabilitation program or a support group for people with pulmonary fibrosis.  Eat small meals often so you do not get too full. Overeating can make breathing trouble worse.  Maintain a healthy weight. Lose weight if you need to.  Keep all follow-up visits. This is important.  Where to find more information  American Lung Association: www.lung.org  National Heart, Lung, and Blood [place]: www.nhlbi.nih.gov  Pulmonary Fibrosis Foundation: pulmonaryfibrosis.org  Contact a health care provider if:  You have symptoms that do not get better with medicines.  You are not able to be as active as usual.  You have trouble taking a deep breath.  You have a fever or chills.  You have blue lips or skin.  You have a lot of headaches.  You cough up mucus that is dark in color.  You have feelings of depression or sadness.  You are unable to sleep because it is hard to breathe.  Get help right away if:  Your symptoms suddenly worsen.  You have chest pain.  You cough up blood.  You get very confused or sleepy.  These symptoms may be an emergency. Get help right away. Call 911.  Do not wait to see if the symptoms will go away.  Do not drive yourself to the hospital.  Summary  Pulmonary fibrosis is a type of lung disease that causes scar tissue to build up in the air sacs of your lungs (alveoli) over time. This makes it hard for you to breathe because less oxygen gets into your bloodstream.  Scarring from pulmonary fibrosis is permanent and may lead to other serious health problems.  You are more likely to develop this condition if you have a family history of the condition or a job that exposes you to certain chemicals.  There is no cure for pulmonary fibrosis. Treatment focuses on managing symptoms and preventing scarring from getting worse.  This information is not intended to replace advice given to you by your health care provider. Make sure  you discuss any questions you have with your health care provider.  Document Revised: 08/09/2022 Document Reviewed: 08/09/2022  Elsevier Patient Education c 2023 [employer]

## 2023-08-24 DIAGNOSIS — G89.29 OTHER CHRONIC PAIN: ICD-10-CM

## 2023-08-24 NOTE — TELEPHONE ENCOUNTER
Caller: Aida Mcclure April    Relationship: Self    Best call back number: [phone]    Requested Prescriptions:   Requested Prescriptions     Pending Prescriptions Disp Refills    HYDROcodone-acetaminophen (NORCO) 7.5-325 MG per tablet 60 tablet 0     Sig: Take 1 tablet by mouth Every 12 (Twelve) Hours As Needed for Moderate Pain for up to 30 days.        Pharmacy where request should be sent: [id number] - [place], [place] - [address] [facility] [phone] [facility] [phone] FX     Last office visit with prescribing clinician: 7/20/2023   Last telemedicine visit with prescribing clinician: Visit date not found   Next office visit with prescribing clinician: 10/24/2023     Additional details provided by patient: PATIENT CURRENTLY HAS 4 PILLS LEFT.     Does the patient have less than a 3 day supply:  [x] Yes  [] No    Would you like a call back once the refill request has been completed: [] Yes [x] No    If the office needs to give you a call back, can they leave a voicemail: [] Yes [x] No    Sarah Rucker Rep   08/24/23 16:39 EDT

## 2023-08-28 DIAGNOSIS — Z76.0 MEDICATION REFILL: ICD-10-CM

## 2023-08-29 RX ORDER — RIZATRIPTAN BENZOATE 10 MG/1
10 TABLET ORAL AS NEEDED
Qty: 9 TABLET | Refills: 3 | Status: SHIPPED | OUTPATIENT
Start: 2023-08-29

## 2023-08-30 ENCOUNTER — OFFICE VISIT (OUTPATIENT)
Dept: ORTHOPEDIC SURGERY | Facility: CLINIC | Age: 58
End: 2023-08-30
Payer: MEDICARE

## 2023-08-30 VITALS
OXYGEN SATURATION: 94 % | DIASTOLIC BLOOD PRESSURE: 84 MMHG | SYSTOLIC BLOOD PRESSURE: 124 MMHG | WEIGHT: 198 LBS | BODY MASS INDEX: 33.8 KG/M2 | HEIGHT: 64 IN | HEART RATE: 92 BPM

## 2023-08-30 DIAGNOSIS — M17.12 OSTEOARTHRITIS OF LEFT KNEE, UNSPECIFIED OSTEOARTHRITIS TYPE: ICD-10-CM

## 2023-08-30 DIAGNOSIS — M25.562 LEFT KNEE PAIN, UNSPECIFIED CHRONICITY: Primary | ICD-10-CM

## 2023-08-30 RX ORDER — BUPIVACAINE HYDROCHLORIDE 5 MG/ML
5 INJECTION, SOLUTION EPIDURAL; INTRACAUDAL
Status: COMPLETED | OUTPATIENT
Start: 2023-08-30 | End: 2023-08-30

## 2023-08-30 RX ORDER — AZATHIOPRINE 50 MG/1
TABLET ORAL
COMMUNITY
Start: 2023-08-28 | End: 2023-08-31 | Stop reason: SDUPTHER

## 2023-08-30 RX ORDER — TRIAMCINOLONE ACETONIDE 40 MG/ML
40 INJECTION, SUSPENSION INTRA-ARTICULAR; INTRAMUSCULAR
Status: COMPLETED | OUTPATIENT
Start: 2023-08-30 | End: 2023-08-30

## 2023-08-30 RX ADMIN — TRIAMCINOLONE ACETONIDE 40 MG: 40 INJECTION, SUSPENSION INTRA-ARTICULAR; INTRAMUSCULAR at 11:50

## 2023-08-30 RX ADMIN — BUPIVACAINE HYDROCHLORIDE 5 ML: 5 INJECTION, SOLUTION EPIDURAL; INTRACAUDAL at 11:50

## 2023-08-30 NOTE — TELEPHONE ENCOUNTER
Last follow up visit date: 7/20/23    Last urine drug screen date:   3/1/23  Last consent/contract date:  2/14/23  Does patient utilize [facility] pharmacy (yes or no)?  no

## 2023-08-30 NOTE — PROGRESS NOTES
"Chief Complaint  Initial Evaluation and Pain of the Left Knee     Subjective      Aida Mcclure presents to [facility] for initial evaluation of the left knee.  She is wearing a left knee brace.  She notes pain around the patella and has burning in the back of the knee.  She went to  on 8/2/23 and had X rays and here to review.  She has had no treatment to her knee in the past.  She is on O2 daily. She has lupus and CHF.      Allergies   Allergen Reactions    Aspirin Anaphylaxis     In high doses    Salsalate Hives, Anaphylaxis, Unknown - High Severity and Shortness Of Breath        Social History     Socioeconomic History    Marital status:    Tobacco Use    Smoking status: Never     Passive exposure: Past    Smokeless tobacco: Never   Vaping Use    Vaping Use: Never used   Substance and Sexual Activity    Alcohol use: Yes     Alcohol/week: 2.0 standard drinks     Types: 2 Standard drinks or equivalent per week     Comment: 2 wine coolers    Drug use: Never    Sexual activity: Not Currently     Birth control/protection: Tubal ligation        I reviewed the patient's chief complaint, history of present illness, review of systems, past medical history, surgical history, family history, social history, medications, and allergy list.     Review of Systems     Constitutional: Denies fevers, chills, weight loss  Cardiovascular: Denies chest pain, shortness of breath  Skin: Denies rashes, acute skin changes  Neurologic: Denies headache, loss of consciousness        Vital Signs:   /84   Pulse 92   Ht 162.6 cm (64\")   Wt 89.8 kg (198 lb)   SpO2 94% Comment: 3L O2  BMI 33.99 kg/mý          Physical Exam  General: Alert. No acute distress    Ortho Exam        LEFT KNEE Flexion 95. Extension -3. Stable to varus/valgus stress. Stable to anterior/posterior drawer. Neurovascularly intact. Negative Jalyeen. Negative Lachman. Positive EHL, FHL, HS and TA. Sensation intact " "to light touch all 5 nerves of the foot. Ambulates with Antalgic gait. Patella is well tracking. Calf supple, non-tender. Positive tenderness to the medial joint line. Positive tenderness to the lateral joint line. Positive Crepitus. Good strength to hamstrings, quadriceps, dorsiflexors, and plantar flexors.  Knee Extensor Mechanism intact      Large Joint LEFT KNEE: L knee  Date/Time: 8/30/2023 11:50 AM  Consent given by: patient  Site marked: site marked  Timeout: Immediately prior to procedure a time out was called to verify the correct patient, procedure, equipment, support staff and site/side marked as required   Supporting Documentation  Indications: pain   Procedure Details  Location: knee - L knee  Preparation: Patient was prepped and draped in the usual sterile fashion  Needle size: 22 G  Medications administered: 5 mL bupivacaine (PF) 0.5 %; 40 mg triamcinolone acetonide 40 MG/ML  Patient tolerance: patient tolerated the procedure well with no immediate complications          Imaging Results (Most Recent)       None             Result Review :         XR Knee 3 View Left    Result Date: 8/2/2023  Narrative: PROCEDURE: XR KNEE 3 VW LEFT  COMPARISON: 12/30/2014.  INDICATIONS: pain in left knee - shante. anteriorly w/ knee \"locking\"  FINDINGS:  3 nonweightbearing of the left knee views were obtained.  No acute fracture or acute malalignment is identified.  Moderate-to-severe tricompartmental osteoarthritis is suspected radiographically, especially involving the medial compartment, seen previously.  The degenerative changes have probably progressed since the 12/30/2014 study.  Multiple scattered soft tissue calcifications are seen, especially in the lower left leg, and may be related to chronic venous insufficiency.  Please correlate clinically.  There is some degree of generalized osteopenia.  Minimal, if any, left knee joint effusion is suggested.  No subcutaneous emphysema.  No retained radiopaque foreign " body.  If symptoms or clinical concerns persist, consider imaging follow-up.      Impression:   No acute fracture or acute malalignment is identified.  Moderate-to-severe tricompartmental osteoarthritis is suggested, as discussed.  Please see above comments for further detail.    Please note that portions of this note were completed with a voice recognition program.  TERRIE ODELL JR, MD            Electronically Signed and Approved By: TERRIE ODELL JR, MD on 8/02/2023 at 21:58                  Assessment and Plan     Diagnoses and all orders for this visit:    1. Left knee pain, unspecified chronicity (Primary)    2. Osteoarthritis of left knee, unspecified osteoarthritis type        Discussed the treatment plan with the patient. I reviewed the X-rays that were obtained 8/2/23 with the patient.     Discussed the treatment options with the patient, operative vs non-operative.     The patient expressed understanding and wished to proceed with a left knee steroid injection.  She tolerated the injection well.         Call or return if worsening symptoms.    Follow Up     4-6 weeks.  Will discuss surgical intervention.        Patient was given instructions and counseling regarding her condition or for health maintenance advice. Please see specific information pulled into the AVS if appropriate.     Scribed for Long Elaine MD by Kell Wells MA.  08/30/23   10:24 EDT    I have personally performed the services described in this document as scribed by the above individual and it is both accurate and complete. Long Elaine MD 08/30/23

## 2023-08-31 ENCOUNTER — OFFICE VISIT (OUTPATIENT)
Dept: PULMONOLOGY | Facility: CLINIC | Age: 58
End: 2023-08-31
Payer: MEDICARE

## 2023-08-31 VITALS
DIASTOLIC BLOOD PRESSURE: 64 MMHG | RESPIRATION RATE: 16 BRPM | HEIGHT: 64 IN | BODY MASS INDEX: 33.72 KG/M2 | WEIGHT: 197.5 LBS | OXYGEN SATURATION: 99 % | SYSTOLIC BLOOD PRESSURE: 116 MMHG | HEART RATE: 86 BPM

## 2023-08-31 DIAGNOSIS — J30.2 SEASONAL ALLERGIES: ICD-10-CM

## 2023-08-31 DIAGNOSIS — M32.9 SYSTEMIC LUPUS ERYTHEMATOSUS, UNSPECIFIED SLE TYPE, UNSPECIFIED ORGAN INVOLVEMENT STATUS: ICD-10-CM

## 2023-08-31 DIAGNOSIS — J98.4 RESTRICTIVE LUNG DISEASE: ICD-10-CM

## 2023-08-31 DIAGNOSIS — I50.32 CHRONIC DIASTOLIC HEART FAILURE: ICD-10-CM

## 2023-08-31 DIAGNOSIS — M35.1 MIXED CONNECTIVE TISSUE DISEASE: ICD-10-CM

## 2023-08-31 DIAGNOSIS — J84.9 INTERSTITIAL LUNG DISEASE: ICD-10-CM

## 2023-08-31 DIAGNOSIS — Z51.81 THERAPEUTIC DRUG MONITORING: ICD-10-CM

## 2023-08-31 DIAGNOSIS — R06.09 CHRONIC DYSPNEA: Primary | ICD-10-CM

## 2023-08-31 DIAGNOSIS — J84.9 ILD (INTERSTITIAL LUNG DISEASE): ICD-10-CM

## 2023-08-31 DIAGNOSIS — Z86.711 PERSONAL HISTORY OF PE (PULMONARY EMBOLISM): ICD-10-CM

## 2023-08-31 RX ORDER — HYDROCODONE BITARTRATE AND ACETAMINOPHEN 7.5; 325 MG/1; MG/1
1 TABLET ORAL EVERY 12 HOURS PRN
Qty: 60 TABLET | Refills: 0 | Status: SHIPPED | OUTPATIENT
Start: 2023-08-31 | End: 2023-09-30

## 2023-08-31 RX ORDER — AZATHIOPRINE 50 MG/1
150 TABLET ORAL DAILY
Qty: 90 TABLET | Refills: 5 | Status: SHIPPED | OUTPATIENT
Start: 2023-08-31 | End: 2023-09-30

## 2023-09-01 ENCOUNTER — TELEPHONE (OUTPATIENT)
Dept: PULMONOLOGY | Facility: CLINIC | Age: 58
End: 2023-09-01
Payer: MEDICARE

## 2023-09-01 RX ORDER — ALBUTEROL SULFATE 90 UG/1
2 AEROSOL, METERED RESPIRATORY (INHALATION) EVERY 4 HOURS PRN
Qty: 18 G | Refills: 5 | Status: SHIPPED | OUTPATIENT
Start: 2023-09-01 | End: 2023-10-01

## 2023-09-03 DIAGNOSIS — Z76.0 MEDICATION REFILL: ICD-10-CM

## 2023-09-06 DIAGNOSIS — J34.89 NASAL DRAINAGE: ICD-10-CM

## 2023-09-06 RX ORDER — MONTELUKAST SODIUM 10 MG/1
TABLET ORAL
Qty: 30 TABLET | Refills: 1 | Status: SHIPPED | OUTPATIENT
Start: 2023-09-06

## 2023-09-06 RX ORDER — HYDROXYZINE HYDROCHLORIDE 25 MG/1
TABLET, FILM COATED ORAL
Qty: 90 TABLET | Refills: 1 | Status: SHIPPED | OUTPATIENT
Start: 2023-09-06

## 2023-09-08 DIAGNOSIS — J34.89 NASAL DRAINAGE: ICD-10-CM

## 2023-09-08 RX ORDER — CETIRIZINE HYDROCHLORIDE 10 MG/1
TABLET ORAL
Qty: 30 TABLET | Refills: 1 | Status: SHIPPED | OUTPATIENT
Start: 2023-09-08

## 2023-09-11 ENCOUNTER — TELEPHONE (OUTPATIENT)
Dept: INTERNAL MEDICINE | Facility: CLINIC | Age: 58
End: 2023-09-11

## 2023-09-11 DIAGNOSIS — G89.29 OTHER CHRONIC PAIN: ICD-10-CM

## 2023-09-11 RX ORDER — HYDROCODONE BITARTRATE AND ACETAMINOPHEN 7.5; 325 MG/1; MG/1
1 TABLET ORAL EVERY 12 HOURS PRN
Qty: 60 TABLET | Refills: 0 | Status: SHIPPED | OUTPATIENT
Start: 2023-09-11 | End: 2023-10-11

## 2023-09-11 NOTE — TELEPHONE ENCOUNTER
Caller: Aida Mcclure April    Relationship: Self    Best call back number: [phone]    Requested Prescriptions:   Requested Prescriptions     Pending Prescriptions Disp Refills    HYDROcodone-acetaminophen (NORCO) 7.5-325 MG per tablet 60 tablet 0     Sig: Take 1 tablet by mouth Every 12 (Twelve) Hours As Needed for Moderate Pain for up to 30 days.        Pharmacy where request should be sent: [id number] [place] [facility] [address] AT [facility] [phone] [facility] [phone]      Last office visit with prescribing clinician: 7/20/2023   Last telemedicine visit with prescribing clinician: Visit date not found   Next office visit with prescribing clinician: 10/24/2023     Additional details provided by patient: [facility] HAS BEEN OUT OF STOCK OF THIS FOR A COUPLE MONTHS. PATIENT IS NEEDING THIS SENT TO [facility] ON [address] AND [place].     Does the patient have less than a 3 day supply:  [x] Yes  [] No    Would you like a call back once the refill request has been completed: [] Yes [x] No    If the office needs to give you a call back, can they leave a voicemail: [] Yes [x] No    Sarah Rucker   09/11/23 16:39 EDT

## 2023-09-11 NOTE — TELEPHONE ENCOUNTER
Last follow up visit date: 7/20/2023    Last urine drug screen date: 3/1/2023    Last consent/contract date:2/14/2023    Does patient utilize [facility] pharmacy (yes or no)? No

## 2023-09-19 ENCOUNTER — TELEPHONE (OUTPATIENT)
Dept: INTERNAL MEDICINE | Facility: CLINIC | Age: 58
End: 2023-09-19
Payer: MEDICARE

## 2023-09-27 ENCOUNTER — TELEPHONE (OUTPATIENT)
Dept: OBSTETRICS AND GYNECOLOGY | Facility: CLINIC | Age: 58
End: 2023-09-27
Payer: MEDICARE

## 2023-09-27 NOTE — TELEPHONE ENCOUNTER
Office called the patient tor reschedule due to AG out of office.Received her voicemail - left message on voicemail to call back to r/s.

## 2023-10-02 DIAGNOSIS — J34.89 NASAL DRAINAGE: ICD-10-CM

## 2023-10-02 DIAGNOSIS — Z76.0 MEDICATION REFILL: ICD-10-CM

## 2023-10-02 RX ORDER — MONTELUKAST SODIUM 10 MG/1
TABLET ORAL
Qty: 30 TABLET | Refills: 1 | Status: SHIPPED | OUTPATIENT
Start: 2023-10-02

## 2023-10-02 RX ORDER — HYDROXYZINE HYDROCHLORIDE 25 MG/1
TABLET, FILM COATED ORAL
Qty: 90 TABLET | Refills: 1 | Status: SHIPPED | OUTPATIENT
Start: 2023-10-02

## 2023-10-03 NOTE — TELEPHONE ENCOUNTER
2 calls were given to the patient and a Welcome Real-timet message, also was sent (at this time hasn't been read, yet).  Patient was also, mailed a letter - to call the office, to reschedule.

## 2023-10-04 DIAGNOSIS — Z76.0 MEDICATION REFILL: ICD-10-CM

## 2023-10-04 DIAGNOSIS — E55.9 VITAMIN D DEFICIENCY: Primary | ICD-10-CM

## 2023-10-04 DIAGNOSIS — E03.9 HYPOTHYROIDISM, UNSPECIFIED TYPE: ICD-10-CM

## 2023-10-04 RX ORDER — SPIRONOLACTONE 50 MG/1
TABLET, FILM COATED ORAL
Qty: 90 TABLET | Refills: 1 | Status: SHIPPED | OUTPATIENT
Start: 2023-10-04

## 2023-10-04 NOTE — TELEPHONE ENCOUNTER
Refill request levothyroxine 137mcg.     Name from pharmacy: LEVOTHYROXINE 137 MCG TABLET          Will file in chart as: levothyroxine (SYNTHROID, LEVOTHROID) 137 MCG tablet    Sig: TAKE 1 TABLET BY MOUTH EVERY DAY    Disp: 90 tablet    Refills: 1    Start: 10/4/2023    Class: Normal    Non-formulary For: Hypothyroidism, unspecified type    Last ordered: 5 months ago by Merline Enamorado MD Last refill: 7/9/2023    Rx #: [id number]    Thyroid Hormones Protocol Hrcssz61/04/2023 01:34 AM   Protocol Details Normal TSH in past 12 months    No active pregnancy on record    No positive pregnancy test in past year    Recent or future visit with authorizing provider

## 2023-10-05 RX ORDER — LEVOTHYROXINE SODIUM 137 UG/1
TABLET ORAL
Qty: 90 TABLET | Refills: 1 | Status: SHIPPED | OUTPATIENT
Start: 2023-10-05

## 2023-10-10 NOTE — TELEPHONE ENCOUNTER
Caller: Aida Mcclure April    Relationship: Self    Best call back number: [phone]    Requested Prescriptions:   Requested Prescriptions     Pending Prescriptions Disp Refills   • HYDROcodone-acetaminophen (NORCO) 7.5-325 MG per tablet 60 tablet 0     Sig: Take 1 tablet by mouth Every 12 (Twelve) Hours As Needed for Moderate Pain for up to 30 days.        Pharmacy where request should be sent: [id number] - [place] - [address] AT [address] [facility] [phone] [facility] [phone]      Additional details provided by patient: three days left on hand.     Does the patient have less than a 3 day supply:  [x] Yes  [] No    Would you like a call back once the refill request has been completed: [x] Yes [] No    If the office needs to give you a call back, can they leave a voicemail: [x] Yes [] No    Sarah Goss Rep   02/09/23 16:25 EST         
Filled during visit office today.   
HAL Harris

## 2023-10-17 ENCOUNTER — TELEPHONE (OUTPATIENT)
Dept: PULMONOLOGY | Facility: CLINIC | Age: 58
End: 2023-10-17
Payer: MEDICARE

## 2023-10-17 NOTE — TELEPHONE ENCOUNTER
Patient no longer has medicaid and her Ofev is too expensive she would like to know if there are other options for her for assistance with cost or a different medication. Please advise, thank you.

## 2023-10-19 ENCOUNTER — TELEPHONE (OUTPATIENT)
Dept: PULMONOLOGY | Facility: CLINIC | Age: 58
End: 2023-10-19
Payer: MEDICARE

## 2023-10-19 NOTE — TELEPHONE ENCOUNTER
Patient called and stated that she no longer has Medicaid and she is on Ofev it is too much for her and is wanting to know if there is another medication she can go on. Patient would like a call back

## 2023-11-17 DIAGNOSIS — G89.29 OTHER CHRONIC PAIN: ICD-10-CM

## 2023-11-17 NOTE — TELEPHONE ENCOUNTER
Caller: Aida Mcclure April    Relationship: Self    Best call back number: [phone]     Requested Prescriptions:   Requested Prescriptions     Pending Prescriptions Disp Refills    gabapentin (NEURONTIN) 300 MG capsule 90 capsule 0     Sig: Take 1 capsule by mouth 3 (Three) Times a Day for 30 days.    HYDROcodone-acetaminophen (NORCO) 7.5-325 MG per tablet 60 tablet 0     Sig: Take 1 tablet by mouth Every 12 (Twelve) Hours As Needed for Moderate Pain for up to 30 days.        Pharmacy where request should be sent: [facility] #[id number] - [place], [place] - [address] [facility] - [phone]  - [phone] FX     Last office visit with prescribing clinician: 7/20/2023   Last telemedicine visit with prescribing clinician: Visit date not found   Next office visit with prescribing clinician: 11/29/2023     Additional details provided by patient: PATIENT HAS 2 DAYS LEFT OF MEDICATION.     Does the patient have less than a 3 day supply:  [x] Yes  [] No    Would you like a call back once the refill request has been completed: [] Yes [] No    If the office needs to give you a call back, can they leave a voicemail: [x] Yes [] No    Sarah Verduzco Rep   11/17/23 14:18 EST

## 2023-11-17 NOTE — TELEPHONE ENCOUNTER
Caller: Aida Mcclure April    Relationship to patient: Self    Best call back number: [phone]     Patient is needing: PATIENT WANTING TO SPEAK WITH A NURSE OR DR. ESPINOSA ABOUT GETTING SHINGLES VACCINE. PATIENT STATES THAT SHE HAS BEEN TOLD BY SOME PEOPLE THAT DUE TO AUTOIMMUNE ISSUES THAT THE SHINGLES VACCINE WOULD BE BAD FOR HER. PATIENT REQUESTING MEDICAL PROFESSIONAL ADVICE.   PATIENT STATES A DETAILED VOICE MESSAGE CAN BE LEFT.

## 2023-11-20 NOTE — TELEPHONE ENCOUNTER
Last follow up visit date: 7/20/23    Last urine drug screen date: 3/1/23    Last consent/contract date: 2/14/23    Does patient utilize [facility] pharmacy (yes or no)?       [facility]  Hydrocodone- [facility] 9/11/23

## 2023-11-21 ENCOUNTER — TELEPHONE (OUTPATIENT)
Dept: INTERNAL MEDICINE | Facility: CLINIC | Age: 58
End: 2023-11-21

## 2023-11-21 NOTE — TELEPHONE ENCOUNTER
Caller: [employer]    Relationship to patient: Other    Best call back number: [phone]    Patient is needing: [employer] REACHING OUT BECAUSE THE PATIENT HAS REACHED OUT TO THEM FOR KNEE SUPPORTS FOR BOTH KNEES. [employer] WILL BE SENDING A FAX WITH INFORMATION ON IT FOR US TO TRY AND HELP THE PATIENT GET HER KNEE SUPPORTS.

## 2023-11-21 NOTE — TELEPHONE ENCOUNTER
Caller: Aida Mcclure April    Relationship to patient: Self    Best call back number: [phone]     Patient is needing:   PATIENT REQUESTING TO SPEAK WITH A NURSE OR PROVIDER REGARDING THE SHINGLES VACCINE. PATIENT STATES THAT SHE HAS BEEN TOLD BY SOME PEOPLE THAT DUE TO AUTOIMMUNE ISSUES THAT THE SHINGLES VACCINE WOULD BE BAD FOR HER. PATIENT REQUESTING PROFESSIONAL ADVICE.   PATIENT STATES A DETAILED VOICE MESSAGE CAN BE LEFT.

## 2023-11-22 RX ORDER — HYDROCODONE BITARTRATE AND ACETAMINOPHEN 7.5; 325 MG/1; MG/1
1 TABLET ORAL EVERY 12 HOURS PRN
Qty: 60 TABLET | Refills: 0 | Status: SHIPPED | OUTPATIENT
Start: 2023-11-22 | End: 2023-12-22

## 2023-11-22 RX ORDER — GABAPENTIN 300 MG/1
300 CAPSULE ORAL 3 TIMES DAILY
Qty: 90 CAPSULE | Refills: 0 | Status: SHIPPED | OUTPATIENT
Start: 2023-11-22 | End: 2023-12-22

## 2023-11-22 NOTE — TELEPHONE ENCOUNTER
Pt has in office apt scheduled with me next week on 11/29. Please let her know that we can discuss this during her visit. Thank You

## 2023-11-26 ENCOUNTER — APPOINTMENT (OUTPATIENT)
Dept: GENERAL RADIOLOGY | Facility: HOSPITAL | Age: 58
End: 2023-11-26
Payer: MEDICARE

## 2023-11-26 ENCOUNTER — HOSPITAL ENCOUNTER (EMERGENCY)
Facility: HOSPITAL | Age: 58
Discharge: HOME | End: 2023-11-26
Attending: EMERGENCY MEDICINE | Admitting: EMERGENCY MEDICINE
Payer: MEDICARE

## 2023-11-26 VITALS
RESPIRATION RATE: 20 BRPM | DIASTOLIC BLOOD PRESSURE: 85 MMHG | OXYGEN SATURATION: 95 % | BODY MASS INDEX: 35.38 KG/M2 | SYSTOLIC BLOOD PRESSURE: 137 MMHG | HEIGHT: 64 IN | TEMPERATURE: 98.2 F | HEART RATE: 92 BPM | WEIGHT: 207.23 LBS

## 2023-11-26 DIAGNOSIS — B33.8 RSV (RESPIRATORY SYNCYTIAL VIRUS INFECTION): Primary | ICD-10-CM

## 2023-11-26 LAB
FLUAV SUBTYP SPEC NAA+PROBE: NOT DETECTED
FLUBV RNA ISLT QL NAA+PROBE: NOT DETECTED
RSV RNA NPH QL NAA+NON-PROBE: DETECTED
SARS-COV-2 RNA RESP QL NAA+PROBE: NOT DETECTED

## 2023-11-26 PROCEDURE — 71045 X-RAY EXAM CHEST 1 VIEW: CPT

## 2023-11-26 PROCEDURE — 99283 EMERGENCY DEPT VISIT LOW MDM: CPT

## 2023-11-26 PROCEDURE — 87637 SARSCOV2&INF A&B&RSV AMP PRB: CPT | Performed by: EMERGENCY MEDICINE

## 2023-11-26 RX ORDER — BENZONATATE 200 MG/1
200 CAPSULE ORAL 3 TIMES DAILY PRN
Qty: 20 CAPSULE | Refills: 0 | Status: SHIPPED | OUTPATIENT
Start: 2023-11-26 | End: 2023-11-29 | Stop reason: SDUPTHER

## 2023-11-26 RX ORDER — BROMPHENIRAMINE MALEATE, PSEUDOEPHEDRINE HYDROCHLORIDE, AND DEXTROMETHORPHAN HYDROBROMIDE 2; 30; 10 MG/5ML; MG/5ML; MG/5ML
10 SYRUP ORAL 4 TIMES DAILY PRN
Qty: 473 ML | Refills: 0 | Status: SHIPPED | OUTPATIENT
Start: 2023-11-26

## 2023-11-27 NOTE — DISCHARGE INSTRUCTIONS
Take all medications as prescribed. Read and follow educational instructions provided to you in discharge packet. If symptoms worsen or fail to improve as anticipated return to ER. Patient agrees to treatment plan.

## 2023-11-27 NOTE — ED PROVIDER NOTES
Time: 7:07 PM EST  Date of encounter:  11/26/2023  Independent Historian/Clinical History and Information was obtained by:   Patient  Chief Complaint: Cough, shortness of breath    History is limited by: N/A    History of Present Illness:  Patient is a 58 y.o. year old female who presents to the emergency department for evaluation of for nonproductive cough for a week and a half.  Patient went to urgent care a week and a half ago for cough and tested negative for flu, strep, COVID.  Patient was told that she had bronchitis prescribed cefdinir, inhaler, cough syrup but cough is not improved.  Patient does get some relief at night when she takes her cough syrup.  Patient has CHF and is on 3 L of oxygen oxygen.    HPI    Patient Care Team  Primary Care Provider: Merline Enamorado MD    Past Medical History:     Allergies   Allergen Reactions    Aspirin Anaphylaxis     In high doses    Salsalate Hives, Anaphylaxis, Unknown - High Severity and Shortness Of Breath     Past Medical History:   Diagnosis Date    Anemia     Annual physical exam 11/06/2017    WILL RESCHEDULE MAMMOGRAM     Breast lump     CHF (congestive heart failure) 11/06/2017    FOLLOWS WITH CARDS. CURRENTLY DOING WELL ON LASIX, LISINOPRIL, ALDACTONE, AND PROPANOLOL    Degeneration of lumbar intervertebral disc 06/12/2012    Diverticulitis     Essential hypertension 11/06/2017    WELL CONTROLLED ON CURRENT REGIMEN     GERD (gastroesophageal reflux disease)     Hamstring injury 08/17/2015    BILATERAL HAMSTRING INJURY/PAIN     Head injury     Hernia cerebri     Hyperthyroidism     Hyperthyroidism 11/06/2017    CURRENTLY ON METHIMAZOLE, PT REPORT POSSIBLY DUE TO GRAVE'S DISEASE. WILL NEED TO OBTAIN AND REVIEW MEDICAL RECORDS FROM [facility]. WILL REFER TO ENDO     Hypoxia     Insomnia 11/06/2017    DISCUSSED SLEEP HYGIENE. PT TO TRY AVOIDING BLUE LIGHT AT NIGHT. PT ALSO TO SET ROUTINE PRIOR TO BEDTIME. WILL AVOID MEDS AT THIS TIME     Interstitial lung disease      Lumbosacral disc herniation 2012    LEFT L5-S1 SMALL NATURE. SHE HAS FAILED ALL CONSERVATIVE MEASURES AND DESIRES SURGERY     Lupus     SEES DR. ANDERSON    Migraines     Pulmonary embolism     Respiratory failure with hypoxia 2021    Rheumatoid arthritis 2017    CURRENTLY CONTROLLED, BUT WITH RESIDUAL DEFORMITY. WILL FOLLOW WITH DR. ANDERSON     Tachycardia     Total knee replacement status, right 2015     Past Surgical History:   Procedure Laterality Date    ANKLE SURGERY Bilateral     BREAST BIOPSY      CATARACT EXTRACTION WITH INTRAOCULAR LENS IMPLANT       SECTION      COLONOSCOPY      ENDOSCOPY N/A 2023    Procedure: ESOPHAGOGASTRODUODENOSCOPY WITH BIOPSY;  Surgeon: Shama Martin MD;  Location: [facility] ENDOSCOPY;  Service: Gastroenterology;  Laterality: N/A;  NORMAL EGD    EYE LENS IMPLANT SECONDARY      YES    FASCIOTOMY      LEFT ANTERIOR ARM     HAND SURGERY Left     KNEE SURGERY      OTHER SURGICAL HISTORY      LYMPH NODE EXCISION    PORTACATH PLACEMENT      POWER PORT PLACEMENT     TUBAL ABDOMINAL LIGATION      UPPER GASTROINTESTINAL ENDOSCOPY       Family History   Problem Relation Age of Onset    Stroke Father     Lung cancer Other     Arthritis Other     Cancer Other     Parkinsonism Other     Diabetes type II Other        Home Medications:  Prior to Admission medications    Medication Sig Start Date End Date Taking? Authorizing Provider   albuterol sulfate HFA (Proventil HFA) 108 (90 Base) MCG/ACT inhaler Every 6 (Six) Hours.    ProviderKriss MD   ascorbic acid (VITAMIN C) 250 MG tablet Take 1 tablet by mouth Daily. 21   Kriss Wilson MD   azaTHIOprine (IMURAN) 50 MG tablet  10/23/23   Kriss Wilson MD   Calcium Carbonate-Vitamin D (CALTRATE 600+D PO)     ProviderKriss MD   cetirizine (zyrTEC) 10 MG tablet TAKE 1 TABLET BY MOUTH EVERY DAY 23   Merline Enamorado MD   cyanocobalamin 1000 MCG/ML injection ADMINISTER 1 ML  IN THE MUSCLE EVERY 30 DAYS FOR 3 DOSES AS DIRECTED BY PRESCRIBER 3/28/23   Kriss Wilson MD   cyclobenzaprine (FLEXERIL) 10 MG tablet TAKE 1 TABLET BY MOUTH TWICE A DAY AS NEEDED FOR MUSCLE SPASMS 6/22/23   Merline Enamorado MD   cyclobenzaprine (FLEXERIL) 5 MG tablet Take 1 tablet by mouth 2 (Two) Times a Day As Needed. 5/2/23   Kriss Wilson MD   Diclofenac Sodium (VOLTAREN) 1 % gel gel Apply 4 g topically to the appropriate area as directed 4 (Four) Times a Day As Needed (shoulder/hip pain). 12/30/22   Merline Enamorado MD   fluticasone (FLONASE) 50 MCG/ACT nasal spray SHAKE LIQUID AND USE 2 SPRAYS(100 MCG) IN EACH NOSTRIL EVERY DAY 12/28/21   Merline Enamorado MD   furosemide (LASIX) 40 MG tablet Take 1 tablet by mouth Daily for 90 days. 5/31/23 8/31/23  Merline Enamorado MD   gabapentin (NEURONTIN) 300 MG capsule Take 1 capsule by mouth 3 (Three) Times a Day for 30 days. 11/22/23 12/22/23  Merline Enamorado MD   HYDROcodone-acetaminophen (NORCO) 7.5-325 MG per tablet Take 1 tablet by mouth Every 12 (Twelve) Hours As Needed for Moderate Pain for up to 30 days. 11/22/23 12/22/23  Merline Enamorado MD   hydroxychloroquine (PLAQUENIL) 200 MG tablet Take 1 tablet by mouth 2 (Two) Times a Day.    Kriss Wilson MD   hydrOXYzine (ATARAX) 25 MG tablet TAKE 1 TABLET BY MOUTH THREE TIMES A DAY AS NEEDED FOR ITCHING 10/2/23   Merline Enamorado MD   levothyroxine (SYNTHROID, LEVOTHROID) 137 MCG tablet TAKE 1 TABLET BY MOUTH EVERY DAY 10/5/23   Merline Enamorado MD   lisinopril (PRINIVIL,ZESTRIL) 10 MG tablet Orally    Kriss Wilson MD   loratadine (CLARITIN) 10 MG tablet TAKE 1 TABLET BY MOUTH EVERY DAY 8/7/23   Merline Enamorado MD   magnesium oxide (MAG-OX) 400 MG tablet Take 1 tablet by mouth Daily. 5/31/23   Merline Enamorado MD   methylPREDNISolone (MEDROL) 4 MG dose pack Take as directed on package instructions. 10/30/23   Mary Lucia APRN   montelukast (SINGULAIR) 10 MG  tablet TAKE 1 TABLET BY MOUTH EVERY DAY AT NIGHT 10/2/23   Merline Enamorado MD   Nintedanib Esylate (Ofev) 150 MG capsule Take 150 mg by mouth 2 (Two) Times a Day. 7/6/23   Kasey Desai APRN   O2 (OXYGEN) Inhale 3 L/min Daily.    Emergency, Nurse Bertha, RN   ondansetron (ZOFRAN) 4 MG tablet As Needed.    Kriss Wilson MD   pilocarpine (SALAGEN) 5 MG tablet Take 1 tablet by mouth 3 times a day. 9/15/23   Kriss Wilson MD   promethazine-dextromethorphan (PROMETHAZINE-DM) 6.25-15 MG/5ML syrup Take 5 mL by mouth At Night As Needed for Cough. 10/30/23   Mary Lucia APRN   propranolol (INDERAL) 40 MG tablet TK SS T PO TID 11/3/17   Kriss Wilson MD   rizatriptan (MAXALT) 10 MG tablet TAKE 1 TABLET BY MOUTH AS NEEDED (9) FOR UP TO 9 DOSES. 8/29/23   Merline Enamorado MD   rOPINIRole (REQUIP) 1 MG tablet  6/26/22   Kriss Wilson MD   spironolactone (ALDACTONE) 50 MG tablet TAKE 1 TABLET BY MOUTH EVERY DAY 10/4/23   Merline Enamorado MD   SUMAtriptan (IMITREX) 50 MG tablet Every 12 (Twelve) Hours.    Kriss Wilson MD        Social History:   Social History     Tobacco Use    Smoking status: Never     Passive exposure: Past    Smokeless tobacco: Never   Vaping Use    Vaping Use: Never used   Substance Use Topics    Alcohol use: Yes     Alcohol/week: 2.0 standard drinks of alcohol     Types: 2 Standard drinks or equivalent per week     Comment: 2 wine coolers    Drug use: Never         Review of Systems:  Review of Systems   Constitutional:  Negative for chills and fever.   HENT:  Positive for congestion. Negative for ear pain and sore throat.    Eyes:  Negative for pain.   Respiratory:  Positive for cough. Negative for chest tightness and shortness of breath.    Cardiovascular:  Negative for chest pain.   Gastrointestinal:  Negative for abdominal pain, diarrhea, nausea and vomiting.   Genitourinary:  Negative for flank pain and hematuria.   Musculoskeletal:  Negative for  "joint swelling.   Skin:  Negative for pallor.   Neurological:  Negative for seizures and headaches.   All other systems reviewed and are negative.         Physical Exam:  /85 (BP Location: Right arm, Patient Position: Sitting)   Pulse 92   Temp 98.2 °F (36.8 °C) (Oral)   Resp 20   Ht 162.6 cm (64\")   Wt 94 kg (207 lb 3.7 oz)   SpO2 95%   BMI 35.57 kg/m²     Physical Exam  Vitals and nursing note reviewed.   Constitutional:       General: She is not in acute distress.     Appearance: Normal appearance. She is not toxic-appearing.   HENT:      Head: Normocephalic and atraumatic.      Right Ear: Tympanic membrane and ear canal normal.      Left Ear: Tympanic membrane and ear canal normal.      Mouth/Throat:      Mouth: Mucous membranes are moist.   Eyes:      General: No scleral icterus.     Conjunctiva/sclera: Conjunctivae normal.   Cardiovascular:      Rate and Rhythm: Normal rate and regular rhythm.      Pulses: Normal pulses.      Heart sounds: Normal heart sounds.   Pulmonary:      Effort: Pulmonary effort is normal. No respiratory distress.      Breath sounds: Normal breath sounds.   Abdominal:      General: Bowel sounds are normal.      Palpations: Abdomen is soft.      Tenderness: There is no abdominal tenderness.   Musculoskeletal:         General: Normal range of motion.      Cervical back: Normal range of motion and neck supple.   Skin:     General: Skin is warm and dry.   Neurological:      Mental Status: She is alert and oriented to person, place, and time. Mental status is at baseline.   Psychiatric:         Judgment: Judgment normal.         Vital signs were reviewed under triage note.            Procedures:  Procedures      Medical Decision Making:      Comorbidities that affect care:    Hypertension, congestive heart failure, anemia, hypothyroidism    External Notes reviewed:    Previous Clinic Note: 10/30/2023 urgent care visit for same complaint of cough      The following orders were " placed and all results were independently analyzed by me:  Orders Placed This Encounter   Procedures    COVID-19, FLU A/B, RSV PCR 1 HR TAT - Swab, Nasopharynx    XR Chest 1 View       Medications Given in the Emergency Department:  Medications - No data to display     ED Course:        Labs:    Lab Results (last 24 hours)       Procedure Component Value Units Date/Time    COVID-19, FLU A/B, RSV PCR 1 HR TAT - Swab, Nasopharynx [[id number]]  (Abnormal) Collected: 11/26/23 1906    Specimen: Swab from Nasopharynx Updated: 11/26/23 2018     COVID19 Not Detected     Influenza A PCR Not Detected     Influenza B PCR Not Detected     RSV, PCR Detected    Narrative:      Fact sheet for providers: https://www.fda.gov/media/674341/download    Fact sheet for patients: https://www.fda.gov/media/180168/download    Test performed by PCR.             Imaging:    XR Chest 1 View    Result Date: 11/26/2023  PROCEDURE: XR CHEST 1 VW  COMPARISON: 6/8/2023.  INDICATIONS: WORSENING COUGH & SHORTNESS OF BREATH X 1 WEEK.  FINDINGS: A single AP upright portable chest radiograph is provided for review.  Decreased infiltrates are seen.  Atelectasis and/or fibrosis is (are) suggested in the lung bases.  There may be borderline cardiac enlargement.  No change in the left-sided central venous line with its distal tip in the expected right atrium.  No pneumothorax is seen.  Minimal if any pleural effusion is identified.  The patient has undergone total thyroidectomy.  There are old healed right-sided rib fractures.  There is mild dextroscoliosis of the thoracic spine.  Degenerative changes involve the bilateral shoulders and spine.       Probably no acute infiltrate.  Chronic interstitial lung disease is suggested.  Borderline cardiac enlargement is noted.      Please note that portions of this note were completed with a voice recognition program.  TERRIE ODELL JR, MD       Electronically Signed and Approved By: TERRIE ODELL JR, MD on  11/26/2023 at 20:08                 Differential Diagnosis and Discussion:    Cough: Differential diagnosis includes but is not limited to pneumonia, acute bronchitis, upper respiratory infection, ACE inhibitor use, allergic reaction, epiglottitis, seasonal allergies, chemical irritants, exercise-induced asthma, viral syndrome.    All labs were reviewed and interpreted by me.  All X-rays impressions were independently interpreted by me.    MDM     Amount and/or Complexity of Data Reviewed  Clinical lab tests: reviewed  Tests in the radiology section of CPT®: reviewed    Risk of Complications, Morbidity, and/or Mortality  Presenting problems: moderate  Diagnostic procedures: moderate  Management options: moderate             Patient Care Considerations:    Consider antibiotics however the patient has completed a round of antibiotics and signs and symptoms does not indicate the need of.      Consultants/Shared Management Plan:    None    Social Determinants of Health:    Patient is independent, reliable, and has access to care.       Disposition and Care Coordination:    Discharged: The patient is suitable and stable for discharge with no need for consideration of observation or admission.    [unfilled]  I have explained discharge medications and the need for follow up with the patient/caretakers. This was also printed in the discharge instructions. Patient was discharged with the following medications and follow up:      Medication List      No changes were made to your prescriptions during this visit.      Merline Enamorado MD  [address] [place], [place] [id number] [phone]    Schedule an appointment as soon as possible for a visit   As needed       Final diagnoses:   RSV (respiratory syncytial virus infection)        ED Disposition       ED Disposition   Discharge    Condition   Stable    Comment   --               This medical record created using voice recognition software.             Matthew  Екатерина, APRN  11/26/23 2025

## 2023-11-29 ENCOUNTER — OFFICE VISIT (OUTPATIENT)
Dept: INTERNAL MEDICINE | Facility: CLINIC | Age: 58
End: 2023-11-29
Payer: MEDICARE

## 2023-11-29 VITALS
DIASTOLIC BLOOD PRESSURE: 64 MMHG | WEIGHT: 211.2 LBS | HEART RATE: 67 BPM | OXYGEN SATURATION: 89 % | HEIGHT: 64 IN | SYSTOLIC BLOOD PRESSURE: 122 MMHG | TEMPERATURE: 97.3 F | BODY MASS INDEX: 36.06 KG/M2

## 2023-11-29 DIAGNOSIS — Z00.00 ENCOUNTER FOR ANNUAL WELLNESS VISIT (AWV) IN MEDICARE PATIENT: Primary | ICD-10-CM

## 2023-11-29 DIAGNOSIS — Z23 INFLUENZA VACCINE NEEDED: ICD-10-CM

## 2023-11-29 DIAGNOSIS — B33.8 RSV INFECTION: ICD-10-CM

## 2023-11-29 DIAGNOSIS — R05.8 POST-VIRAL COUGH SYNDROME: ICD-10-CM

## 2023-11-29 DIAGNOSIS — Z79.899 CONTROLLED SUBSTANCE AGREEMENT SIGNED: ICD-10-CM

## 2023-11-29 DIAGNOSIS — Z76.0 MEDICATION REFILL: ICD-10-CM

## 2023-11-29 DIAGNOSIS — G89.29 OTHER CHRONIC PAIN: ICD-10-CM

## 2023-11-29 LAB
25(OH)D3 SERPL-MCNC: 10.5 NG/ML (ref 30–100)
ALBUMIN SERPL-MCNC: 3.7 G/DL (ref 3.5–5.2)
ALBUMIN/GLOB SERPL: 1.5 G/DL
ALP SERPL-CCNC: 60 U/L (ref 39–117)
ALT SERPL W P-5'-P-CCNC: 7 U/L (ref 1–33)
AMPHET+METHAMPHET UR QL: NEGATIVE
AMPHETAMINE INTERNAL CONTROL: ABNORMAL
AMPHETAMINES UR QL: NEGATIVE
ANION GAP SERPL CALCULATED.3IONS-SCNC: 9 MMOL/L (ref 5–15)
AST SERPL-CCNC: 18 U/L (ref 1–32)
BARBITURATE INTERNAL CONTROL: ABNORMAL
BARBITURATES UR QL SCN: NEGATIVE
BASOPHILS # BLD AUTO: 0.07 10*3/MM3 (ref 0–0.2)
BASOPHILS NFR BLD AUTO: 1.1 % (ref 0–1.5)
BENZODIAZ UR QL SCN: NEGATIVE
BENZODIAZEPINE INTERNAL CONTROL: ABNORMAL
BILIRUB SERPL-MCNC: 0.5 MG/DL (ref 0–1.2)
BUN SERPL-MCNC: 13 MG/DL (ref 8–23)
BUN/CREAT SERPL: 12.1 (ref 7–25)
BUPRENORPHINE INTERNAL CONTROL: ABNORMAL
BUPRENORPHINE SERPL-MCNC: NEGATIVE NG/ML
CALCIUM SPEC-SCNC: 7.3 MG/DL (ref 8.6–10.5)
CANNABINOIDS SERPL QL: POSITIVE
CHLORIDE SERPL-SCNC: 111 MMOL/L (ref 98–107)
CHOLEST SERPL-MCNC: 148 MG/DL (ref 0–200)
CO2 SERPL-SCNC: 24 MMOL/L (ref 22–29)
COCAINE INTERNAL CONTROL: ABNORMAL
COCAINE UR QL: NEGATIVE
CREAT SERPL-MCNC: 1.07 MG/DL (ref 0.57–1)
DEPRECATED RDW RBC AUTO: 44.4 FL (ref 37–54)
EGFRCR SERPLBLD CKD-EPI 2021: 59.6 ML/MIN/1.73
EOSINOPHIL # BLD AUTO: 0.33 10*3/MM3 (ref 0–0.4)
EOSINOPHIL NFR BLD AUTO: 5.3 % (ref 0.3–6.2)
ERYTHROCYTE [DISTWIDTH] IN BLOOD BY AUTOMATED COUNT: 13.9 % (ref 12.3–15.4)
EXPIRATION DATE: ABNORMAL
GLOBULIN UR ELPH-MCNC: 2.4 GM/DL
GLUCOSE SERPL-MCNC: 68 MG/DL (ref 65–99)
HCT VFR BLD AUTO: 37.5 % (ref 34–46.6)
HDLC SERPL-MCNC: 53 MG/DL (ref 40–60)
HGB BLD-MCNC: 11.7 G/DL (ref 12–15.9)
IMM GRANULOCYTES # BLD AUTO: 0.01 10*3/MM3 (ref 0–0.05)
IMM GRANULOCYTES NFR BLD AUTO: 0.2 % (ref 0–0.5)
LDLC SERPL CALC-MCNC: 81 MG/DL (ref 0–100)
LDLC/HDLC SERPL: 1.54 {RATIO}
LYMPHOCYTES # BLD AUTO: 2.74 10*3/MM3 (ref 0.7–3.1)
LYMPHOCYTES NFR BLD AUTO: 44.4 % (ref 19.6–45.3)
Lab: ABNORMAL
MCH RBC QN AUTO: 27.7 PG (ref 26.6–33)
MCHC RBC AUTO-ENTMCNC: 31.2 G/DL (ref 31.5–35.7)
MCV RBC AUTO: 88.7 FL (ref 79–97)
MDMA (ECSTASY) INTERNAL CONTROL: ABNORMAL
MDMA UR QL SCN: NEGATIVE
METHADONE INTERNAL CONTROL: ABNORMAL
METHADONE UR QL SCN: NEGATIVE
METHAMPHETAMINE INTERNAL CONTROL: ABNORMAL
MONOCYTES # BLD AUTO: 0.41 10*3/MM3 (ref 0.1–0.9)
MONOCYTES NFR BLD AUTO: 6.6 % (ref 5–12)
NEUTROPHILS NFR BLD AUTO: 2.61 10*3/MM3 (ref 1.7–7)
NEUTROPHILS NFR BLD AUTO: 42.4 % (ref 42.7–76)
NRBC BLD AUTO-RTO: 0 /100 WBC (ref 0–0.2)
OPIATES INTERNAL CONTROL: ABNORMAL
OPIATES UR QL: NEGATIVE
OXYCODONE INTERNAL CONTROL: ABNORMAL
OXYCODONE UR QL SCN: NEGATIVE
PCP UR QL SCN: NEGATIVE
PHENCYCLIDINE INTERNAL CONTROL: ABNORMAL
PLATELET # BLD AUTO: 171 10*3/MM3 (ref 140–450)
PMV BLD AUTO: 12.8 FL (ref 6–12)
POTASSIUM SERPL-SCNC: 3.3 MMOL/L (ref 3.5–5.2)
PROT SERPL-MCNC: 6.1 G/DL (ref 6–8.5)
RBC # BLD AUTO: 4.23 10*6/MM3 (ref 3.77–5.28)
SODIUM SERPL-SCNC: 144 MMOL/L (ref 136–145)
T4 FREE SERPL-MCNC: 0.89 NG/DL (ref 0.93–1.7)
THC INTERNAL CONTROL: ABNORMAL
TRIGL SERPL-MCNC: 68 MG/DL (ref 0–150)
TSH SERPL DL<=0.05 MIU/L-ACNC: 38.5 UIU/ML (ref 0.27–4.2)
VLDLC SERPL-MCNC: 14 MG/DL (ref 5–40)
WBC NRBC COR # BLD AUTO: 6.17 10*3/MM3 (ref 3.4–10.8)

## 2023-11-29 PROCEDURE — 84443 ASSAY THYROID STIM HORMONE: CPT | Performed by: STUDENT IN AN ORGANIZED HEALTH CARE EDUCATION/TRAINING PROGRAM

## 2023-11-29 PROCEDURE — 84439 ASSAY OF FREE THYROXINE: CPT | Performed by: STUDENT IN AN ORGANIZED HEALTH CARE EDUCATION/TRAINING PROGRAM

## 2023-11-29 PROCEDURE — 85025 COMPLETE CBC W/AUTO DIFF WBC: CPT | Performed by: STUDENT IN AN ORGANIZED HEALTH CARE EDUCATION/TRAINING PROGRAM

## 2023-11-29 PROCEDURE — 80061 LIPID PANEL: CPT | Performed by: STUDENT IN AN ORGANIZED HEALTH CARE EDUCATION/TRAINING PROGRAM

## 2023-11-29 PROCEDURE — 80053 COMPREHEN METABOLIC PANEL: CPT | Performed by: STUDENT IN AN ORGANIZED HEALTH CARE EDUCATION/TRAINING PROGRAM

## 2023-11-29 PROCEDURE — 82306 VITAMIN D 25 HYDROXY: CPT | Performed by: STUDENT IN AN ORGANIZED HEALTH CARE EDUCATION/TRAINING PROGRAM

## 2023-11-29 RX ORDER — MONTELUKAST SODIUM 10 MG/1
10 TABLET ORAL
Qty: 90 TABLET | Refills: 1 | Status: SHIPPED | OUTPATIENT
Start: 2023-11-29

## 2023-11-29 RX ORDER — PILOCARPINE HYDROCHLORIDE 5 MG/1
5 TABLET, FILM COATED ORAL 3 TIMES DAILY
Qty: 90 TABLET | Refills: 1 | Status: SHIPPED | OUTPATIENT
Start: 2023-11-29

## 2023-11-29 RX ORDER — HYDROXYZINE HYDROCHLORIDE 25 MG/1
25 TABLET, FILM COATED ORAL EVERY 8 HOURS PRN
Qty: 90 TABLET | Refills: 1 | Status: SHIPPED | OUTPATIENT
Start: 2023-11-29

## 2023-11-29 RX ORDER — CYCLOBENZAPRINE HCL 10 MG
10 TABLET ORAL 2 TIMES DAILY PRN
Qty: 60 TABLET | Refills: 3 | Status: SHIPPED | OUTPATIENT
Start: 2023-11-29

## 2023-11-29 RX ORDER — CETIRIZINE HYDROCHLORIDE 10 MG/1
10 TABLET ORAL DAILY
Qty: 90 TABLET | Refills: 1 | Status: SHIPPED | OUTPATIENT
Start: 2023-11-29

## 2023-11-29 RX ORDER — BENZONATATE 200 MG/1
200 CAPSULE ORAL 3 TIMES DAILY PRN
Qty: 30 CAPSULE | Refills: 1 | Status: SHIPPED | OUTPATIENT
Start: 2023-11-29

## 2023-11-29 RX ORDER — CYANOCOBALAMIN 1000 UG/ML
1000 INJECTION, SOLUTION INTRAMUSCULAR; SUBCUTANEOUS
Qty: 3 ML | Refills: 3 | Status: SHIPPED | OUTPATIENT
Start: 2023-11-29

## 2023-11-29 RX ORDER — FUROSEMIDE 40 MG/1
40 TABLET ORAL DAILY
Qty: 90 TABLET | Refills: 1 | Status: SHIPPED | OUTPATIENT
Start: 2023-11-29 | End: 2024-02-27

## 2023-11-29 RX ORDER — ROPINIROLE 1 MG/1
1 TABLET, FILM COATED ORAL NIGHTLY
Qty: 90 TABLET | Refills: 1 | Status: SHIPPED | OUTPATIENT
Start: 2023-11-29

## 2023-11-29 RX ORDER — LEVOTHYROXINE SODIUM 137 UG/1
137 TABLET ORAL DAILY
Qty: 90 TABLET | Refills: 1 | Status: SHIPPED | OUTPATIENT
Start: 2023-11-29

## 2023-11-29 RX ORDER — RIZATRIPTAN BENZOATE 10 MG/1
10 TABLET ORAL AS NEEDED
Qty: 9 TABLET | Refills: 3 | Status: SHIPPED | OUTPATIENT
Start: 2023-11-29

## 2023-11-29 RX ORDER — SPIRONOLACTONE 50 MG/1
50 TABLET, FILM COATED ORAL DAILY
Qty: 90 TABLET | Refills: 1 | Status: SHIPPED | OUTPATIENT
Start: 2023-11-29

## 2023-11-29 RX ORDER — ONDANSETRON 4 MG/1
4 TABLET, FILM COATED ORAL EVERY 12 HOURS PRN
Qty: 30 TABLET | Refills: 1 | Status: SHIPPED | OUTPATIENT
Start: 2023-11-29

## 2023-11-29 NOTE — PROGRESS NOTES
"Chief Complaint  Follow-up, Cough (Been having symptoms for the last two weeks. Patient has been taking mucinex for symptoms. Went to doctor other day, tested for covid and flu, both negative. Currently on medications for symptoms. ), Nasal Congestion, and Med Refill (On all routine medications. )    Subjective            Aida Mcclure presents to [facility]  History of Present Illness    Cough:  Patient seen in the ER on 11/26 for her cough that was ongoing for past 2 weeks, along with congestion and fever. She was last febrile about 3d ago. She had POCT testing for covid/flu/rsv/ and cxr which returned positive for for RSV and CXR was negative for any acute process. She was prescribed tessalon perles and brom-phed.    Of note patient was seen at urgent care center about a few weeks prior for cough, nasal drainage for which she was treated with cefdnir and methylprednisolone at that time for bronchitis.    Today patient states she is felling much better except that her cough is still here.       Patient is also presenting today for medications refills and for AWV.   She needs many of her meds fills \"I guess I've been taking old medications\"    Past Medical History:   Diagnosis Date    Anemia     Annual physical exam 11/06/2017    WILL RESCHEDULE MAMMOGRAM     Breast lump     CHF (congestive heart failure) 11/06/2017    FOLLOWS WITH CARDS. CURRENTLY DOING WELL ON LASIX, LISINOPRIL, ALDACTONE, AND PROPANOLOL    Degeneration of lumbar intervertebral disc 06/12/2012    Diverticulitis     Essential hypertension 11/06/2017    WELL CONTROLLED ON CURRENT REGIMEN     GERD (gastroesophageal reflux disease)     Hamstring injury 08/17/2015    BILATERAL HAMSTRING INJURY/PAIN     Head injury     Hernia cerebri     Hyperthyroidism     Hyperthyroidism 11/06/2017    CURRENTLY ON METHIMAZOLE, PT REPORT POSSIBLY DUE TO GRAVE'S DISEASE. WILL NEED TO OBTAIN AND REVIEW MEDICAL " RECORDS FROM [facility] [place]. WILL REFER TO ENDO     Hypoxia     Insomnia 2017    DISCUSSED SLEEP HYGIENE. PT TO TRY AVOIDING BLUE LIGHT AT NIGHT. PT ALSO TO SET ROUTINE PRIOR TO BEDTIME. WILL AVOID MEDS AT THIS TIME     Interstitial lung disease     Lumbosacral disc herniation 2012    LEFT L5-S1 SMALL NATURE. SHE HAS FAILED ALL CONSERVATIVE MEASURES AND DESIRES SURGERY     Lupus     SEES DR. ANDERSON    Migraines     Pulmonary embolism     Respiratory failure with hypoxia 2021    Rheumatoid arthritis 2017    CURRENTLY CONTROLLED, BUT WITH RESIDUAL DEFORMITY. WILL FOLLOW WITH DR. ANDERSON     Tachycardia     Total knee replacement status, right 2015       Allergies:   Allergies   Allergen Reactions    Aspirin Anaphylaxis     In high doses    Salsalate Hives, Anaphylaxis, Unknown - High Severity and Shortness Of Breath          Past Surgical History:   Procedure Laterality Date    ANKLE SURGERY Bilateral     BREAST BIOPSY      CATARACT EXTRACTION WITH INTRAOCULAR LENS IMPLANT       SECTION      COLONOSCOPY      ENDOSCOPY N/A 2023    Procedure: ESOPHAGOGASTRODUODENOSCOPY WITH BIOPSY;  Surgeon: Shama Martin MD;  Location: [facility] ENDOSCOPY;  Service: Gastroenterology;  Laterality: N/A;  NORMAL EGD    EYE LENS IMPLANT SECONDARY      YES    FASCIOTOMY      LEFT ANTERIOR ARM     HAND SURGERY Left     KNEE SURGERY      OTHER SURGICAL HISTORY      LYMPH NODE EXCISION    PORTACATH PLACEMENT      POWER PORT PLACEMENT     TUBAL ABDOMINAL LIGATION      UPPER GASTROINTESTINAL ENDOSCOPY            Social History     Socioeconomic History    Marital status:    Tobacco Use    Smoking status: Never     Passive exposure: Past    Smokeless tobacco: Never   Vaping Use    Vaping Use: Never used   Substance and Sexual Activity    Alcohol use: Yes     Alcohol/week: 2.0 standard drinks of alcohol     Types: 2 Standard drinks or equivalent per week     Comment: 2 wine coolers    Drug use: Never     Sexual activity: Not Currently     Birth control/protection: Tubal ligation         Family History   Problem Relation Age of Onset    Stroke Father     Lung cancer Other     Arthritis Other     Cancer Other     Parkinsonism Other     Diabetes type II Other           Health Maintenance Due   Topic Date Due    COLORECTAL CANCER SCREENING  Never done    TDAP/TD VACCINES (1 - Tdap) Never done    ZOSTER VACCINE (2 of 2) 02/24/2023    COVID-19 Vaccine (3 - 2023-24 season) 09/01/2023    BMI FOLLOWUP  11/18/2023            Current Outpatient Medications:     albuterol sulfate HFA (Proventil HFA) 108 (90 Base) MCG/ACT inhaler, Every 6 (Six) Hours., Disp: , Rfl:     ascorbic acid (VITAMIN C) 250 MG tablet, Take 1 tablet by mouth Daily., Disp: , Rfl:     azaTHIOprine (IMURAN) 50 MG tablet, , Disp: , Rfl:     benzonatate (TESSALON) 200 MG capsule, Take 1 capsule by mouth 3 (Three) Times a Day As Needed for Cough., Disp: 30 capsule, Rfl: 1    brompheniramine-pseudoephedrine-DM 30-2-10 MG/5ML syrup, Take 10 mL by mouth 4 (Four) Times a Day As Needed for Cough., Disp: 473 mL, Rfl: 0    Calcium Carbonate-Vitamin D (CALTRATE 600+D PO), , Disp: , Rfl:     cetirizine (zyrTEC) 10 MG tablet, Take 1 tablet by mouth Daily., Disp: 90 tablet, Rfl: 1    cyanocobalamin 1000 MCG/ML injection, Inject 1 mL under the skin into the appropriate area as directed Every 30 (Thirty) Days., Disp: 3 mL, Rfl: 3    cyclobenzaprine (FLEXERIL) 10 MG tablet, Take 1 tablet by mouth 2 (Two) Times a Day As Needed for Muscle Spasms., Disp: 60 tablet, Rfl: 3    Diclofenac Sodium (VOLTAREN) 1 % gel gel, Apply 4 g topically to the appropriate area as directed 4 (Four) Times a Day As Needed (shoulder/hip pain)., Disp: 100 g, Rfl: 3    fluticasone (FLONASE) 50 MCG/ACT nasal spray, SHAKE LIQUID AND USE 2 SPRAYS(100 MCG) IN EACH NOSTRIL EVERY DAY, Disp: 16 g, Rfl: 3    furosemide (LASIX) 40 MG tablet, Take 1 tablet by mouth Daily for 90 days., Disp: 90 tablet, Rfl:  1    gabapentin (NEURONTIN) 300 MG capsule, Take 1 capsule by mouth 3 (Three) Times a Day for 30 days., Disp: 90 capsule, Rfl: 0    HYDROcodone-acetaminophen (NORCO) 7.5-325 MG per tablet, Take 1 tablet by mouth Every 12 (Twelve) Hours As Needed for Moderate Pain for up to 30 days., Disp: 60 tablet, Rfl: 0    hydroxychloroquine (PLAQUENIL) 200 MG tablet, Take 1 tablet by mouth 2 (Two) Times a Day., Disp: , Rfl:     hydrOXYzine (ATARAX) 25 MG tablet, Take 1 tablet by mouth Every 8 (Eight) Hours As Needed for Itching., Disp: 90 tablet, Rfl: 1    levothyroxine (SYNTHROID, LEVOTHROID) 137 MCG tablet, Take 1 tablet by mouth Daily., Disp: 90 tablet, Rfl: 1    magnesium oxide (MAG-OX) 400 MG tablet, Take 1 tablet by mouth Daily., Disp: 90 tablet, Rfl: 1    montelukast (SINGULAIR) 10 MG tablet, Take 1 tablet by mouth every night at bedtime., Disp: 90 tablet, Rfl: 1    Nintedanib Esylate (Ofev) 150 MG capsule, Take 150 mg by mouth 2 (Two) Times a Day., Disp: 60 capsule, Rfl: 5    O2 (OXYGEN), Inhale 3 L/min Daily., Disp: , Rfl:     ondansetron (ZOFRAN) 4 MG tablet, Take 1 tablet by mouth Every 12 (Twelve) Hours As Needed for Vomiting or Nausea., Disp: 30 tablet, Rfl: 1    pilocarpine (SALAGEN) 5 MG tablet, Take 1 tablet by mouth 3 times a day., Disp: 90 tablet, Rfl: 1    promethazine-dextromethorphan (PROMETHAZINE-DM) 6.25-15 MG/5ML syrup, Take 5 mL by mouth At Night As Needed for Cough., Disp: 75 mL, Rfl: 0    rizatriptan (MAXALT) 10 MG tablet, Take 1 tablet by mouth As Needed (9) for up to 9 doses., Disp: 9 tablet, Rfl: 3    rOPINIRole (REQUIP) 1 MG tablet, Take 1 tablet by mouth Every Night., Disp: 90 tablet, Rfl: 1    spironolactone (ALDACTONE) 50 MG tablet, Take 1 tablet by mouth Daily., Disp: 90 tablet, Rfl: 1    methylPREDNISolone (MEDROL) 4 MG dose pack, Take as directed on package instructions. (Patient not taking: Reported on 11/29/2023), Disp: 21 tablet, Rfl: 0      Immunization History   Administered Date(s)  "Administered    COVID-19 (SANDI) 03/29/2021    COVID-19 (PFIZER) Purple Cap Monovalent 12/16/2021    Flu Vaccine Intradermal Quad 18-64YR 01/02/2008, 11/10/2008, 11/05/2009    Flu Vaccine Quad PF >36MO 10/25/2017    Fluzone (or Fluarix & Flulaval for VFC) >6mos 10/25/2017, 11/18/2021, 10/12/2022, 11/29/2023    Hepatitis A 04/30/2018    Influenza Quad Vaccine (Inpatient) 08/27/2012, 10/15/2013    Influenza Seasonal Injectable 01/02/2008, 11/10/2008, 11/05/2009    Influenza, Unspecified 09/17/2020, 10/01/2021, 10/12/2022    Pneumococcal Conjugate 13-Valent (PCV13) 06/14/2018    Pneumococcal Polysaccharide (PPSV23) 08/26/2019    Shingrix 12/30/2022         Review of Systems       Objective       Vitals:    11/29/23 1059   BP: 122/64   BP Location: Right arm   Patient Position: Sitting   Cuff Size: Large Adult   Pulse: 67   Temp: 97.3 °F (36.3 °C)   TempSrc: Temporal   SpO2: (!) 89%   Weight: 95.8 kg (211 lb 3.2 oz)   Height: 162.6 cm (64.02\")     Body mass index is 36.23 kg/m².      Physical Exam  Vitals reviewed.   Constitutional:       Appearance: Normal appearance.   HENT:      Head: Normocephalic and atraumatic.      Nose: Nose normal.   Eyes:      Extraocular Movements: Extraocular movements intact.      Conjunctiva/sclera: Conjunctivae normal.   Cardiovascular:      Rate and Rhythm: Normal rate and regular rhythm.      Pulses: Normal pulses.      Heart sounds: Normal heart sounds.   Pulmonary:      Effort: Pulmonary effort is normal. No respiratory distress.      Breath sounds: Normal breath sounds.   Musculoskeletal:         General: Normal range of motion.   Skin:     General: Skin is warm and dry.   Neurological:      General: No focal deficit present.      Mental Status: She is alert and oriented to person, place, and time.      Cranial Nerves: No cranial nerve deficit.   Psychiatric:         Mood and Affect: Mood normal.         Behavior: Behavior normal.         Thought Content: Thought content normal. "           Result Review :                           Assessment and Plan      Diagnoses and all orders for this visit:    1. Encounter for annual wellness visit (AWV) in Medicare patient (Primary)  Comments:  pt is at baseline state of health, escept as outlined below.    2. Post-viral cough syndrome  Comments:  subacute. Provided reassurance.    3. RSV infection  Comments:  unremarkable exam except for post viral cough.    4. Influenza vaccine needed  Comments:  administered in office today and pt tolerated well.  Orders:  -     Fluzone (or Fluarix & Flulaval for VFC) >6mos    5. Other chronic pain  Comments:  Chronic and stable. Due for refill of norco.       6. Medication refill  Comments:  refilled meds.  Orders:  -     rizatriptan (MAXALT) 10 MG tablet; Take 1 tablet by mouth As Needed (9) for up to 9 doses.  Dispense: 9 tablet; Refill: 3  -     furosemide (LASIX) 40 MG tablet; Take 1 tablet by mouth Daily for 90 days.  Dispense: 90 tablet; Refill: 1  -     Diclofenac Sodium (VOLTAREN) 1 % gel gel; Apply 4 g topically to the appropriate area as directed 4 (Four) Times a Day As Needed (shoulder/hip pain).  Dispense: 100 g; Refill: 3  -     pilocarpine (SALAGEN) 5 MG tablet; Take 1 tablet by mouth 3 times a day.  Dispense: 90 tablet; Refill: 1  -     cetirizine (zyrTEC) 10 MG tablet; Take 1 tablet by mouth Daily.  Dispense: 90 tablet; Refill: 1  -     cyanocobalamin 1000 MCG/ML injection; Inject 1 mL under the skin into the appropriate area as directed Every 30 (Thirty) Days.  Dispense: 3 mL; Refill: 3  -     cyclobenzaprine (FLEXERIL) 10 MG tablet; Take 1 tablet by mouth 2 (Two) Times a Day As Needed for Muscle Spasms.  Dispense: 60 tablet; Refill: 3  -     hydrOXYzine (ATARAX) 25 MG tablet; Take 1 tablet by mouth Every 8 (Eight) Hours As Needed for Itching.  Dispense: 90 tablet; Refill: 1  -     levothyroxine (SYNTHROID, LEVOTHROID) 137 MCG tablet; Take 1 tablet by mouth Daily.  Dispense: 90 tablet; Refill: 1  -      montelukast (SINGULAIR) 10 MG tablet; Take 1 tablet by mouth every night at bedtime.  Dispense: 90 tablet; Refill: 1  -     rOPINIRole (REQUIP) 1 MG tablet; Take 1 tablet by mouth Every Night.  Dispense: 90 tablet; Refill: 1  -     spironolactone (ALDACTONE) 50 MG tablet; Take 1 tablet by mouth Daily.  Dispense: 90 tablet; Refill: 1    7. Controlled substance agreement signed  Comments:  signed. Due for UDS. collected today.  Orders:  -     POC Urine Drug Screen [facility]    Other orders  -     benzonatate (TESSALON) 200 MG capsule; Take 1 capsule by mouth 3 (Three) Times a Day As Needed for Cough.  Dispense: 30 capsule; Refill: 1  -     ondansetron (ZOFRAN) 4 MG tablet; Take 1 tablet by mouth Every 12 (Twelve) Hours As Needed for Vomiting or Nausea.  Dispense: 30 tablet; Refill: 1      She is overdue for labs.   Port was accessed in office for lab draw of previous lab orders.         Follow Up     Return in about 6 weeks (around 1/10/2024) for cough,.    Patient was given instructions and counseling regarding her condition or for health maintenance advice. Please see specific information pulled into the AVS if appropriate.     Merline Enamorado MD   Internal Medicine-Pediatrics

## 2023-11-29 NOTE — PROGRESS NOTES
The ABCs of the Annual Wellness Visit  Subsequent Medicare Wellness Visit    Subjective      Aida Mcclure is a 58 y.o. female who presents for a Subsequent Medicare Wellness Visit.    The following portions of the patient's history were reviewed and   updated as appropriate: allergies, current medications, past family history, past medical history, past social history, past surgical history, and problem list.    Compared to one year ago, the patient feels her physical   health is the same.    Compared to one year ago, the patient feels her mental   health is the same.    Recent Hospitalizations:  She was not admitted to the hospital during the last year.       Current Medical Providers:  Patient Care Team:  Merline Enamorado MD as PCP - General (Internal Medicine)    Outpatient Medications Prior to Visit   Medication Sig Dispense Refill    albuterol sulfate HFA (Proventil HFA) 108 (90 Base) MCG/ACT inhaler Every 6 (Six) Hours.      ascorbic acid (VITAMIN C) 250 MG tablet Take 1 tablet by mouth Daily.      azaTHIOprine (IMURAN) 50 MG tablet       brompheniramine-pseudoephedrine-DM 30-2-10 MG/5ML syrup Take 10 mL by mouth 4 (Four) Times a Day As Needed for Cough. 473 mL 0    Calcium Carbonate-Vitamin D (CALTRATE 600+D PO)       fluticasone (FLONASE) 50 MCG/ACT nasal spray SHAKE LIQUID AND USE 2 SPRAYS(100 MCG) IN EACH NOSTRIL EVERY DAY 16 g 3    gabapentin (NEURONTIN) 300 MG capsule Take 1 capsule by mouth 3 (Three) Times a Day for 30 days. 90 capsule 0    HYDROcodone-acetaminophen (NORCO) 7.5-325 MG per tablet Take 1 tablet by mouth Every 12 (Twelve) Hours As Needed for Moderate Pain for up to 30 days. 60 tablet 0    hydroxychloroquine (PLAQUENIL) 200 MG tablet Take 1 tablet by mouth 2 (Two) Times a Day.      magnesium oxide (MAG-OX) 400 MG tablet Take 1 tablet by mouth Daily. 90 tablet 1    Nintedanib Esylate (Ofev) 150 MG capsule Take 150 mg by mouth 2 (Two) Times a Day. 60 capsule 5    O2 (OXYGEN) Inhale 3  L/min Daily.      promethazine-dextromethorphan (PROMETHAZINE-DM) 6.25-15 MG/5ML syrup Take 5 mL by mouth At Night As Needed for Cough. 75 mL 0    benzonatate (TESSALON) 200 MG capsule Take 1 capsule by mouth 3 (Three) Times a Day As Needed for Cough. 20 capsule 0    cetirizine (zyrTEC) 10 MG tablet TAKE 1 TABLET BY MOUTH EVERY DAY 30 tablet 1    cyanocobalamin 1000 MCG/ML injection ADMINISTER 1 ML IN THE MUSCLE EVERY 30 DAYS FOR 3 DOSES AS DIRECTED BY PRESCRIBER      Diclofenac Sodium (VOLTAREN) 1 % gel gel Apply 4 g topically to the appropriate area as directed 4 (Four) Times a Day As Needed (shoulder/hip pain). 100 g 3    hydrOXYzine (ATARAX) 25 MG tablet TAKE 1 TABLET BY MOUTH THREE TIMES A DAY AS NEEDED FOR ITCHING 90 tablet 1    levothyroxine (SYNTHROID, LEVOTHROID) 137 MCG tablet TAKE 1 TABLET BY MOUTH EVERY DAY 90 tablet 1    lisinopril (PRINIVIL,ZESTRIL) 10 MG tablet Orally      loratadine (CLARITIN) 10 MG tablet TAKE 1 TABLET BY MOUTH EVERY DAY 90 tablet 2    montelukast (SINGULAIR) 10 MG tablet TAKE 1 TABLET BY MOUTH EVERY DAY AT NIGHT 30 tablet 1    ondansetron (ZOFRAN) 4 MG tablet As Needed.      pilocarpine (SALAGEN) 5 MG tablet Take 1 tablet by mouth 3 times a day.      propranolol (INDERAL) 40 MG tablet TK SS T PO TID  1    rizatriptan (MAXALT) 10 MG tablet TAKE 1 TABLET BY MOUTH AS NEEDED (9) FOR UP TO 9 DOSES. 9 tablet 3    rOPINIRole (REQUIP) 1 MG tablet       spironolactone (ALDACTONE) 50 MG tablet TAKE 1 TABLET BY MOUTH EVERY DAY 90 tablet 1    SUMAtriptan (IMITREX) 50 MG tablet Every 12 (Twelve) Hours.      methylPREDNISolone (MEDROL) 4 MG dose pack Take as directed on package instructions. (Patient not taking: Reported on 11/29/2023) 21 tablet 0    cyclobenzaprine (FLEXERIL) 10 MG tablet TAKE 1 TABLET BY MOUTH TWICE A DAY AS NEEDED FOR MUSCLE SPASMS (Patient not taking: Reported on 11/29/2023) 30 tablet 1    cyclobenzaprine (FLEXERIL) 5 MG tablet Take 1 tablet by mouth 2 (Two) Times a Day As  Needed. (Patient not taking: Reported on 11/29/2023)      furosemide (LASIX) 40 MG tablet Take 1 tablet by mouth Daily for 90 days. 90 tablet 1     No facility-administered medications prior to visit.       Opioid medication/s are on active medication list.  and I have evaluated her active treatment plan and pain score trends (see table).  There were no vitals filed for this visit.  I have reviewed the chart for potential of high risk medication and harmful drug interactions in the elderly.          Aspirin is not on active medication list.  Aspirin use is not indicated based on review of current medical condition/s. Risk of harm outweighs potential benefits.  .    Patient Active Problem List   Diagnosis    Vitamin D deficiency    Sleep disturbance    Heat intolerance    Palpitations    Thyrotoxicosis with diffuse goiter and without thyroid storm    Cold intolerance    Chronic fatigue    Premature menopause    Hyperglycemia    Abdominal hernia    Anemia    Therapeutic drug monitoring    Breast lump    CHF (congestive heart failure)    Degeneration of lumbar intervertebral disc    Displacement of lumbar intervertebral disc without myelopathy    Esophageal reflux    Essential hypertension    Head injury    Hyperthyroidism    Hypoxia    Insomnia    ILD (interstitial lung disease)    Migraines    Pulmonary embolism    Respiratory failure with hypoxia    Rheumatoid arthritis    Systemic lupus erythematosus    Tachycardia    Fatigue    Chronic dyspnea    Personal history of PE (pulmonary embolism)    Mixed connective tissue disease    Leg edema    SHANELLE (obstructive sleep apnea)    Chronic diastolic CHF (congestive heart failure)    Cough    Dyspnea on exertion    Food poisoning    Bilateral lower extremity edema    Hypoalbuminemia    Leukopenia    Other long term (current) drug therapy    Polyarthritis    Long term current use of systemic steroids    Pulmonary fibrosis    Proteinuria    Stage 4 chronic kidney disease     "Renal impairment    Chronic diastolic heart failure    Encounter for immunization    Interstitial lung disease    Disorder of connective tissue    Lumbar radiculopathy    Flu vaccine need    Seasonal allergies    Excessive daytime sleepiness    Generalized abdominal pain    Acute pain of right shoulder    Restrictive lung disease     Advance Care Planning   Advance Care Planning     Advance Directive is on file.  ACP discussion was held with the patient during this visit. Patient has an advance directive in EMR which is still valid.      Objective    Vitals:    11/29/23 1059   BP: 122/64   BP Location: Right arm   Patient Position: Sitting   Cuff Size: Large Adult   Pulse: 67   Temp: 97.3 °F (36.3 °C)   TempSrc: Temporal   SpO2: (!) 89%   Weight: 95.8 kg (211 lb 3.2 oz)   Height: 162.6 cm (64.02\")     Estimated body mass index is 36.23 kg/m² as calculated from the following:    Height as of this encounter: 162.6 cm (64.02\").    Weight as of this encounter: 95.8 kg (211 lb 3.2 oz).    Class 2 Severe Obesity (BMI >=35 and <=39.9). Obesity-related health conditions include the following: hypertension and chronic lung disease . Obesity is unchanged. BMI is is above average; BMI management plan is completed. We discussed portion control and increasing exercise.      Does the patient have evidence of cognitive impairment?   No    Lab Results   Component Value Date    TRIG 68 11/29/2023    HDL 53 11/29/2023    LDL 81 11/29/2023    VLDL 14 11/29/2023          HEALTH RISK ASSESSMENT    Smoking Status:  Social History     Tobacco Use   Smoking Status Never    Passive exposure: Past   Smokeless Tobacco Never     Alcohol Consumption:  Social History     Substance and Sexual Activity   Alcohol Use Yes    Alcohol/week: 2.0 standard drinks of alcohol    Types: 2 Standard drinks or equivalent per week    Comment: 2 wine coolers     Fall Risk Screen:    STEADI Fall Risk Assessment has not been completed.    Depression Screening:     "  2023    11:34 AM   PHQ-2/PHQ-9 Depression Screening   Little Interest or Pleasure in Doing Things 0-->not at all   Feeling Down, Depressed or Hopeless 0-->not at all   PHQ-9: Brief Depression Severity Measure Score 0       Health Habits and Functional and Cognitive Screenin/29/2023    12:00 PM   Functional & Cognitive Status   Do you have difficulty preparing food and eating? No   Do you have difficulty bathing yourself, getting dressed or grooming yourself? No   Do you have difficulty using the toilet? No   Do you have difficulty moving around from place to place? No   Do you have trouble with steps or getting out of a bed or a chair? No   Current Diet Well Balanced Diet   Dental Exam Up to date   Eye Exam Up to date   Exercise (times per week) 4 times per week   Current Exercises Include Walking;Light Weights;House Cleaning   Do you need help using the phone?  No   Are you deaf or do you have serious difficulty hearing?  No   Do you need help to go to places out of walking distance? No   Do you need help shopping? No   Do you need help preparing meals?  No   Do you need help with housework?  No   Do you need help with laundry? No   Do you need help taking your medications? Yes   Do you need help managing money? No   Do you ever drive or ride in a car without wearing a seat belt? Yes   Have you felt unusual stress, anger or loneliness in the last month? No   Who do you live with? Alone   If you need help, do you have trouble finding someone available to you? No   Have you been bothered in the last four weeks by sexual problems? No   Do you have difficulty concentrating, remembering or making decisions? Yes       Age-appropriate Screening Schedule:  Refer to the list below for future screening recommendations based on patient's age, sex and/or medical conditions. Orders for these recommended tests are listed in the plan section. The patient has been provided with a written plan.    Health Maintenance    Topic Date Due    COLORECTAL CANCER SCREENING  Never done    TDAP/TD VACCINES (1 - Tdap) Never done    ZOSTER VACCINE (2 of 2) 02/24/2023    COVID-19 Vaccine (3 - 2023-24 season) 09/01/2023    BMI FOLLOWUP  11/18/2023    Pneumococcal Vaccine 0-64 (3 - PPSV23 or PCV20) 08/26/2024    ANNUAL WELLNESS VISIT  11/29/2024    LIPID PANEL  11/29/2024    MAMMOGRAM  06/06/2025    PAP SMEAR  12/14/2025    HEPATITIS C SCREENING  Completed    INFLUENZA VACCINE  Completed                  CMS Preventative Services Quick Reference  Risk Factors Identified During Encounter:    Chronic Pain:  chronic and at baseline.    The above risks/problems have been discussed with the patient.  Pertinent information has been shared with the patient in the After Visit Summary.    Diagnoses and all orders for this visit:    1. Encounter for annual wellness visit (AWV) in Medicare patient (Primary)  Comments:  pt is at baseline state of health, escept as outlined below.    Follow Up:   Next Medicare Wellness visit to be scheduled in 1 year.      An After Visit Summary and PPPS were made available to the patient.

## 2023-11-30 NOTE — PROGRESS NOTES
"Accessed port-a-cath per policy, utilizing sterile technique. Port-a-cath flushed with 20mL Normal saline; as well as 500 units of Heparin. Port-a-cath deaccessed and bandage applied to site. Patient tolerated well.  Lab draw completed while patient in office for visit via PAC.  Patient is not currently receiving treatment via PAC; confirmed device was placed \"years ago\" due to poor venous access.  Patient confirms that she typically gets PAC flushed and maintained once a month per Dr. Boone's office.  Primary care provider notified.      Shannan Cervantes RN BSN  [facility]   "

## 2023-12-04 DIAGNOSIS — E83.51 HYPOCALCEMIA: Primary | ICD-10-CM

## 2023-12-04 DIAGNOSIS — E03.9 HYPOTHYROIDISM, UNSPECIFIED TYPE: ICD-10-CM

## 2023-12-31 PROCEDURE — 87635 SARS-COV-2 COVID-19 AMP PRB: CPT | Performed by: STUDENT IN AN ORGANIZED HEALTH CARE EDUCATION/TRAINING PROGRAM

## 2024-01-11 ENCOUNTER — OFFICE VISIT (OUTPATIENT)
Dept: INTERNAL MEDICINE | Facility: CLINIC | Age: 59
End: 2024-01-11
Payer: MEDICARE

## 2024-01-11 VITALS
BODY MASS INDEX: 34.83 KG/M2 | OXYGEN SATURATION: 98 % | DIASTOLIC BLOOD PRESSURE: 88 MMHG | TEMPERATURE: 97.2 F | WEIGHT: 203 LBS | SYSTOLIC BLOOD PRESSURE: 138 MMHG | HEART RATE: 95 BPM

## 2024-01-11 DIAGNOSIS — Z51.81 MEDICATION MONITORING ENCOUNTER: ICD-10-CM

## 2024-01-11 DIAGNOSIS — Z76.0 MEDICATION REFILL: ICD-10-CM

## 2024-01-11 DIAGNOSIS — M19.90 ARTHRITIS: ICD-10-CM

## 2024-01-11 DIAGNOSIS — E03.9 HYPOTHYROIDISM, UNSPECIFIED TYPE: ICD-10-CM

## 2024-01-11 DIAGNOSIS — R05.1 ACUTE COUGH: Primary | ICD-10-CM

## 2024-01-11 DIAGNOSIS — G89.29 OTHER CHRONIC PAIN: ICD-10-CM

## 2024-01-11 LAB
AMPHET+METHAMPHET UR QL: NEGATIVE
AMPHETAMINE INTERNAL CONTROL: NORMAL
AMPHETAMINES UR QL: NEGATIVE
ANION GAP SERPL CALCULATED.3IONS-SCNC: 11 MMOL/L (ref 5–15)
BARBITURATE INTERNAL CONTROL: NORMAL
BARBITURATES UR QL SCN: NEGATIVE
BENZODIAZ UR QL SCN: NEGATIVE
BENZODIAZEPINE INTERNAL CONTROL: NORMAL
BUN SERPL-MCNC: 13 MG/DL (ref 8–23)
BUN/CREAT SERPL: 9.1 (ref 7–25)
BUPRENORPHINE INTERNAL CONTROL: NORMAL
BUPRENORPHINE SERPL-MCNC: NEGATIVE NG/ML
CA-I BLD-MCNC: 4.9 MG/DL (ref 4.6–5.4)
CA-I SERPL ISE-MCNC: 1.22 MMOL/L (ref 1.15–1.35)
CALCIUM SPEC-SCNC: 9.7 MG/DL (ref 8.6–10.5)
CANNABINOIDS SERPL QL: NEGATIVE
CHLORIDE SERPL-SCNC: 103 MMOL/L (ref 98–107)
CO2 SERPL-SCNC: 27 MMOL/L (ref 22–29)
COCAINE INTERNAL CONTROL: NORMAL
COCAINE UR QL: NEGATIVE
CREAT SERPL-MCNC: 1.43 MG/DL (ref 0.57–1)
EGFRCR SERPLBLD CKD-EPI 2021: 42.1 ML/MIN/1.73
EXPIRATION DATE: NORMAL
GLUCOSE SERPL-MCNC: 65 MG/DL (ref 65–99)
Lab: NORMAL
MDMA (ECSTASY) INTERNAL CONTROL: NORMAL
MDMA UR QL SCN: NEGATIVE
METHADONE INTERNAL CONTROL: NORMAL
METHADONE UR QL SCN: NEGATIVE
METHAMPHETAMINE INTERNAL CONTROL: NORMAL
MORPHINE INTERNAL CONTROL: NORMAL
MORPHINE UR QL SCN: NEGATIVE
OXYCODONE INTERNAL CONTROL: NORMAL
OXYCODONE UR QL SCN: NEGATIVE
PCP UR QL SCN: NEGATIVE
PHENCYCLIDINE INTERNAL CONTROL: NORMAL
POTASSIUM SERPL-SCNC: 4.4 MMOL/L (ref 3.5–5.2)
PROPOXYPH UR QL SCN: NEGATIVE
PROPOXYPHENE INTERNAL CONTROL: NORMAL
PTH-INTACT SERPL-MCNC: 34.3 PG/ML (ref 15–65)
SODIUM SERPL-SCNC: 141 MMOL/L (ref 136–145)
T4 FREE SERPL-MCNC: 1.36 NG/DL (ref 0.93–1.7)
THC INTERNAL CONTROL: NORMAL
TRICYCLIC ANTIDEPRESSANTS INTERNAL CONTROL: NORMAL
TRICYCLICS UR QL SCN: NEGATIVE
TSH SERPL DL<=0.05 MIU/L-ACNC: 27.1 UIU/ML (ref 0.27–4.2)

## 2024-01-11 PROCEDURE — 80048 BASIC METABOLIC PNL TOTAL CA: CPT | Performed by: STUDENT IN AN ORGANIZED HEALTH CARE EDUCATION/TRAINING PROGRAM

## 2024-01-11 PROCEDURE — 83970 ASSAY OF PARATHORMONE: CPT | Performed by: STUDENT IN AN ORGANIZED HEALTH CARE EDUCATION/TRAINING PROGRAM

## 2024-01-11 PROCEDURE — 84439 ASSAY OF FREE THYROXINE: CPT | Performed by: STUDENT IN AN ORGANIZED HEALTH CARE EDUCATION/TRAINING PROGRAM

## 2024-01-11 PROCEDURE — 82330 ASSAY OF CALCIUM: CPT | Performed by: STUDENT IN AN ORGANIZED HEALTH CARE EDUCATION/TRAINING PROGRAM

## 2024-01-11 PROCEDURE — 84443 ASSAY THYROID STIM HORMONE: CPT | Performed by: STUDENT IN AN ORGANIZED HEALTH CARE EDUCATION/TRAINING PROGRAM

## 2024-01-11 RX ORDER — MAGNESIUM OXIDE 400 MG/1
1 TABLET ORAL DAILY
Qty: 90 TABLET | Refills: 1 | Status: SHIPPED | OUTPATIENT
Start: 2024-01-11

## 2024-01-11 RX ORDER — GABAPENTIN 300 MG/1
300 CAPSULE ORAL 3 TIMES DAILY
Qty: 90 CAPSULE | Refills: 0 | Status: SHIPPED | OUTPATIENT
Start: 2024-01-11 | End: 2024-02-10

## 2024-01-11 RX ORDER — HYDROCODONE BITARTRATE AND ACETAMINOPHEN 7.5; 325 MG/1; MG/1
1 TABLET ORAL EVERY 12 HOURS PRN
Qty: 60 TABLET | Refills: 0 | Status: SHIPPED | OUTPATIENT
Start: 2024-01-11 | End: 2024-02-10

## 2024-01-11 NOTE — PROGRESS NOTES
"Chief Complaint  Cough (Follow up), URI ( on 12/31), and Arthritis (States it seems to be getting worse in hands.)    Subjective            Aidakael Mcclure presents to [facility]  History of Present Illness  Cough:  Pt seen at  on 12/31 for cough. POCT for flu and covid were negative.   She was treated with doxycycline for 10d course and has 3d of abx left.   Pt today states that she is starting to feel better. States she is still coughing but is doing much better over all.     Arthritis:   Pt w/ RA, following w/ rheum states arthritis in her hands seems to be getting worse. States her right thumb, the pip joint is doubled in size. Endorses swelling over the pip joints of bilateral index fingers are also more swollen. No tenderness or overlying redness/warmth/or rash.   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     States her thumb is also turning more inward which is limiting her ability to  items. She is right handed.   She last saw rheumatology about 2 mos ago and her thumb/hands were not as bad as they are now.     Pt also presenting for medication refill:   Requesting refill for norco, gabapentin and magnesium.     Pulmonary fibrosis:  She is on Ofevfor this and has nausea with this agent. Would like to know if otc anti nausea agent is safe to take with this.       Past Medical History:   Diagnosis Date    Anemia     Annual physical exam 11/06/2017    WILL RESCHEDULE MAMMOGRAM     Breast lump     CHF (congestive heart failure) 11/06/2017    FOLLOWS WITH CARDS. CURRENTLY DOING WELL ON LASIX, LISINOPRIL, ALDACTONE, AND PROPANOLOL    Degeneration of lumbar intervertebral disc 06/12/2012    Diverticulitis     Essential hypertension 11/06/2017    WELL CONTROLLED ON CURRENT REGIMEN     GERD (gastroesophageal reflux disease)     Hamstring injury 08/17/2015    BILATERAL HAMSTRING " INJURY/PAIN     Head injury     Hernia cerebri     Hyperthyroidism     Hyperthyroidism 2017    CURRENTLY ON METHIMAZOLE, PT REPORT POSSIBLY DUE TO GRAVE'S DISEASE. WILL NEED TO OBTAIN AND REVIEW MEDICAL RECORDS FROM [facility]. WILL REFER TO ENDO     Hypoxia     Insomnia 2017    DISCUSSED SLEEP HYGIENE. PT TO TRY AVOIDING BLUE LIGHT AT NIGHT. PT ALSO TO SET ROUTINE PRIOR TO BEDTIME. WILL AVOID MEDS AT THIS TIME     Interstitial lung disease     Lumbosacral disc herniation 2012    LEFT L5-S1 SMALL NATURE. SHE HAS FAILED ALL CONSERVATIVE MEASURES AND DESIRES SURGERY     Lupus     SEES DR. ANDERSON    Migraines     Pulmonary embolism     Respiratory failure with hypoxia 2021    Rheumatoid arthritis 2017    CURRENTLY CONTROLLED, BUT WITH RESIDUAL DEFORMITY. WILL FOLLOW WITH DR. ANDERSON     Tachycardia     Total knee replacement status, right 2015       Allergies:   Allergies   Allergen Reactions    Aspirin Anaphylaxis     In high doses    Salsalate Hives, Anaphylaxis, Unknown - High Severity and Shortness Of Breath          Past Surgical History:   Procedure Laterality Date    ANKLE SURGERY Bilateral     BREAST BIOPSY      CATARACT EXTRACTION WITH INTRAOCULAR LENS IMPLANT       SECTION      COLONOSCOPY      ENDOSCOPY N/A 2023    Procedure: ESOPHAGOGASTRODUODENOSCOPY WITH BIOPSY;  Surgeon: Shama Martin MD;  Location: [facility] ENDOSCOPY;  Service: Gastroenterology;  Laterality: N/A;  NORMAL EGD    EYE LENS IMPLANT SECONDARY      YES    FASCIOTOMY      LEFT ANTERIOR ARM     HAND SURGERY Left     KNEE SURGERY      OTHER SURGICAL HISTORY      LYMPH NODE EXCISION    PORTACATH PLACEMENT      POWER PORT PLACEMENT     TUBAL ABDOMINAL LIGATION      UPPER GASTROINTESTINAL ENDOSCOPY            Social History     Socioeconomic History    Marital status:    Tobacco Use    Smoking status: Never     Passive exposure: Past    Smokeless tobacco: Never   Vaping Use    Vaping Use:  Never used   Substance and Sexual Activity    Alcohol use: Yes     Alcohol/week: 2.0 standard drinks of alcohol     Types: 2 Standard drinks or equivalent per week     Comment: 2 wine coolers    Drug use: Never    Sexual activity: Not Currently     Birth control/protection: Tubal ligation         Family History   Problem Relation Age of Onset    Stroke Father     Lung cancer Other     Arthritis Other     Cancer Other     Parkinsonism Other     Diabetes type II Other           Health Maintenance Due   Topic Date Due    COLORECTAL CANCER SCREENING  Never done    TDAP/TD VACCINES (1 - Tdap) Never done    ZOSTER VACCINE (2 of 2) 02/24/2023    COVID-19 Vaccine (3 - 2023-24 season) 09/01/2023            Current Outpatient Medications:     albuterol sulfate HFA (Proventil HFA) 108 (90 Base) MCG/ACT inhaler, Every 6 (Six) Hours., Disp: , Rfl:     ascorbic acid (VITAMIN C) 250 MG tablet, Take 1 tablet by mouth Daily., Disp: , Rfl:     azaTHIOprine (IMURAN) 50 MG tablet, , Disp: , Rfl:     benzonatate (TESSALON) 200 MG capsule, Take 1 capsule by mouth 3 (Three) Times a Day As Needed for Cough., Disp: 30 capsule, Rfl: 1    brompheniramine-pseudoephedrine-DM 30-2-10 MG/5ML syrup, Take 10 mL by mouth 4 (Four) Times a Day As Needed for Cough., Disp: 473 mL, Rfl: 0    Calcium Carbonate-Vitamin D (CALTRATE 600+D PO), , Disp: , Rfl:     cetirizine (zyrTEC) 10 MG tablet, Take 1 tablet by mouth Daily., Disp: 90 tablet, Rfl: 1    cyanocobalamin 1000 MCG/ML injection, Inject 1 mL under the skin into the appropriate area as directed Every 30 (Thirty) Days., Disp: 3 mL, Rfl: 3    cyclobenzaprine (FLEXERIL) 10 MG tablet, Take 1 tablet by mouth 2 (Two) Times a Day As Needed for Muscle Spasms., Disp: 60 tablet, Rfl: 3    Diclofenac Sodium (VOLTAREN) 1 % gel gel, Apply 4 g topically to the appropriate area as directed 4 (Four) Times a Day As Needed (shoulder/hip pain)., Disp: 100 g, Rfl: 3    fluticasone (FLONASE) 50 MCG/ACT nasal spray,  SHAKE LIQUID AND USE 2 SPRAYS(100 MCG) IN EACH NOSTRIL EVERY DAY, Disp: 16 g, Rfl: 3    furosemide (LASIX) 40 MG tablet, Take 1 tablet by mouth Daily for 90 days., Disp: 90 tablet, Rfl: 1    gabapentin (NEURONTIN) 300 MG capsule, Take 1 capsule by mouth 3 (Three) Times a Day for 30 days., Disp: 90 capsule, Rfl: 0    HYDROcodone-acetaminophen (NORCO) 7.5-325 MG per tablet, Take 1 tablet by mouth Every 12 (Twelve) Hours As Needed for Moderate Pain for up to 30 days., Disp: 60 tablet, Rfl: 0    hydroxychloroquine (PLAQUENIL) 200 MG tablet, Take 1 tablet by mouth 2 (Two) Times a Day., Disp: , Rfl:     hydrOXYzine (ATARAX) 25 MG tablet, Take 1 tablet by mouth Every 8 (Eight) Hours As Needed for Itching., Disp: 90 tablet, Rfl: 1    levothyroxine (SYNTHROID, LEVOTHROID) 137 MCG tablet, Take 1 tablet by mouth Daily., Disp: 90 tablet, Rfl: 1    magnesium oxide (MAG-OX) 400 MG tablet, Take 1 tablet by mouth Daily., Disp: 90 tablet, Rfl: 1    montelukast (SINGULAIR) 10 MG tablet, Take 1 tablet by mouth every night at bedtime., Disp: 90 tablet, Rfl: 1    Nintedanib Esylate (Ofev) 150 MG capsule, Take 150 mg by mouth 2 (Two) Times a Day., Disp: 60 capsule, Rfl: 5    O2 (OXYGEN), Inhale 3 L/min Daily., Disp: , Rfl:     ondansetron (ZOFRAN) 4 MG tablet, Take 1 tablet by mouth Every 12 (Twelve) Hours As Needed for Vomiting or Nausea., Disp: 30 tablet, Rfl: 1    pilocarpine (SALAGEN) 5 MG tablet, Take 1 tablet by mouth 3 times a day., Disp: 90 tablet, Rfl: 1    promethazine-dextromethorphan (PROMETHAZINE-DM) 6.25-15 MG/5ML syrup, Take 5 mL by mouth At Night As Needed for Cough., Disp: 75 mL, Rfl: 0    rizatriptan (MAXALT) 10 MG tablet, Take 1 tablet by mouth As Needed (9) for up to 9 doses., Disp: 9 tablet, Rfl: 3    rOPINIRole (REQUIP) 1 MG tablet, Take 1 tablet by mouth Every Night., Disp: 90 tablet, Rfl: 1    spironolactone (ALDACTONE) 50 MG tablet, Take 1 tablet by mouth Daily., Disp: 90 tablet, Rfl: 1    fluconazole (Diflucan)  150 MG tablet, Take 1 tablet by mouth Every Other Day., Disp: 2 tablet, Rfl: 0      Immunization History   Administered Date(s) Administered    COVID-19 ([employer]) 03/29/2021    COVID-19 (PFIZER) Purple Cap Monovalent 12/16/2021    Flu Vaccine Intradermal Quad 18-64YR 01/02/2008, 11/10/2008, 11/05/2009    Flu Vaccine Quad PF >36MO 10/25/2017    Fluzone (or Fluarix & Flulaval for VFC) >6mos 10/25/2017, 11/18/2021, 10/12/2022, 11/29/2023    Hepatitis A 04/30/2018    Influenza Quad Vaccine (Inpatient) 08/27/2012, 10/15/2013    Influenza Seasonal Injectable 01/02/2008, 11/10/2008, 11/05/2009    Influenza, Unspecified 09/17/2020, 10/01/2021, 10/12/2022    Pneumococcal Conjugate 13-Valent (PCV13) 06/14/2018    Pneumococcal Polysaccharide (PPSV23) 08/26/2019    Shingrix 12/30/2022         Review of Systems       Objective       Vitals:    01/11/24 1306   BP: 138/88   BP Location: Right arm   Patient Position: Sitting   Cuff Size: Large Adult   Pulse: 95   Temp: 97.2 °F (36.2 °C)   TempSrc: Temporal   SpO2: 98%   Weight: 92.1 kg (203 lb)     Body mass index is 34.83 kg/m².      Physical Exam  Vitals reviewed.   Constitutional:       Appearance: Normal appearance.   HENT:      Head: Normocephalic and atraumatic.      Nose: Nose normal.   Eyes:      Extraocular Movements: Extraocular movements intact.      Conjunctiva/sclera: Conjunctivae normal.   Cardiovascular:      Rate and Rhythm: Normal rate and regular rhythm.      Pulses: Normal pulses.      Heart sounds: Normal heart sounds.   Pulmonary:      Effort: Pulmonary effort is normal. No respiratory distress.      Comments: Coarse breath sounds at bases which is at baseline for pt   Musculoskeletal:      Comments: Swelling over dip and pip joints of bilateral fingers with some bogginess w/o redness, warmth or overlying rash.   Joint deformities noted.    Skin:     General: Skin is warm and dry.   Neurological:      General: No focal deficit present.      Mental Status: She  is alert and oriented to person, place, and time.      Cranial Nerves: No cranial nerve deficit.   Psychiatric:         Mood and Affect: Mood normal.         Behavior: Behavior normal.         Thought Content: Thought content normal.                 Result Review :     The following data was reviewed by: Merline Enamorado MD on 01/11/2024:    Common labs          7/20/2023    15:57 11/29/2023    13:14 1/11/2024    14:27   Common Labs   Glucose  68  65    BUN  13  13    Creatinine  1.07  1.43    Sodium  144  141    Potassium  3.3  4.4    Chloride  111  103    Calcium  7.3  9.7    Albumin  3.7     Total Bilirubin  0.5     Alkaline Phosphatase  60     AST (SGOT)  18     ALT (SGPT)  7     WBC 5.20  6.17     Hemoglobin 12.3  11.7     Hematocrit 39.1  37.5     Platelets 149  171     Total Cholesterol  148     Triglycerides  68     HDL Cholesterol  53     LDL Cholesterol   81       TSH          5/12/2023    12:12 11/29/2023    13:14 1/11/2024    14:27   TSH   TSH 7.600  38.500  27.100      Component      Latest Ref Rng 5/12/2023 11/29/2023 1/11/2024   Free T4      0.93 - 1.70 ng/dL 1.21  0.89 (L)  1.36       Legend:  (L) Low                        Assessment and Plan      Diagnoses and all orders for this visit:    1. Acute cough (Primary)  Comments:  acute to subacute. currently completing doxy prescribed at  for concern for possible bronchitis. Pt educated that cough is often last symptom to resolve and can last 4-6 wks.     2. Hypothyroidism, unspecified type  Comments:  chronic with abnormal thyroid labs. Pt has bene taking her synthroid along with her other medications and w/ food. Reviewed proper way for pt to take synthroid w/ plan to repeat thyroid labs in 4-6 wks.     3. Other chronic pain  Comments:  Chronic and stable. Due for refill of norco.     Orders:  -     HYDROcodone-acetaminophen (NORCO) 7.5-325 MG per tablet; Take 1 tablet by mouth Every 12 (Twelve) Hours As Needed for Moderate Pain for up to 30 days.   Dispense: 60 tablet; Refill: 0  -     gabapentin (NEURONTIN) 300 MG capsule; Take 1 capsule by mouth 3 (Three) Times a Day for 30 days.  Dispense: 90 capsule; Refill: 0    4. Arthritis  Comments:  Chronic w/ interval worsening.Pt w/ RA managed by rheum, advised pt to discuss concern for worsening symptoms w/ rhuem.    5. Medication monitoring encounter  Comments:  due for poc UDS per state mandate.  Orders:  -     POC T-Cup Urine Drug Screen    6. Medication refill  Comments:  refilled meds.   Orders:  -     magnesium oxide (MAG-OX) 400 MG tablet; Take 1 tablet by mouth Daily.  Dispense: 90 tablet; Refill:         I spent at least 30 minutes caring for Aida on this date of service. This time includes time spent by me in the following activities:reviewing tests, performing a medically appropriate examination and/or evaluation , counseling and educating the patient/family/caregiver, ordering medications, tests, or procedures, and documenting information in the medical record    Follow Up     Return in about 8 weeks (around 3/8/2024) for hypothyroidism.    Patient was given instructions and counseling regarding her condition or for health maintenance advice. Please see specific information pulled into the AVS if appropriate.     Merline Enamorado MD   Internal Medicine-Pediatrics

## 2024-01-12 ENCOUNTER — TELEPHONE (OUTPATIENT)
Dept: INTERNAL MEDICINE | Facility: CLINIC | Age: 59
End: 2024-01-12

## 2024-01-12 DIAGNOSIS — N89.8 VAGINAL DISCHARGE: Primary | ICD-10-CM

## 2024-01-16 RX ORDER — FLUCONAZOLE 150 MG/1
150 TABLET ORAL EVERY OTHER DAY
Qty: 2 TABLET | Refills: 0 | Status: SHIPPED | OUTPATIENT
Start: 2024-01-16

## 2024-01-23 ENCOUNTER — NURSE TRIAGE (OUTPATIENT)
Dept: CALL CENTER | Facility: HOSPITAL | Age: 59
End: 2024-01-23
Payer: MEDICARE

## 2024-01-23 NOTE — TELEPHONE ENCOUNTER
HUB call  HA, Sore throat and fever 01/23/2024 Caller was diagnosed with URI, bronchitis and RSV over about 3-4 weeks. Was getting better on medication now getting worse again.    Reviewed guideline with caller, advises she go to ED for evaluation of neck pain associated with these symptoms. Warm transfer to [facility] office for appointment tomorrow.

## 2024-01-23 NOTE — TELEPHONE ENCOUNTER
"Reason for Disposition   Stiff neck (can't touch chin to chest)    Additional Information   Negative: Difficult to awaken or acting confused (e.g., disoriented, slurred speech)   Negative: [1] Weakness of the face, arm or leg on one side of the body AND [2] new-onset   Negative: [1] Numbness of the face, arm or leg on one side of the body AND [2] new-onset   Negative: [1] Loss of speech or garbled speech AND [2] new-onset   Negative: Passed out (i.e., lost consciousness, collapsed and was not responding)   Negative: Sounds like a life-threatening emergency to the triager   Negative: Followed a head injury   Negative: Pregnant   Negative: Postpartum (from 0 to 6 weeks after delivery)   Negative: Traumatic Brain Injury (TBI) is suspected   Negative: Unable to walk, or can only walk with assistance (e.g., requires support)    Answer Assessment - Initial Assessment Questions  1. LOCATION: \"Where does it hurt?\"       Right toward the front and back down the right side to her back   2. ONSET: \"When did the headache start?\" (Minutes, hours or days)       Day before yesterday   3. PATTERN: \"Does the pain come and go, or has it been constant since it started?\"     Mostly when she coughs  4. SEVERITY: \"How bad is the pain?\" and \"What does it keep you from doing?\"  (e.g., Scale 1-10; mild, moderate, or severe)    - MILD (1-3): doesn't interfere with normal activities     - MODERATE (4-7): interferes with normal activities or awakens from sleep     - SEVERE (8-10): excruciating pain, unable to do any normal activities         8/10  5. RECURRENT SYMPTOM: \"Have you ever had headaches before?\" If Yes, ask: \"When was the last time?\" and \"What happened that time?\"       Yes, used to have migraines al the time, has not had any recent migraines  6. CAUSE: \"What do you think is causing the headache?\"      URI, RSV cough and cold   7. MIGRAINE: \"Have you been diagnosed with migraine headaches?\" If Yes, ask: \"Is this headache similar?\" " "      In the past, no  8. HEAD INJURY: \"Has there been any recent injury to the head?\"       no  9. OTHER SYMPTOMS: \"Do you have any other symptoms?\" (fever, stiff neck, eye pain, sore throat, cold symptoms)      Fever, stiff neck, cold symptoms, sore throat, eye pain  10. PREGNANCY: \"Is there any chance you are pregnant?\" \"When was your last menstrual period?\"        na    Protocols used: Headache-ADULT-AH    "

## 2024-01-25 ENCOUNTER — OFFICE VISIT (OUTPATIENT)
Dept: INTERNAL MEDICINE | Facility: CLINIC | Age: 59
End: 2024-01-25
Payer: MEDICARE

## 2024-01-25 VITALS
BODY MASS INDEX: 34.86 KG/M2 | TEMPERATURE: 98.8 F | DIASTOLIC BLOOD PRESSURE: 78 MMHG | SYSTOLIC BLOOD PRESSURE: 126 MMHG | WEIGHT: 203.2 LBS | OXYGEN SATURATION: 95 % | HEART RATE: 91 BPM

## 2024-01-25 DIAGNOSIS — R06.00 DYSPNEA, UNSPECIFIED TYPE: Primary | ICD-10-CM

## 2024-01-25 DIAGNOSIS — Z76.0 MEDICATION REFILL: ICD-10-CM

## 2024-01-25 DIAGNOSIS — R52 BODY ACHES: ICD-10-CM

## 2024-01-25 DIAGNOSIS — R05.3 CHRONIC COUGH: ICD-10-CM

## 2024-01-25 DIAGNOSIS — Z23 IMMUNIZATION DUE: ICD-10-CM

## 2024-01-25 LAB
EXPIRATION DATE: NORMAL
FLUAV AG UPPER RESP QL IA.RAPID: NOT DETECTED
FLUBV AG UPPER RESP QL IA.RAPID: NOT DETECTED
INTERNAL CONTROL: NORMAL
Lab: 9133
SARS-COV-2 AG UPPER RESP QL IA.RAPID: NOT DETECTED

## 2024-01-25 PROCEDURE — 99214 OFFICE O/P EST MOD 30 MIN: CPT | Performed by: STUDENT IN AN ORGANIZED HEALTH CARE EDUCATION/TRAINING PROGRAM

## 2024-01-25 PROCEDURE — 87428 SARSCOV & INF VIR A&B AG IA: CPT | Performed by: STUDENT IN AN ORGANIZED HEALTH CARE EDUCATION/TRAINING PROGRAM

## 2024-01-25 RX ORDER — GUAIFENESIN 600 MG/1
1200 TABLET, EXTENDED RELEASE ORAL 2 TIMES DAILY
Qty: 14 TABLET | Refills: 0 | Status: SHIPPED | OUTPATIENT
Start: 2024-01-25

## 2024-01-25 RX ORDER — BROMPHENIRAMINE MALEATE, PSEUDOEPHEDRINE HYDROCHLORIDE, AND DEXTROMETHORPHAN HYDROBROMIDE 2; 30; 10 MG/5ML; MG/5ML; MG/5ML
10 SYRUP ORAL 4 TIMES DAILY PRN
Qty: 473 ML | Refills: 0 | Status: SHIPPED | OUTPATIENT
Start: 2024-01-25

## 2024-01-25 RX ORDER — BENZONATATE 200 MG/1
200 CAPSULE ORAL 3 TIMES DAILY PRN
Qty: 30 CAPSULE | Refills: 1 | Status: SHIPPED | OUTPATIENT
Start: 2024-01-25 | End: 2024-01-29 | Stop reason: SDUPTHER

## 2024-01-25 RX ORDER — CYCLOBENZAPRINE HCL 10 MG
10 TABLET ORAL 2 TIMES DAILY PRN
Qty: 60 TABLET | Refills: 3 | Status: SHIPPED | OUTPATIENT
Start: 2024-01-25

## 2024-01-25 NOTE — PATIENT INSTRUCTIONS
Vaccine counseling:   Due for 2nd and last dose of shingles vaccine (Shingrix).   Due for a covid booster and for tdap.

## 2024-01-25 NOTE — PROGRESS NOTES
Chief Complaint  Headache, Cough, Nasal Congestion, and Generalized Body Aches    Subjective            Aida Yaquelin Mcclure presents to [facility]  History of Present Illness    Patient with chronic respiratory failure on supplemental oxygen from known IPF and chronic lung disease:   Presenting for headache, cough, nasal congestion and generalized body aches for past 3d.   States she's been sneezing a lot more as well.   No fevers but having night sweats.   Endorses some difficulty breathing.     She has pulmonary fibrosis and was scheduled to see her lung doctor but had to canceled due to her being sick at the time.     Past Medical History:   Diagnosis Date    Anemia     Annual physical exam 11/06/2017    WILL RESCHEDULE MAMMOGRAM     Breast lump     CHF (congestive heart failure) 11/06/2017    FOLLOWS WITH CARDS. CURRENTLY DOING WELL ON LASIX, LISINOPRIL, ALDACTONE, AND PROPANOLOL    Degeneration of lumbar intervertebral disc 06/12/2012    Diverticulitis     Essential hypertension 11/06/2017    WELL CONTROLLED ON CURRENT REGIMEN     GERD (gastroesophageal reflux disease)     Hamstring injury 08/17/2015    BILATERAL HAMSTRING INJURY/PAIN     Head injury     Hernia cerebri     Hyperthyroidism     Hyperthyroidism 11/06/2017    CURRENTLY ON METHIMAZOLE, PT REPORT POSSIBLY DUE TO GRAVE'S DISEASE. WILL NEED TO OBTAIN AND REVIEW MEDICAL RECORDS FROM [facility]. WILL REFER TO ENDO     Hypoxia     Insomnia 11/06/2017    DISCUSSED SLEEP HYGIENE. PT TO TRY AVOIDING BLUE LIGHT AT NIGHT. PT ALSO TO SET ROUTINE PRIOR TO BEDTIME. WILL AVOID MEDS AT THIS TIME     Interstitial lung disease     Lumbosacral disc herniation 06/12/2012    LEFT L5-S1 SMALL NATURE. SHE HAS FAILED ALL CONSERVATIVE MEASURES AND DESIRES SURGERY     Lupus     SEES DR. ANDERSON    Migraines     Pulmonary embolism     Respiratory failure with hypoxia 02/04/2021    Rheumatoid arthritis 11/06/2017    CURRENTLY  CONTROLLED, BUT WITH RESIDUAL DEFORMITY. WILL FOLLOW WITH DR. MONICA Sol     Total knee replacement status, right 2015       Allergies:   Allergies   Allergen Reactions    Aspirin Anaphylaxis     In high doses    Salsalate Hives, Anaphylaxis, Unknown - High Severity and Shortness Of Breath          Past Surgical History:   Procedure Laterality Date    ANKLE SURGERY Bilateral     BREAST BIOPSY      CATARACT EXTRACTION WITH INTRAOCULAR LENS IMPLANT       SECTION      COLONOSCOPY      ENDOSCOPY N/A 2023    Procedure: ESOPHAGOGASTRODUODENOSCOPY WITH BIOPSY;  Surgeon: Shama Martin MD;  Location: [facility] ENDOSCOPY;  Service: Gastroenterology;  Laterality: N/A;  NORMAL EGD    EYE LENS IMPLANT SECONDARY      YES    FASCIOTOMY      LEFT ANTERIOR ARM     HAND SURGERY Left     KNEE SURGERY      OTHER SURGICAL HISTORY      LYMPH NODE EXCISION    PORTACATH PLACEMENT      POWER PORT PLACEMENT     TUBAL ABDOMINAL LIGATION      UPPER GASTROINTESTINAL ENDOSCOPY            Social History     Socioeconomic History    Marital status:    Tobacco Use    Smoking status: Never     Passive exposure: Past    Smokeless tobacco: Never   Vaping Use    Vaping Use: Never used   Substance and Sexual Activity    Alcohol use: Yes     Alcohol/week: 2.0 standard drinks of alcohol     Types: 2 Standard drinks or equivalent per week     Comment: 2 wine coolers    Drug use: Never    Sexual activity: Not Currently     Birth control/protection: Tubal ligation         Family History   Problem Relation Age of Onset    Stroke Father     Lung cancer Other     Arthritis Other     Cancer Other     Parkinsonism Other     Diabetes type II Other           Health Maintenance Due   Topic Date Due    COLORECTAL CANCER SCREENING  Never done    TDAP/TD VACCINES (1 - Tdap) Never done    ZOSTER VACCINE (2 of 2) 2023    RSV Vaccine - Adults (1 - 1-dose 60+ series) Never done    COVID-19 Vaccine (3 - - season)  09/01/2023            Current Outpatient Medications:     albuterol sulfate HFA (Proventil HFA) 108 (90 Base) MCG/ACT inhaler, Every 6 (Six) Hours., Disp: , Rfl:     ascorbic acid (VITAMIN C) 250 MG tablet, Take 1 tablet by mouth Daily., Disp: , Rfl:     azaTHIOprine (IMURAN) 50 MG tablet, , Disp: , Rfl:     brompheniramine-pseudoephedrine-DM 30-2-10 MG/5ML syrup, Take 10 mL by mouth 4 (Four) Times a Day As Needed for Cough., Disp: 473 mL, Rfl: 0    Calcium Carbonate-Vitamin D (CALTRATE 600+D PO), , Disp: , Rfl:     cetirizine (zyrTEC) 10 MG tablet, Take 1 tablet by mouth Daily., Disp: 90 tablet, Rfl: 1    cyanocobalamin 1000 MCG/ML injection, Inject 1 mL under the skin into the appropriate area as directed Every 30 (Thirty) Days., Disp: 3 mL, Rfl: 3    cyclobenzaprine (FLEXERIL) 10 MG tablet, Take 1 tablet by mouth 2 (Two) Times a Day As Needed for Muscle Spasms., Disp: 60 tablet, Rfl: 3    Diclofenac Sodium (VOLTAREN) 1 % gel gel, Apply 4 g topically to the appropriate area as directed 4 (Four) Times a Day As Needed (shoulder/hip pain)., Disp: 100 g, Rfl: 3    fluconazole (Diflucan) 150 MG tablet, Take 1 tablet by mouth Every Other Day., Disp: 2 tablet, Rfl: 0    fluticasone (FLONASE) 50 MCG/ACT nasal spray, SHAKE LIQUID AND USE 2 SPRAYS(100 MCG) IN EACH NOSTRIL EVERY DAY, Disp: 16 g, Rfl: 3    furosemide (LASIX) 40 MG tablet, Take 1 tablet by mouth Daily for 90 days., Disp: 90 tablet, Rfl: 1    gabapentin (NEURONTIN) 300 MG capsule, Take 1 capsule by mouth 3 (Three) Times a Day for 30 days., Disp: 90 capsule, Rfl: 0    HYDROcodone-acetaminophen (NORCO) 7.5-325 MG per tablet, Take 1 tablet by mouth Every 12 (Twelve) Hours As Needed for Moderate Pain for up to 30 days., Disp: 60 tablet, Rfl: 0    hydroxychloroquine (PLAQUENIL) 200 MG tablet, Take 1 tablet by mouth 2 (Two) Times a Day., Disp: , Rfl:     hydrOXYzine (ATARAX) 25 MG tablet, Take 1 tablet by mouth Every 8 (Eight) Hours As Needed for Itching., Disp: 90  tablet, Rfl: 1    levothyroxine (SYNTHROID, LEVOTHROID) 137 MCG tablet, Take 1 tablet by mouth Daily., Disp: 90 tablet, Rfl: 1    magnesium oxide (MAG-OX) 400 MG tablet, Take 1 tablet by mouth Daily., Disp: 90 tablet, Rfl: 1    montelukast (SINGULAIR) 10 MG tablet, Take 1 tablet by mouth every night at bedtime., Disp: 90 tablet, Rfl: 1    Nintedanib Esylate (Ofev) 150 MG capsule, Take 150 mg by mouth 2 (Two) Times a Day., Disp: 60 capsule, Rfl: 5    O2 (OXYGEN), Inhale 3 L/min Daily., Disp: , Rfl:     ondansetron (ZOFRAN) 4 MG tablet, Take 1 tablet by mouth Every 12 (Twelve) Hours As Needed for Vomiting or Nausea., Disp: 30 tablet, Rfl: 1    pilocarpine (SALAGEN) 5 MG tablet, Take 1 tablet by mouth 3 times a day., Disp: 90 tablet, Rfl: 1    promethazine-dextromethorphan (PROMETHAZINE-DM) 6.25-15 MG/5ML syrup, Take 5 mL by mouth At Night As Needed for Cough., Disp: 75 mL, Rfl: 0    rizatriptan (MAXALT) 10 MG tablet, Take 1 tablet by mouth As Needed (9) for up to 9 doses., Disp: 9 tablet, Rfl: 3    rOPINIRole (REQUIP) 1 MG tablet, Take 1 tablet by mouth Every Night., Disp: 90 tablet, Rfl: 1    spironolactone (ALDACTONE) 50 MG tablet, Take 1 tablet by mouth Daily., Disp: 90 tablet, Rfl: 1    benzonatate (TESSALON) 200 MG capsule, Take 1 capsule by mouth 3 (Three) Times a Day As Needed for Cough., Disp: 30 capsule, Rfl: 1    guaiFENesin (Mucinex) 600 MG 12 hr tablet, Take 2 tablets by mouth 2 (Two) Times a Day. (Patient not taking: Reported on 1/29/2024), Disp: 14 tablet, Rfl: 0      Immunization History   Administered Date(s) Administered    COVID-19 (SANDI) 03/29/2021    COVID-19 (PFIZER) Purple Cap Monovalent 12/16/2021    Flu Vaccine Intradermal Quad 18-64YR 01/02/2008, 11/10/2008, 11/05/2009    Flu Vaccine Quad PF >36MO 10/25/2017    Fluzone (or Fluarix & Flulaval for VFC) >6mos 10/25/2017, 11/18/2021, 10/12/2022, 11/29/2023    Hepatitis A 04/30/2018    Influenza Quad Vaccine (Inpatient) 08/27/2012, 10/15/2013     Influenza Seasonal Injectable 01/02/2008, 11/10/2008, 11/05/2009    Influenza, Unspecified 09/17/2020, 10/01/2021, 10/12/2022    Pneumococcal Conjugate 13-Valent (PCV13) 06/14/2018    Pneumococcal Polysaccharide (PPSV23) 08/26/2019    Shingrix 12/30/2022         Review of Systems       Objective       Vitals:    01/25/24 1205   BP: 126/78   BP Location: Right arm   Patient Position: Sitting   Cuff Size: Large Adult   Pulse: 91   Temp: 98.8 °F (37.1 °C)   TempSrc: Temporal   SpO2: 95%   Weight: 92.2 kg (203 lb 3.2 oz)     Body mass index is 34.86 kg/m².      Physical Exam  Vitals reviewed.   Constitutional:       Appearance: Normal appearance.   HENT:      Head: Normocephalic and atraumatic.      Nose: Nose normal.   Eyes:      Extraocular Movements: Extraocular movements intact.      Conjunctiva/sclera: Conjunctivae normal.   Cardiovascular:      Rate and Rhythm: Normal rate and regular rhythm.      Pulses: Normal pulses.      Heart sounds: Normal heart sounds.   Pulmonary:      Effort: Pulmonary effort is normal. No respiratory distress.      Breath sounds: Normal breath sounds. No wheezing or rhonchi.      Comments: Mild coarse lung sounds at the bases which is similar to baseline exam.   Musculoskeletal:         General: Normal range of motion.   Skin:     General: Skin is warm and dry.   Neurological:      General: No focal deficit present.      Mental Status: She is alert and oriented to person, place, and time.      Cranial Nerves: No cranial nerve deficit.   Psychiatric:         Mood and Affect: Mood normal.         Behavior: Behavior normal.         Thought Content: Thought content normal.             Result Review :                           Assessment and Plan      Diagnoses and all orders for this visit:    1. Dyspnea, unspecified type (Primary)  Comments:  chronic w/ interval worsening over the past several weeks. Pt with cough and SOA at baseline, and current exacerbation persist despite multiple  courses of abx with concerns that her symptoms may be due to underlying IPF vs concern for acute worsening symptoms due to superimposed allergic rhinitis w/ cough and congestion.   Orders:  -     XR Chest PA & Lateral (In Office)  -     guaiFENesin (Mucinex) 600 MG 12 hr tablet; Take 2 tablets by mouth 2 (Two) Times a Day. (Patient not taking: Reported on 1/29/2024)  Dispense: 14 tablet; Refill: 0    2. Chronic cough  Comments:  Chronic w/ interval worsening. cxr ordered. Mucinex sent in. Encouraged pt to f/u w/ pulmonologist.  Orders:  -     XR Chest PA & Lateral (In Office)  -     guaiFENesin (Mucinex) 600 MG 12 hr tablet; Take 2 tablets by mouth 2 (Two) Times a Day. (Patient not taking: Reported on 1/29/2024)  Dispense: 14 tablet; Refill: 0    3. Medication refill  Comments:  refilled meds.  Orders:  -     Discontinue: benzonatate (TESSALON) 200 MG capsule; Take 1 capsule by mouth 3 (Three) Times a Day As Needed for Cough.  Dispense: 30 capsule; Refill: 1  -     brompheniramine-pseudoephedrine-DM 30-2-10 MG/5ML syrup; Take 10 mL by mouth 4 (Four) Times a Day As Needed for Cough.  Dispense: 473 mL; Refill: 0  -     cyclobenzaprine (FLEXERIL) 10 MG tablet; Take 1 tablet by mouth 2 (Two) Times a Day As Needed for Muscle Spasms.  Dispense: 60 tablet; Refill: 3    4. Immunization due  Comments:  covid booster  due for and 2nd dose of shingles.    5. Body aches  -     POCT SARS-CoV-2 + Flu Antigen JEN  -     XR Chest PA & Lateral (In Office)                  Follow Up     No follow-ups on file.    Patient was given instructions and counseling regarding her condition or for health maintenance advice. Please see specific information pulled into the AVS if appropriate.     Merline Enamorado MD   Internal Medicine-Pediatrics

## 2024-01-29 ENCOUNTER — OFFICE VISIT (OUTPATIENT)
Dept: INTERNAL MEDICINE | Facility: CLINIC | Age: 59
End: 2024-01-29
Payer: COMMERCIAL

## 2024-01-29 VITALS
BODY MASS INDEX: 35.51 KG/M2 | OXYGEN SATURATION: 98 % | HEART RATE: 82 BPM | SYSTOLIC BLOOD PRESSURE: 136 MMHG | TEMPERATURE: 98.5 F | DIASTOLIC BLOOD PRESSURE: 88 MMHG | HEIGHT: 64 IN | WEIGHT: 208 LBS

## 2024-01-29 DIAGNOSIS — Z76.0 MEDICATION REFILL: ICD-10-CM
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
## 2024-01-29 PROCEDURE — 99213 OFFICE O/P EST LOW 20 MIN: CPT | Performed by: PHYSICIAN ASSISTANT

## 2024-01-29 RX ORDER — BENZONATATE 200 MG/1
200 CAPSULE ORAL 3 TIMES DAILY PRN
Qty: 30 CAPSULE | Refills: 1 | Status: SHIPPED | OUTPATIENT
Start: 2024-01-29
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
## 2024-02-02 ENCOUNTER — TELEPHONE (OUTPATIENT)
Dept: INTERNAL MEDICINE | Facility: CLINIC | Age: 59
End: 2024-02-02
Payer: COMMERCIAL

## 2024-02-02 NOTE — TELEPHONE ENCOUNTER
Called patient regarding faxes that keep coming through to office. Patient aware of these and voiced some concern as she did not know what was happening. Situation has been taken to manager.

## 2024-02-14 ENCOUNTER — TRANSCRIBE ORDERS (OUTPATIENT)
Dept: ADMINISTRATIVE | Facility: HOSPITAL | Age: 59
End: 2024-02-14
Payer: MEDICARE

## 2024-02-14 DIAGNOSIS — N18.31 CHRONIC KIDNEY DISEASE, STAGE 3A: Primary | ICD-10-CM

## 2024-03-14 ENCOUNTER — TELEPHONE (OUTPATIENT)
Dept: PULMONOLOGY | Facility: CLINIC | Age: 59
End: 2024-03-14

## 2024-03-14 ENCOUNTER — TELEPHONE (OUTPATIENT)
Dept: INTERNAL MEDICINE | Facility: CLINIC | Age: 59
End: 2024-03-14
Payer: MEDICARE

## 2024-03-14 ENCOUNTER — TELEPHONE (OUTPATIENT)
Dept: INTERNAL MEDICINE | Facility: CLINIC | Age: 59
End: 2024-03-14

## 2024-03-14 NOTE — TELEPHONE ENCOUNTER
Caller: BARRY WITH [facility]    Relationship to patient: Other    Best call back number: [phone]     Patient is needing: CALLER WANTING TO CHECK ON STATUS OF FAX THAT WAS SENT TO THE OFFICE. CALLER NEEDING PAPERWORK TO BE SIGNED AND SENT BACK TO HIM. CALLER STATES THAT IT IS A PATIENT VERIFICATION FORM.     FAX NUMBER: [phone]
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
## 2024-03-14 NOTE — TELEPHONE ENCOUNTER
Caller: Aida Mcclure April    Relationship to patient: Self    Best call back number: [phone]    Patient is needing: PT SAID SHE NEEDS AN EMERGENCY APPT BEFORE APRIL 4TH WITH ASHLY, SAID SHE NEEDS TO DESPERATELY SPEAK TO SOMEONE ABOUT MEDICATION

## 2024-03-14 NOTE — TELEPHONE ENCOUNTER
"  Caller: Aida Mcclure April    Relationship to patient: Self    Best call back number: [phone]     Chief complaint: MEDICATIONS    Type of visit: OFFICE    Requested date: ASAP    Additional notes: STATED SHE HAD AN EPISODE THAT WAS \"OUT OF CHARACTER\" AND WOULD LIKE TO DISCUSS THIS. SHE NEEDS DOCUMENTATION BEFORE APRIL 5TH. HUB UNABLE TO ACCOMMODATE.    THIS WAS ALL THE INFORMATION PATIENT PROVIDED.   "

## 2024-03-15 NOTE — TELEPHONE ENCOUNTER
Caller: BARRY WITH [facility]    Relationship: Other    Best call back number: [phone]    What is the best time to reach you: ANYTIME     What was the call regarding: REDDY WITH [employer] IS CHECKING STATUS OF THE FAX THAT WAS SENT 03/07/2024 . THEY ARE REFAXING TO OFFICE NOW.

## 2024-03-18 NOTE — TELEPHONE ENCOUNTER
Caller: BARRY WITH [facility]    Relationship to patient: Other    Best call back number: [phone]    Patient is needing: CALLER STATES THAT FAX STILL HAS NOT BE RECEIVED WITH PHYSICIAN SIGNATURE. CALLER REQUESTING PAPERWORK TO BE FAXED BACK TO THEM RIGHT AWAY. CALLER STATES HE IS SENDING FAX AGAIN.

## 2024-03-19 ENCOUNTER — TELEPHONE (OUTPATIENT)
Dept: INTERNAL MEDICINE | Facility: CLINIC | Age: 59
End: 2024-03-19
Payer: MEDICARE

## 2024-03-19 NOTE — TELEPHONE ENCOUNTER
"I have called this number back and it does not identify the number as [facility]. When the person answered, he said \"this is Jose from [email or web address]\".  He stated that the call center was in [place], [place]. I tried to call the local [facility] to verify this call was from them, but pharmacy is currently closed for lunch. I will call back.   "

## 2024-03-19 NOTE — TELEPHONE ENCOUNTER
Caller: [id number] - [place], [place] - [address] [facility] [phone] [facility] [phone] [facility]    Relationship: Pharmacy    Best call back number: [phone]    What was the call regarding: REDDY WITH [facility] IS CALLING IN REGARDS TO PAPERS THAT WAS SENT TO OFFICE VIA FAX FOR PATIENT. THEY ARE ASKING FOR SIGNATURE FROM PROVIDER AND SENT BACK TO THEM.     THERE IS SIGNED TELEPHONE ENCOUNTER REFERENCING THIS DATED 03/14/2024.

## 2024-03-19 NOTE — TELEPHONE ENCOUNTER
The local [facility] ([place]) has verified that they should not have anyone calling to verify patients and that this may be a scam.

## 2024-03-21 NOTE — TELEPHONE ENCOUNTER
We are not required to do patient verification forms. I let the caller know. I have also contacted the local [facility] and they state that they do not need this information from us.

## 2024-03-26 ENCOUNTER — LAB (OUTPATIENT)
Dept: LAB | Facility: HOSPITAL | Age: 59
End: 2024-03-26
Payer: MEDICARE

## 2024-03-26 ENCOUNTER — OFFICE VISIT (OUTPATIENT)
Dept: INTERNAL MEDICINE | Facility: CLINIC | Age: 59
End: 2024-03-26
Payer: MEDICARE

## 2024-03-26 VITALS
HEART RATE: 94 BPM | TEMPERATURE: 96.1 F | BODY MASS INDEX: 36.6 KG/M2 | SYSTOLIC BLOOD PRESSURE: 138 MMHG | WEIGHT: 213.2 LBS | OXYGEN SATURATION: 99 % | DIASTOLIC BLOOD PRESSURE: 82 MMHG

## 2024-03-26 DIAGNOSIS — Z86.711 PERSONAL HISTORY OF PE (PULMONARY EMBOLISM): ICD-10-CM

## 2024-03-26 DIAGNOSIS — E55.9 VITAMIN D DEFICIENCY: ICD-10-CM

## 2024-03-26 DIAGNOSIS — I50.32 CHRONIC DIASTOLIC HEART FAILURE: ICD-10-CM

## 2024-03-26 DIAGNOSIS — N18.31 CHRONIC KIDNEY DISEASE, STAGE 3A: ICD-10-CM

## 2024-03-26 DIAGNOSIS — J84.9 ILD (INTERSTITIAL LUNG DISEASE): ICD-10-CM

## 2024-03-26 DIAGNOSIS — R46.89 ALTERED BEHAVIOR: ICD-10-CM

## 2024-03-26 DIAGNOSIS — Z51.81 THERAPEUTIC DRUG MONITORING: ICD-10-CM

## 2024-03-26 DIAGNOSIS — M32.9 SYSTEMIC LUPUS ERYTHEMATOSUS, UNSPECIFIED SLE TYPE, UNSPECIFIED ORGAN INVOLVEMENT STATUS: ICD-10-CM

## 2024-03-26 DIAGNOSIS — G31.84 MILD COGNITIVE IMPAIRMENT: ICD-10-CM

## 2024-03-26 DIAGNOSIS — R06.09 CHRONIC DYSPNEA: ICD-10-CM

## 2024-03-26 DIAGNOSIS — J84.9 INTERSTITIAL LUNG DISEASE: ICD-10-CM

## 2024-03-26 DIAGNOSIS — E03.9 HYPOTHYROIDISM, UNSPECIFIED TYPE: ICD-10-CM

## 2024-03-26 DIAGNOSIS — Z51.81 MEDICATION MONITORING ENCOUNTER: ICD-10-CM

## 2024-03-26 DIAGNOSIS — J30.2 SEASONAL ALLERGIES: ICD-10-CM

## 2024-03-26 DIAGNOSIS — J98.4 RESTRICTIVE LUNG DISEASE: ICD-10-CM

## 2024-03-26 DIAGNOSIS — M35.1 MIXED CONNECTIVE TISSUE DISEASE: ICD-10-CM

## 2024-03-26 DIAGNOSIS — R41.0 CONFUSION: Primary | ICD-10-CM

## 2024-03-26 LAB
25(OH)D3 SERPL-MCNC: 36.7 NG/ML (ref 30–100)
ALBUMIN SERPL-MCNC: 4 G/DL (ref 3.5–5.2)
ALBUMIN/GLOB SERPL: 1.4 G/DL
ALP SERPL-CCNC: 88 U/L (ref 39–117)
ALT SERPL W P-5'-P-CCNC: 7 U/L (ref 1–33)
AMPHET+METHAMPHET UR QL: NEGATIVE
AMPHETAMINE INTERNAL CONTROL: ABNORMAL
AMPHETAMINES UR QL: NEGATIVE
ANION GAP SERPL CALCULATED.3IONS-SCNC: 9.3 MMOL/L (ref 5–15)
AST SERPL-CCNC: 14 U/L (ref 1–32)
BACTERIA UR QL AUTO: ABNORMAL /HPF
BARBITURATE INTERNAL CONTROL: ABNORMAL
BARBITURATES UR QL SCN: NEGATIVE
BASOPHILS # BLD AUTO: 0.09 10*3/MM3 (ref 0–0.2)
BASOPHILS NFR BLD AUTO: 1.3 % (ref 0–1.5)
BENZODIAZ UR QL SCN: NEGATIVE
BENZODIAZEPINE INTERNAL CONTROL: ABNORMAL
BILIRUB SERPL-MCNC: 0.5 MG/DL (ref 0–1.2)
BILIRUB UR QL STRIP: NEGATIVE
BUN SERPL-MCNC: 23 MG/DL (ref 8–23)
BUN/CREAT SERPL: 16.7 (ref 7–25)
BUPRENORPHINE INTERNAL CONTROL: ABNORMAL
BUPRENORPHINE SERPL-MCNC: NEGATIVE NG/ML
CALCIUM SPEC-SCNC: 8.8 MG/DL (ref 8.6–10.5)
CANNABINOIDS SERPL QL: NEGATIVE
CHLORIDE SERPL-SCNC: 107 MMOL/L (ref 98–107)
CLARITY UR: CLEAR
CO2 SERPL-SCNC: 23.7 MMOL/L (ref 22–29)
COCAINE INTERNAL CONTROL: ABNORMAL
COCAINE UR QL: NEGATIVE
COLOR UR: YELLOW
CREAT SERPL-MCNC: 1.38 MG/DL (ref 0.57–1)
CREAT UR-MCNC: 96.3 MG/DL
DEPRECATED RDW RBC AUTO: 45.7 FL (ref 37–54)
EGFRCR SERPLBLD CKD-EPI 2021: 43.9 ML/MIN/1.73
EOSINOPHIL # BLD AUTO: 0.29 10*3/MM3 (ref 0–0.4)
EOSINOPHIL NFR BLD AUTO: 4.1 % (ref 0.3–6.2)
ERYTHROCYTE [DISTWIDTH] IN BLOOD BY AUTOMATED COUNT: 13.7 % (ref 12.3–15.4)
EXPIRATION DATE: ABNORMAL
GLOBULIN UR ELPH-MCNC: 2.9 GM/DL
GLUCOSE SERPL-MCNC: 105 MG/DL (ref 65–99)
GLUCOSE UR STRIP-MCNC: NEGATIVE MG/DL
HCT VFR BLD AUTO: 36.8 % (ref 34–46.6)
HGB BLD-MCNC: 11.8 G/DL (ref 12–15.9)
HGB UR QL STRIP.AUTO: NEGATIVE
HYALINE CASTS UR QL AUTO: ABNORMAL /LPF
IMM GRANULOCYTES # BLD AUTO: 0.01 10*3/MM3 (ref 0–0.05)
IMM GRANULOCYTES NFR BLD AUTO: 0.1 % (ref 0–0.5)
KETONES UR QL STRIP: NEGATIVE
LEUKOCYTE ESTERASE UR QL STRIP.AUTO: NEGATIVE
LYMPHOCYTES # BLD AUTO: 2.46 10*3/MM3 (ref 0.7–3.1)
LYMPHOCYTES NFR BLD AUTO: 34.8 % (ref 19.6–45.3)
Lab: ABNORMAL
MCH RBC QN AUTO: 29.6 PG (ref 26.6–33)
MCHC RBC AUTO-ENTMCNC: 32.1 G/DL (ref 31.5–35.7)
MCV RBC AUTO: 92.2 FL (ref 79–97)
MDMA (ECSTASY) INTERNAL CONTROL: ABNORMAL
MDMA UR QL SCN: NEGATIVE
METHADONE INTERNAL CONTROL: ABNORMAL
METHADONE UR QL SCN: NEGATIVE
METHAMPHETAMINE INTERNAL CONTROL: ABNORMAL
MONOCYTES # BLD AUTO: 0.63 10*3/MM3 (ref 0.1–0.9)
MONOCYTES NFR BLD AUTO: 8.9 % (ref 5–12)
MORPHINE INTERNAL CONTROL: ABNORMAL
MORPHINE/OPIATES SCREEN, URINE: POSITIVE
NEUTROPHILS NFR BLD AUTO: 3.58 10*3/MM3 (ref 1.7–7)
NEUTROPHILS NFR BLD AUTO: 50.8 % (ref 42.7–76)
NITRITE UR QL STRIP: NEGATIVE
NRBC BLD AUTO-RTO: 0 /100 WBC (ref 0–0.2)
OXYCODONE INTERNAL CONTROL: ABNORMAL
OXYCODONE UR QL SCN: NEGATIVE
PCP UR QL SCN: NEGATIVE
PH UR STRIP.AUTO: 5.5 [PH] (ref 5–8)
PHENCYCLIDINE INTERNAL CONTROL: ABNORMAL
PHOSPHATE SERPL-MCNC: 4.2 MG/DL (ref 2.5–4.5)
PLATELET # BLD AUTO: 150 10*3/MM3 (ref 140–450)
PMV BLD AUTO: 12.5 FL (ref 6–12)
POTASSIUM SERPL-SCNC: 4.1 MMOL/L (ref 3.5–5.2)
PROT ?TM UR-MCNC: 34 MG/DL
PROT SERPL-MCNC: 6.9 G/DL (ref 6–8.5)
PROT UR QL STRIP: ABNORMAL
PROT/CREAT UR: 0.35 MG/G{CREAT}
RBC # BLD AUTO: 3.99 10*6/MM3 (ref 3.77–5.28)
RBC # UR STRIP: ABNORMAL /HPF
REF LAB TEST METHOD: ABNORMAL
SODIUM SERPL-SCNC: 140 MMOL/L (ref 136–145)
SP GR UR STRIP: 1.02 (ref 1–1.03)
SQUAMOUS #/AREA URNS HPF: ABNORMAL /HPF
T4 FREE SERPL-MCNC: 1.2 NG/DL (ref 0.93–1.7)
THC INTERNAL CONTROL: ABNORMAL
TSH SERPL DL<=0.05 MIU/L-ACNC: 26.1 UIU/ML (ref 0.27–4.2)
UROBILINOGEN UR QL STRIP: ABNORMAL
WBC # UR STRIP: ABNORMAL /HPF
WBC NRBC COR # BLD AUTO: 7.06 10*3/MM3 (ref 3.4–10.8)

## 2024-03-26 PROCEDURE — 84443 ASSAY THYROID STIM HORMONE: CPT | Performed by: STUDENT IN AN ORGANIZED HEALTH CARE EDUCATION/TRAINING PROGRAM

## 2024-03-26 PROCEDURE — 84100 ASSAY OF PHOSPHORUS: CPT

## 2024-03-26 PROCEDURE — 81001 URINALYSIS AUTO W/SCOPE: CPT

## 2024-03-26 PROCEDURE — 84439 ASSAY OF FREE THYROXINE: CPT | Performed by: STUDENT IN AN ORGANIZED HEALTH CARE EDUCATION/TRAINING PROGRAM

## 2024-03-26 PROCEDURE — 85025 COMPLETE CBC W/AUTO DIFF WBC: CPT

## 2024-03-26 PROCEDURE — 83970 ASSAY OF PARATHORMONE: CPT

## 2024-03-26 PROCEDURE — 80053 COMPREHEN METABOLIC PANEL: CPT

## 2024-03-26 PROCEDURE — 82306 VITAMIN D 25 HYDROXY: CPT | Performed by: STUDENT IN AN ORGANIZED HEALTH CARE EDUCATION/TRAINING PROGRAM

## 2024-03-26 PROCEDURE — 82570 ASSAY OF URINE CREATININE: CPT

## 2024-03-26 PROCEDURE — 84156 ASSAY OF PROTEIN URINE: CPT

## 2024-03-26 PROCEDURE — 36415 COLL VENOUS BLD VENIPUNCTURE: CPT

## 2024-03-26 NOTE — PROGRESS NOTES
"Chief Complaint  Memory Loss (2/12 had innocent at work, getting hard to breathe, turned up oxygen and took pain medication. Usually on 2L, but turned up to 6L. Stole money from someone's purse but has no memory of this. States court system needs records of medication list)    Subjective            Aida Mcclure presents to [facility]  History of Present Illness    States on on 2/12 she was very busy at work and felt short of breath. States this was a busy day and she increased her oxygen and had taken an extra norco and gabapentin.   States her daughter did tell her that she looked a little daze and tired and checked in on her and states that she told her she was tired.   Her daughter works at the same place as her and had several people come to her telling her that pt was acting differently and that she was acting strange.   Her daughter offered to take her home early.   States she went to grab her things and was even asked by her co-worker to take a coat. States she reached for a coat but it was the wrong one and the co-worker pointed it out to her.     States on the video, it shows that she reached over the coats and reached in someone else's purse and took something out of the bag and placed in her pocket. States she does not remember doing any of the actions portrayed on the video. States she was fired over this. States she went home and checked the clothes she was wearing and found money in the back pocket. States she wrote a note and went back to return the money to her manager, Jennifer. Patient states the involved party did press charges and she is having to go to court.     Pt states she's had episodes when she does not remember something her daughter said but \"never this bad\"   States she has been \"sticking close to home, afraid to leave the house\" since the incident.     Past Medical History:   Diagnosis Date    Anemia     Annual physical exam " 2017    WILL RESCHEDULE MAMMOGRAM     Breast lump     CHF (congestive heart failure) 2017    FOLLOWS WITH CARDS. CURRENTLY DOING WELL ON LASIX, LISINOPRIL, ALDACTONE, AND PROPANOLOL    Degeneration of lumbar intervertebral disc 2012    Diverticulitis     Essential hypertension 2017    WELL CONTROLLED ON CURRENT REGIMEN     GERD (gastroesophageal reflux disease)     Hamstring injury 2015    BILATERAL HAMSTRING INJURY/PAIN     Head injury     Hernia cerebri     Hyperthyroidism     Hyperthyroidism 2017    CURRENTLY ON METHIMAZOLE, PT REPORT POSSIBLY DUE TO GRAVE'S DISEASE. WILL NEED TO OBTAIN AND REVIEW MEDICAL RECORDS FROM [facility]. WILL REFER TO ENDO     Hypoxia     Insomnia 2017    DISCUSSED SLEEP HYGIENE. PT TO TRY AVOIDING BLUE LIGHT AT NIGHT. PT ALSO TO SET ROUTINE PRIOR TO BEDTIME. WILL AVOID MEDS AT THIS TIME     Interstitial lung disease     Lumbosacral disc herniation 2012    LEFT L5-S1 SMALL NATURE. SHE HAS FAILED ALL CONSERVATIVE MEASURES AND DESIRES SURGERY     Lupus     SEES DR. ANDERSON    Migraines     Pulmonary embolism     Respiratory failure with hypoxia 2021    Rheumatoid arthritis 2017    CURRENTLY CONTROLLED, BUT WITH RESIDUAL DEFORMITY. WILL FOLLOW WITH DR. ANDERSON     Tachycardia     Total knee replacement status, right 2015       Allergies:   Allergies   Allergen Reactions    Aspirin Anaphylaxis     In high doses    Salsalate Hives, Anaphylaxis, Unknown - High Severity and Shortness Of Breath          Past Surgical History:   Procedure Laterality Date    ANKLE SURGERY Bilateral     BREAST BIOPSY      CATARACT EXTRACTION WITH INTRAOCULAR LENS IMPLANT       SECTION      COLONOSCOPY      ENDOSCOPY N/A 2023    Procedure: ESOPHAGOGASTRODUODENOSCOPY WITH BIOPSY;  Surgeon: Shama Martin MD;  Location: [facility] ENDOSCOPY;  Service: Gastroenterology;  Laterality: N/A;  NORMAL EGD    EYE LENS IMPLANT SECONDARY      YES     FASCIOTOMY      LEFT ANTERIOR ARM     HAND SURGERY Left     KNEE SURGERY      OTHER SURGICAL HISTORY      LYMPH NODE EXCISION    PORTACATH PLACEMENT      POWER PORT PLACEMENT     TUBAL ABDOMINAL LIGATION      UPPER GASTROINTESTINAL ENDOSCOPY            Social History     Socioeconomic History    Marital status:    Tobacco Use    Smoking status: Never     Passive exposure: Past    Smokeless tobacco: Never   Vaping Use    Vaping status: Never Used   Substance and Sexual Activity    Alcohol use: Yes     Alcohol/week: 2.0 standard drinks of alcohol     Types: 2 Standard drinks or equivalent per week     Comment: 2 wine coolers    Drug use: Never    Sexual activity: Not Currently     Birth control/protection: Tubal ligation         Family History   Problem Relation Age of Onset    Stroke Father     Lung cancer Other     Arthritis Other     Cancer Other     Parkinsonism Other     Diabetes type II Other           Health Maintenance Due   Topic Date Due    COLORECTAL CANCER SCREENING  Never done    TDAP/TD VACCINES (1 - Tdap) Never done    ZOSTER VACCINE (2 of 2) 02/24/2023    RSV Vaccine - Adults (1 - 1-dose 60+ series) Never done    COVID-19 Vaccine (3 - 2023-24 season) 09/01/2023            Current Outpatient Medications:     albuterol sulfate HFA (Proventil HFA) 108 (90 Base) MCG/ACT inhaler, Every 6 (Six) Hours., Disp: , Rfl:     ascorbic acid (VITAMIN C) 250 MG tablet, Take 1 tablet by mouth Daily., Disp: , Rfl:     azaTHIOprine (IMURAN) 50 MG tablet, , Disp: , Rfl:     benzonatate (TESSALON) 200 MG capsule, Take 1 capsule by mouth 3 (Three) Times a Day As Needed for Cough., Disp: 30 capsule, Rfl: 1    brompheniramine-pseudoephedrine-DM 30-2-10 MG/5ML syrup, Take 10 mL by mouth 4 (Four) Times a Day As Needed for Cough., Disp: 473 mL, Rfl: 0    Calcium Carbonate-Vitamin D (CALTRATE 600+D PO), , Disp: , Rfl:     cetirizine (zyrTEC) 10 MG tablet, Take 1 tablet by mouth Daily., Disp: 90 tablet, Rfl: 1     cyanocobalamin 1000 MCG/ML injection, Inject 1 mL under the skin into the appropriate area as directed Every 30 (Thirty) Days., Disp: 3 mL, Rfl: 3    cyclobenzaprine (FLEXERIL) 10 MG tablet, Take 1 tablet by mouth 2 (Two) Times a Day As Needed for Muscle Spasms., Disp: 60 tablet, Rfl: 3    Diclofenac Sodium (VOLTAREN) 1 % gel gel, Apply 4 g topically to the appropriate area as directed 4 (Four) Times a Day As Needed (shoulder/hip pain)., Disp: 100 g, Rfl: 3    fluconazole (Diflucan) 150 MG tablet, Take 1 tablet by mouth Every Other Day., Disp: 2 tablet, Rfl: 0    fluticasone (FLONASE) 50 MCG/ACT nasal spray, SHAKE LIQUID AND USE 2 SPRAYS(100 MCG) IN EACH NOSTRIL EVERY DAY, Disp: 16 g, Rfl: 3    guaiFENesin (Mucinex) 600 MG 12 hr tablet, Take 2 tablets by mouth 2 (Two) Times a Day., Disp: 14 tablet, Rfl: 0    hydroxychloroquine (PLAQUENIL) 200 MG tablet, Take 1 tablet by mouth 2 (Two) Times a Day., Disp: , Rfl:     hydrOXYzine (ATARAX) 25 MG tablet, Take 1 tablet by mouth Every 8 (Eight) Hours As Needed for Itching., Disp: 90 tablet, Rfl: 1    levothyroxine (SYNTHROID, LEVOTHROID) 137 MCG tablet, Take 1 tablet by mouth Daily., Disp: 90 tablet, Rfl: 1    magnesium oxide (MAG-OX) 400 MG tablet, Take 1 tablet by mouth Daily., Disp: 90 tablet, Rfl: 1    montelukast (SINGULAIR) 10 MG tablet, Take 1 tablet by mouth every night at bedtime., Disp: 90 tablet, Rfl: 1    Nintedanib Esylate (Ofev) 150 MG capsule, Take 150 mg by mouth 2 (Two) Times a Day., Disp: 60 capsule, Rfl: 5    O2 (OXYGEN), Inhale 3 L/min Daily., Disp: , Rfl:     ondansetron (ZOFRAN) 4 MG tablet, Take 1 tablet by mouth Every 12 (Twelve) Hours As Needed for Vomiting or Nausea., Disp: 30 tablet, Rfl: 1    pilocarpine (SALAGEN) 5 MG tablet, Take 1 tablet by mouth 3 times a day., Disp: 90 tablet, Rfl: 1    promethazine-dextromethorphan (PROMETHAZINE-DM) 6.25-15 MG/5ML syrup, Take 5 mL by mouth At Night As Needed for Cough., Disp: 75 mL, Rfl: 0    rizatriptan  (MAXALT) 10 MG tablet, Take 1 tablet by mouth As Needed (9) for up to 9 doses., Disp: 9 tablet, Rfl: 3    rOPINIRole (REQUIP) 1 MG tablet, Take 1 tablet by mouth Every Night., Disp: 90 tablet, Rfl: 1    spironolactone (ALDACTONE) 50 MG tablet, Take 1 tablet by mouth Daily., Disp: 90 tablet, Rfl: 1    furosemide (LASIX) 40 MG tablet, Take 1 tablet by mouth Daily for 90 days., Disp: 90 tablet, Rfl: 1    gabapentin (NEURONTIN) 300 MG capsule, Take 1 capsule by mouth 3 (Three) Times a Day for 30 days., Disp: 90 capsule, Rfl: 0    HYDROcodone-acetaminophen (NORCO) 7.5-325 MG per tablet, Take 1 tablet by mouth Every 12 (Twelve) Hours As Needed for Moderate Pain for up to 30 days., Disp: 60 tablet, Rfl: 0      Immunization History   Administered Date(s) Administered    COVID-19 ([employer]) 03/29/2021    COVID-19 (PFIZER) Purple Cap Monovalent 12/16/2021    Flu Vaccine Intradermal Quad 18-64YR 01/02/2008, 11/10/2008, 11/05/2009    Flu Vaccine Quad PF >36MO 10/25/2017    Fluzone (or Fluarix & Flulaval for VFC) >6mos 10/25/2017, 11/18/2021, 10/12/2022, 11/29/2023    Hepatitis A 04/30/2018    Influenza Quad Vaccine (Inpatient) 08/27/2012, 10/15/2013    Influenza Seasonal Injectable 01/02/2008, 11/10/2008, 11/05/2009    Influenza, Unspecified 09/17/2020, 10/01/2021, 10/12/2022    Pneumococcal Conjugate 13-Valent (PCV13) 06/14/2018    Pneumococcal Polysaccharide (PPSV23) 08/26/2019    Shingrix 12/30/2022         Review of Systems       Objective       Vitals:    03/26/24 1456   BP: 138/82   BP Location: Right arm   Patient Position: Sitting   Cuff Size: Large Adult   Pulse: 94   Temp: 96.1 °F (35.6 °C)   TempSrc: Temporal   SpO2: 99%   Weight: 96.7 kg (213 lb 3.2 oz)     Body mass index is 36.6 kg/m².      Physical Exam  Vitals reviewed.   Constitutional:       Appearance: Normal appearance.   HENT:      Head: Normocephalic and atraumatic.      Nose: Nose normal.   Eyes:      Extraocular Movements: Extraocular movements intact.       Conjunctiva/sclera: Conjunctivae normal.   Cardiovascular:      Rate and Rhythm: Normal rate and regular rhythm.      Pulses: Normal pulses.      Heart sounds: Normal heart sounds.   Pulmonary:      Effort: Pulmonary effort is normal. No respiratory distress.      Comments: Chronic coarse breath sounds bilaterally.   Nasal canula in place.   Musculoskeletal:         General: Normal range of motion.   Skin:     General: Skin is warm and dry.   Neurological:      General: No focal deficit present.      Mental Status: She is alert and oriented to person, place, and time.      Cranial Nerves: No cranial nerve deficit.   Psychiatric:         Mood and Affect: Mood normal.         Behavior: Behavior normal.         Thought Content: Thought content normal.         Judgment: Judgment normal.             Result Review :                           Assessment and Plan      Diagnoses and all orders for this visit:    1. Confusion (Primary)  Comments:  likely due to medication AE as pt took an extra norco and gabapentin at work which may have contributed to her altered awareness.  Orders:  -     TSH  -     T4, Free      2. Altered behavior  Comments:  at work, x1 in isolation. Likely due to AE of medications per above.  Orders:  -     Ambulatory Referral to Neurology    3. Mild cognitive impairment  Comments:  Weber score of 22. Referring to neurlogy, [facility], for further evaluation.  Orders:  -     Ambulatory Referral to Neurology    4. Hypothyroidism, unspecified type  Comments:  Chronic, previously following with Dr. Ozuna, new referral placed. Due for repeat labs. if TSH remains high, will plan to increase synthroid dose.  Orders:  -     Ambulatory Referral to ENT (Otolaryngology)    5. Vitamin D deficiency  Comments:  Chronic, due for labs.  Orders:  -     Vitamin D 25 hydroxy    6. Medication monitoring encounter  Comments:  Due for UDS per state mandate for controlled substance (Norco and  gabapentin).  Orders:  -     POC 12 Panel Urine Drug Screen          Orders Placed This Encounter   Procedures    TSH    T4, Free    Vitamin D 25 hydroxy    Ambulatory Referral to Neurology    Ambulatory Referral to ENT (Otolaryngology)    POC 12 Panel Urine Drug Screen                        Follow Up     Return in about 17 days (around 4/12/2024) for thyroid .    Patient was given instructions and counseling regarding her condition or for health maintenance advice. Please see specific information pulled into the AVS if appropriate.     Merline Enamorado MD   Internal Medicine-Pediatrics

## 2024-03-27 LAB — PTH-INTACT SERPL-MCNC: 62.4 PG/ML (ref 15–65)

## 2024-04-03 DIAGNOSIS — G89.29 OTHER CHRONIC PAIN: ICD-10-CM

## 2024-04-03 NOTE — TELEPHONE ENCOUNTER
Caller: Aida Mcclure April    Relationship: Self    Best call back number: [phone]    Requested Prescriptions:   Requested Prescriptions     Pending Prescriptions Disp Refills    HYDROcodone-acetaminophen (NORCO) 7.5-325 MG per tablet 60 tablet 0     Sig: Take 1 tablet by mouth Every 12 (Twelve) Hours As Needed for Moderate Pain for up to 30 days.        Pharmacy where request should be sent: [facility]/PHARMACY #[id number] - [place], [place] - [address] [facility] [phone] [facility] [phone] [facility]     Last office visit with prescribing clinician: 3/26/2024   Last telemedicine visit with prescribing clinician: Visit date not found   Next office visit with prescribing clinician: 4/12/2024     Additional details provided by patient: PATIENT IS CURRENTLY ALMOST OUT OF THIS MEDICINE.     Does the patient have less than a 3 day supply:  [x] Yes  [] No    Would you like a call back once the refill request has been completed: [] Yes [x] No    If the office needs to give you a call back, can they leave a voicemail: [] Yes [x] No    Sarah Rucker Rep   04/03/24 16:50 EDT

## 2024-04-04 ENCOUNTER — TELEPHONE (OUTPATIENT)
Dept: INTERNAL MEDICINE | Facility: CLINIC | Age: 59
End: 2024-04-04
Payer: MEDICARE

## 2024-04-04 RX ORDER — HYDROCODONE BITARTRATE AND ACETAMINOPHEN 7.5; 325 MG/1; MG/1
1 TABLET ORAL EVERY 12 HOURS PRN
Qty: 60 TABLET | Refills: 0 | Status: SHIPPED | OUTPATIENT
Start: 2024-04-04 | End: 2024-05-04

## 2024-04-04 NOTE — TELEPHONE ENCOUNTER
Patient requesting print out of last office visit and recent medication list to take to court. Okay per Dr Rick. Dr Rick has to sign the note and then we can print for patient it .

## 2024-04-04 NOTE — TELEPHONE ENCOUNTER
Last follow up visit date: 3/26/24    Last urine drug screen date: 11/29/23    Last consent/contract date: 2/14/23    Does patient utilize [facility] pharmacy (yes or no)? No    1/11/24

## 2024-04-12 ENCOUNTER — OFFICE VISIT (OUTPATIENT)
Dept: INTERNAL MEDICINE | Facility: CLINIC | Age: 59
End: 2024-04-12
Payer: MEDICARE

## 2024-04-12 ENCOUNTER — TELEPHONE (OUTPATIENT)
Dept: INTERNAL MEDICINE | Facility: CLINIC | Age: 59
End: 2024-04-12

## 2024-04-12 VITALS
TEMPERATURE: 96.9 F | OXYGEN SATURATION: 97 % | WEIGHT: 221.6 LBS | HEART RATE: 76 BPM | BODY MASS INDEX: 38.04 KG/M2 | DIASTOLIC BLOOD PRESSURE: 88 MMHG | SYSTOLIC BLOOD PRESSURE: 140 MMHG

## 2024-04-12 DIAGNOSIS — R63.5 WEIGHT GAIN: ICD-10-CM

## 2024-04-12 DIAGNOSIS — G47.00 INSOMNIA, UNSPECIFIED TYPE: ICD-10-CM

## 2024-04-12 DIAGNOSIS — N18.4 STAGE 4 CHRONIC KIDNEY DISEASE: ICD-10-CM

## 2024-04-12 DIAGNOSIS — E03.9 HYPOTHYROIDISM, UNSPECIFIED TYPE: Primary | ICD-10-CM

## 2024-04-12 PROCEDURE — 99214 OFFICE O/P EST MOD 30 MIN: CPT | Performed by: STUDENT IN AN ORGANIZED HEALTH CARE EDUCATION/TRAINING PROGRAM

## 2024-04-12 PROCEDURE — 3079F DIAST BP 80-89 MM HG: CPT | Performed by: STUDENT IN AN ORGANIZED HEALTH CARE EDUCATION/TRAINING PROGRAM

## 2024-04-12 PROCEDURE — 3077F SYST BP >= 140 MM HG: CPT | Performed by: STUDENT IN AN ORGANIZED HEALTH CARE EDUCATION/TRAINING PROGRAM

## 2024-04-12 RX ORDER — LEVOTHYROXINE SODIUM 0.15 MG/1
150 TABLET ORAL DAILY
Qty: 30 TABLET | Refills: 1 | Status: SHIPPED | OUTPATIENT
Start: 2024-04-12

## 2024-04-12 RX ORDER — DOXYCYCLINE 100 MG/1
CAPSULE ORAL
COMMUNITY
Start: 2023-12-31

## 2024-04-12 RX ORDER — TRAZODONE HYDROCHLORIDE 50 MG/1
50 TABLET ORAL NIGHTLY
Qty: 90 TABLET | Refills: 1 | Status: SHIPPED | OUTPATIENT
Start: 2024-04-12

## 2024-04-12 NOTE — PROGRESS NOTES
Chief Complaint  Thyroid Problem (Follow up) and Insomnia (States she did not sleep last night. Been occurring for several months. )    Subjective            Aida Mcclure presents to [facility]  History of Present Illness  History of Present Illness  The patient presents to the office for follow-up of thyroid problems and insomnia.    Insomnia:  The patient reports experiencing difficulty in initiating sleep, a condition that has persisted for four consecutive days this week. She awoke at 10:00 AM the previous day and was unable to return to sleep at 6:00 AM. Despite attempts to rest, she was unsuccessful. This pattern of insomnia is frequent. She denies experiencing racing thoughts. Her sleep routine includes reading and performing puzzles, which previously induce drowsiness, but this has not been the case recently. She hypothesizes that her insomnia may be stress-related due to her court case. She has previously tried melatonin 10 mg, which initially provided relief, but has since become ineffective. Consequently, she doubled her melatonin dosage, which initially resulted in sleep initiation. She also takes hydroxyzine thrice daily for itching, which does not induce drowsiness when taken concurrently. She is uncertain if these medications are contributing to her sleep issues. She has not tried trazodone. Approximately 10 to 15 years ago, she was prescribed zolpidem for sleep issues.    Hypothyroidism:  Chronic, presenting for lab result review.   TSH has been significantly elevated lately. Pt was counseled on how to take her medications at last visit.   The patient denies experiencing fatigue or unexplained weight gain. She takes her thyroid medication at 6:00 AM with water and does not consume food until 1:00 AM or 2:00 AM.        Past Medical History:   Diagnosis Date    Anemia     Annual physical exam 11/06/2017    WILL RESCHEDULE MAMMOGRAM     Breast  lump     CHF (congestive heart failure) 2017    FOLLOWS WITH CARDS. CURRENTLY DOING WELL ON LASIX, LISINOPRIL, ALDACTONE, AND PROPANOLOL    Degeneration of lumbar intervertebral disc 2012    Diverticulitis     Essential hypertension 2017    WELL CONTROLLED ON CURRENT REGIMEN     GERD (gastroesophageal reflux disease)     Hamstring injury 2015    BILATERAL HAMSTRING INJURY/PAIN     Head injury     Hernia cerebri     Hyperthyroidism     Hyperthyroidism 2017    CURRENTLY ON METHIMAZOLE, PT REPORT POSSIBLY DUE TO GRAVE'S DISEASE. WILL NEED TO OBTAIN AND REVIEW MEDICAL RECORDS FROM [facility]. WILL REFER TO ENDO     Hypoxia     Insomnia 2017    DISCUSSED SLEEP HYGIENE. PT TO TRY AVOIDING BLUE LIGHT AT NIGHT. PT ALSO TO SET ROUTINE PRIOR TO BEDTIME. WILL AVOID MEDS AT THIS TIME     Interstitial lung disease     Lumbosacral disc herniation 2012    LEFT L5-S1 SMALL NATURE. SHE HAS FAILED ALL CONSERVATIVE MEASURES AND DESIRES SURGERY     Lupus     SEES DR. ANDERSON    Migraines     Pulmonary embolism     Respiratory failure with hypoxia 2021    Rheumatoid arthritis 2017    CURRENTLY CONTROLLED, BUT WITH RESIDUAL DEFORMITY. WILL FOLLOW WITH DR. ANDERSON     Tachycardia     Total knee replacement status, right 2015       Allergies:   Allergies   Allergen Reactions    Aspirin Anaphylaxis     In high doses    Salsalate Hives, Anaphylaxis, Unknown - High Severity and Shortness Of Breath          Past Surgical History:   Procedure Laterality Date    ANKLE SURGERY Bilateral     BREAST BIOPSY      CATARACT EXTRACTION WITH INTRAOCULAR LENS IMPLANT       SECTION      COLONOSCOPY      ENDOSCOPY N/A 2023    Procedure: ESOPHAGOGASTRODUODENOSCOPY WITH BIOPSY;  Surgeon: Shama Martin MD;  Location: [facility] ENDOSCOPY;  Service: Gastroenterology;  Laterality: N/A;  NORMAL EGD    EYE LENS IMPLANT SECONDARY      YES    FASCIOTOMY      LEFT ANTERIOR ARM     HAND SURGERY  Left     KNEE SURGERY      OTHER SURGICAL HISTORY      LYMPH NODE EXCISION    PORTACATH PLACEMENT      POWER PORT PLACEMENT     TUBAL ABDOMINAL LIGATION      UPPER GASTROINTESTINAL ENDOSCOPY            Social History     Socioeconomic History    Marital status:    Tobacco Use    Smoking status: Never     Passive exposure: Past    Smokeless tobacco: Never   Vaping Use    Vaping status: Never Used   Substance and Sexual Activity    Alcohol use: Yes     Alcohol/week: 2.0 standard drinks of alcohol     Types: 2 Standard drinks or equivalent per week     Comment: 2 wine coolers    Drug use: Never    Sexual activity: Not Currently     Birth control/protection: Tubal ligation         Family History   Problem Relation Age of Onset    Stroke Father     Lung cancer Other     Arthritis Other     Cancer Other     Parkinsonism Other     Diabetes type II Other           Health Maintenance Due   Topic Date Due    COLORECTAL CANCER SCREENING  Never done    TDAP/TD VACCINES (1 - Tdap) Never done    RSV Vaccine - Adults (1 - 1-dose 60+ series) Never done            Current Outpatient Medications:     albuterol sulfate HFA (Proventil HFA) 108 (90 Base) MCG/ACT inhaler, Every 6 (Six) Hours., Disp: , Rfl:     ascorbic acid (VITAMIN C) 250 MG tablet, Take 1 tablet by mouth Daily., Disp: , Rfl:     azaTHIOprine (IMURAN) 50 MG tablet, , Disp: , Rfl:     benzonatate (TESSALON) 200 MG capsule, Take 1 capsule by mouth 3 (Three) Times a Day As Needed for Cough., Disp: 30 capsule, Rfl: 1    brompheniramine-pseudoephedrine-DM 30-2-10 MG/5ML syrup, Take 10 mL by mouth 4 (Four) Times a Day As Needed for Cough., Disp: 473 mL, Rfl: 0    Calcium Carbonate-Vitamin D (CALTRATE 600+D PO), , Disp: , Rfl:     cetirizine (zyrTEC) 10 MG tablet, Take 1 tablet by mouth Daily., Disp: 90 tablet, Rfl: 1    cyanocobalamin 1000 MCG/ML injection, Inject 1 mL under the skin into the appropriate area as directed Every 30 (Thirty) Days., Disp: 3 mL, Rfl: 3     cyclobenzaprine (FLEXERIL) 10 MG tablet, Take 1 tablet by mouth 2 (Two) Times a Day As Needed for Muscle Spasms., Disp: 60 tablet, Rfl: 3    Diclofenac Sodium (VOLTAREN) 1 % gel gel, Apply 4 g topically to the appropriate area as directed 4 (Four) Times a Day As Needed (shoulder/hip pain)., Disp: 100 g, Rfl: 3    doxycycline (MONODOX) 100 MG capsule, TAKE 1 CAPSULE BY MOUTH 2 TIMES A DAY FOR 10 DAYS., Disp: , Rfl:     fluconazole (Diflucan) 150 MG tablet, Take 1 tablet by mouth Every Other Day., Disp: 2 tablet, Rfl: 0    fluticasone (FLONASE) 50 MCG/ACT nasal spray, SHAKE LIQUID AND USE 2 SPRAYS(100 MCG) IN EACH NOSTRIL EVERY DAY, Disp: 16 g, Rfl: 3    guaiFENesin (Mucinex) 600 MG 12 hr tablet, Take 2 tablets by mouth 2 (Two) Times a Day., Disp: 14 tablet, Rfl: 0    HYDROcodone-acetaminophen (NORCO) 7.5-325 MG per tablet, Take 1 tablet by mouth Every 12 (Twelve) Hours As Needed for Moderate Pain for up to 30 days., Disp: 60 tablet, Rfl: 0    hydroxychloroquine (PLAQUENIL) 200 MG tablet, Take 1 tablet by mouth 2 (Two) Times a Day., Disp: , Rfl:     hydrOXYzine (ATARAX) 25 MG tablet, Take 1 tablet by mouth Every 8 (Eight) Hours As Needed for Itching., Disp: 90 tablet, Rfl: 1    magnesium oxide (MAG-OX) 400 MG tablet, Take 1 tablet by mouth Daily., Disp: 90 tablet, Rfl: 1    montelukast (SINGULAIR) 10 MG tablet, Take 1 tablet by mouth every night at bedtime., Disp: 90 tablet, Rfl: 1    Nintedanib Esylate (Ofev) 150 MG capsule, Take 150 mg by mouth 2 (Two) Times a Day., Disp: 60 capsule, Rfl: 5    O2 (OXYGEN), Inhale 3 L/min Daily., Disp: , Rfl:     ondansetron (ZOFRAN) 4 MG tablet, Take 1 tablet by mouth Every 12 (Twelve) Hours As Needed for Vomiting or Nausea., Disp: 30 tablet, Rfl: 1    pilocarpine (SALAGEN) 5 MG tablet, Take 1 tablet by mouth 3 times a day., Disp: 90 tablet, Rfl: 1    promethazine-dextromethorphan (PROMETHAZINE-DM) 6.25-15 MG/5ML syrup, Take 5 mL by mouth At Night As Needed for Cough., Disp: 75 mL,  Rfl: 0    rizatriptan (MAXALT) 10 MG tablet, Take 1 tablet by mouth As Needed (9) for up to 9 doses., Disp: 9 tablet, Rfl: 3    rOPINIRole (REQUIP) 1 MG tablet, Take 1 tablet by mouth Every Night., Disp: 90 tablet, Rfl: 1    spironolactone (ALDACTONE) 50 MG tablet, Take 1 tablet by mouth Daily., Disp: 90 tablet, Rfl: 1    furosemide (LASIX) 40 MG tablet, Take 1 tablet by mouth Daily for 90 days., Disp: 90 tablet, Rfl: 1    gabapentin (NEURONTIN) 300 MG capsule, Take 1 capsule by mouth 3 (Three) Times a Day for 30 days., Disp: 90 capsule, Rfl: 0    levothyroxine (Synthroid) 150 MCG tablet, Take 1 tablet by mouth Daily., Disp: 30 tablet, Rfl: 1    traZODone (DESYREL) 50 MG tablet, Take 1 tablet by mouth Every Night. Take 1/2 tablet for 3 night, then full tablet if no improvement w/ 1/2., Disp: 90 tablet, Rfl: 1      Immunization History   Administered Date(s) Administered    COVID-19 (SANDI) 03/29/2021    COVID-19 (PFIZER) Purple Cap Monovalent 12/16/2021    COVID-19 F23 (PFIZER) 12YRS+ (COMIRNATY) 04/08/2024    Flu Vaccine Intradermal Quad 18-64YR 01/02/2008, 11/10/2008, 11/05/2009    Flu Vaccine Quad PF >36MO 10/25/2017    Fluzone (or Fluarix & Flulaval for VFC) >6mos 10/25/2017, 11/18/2021, 10/12/2022, 11/29/2023    Hepatitis A 04/30/2018    Influenza Quad Vaccine (Inpatient) 08/27/2012, 10/15/2013    Influenza Seasonal Injectable 01/02/2008, 11/10/2008, 11/05/2009    Influenza, Unspecified 09/17/2020, 10/01/2021, 10/12/2022    Pneumococcal Conjugate 13-Valent (PCV13) 06/14/2018    Pneumococcal Polysaccharide (PPSV23) 08/26/2019    Shingrix 12/30/2022         Review of Systems       Objective       Vitals:    04/12/24 0835   BP: 140/88   BP Location: Right arm   Patient Position: Sitting   Cuff Size: Large Adult   Pulse: 76   Temp: 96.9 °F (36.1 °C)   TempSrc: Temporal   SpO2: 97%   Weight: 101 kg (221 lb 9.6 oz)     Body mass index is 38.04 kg/m².      Physical Exam  Vitals reviewed.   Constitutional:        Appearance: Normal appearance.   HENT:      Head: Normocephalic and atraumatic.      Nose: Nose normal.   Eyes:      Extraocular Movements: Extraocular movements intact.      Conjunctiva/sclera: Conjunctivae normal.   Cardiovascular:      Rate and Rhythm: Normal rate and regular rhythm.      Pulses: Normal pulses.      Heart sounds: Normal heart sounds.   Pulmonary:      Effort: Pulmonary effort is normal. No respiratory distress.      Breath sounds: Normal breath sounds.   Musculoskeletal:         General: Normal range of motion.   Skin:     General: Skin is warm and dry.   Neurological:      General: No focal deficit present.      Mental Status: She is alert and oriented to person, place, and time.      Cranial Nerves: No cranial nerve deficit.   Psychiatric:         Mood and Affect: Mood normal.         Behavior: Behavior normal.         Thought Content: Thought content normal.       Physical Exam  Vital Signs  Patient's weight is 221.      Result Review :   Results  Laboratory Studies  TSH was 26, down from 38 in November. Calcium level was normal. Kidney function stable. Mild anemia, 11.8. Vitamin D level improved compared to November. PTH was normal. Phosphorus level was normal. Urine test was normal.     Component      Latest Ref Rng 5/12/2023 11/29/2023 1/11/2024 3/26/2024   TSH Baseline      0.270 - 4.200 uIU/mL 7.600 (H)  38.500 (H)  27.100 (H)  26.100 (H)    Free T4      0.93 - 1.70 ng/dL 1.21  0.89 (L)  1.36  1.20       Legend:  (H) High  (L) Low                          Assessment and Plan      Diagnoses and all orders for this visit:    1. Hypothyroidism, unspecified type (Primary)  -     levothyroxine (Synthroid) 150 MCG tablet; Take 1 tablet by mouth Daily.  Dispense: 30 tablet; Refill: 1  -     T4, Free; Future  -     TSH; Future    2. Insomnia, unspecified type  -     traZODone (DESYREL) 50 MG tablet; Take 1 tablet by mouth Every Night. Take 1/2 tablet for 3 night, then full tablet if no  improvement w/ 1/2.  Dispense: 90 tablet; Refill: 1    3. Weight gain    4. Stage 4 chronic kidney disease      Assessment & Plan  1. Insomnia.  The patient's sleep disturbances may be attributed to underlying nervousness or anxiety related to her court case. The patient was advised to establish a bedtime routine by 10:00 PM. A prescription for trazodone 50 mg was issued, with instructions for the patient to commence with a half tablet for a duration of 3 months, after which a full tablet will be considered if there is no improvement.    2. Hypothyroidism.  3. Weight gain   The patient has experienced a weight gain of 18 pounds since 01/2024. Her TSH levels remain elevated, indicating a need for adjustment in her Synthroid dosage. The patient's Synthroid dosage will be increased to 150 mcg, to be taken daily in the morning with water.    4. Chronic kidney disease.  The patient's kidney function remains stable. The patient was advised to continue her vitamin D supplement.    Follow-up  The patient is scheduled for a follow-up visit in 6 weeks, during which repeat blood work will be conducted.               Follow Up     Return in about 6 weeks (around 5/24/2024) for hypothyroidism and insomnia .    Patient was given instructions and counseling regarding her condition or for health maintenance advice. Please see specific information pulled into the AVS if appropriate.     Merline Enamorado MD   Internal Medicine-Pediatrics     Patient or patient representative verbalized consent for the use of Ambient Listening during the visit with  Merline Enamorado MD for chart documentation. 4/12/2024  10:53 EDT

## 2024-04-24 DIAGNOSIS — Z76.0 MEDICATION REFILL: ICD-10-CM

## 2024-04-24 RX ORDER — HYDROXYZINE HYDROCHLORIDE 25 MG/1
25 TABLET, FILM COATED ORAL EVERY 8 HOURS PRN
Qty: 90 TABLET | Refills: 1 | Status: SHIPPED | OUTPATIENT
Start: 2024-04-24

## 2024-05-23 ENCOUNTER — OFFICE VISIT (OUTPATIENT)
Dept: INTERNAL MEDICINE | Facility: CLINIC | Age: 59
End: 2024-05-23
Payer: MEDICARE

## 2024-05-23 VITALS
WEIGHT: 209.6 LBS | DIASTOLIC BLOOD PRESSURE: 86 MMHG | HEART RATE: 86 BPM | TEMPERATURE: 98 F | SYSTOLIC BLOOD PRESSURE: 138 MMHG | OXYGEN SATURATION: 98 % | BODY MASS INDEX: 35.98 KG/M2

## 2024-05-23 DIAGNOSIS — Z91.148 CONTROLLED SUBSTANCE AGREEMENT TERMINATED: ICD-10-CM

## 2024-05-23 DIAGNOSIS — G89.29 OTHER CHRONIC PAIN: ICD-10-CM

## 2024-05-23 DIAGNOSIS — J20.9 ACUTE BRONCHITIS, UNSPECIFIED ORGANISM: ICD-10-CM

## 2024-05-23 DIAGNOSIS — R09.81 NASAL CONGESTION: Primary | ICD-10-CM

## 2024-05-23 DIAGNOSIS — E03.9 HYPOTHYROIDISM, UNSPECIFIED TYPE: ICD-10-CM

## 2024-05-23 DIAGNOSIS — R05.1 ACUTE COUGH: ICD-10-CM

## 2024-05-23 LAB
AMPHET+METHAMPHET UR QL: NEGATIVE
AMPHETAMINE INTERNAL CONTROL: ABNORMAL
AMPHETAMINES UR QL: NEGATIVE
BARBITURATE INTERNAL CONTROL: ABNORMAL
BARBITURATES UR QL SCN: NEGATIVE
BENZODIAZ UR QL SCN: NEGATIVE
BENZODIAZEPINE INTERNAL CONTROL: ABNORMAL
BUPRENORPHINE INTERNAL CONTROL: ABNORMAL
BUPRENORPHINE SERPL-MCNC: NEGATIVE NG/ML
CANNABINOIDS SERPL QL: POSITIVE
COCAINE INTERNAL CONTROL: ABNORMAL
COCAINE UR QL: NEGATIVE
EXPIRATION DATE: ABNORMAL
EXPIRATION DATE: NORMAL
FLUAV AG UPPER RESP QL IA.RAPID: NOT DETECTED
FLUBV AG UPPER RESP QL IA.RAPID: NOT DETECTED
INTERNAL CONTROL: NORMAL
Lab: ABNORMAL
Lab: NORMAL
MDMA (ECSTASY) INTERNAL CONTROL: ABNORMAL
MDMA UR QL SCN: NEGATIVE
METHADONE INTERNAL CONTROL: ABNORMAL
METHADONE UR QL SCN: NEGATIVE
METHAMPHETAMINE INTERNAL CONTROL: ABNORMAL
MORPHINE INTERNAL CONTROL: ABNORMAL
MORPHINE/OPIATES SCREEN, URINE: NEGATIVE
OXYCODONE INTERNAL CONTROL: ABNORMAL
OXYCODONE UR QL SCN: NEGATIVE
PCP UR QL SCN: NEGATIVE
PHENCYCLIDINE INTERNAL CONTROL: ABNORMAL
SARS-COV-2 AG UPPER RESP QL IA.RAPID: NOT DETECTED
T4 FREE SERPL-MCNC: 1.74 NG/DL (ref 0.93–1.7)
THC INTERNAL CONTROL: ABNORMAL
TSH SERPL DL<=0.05 MIU/L-ACNC: 2.58 UIU/ML (ref 0.27–4.2)

## 2024-05-23 PROCEDURE — 99214 OFFICE O/P EST MOD 30 MIN: CPT | Performed by: STUDENT IN AN ORGANIZED HEALTH CARE EDUCATION/TRAINING PROGRAM

## 2024-05-23 PROCEDURE — 3075F SYST BP GE 130 - 139MM HG: CPT | Performed by: STUDENT IN AN ORGANIZED HEALTH CARE EDUCATION/TRAINING PROGRAM

## 2024-05-23 PROCEDURE — 84439 ASSAY OF FREE THYROXINE: CPT | Performed by: STUDENT IN AN ORGANIZED HEALTH CARE EDUCATION/TRAINING PROGRAM

## 2024-05-23 PROCEDURE — 84443 ASSAY THYROID STIM HORMONE: CPT | Performed by: STUDENT IN AN ORGANIZED HEALTH CARE EDUCATION/TRAINING PROGRAM

## 2024-05-23 PROCEDURE — 87428 SARSCOV & INF VIR A&B AG IA: CPT | Performed by: STUDENT IN AN ORGANIZED HEALTH CARE EDUCATION/TRAINING PROGRAM

## 2024-05-23 PROCEDURE — 3079F DIAST BP 80-89 MM HG: CPT | Performed by: STUDENT IN AN ORGANIZED HEALTH CARE EDUCATION/TRAINING PROGRAM

## 2024-05-23 PROCEDURE — 1125F AMNT PAIN NOTED PAIN PRSNT: CPT | Performed by: STUDENT IN AN ORGANIZED HEALTH CARE EDUCATION/TRAINING PROGRAM

## 2024-05-23 RX ORDER — GABAPENTIN 300 MG/1
300 CAPSULE ORAL 3 TIMES DAILY
Qty: 90 CAPSULE | Refills: 0 | Status: SHIPPED | OUTPATIENT
Start: 2024-05-23 | End: 2024-06-22

## 2024-05-23 RX ORDER — GUAIFENESIN 600 MG/1
1200 TABLET, EXTENDED RELEASE ORAL 2 TIMES DAILY
Qty: 14 TABLET | Refills: 0 | Status: SHIPPED | OUTPATIENT
Start: 2024-05-23

## 2024-05-23 RX ORDER — DEXTROMETHORPHAN HYDROBROMIDE AND PROMETHAZINE HYDROCHLORIDE 15; 6.25 MG/5ML; MG/5ML
5 SYRUP ORAL NIGHTLY PRN
Qty: 75 ML | Refills: 0 | Status: SHIPPED | OUTPATIENT
Start: 2024-05-23

## 2024-05-23 RX ORDER — AZELASTINE HYDROCHLORIDE 137 UG/1
1 SPRAY, METERED NASAL DAILY
COMMUNITY
Start: 2024-05-15

## 2024-05-23 RX ORDER — HYDROCODONE BITARTRATE AND ACETAMINOPHEN 7.5; 325 MG/1; MG/1
1 TABLET ORAL EVERY 12 HOURS PRN
Qty: 60 TABLET | Refills: 0 | Status: SHIPPED | OUTPATIENT
Start: 2024-05-23 | End: 2024-06-22

## 2024-05-23 NOTE — PROGRESS NOTES
Chief Complaint  Hypothyroidism (6 wk f/u after adjusting medication) and Nasal Congestion (X 3 days)    Subjective            Aida Yaquelin Mcclure presents to [facility]  History of Present Illness  Answers submitted by the patient for this visit:  Primary Reason for Visit (Submitted on 5/22/2024)  What is the primary reason for your visit?: Sore Throat  Sore Throat Questionnaire (Submitted on 5/22/2024)  Chief Complaint: Sore throat  drainage: Yes    Hypothyroidism:  Chronic, w/ abnormal labs recently, however pt was taking her thyroid medications incorrectly. We reviewed proper way to take synthroid which she has been adhering to. Repeat labs were placed to be done prior to today's visit however she has not had them done.     Nasal congestion:  Started about 3 days ago.   Endorses worsening of underlying cough as she having green mucous production w/ cough and HA w/ coughing fits. Endorses wheezing. Endorses fever w/ tmax of 100.1, states she did have to change her Pj's last night due to sweating.   She has been taking promethazine dm for the cough which helped somewhat.   States cough is worse when she is laying down. States she tends to cough through the night.   Denies any change in underlying SOA.   She is on 3L oxygen for pulmonary fibrosis and she has not had to increase her oxygen during her current illness.   Denies any sick contact.       Past Medical History:   Diagnosis Date    Anemia     Annual physical exam 11/06/2017    WILL RESCHEDULE MAMMOGRAM     Breast lump     Cataract 2010 ?    Had them in both eyes have been removed.    CHF (congestive heart failure) 11/06/2017    FOLLOWS WITH CARDS. CURRENTLY DOING WELL ON LASIX, LISINOPRIL, ALDACTONE, AND PROPANOLOL    Degeneration of lumbar intervertebral disc 06/12/2012    Diverticulitis     Diverticulosis     Essential hypertension 11/06/2017    WELL CONTROLLED ON CURRENT REGIMEN     GERD (gastroesophageal  reflux disease)     Hamstring injury 2015    BILATERAL HAMSTRING INJURY/PAIN     Head injury     Hernia cerebri     Hyperthyroidism     Hyperthyroidism 2017    CURRENTLY ON METHIMAZOLE, PT REPORT POSSIBLY DUE TO GRAVE'S DISEASE. WILL NEED TO OBTAIN AND REVIEW MEDICAL RECORDS FROM [facility]. WILL REFER TO ENDO     Hypoxia     Insomnia 2017    DISCUSSED SLEEP HYGIENE. PT TO TRY AVOIDING BLUE LIGHT AT NIGHT. PT ALSO TO SET ROUTINE PRIOR TO BEDTIME. WILL AVOID MEDS AT THIS TIME     Interstitial lung disease     Low back pain     Lumbosacral disc herniation 2012    LEFT L5-S1 SMALL NATURE. SHE HAS FAILED ALL CONSERVATIVE MEASURES AND DESIRES SURGERY     Lupus     SEES DR. ANDERSON    Migraines     Obesity     All my life    Pulmonary embolism     Respiratory failure with hypoxia 2021    Rheumatoid arthritis 2017    CURRENTLY CONTROLLED, BUT WITH RESIDUAL DEFORMITY. WILL FOLLOW WITH DR. ANDERSON     Seizures 06/15/1995    Gave birth to my baby daughter went home two days later went into seizures had to go back into the hospital for a week, that was the first and last one I had since that date.    Tachycardia     Total knee replacement status, right 2015       Allergies:   Allergies   Allergen Reactions    Aspirin Anaphylaxis     In high doses    Salsalate Hives, Anaphylaxis, Unknown - High Severity and Shortness Of Breath          Past Surgical History:   Procedure Laterality Date    ANKLE SURGERY Bilateral     BREAST BIOPSY      CATARACT EXTRACTION WITH INTRAOCULAR LENS IMPLANT       SECTION      COLONOSCOPY      ENDOSCOPY N/A 2023    Procedure: ESOPHAGOGASTRODUODENOSCOPY WITH BIOPSY;  Surgeon: Shama Martin MD;  Location: [facility] ENDOSCOPY;  Service: Gastroenterology;  Laterality: N/A;  NORMAL EGD    EYE LENS IMPLANT SECONDARY      YES    EYE SURGERY      Cataracts removed from 05    FASCIOTOMY      LEFT ANTERIOR ARM     HAND SURGERY Left     JOINT REPLACEMENT       Right knee replacement    KNEE SURGERY      LYMPH NODE BIOPSY  1991    To find my systemic lupus    OTHER SURGICAL HISTORY      LYMPH NODE EXCISION    PORTACATH PLACEMENT      POWER PORT PLACEMENT     TUBAL ABDOMINAL LIGATION      UPPER GASTROINTESTINAL ENDOSCOPY            Social History     Socioeconomic History    Marital status:    Tobacco Use    Smoking status: Never     Passive exposure: Past    Smokeless tobacco: Never   Vaping Use    Vaping status: Never Used   Substance and Sexual Activity    Alcohol use: Yes     Alcohol/week: 2.0 standard drinks of alcohol     Types: 2 Drinks containing 0.5 oz of alcohol per week     Comment: One or two wine coolers a week    Drug use: Never    Sexual activity: Not Currently     Partners: Male     Birth control/protection: Tubal ligation     Comment: Tubes tied         Family History   Problem Relation Age of Onset    Stroke Father     Alcohol abuse Father     Arthritis Father     Hyperlipidemia Father     Lung cancer Other     Arthritis Other     Cancer Other     Parkinsonism Other     Diabetes type II Other     Alcohol abuse Sister     Thyroid disease Sister     Anxiety disorder Daughter           Health Maintenance Due   Topic Date Due    COLORECTAL CANCER SCREENING  Never done    TDAP/TD VACCINES (1 - Tdap) Never done    COVID-19 Vaccine (4 - 2023-24 season) 06/03/2024            Current Outpatient Medications:     albuterol sulfate HFA (Proventil HFA) 108 (90 Base) MCG/ACT inhaler, Every 6 (Six) Hours., Disp: , Rfl:     ascorbic acid (VITAMIN C) 250 MG tablet, Take 1 tablet by mouth Daily., Disp: , Rfl:     azaTHIOprine (IMURAN) 50 MG tablet, , Disp: , Rfl:     Azelastine HCl 137 MCG/SPRAY solution, 1 spray into the nostril(s) as directed by provider Daily., Disp: , Rfl:     benzonatate (TESSALON) 200 MG capsule, Take 1 capsule by mouth 3 (Three) Times a Day As Needed for Cough., Disp: 30 capsule, Rfl: 1    Calcium Carbonate-Vitamin D (CALTRATE 600+D PO), ,  Disp: , Rfl:     cetirizine (zyrTEC) 10 MG tablet, Take 1 tablet by mouth Daily., Disp: 90 tablet, Rfl: 1    cyanocobalamin 1000 MCG/ML injection, Inject 1 mL under the skin into the appropriate area as directed Every 30 (Thirty) Days., Disp: 3 mL, Rfl: 3    cyclobenzaprine (FLEXERIL) 10 MG tablet, Take 1 tablet by mouth 2 (Two) Times a Day As Needed for Muscle Spasms., Disp: 60 tablet, Rfl: 3    Diclofenac Sodium (VOLTAREN) 1 % gel gel, Apply 4 g topically to the appropriate area as directed 4 (Four) Times a Day As Needed (shoulder/hip pain)., Disp: 100 g, Rfl: 3    fluticasone (FLONASE) 50 MCG/ACT nasal spray, SHAKE LIQUID AND USE 2 SPRAYS(100 MCG) IN EACH NOSTRIL EVERY DAY, Disp: 16 g, Rfl: 3    furosemide (LASIX) 40 MG tablet, Take 1 tablet by mouth Daily for 90 days., Disp: 90 tablet, Rfl: 1    gabapentin (NEURONTIN) 300 MG capsule, Take 1 capsule by mouth 3 (Three) Times a Day for 30 days., Disp: 90 capsule, Rfl: 0    guaiFENesin (Mucinex) 600 MG 12 hr tablet, Take 2 tablets by mouth 2 (Two) Times a Day., Disp: 14 tablet, Rfl: 0    HYDROcodone-acetaminophen (NORCO) 7.5-325 MG per tablet, Take 1 tablet by mouth Every 12 (Twelve) Hours As Needed for Moderate Pain for up to 30 days., Disp: 60 tablet, Rfl: 0    hydroxychloroquine (PLAQUENIL) 200 MG tablet, Take 1 tablet by mouth 2 (Two) Times a Day., Disp: , Rfl:     hydrOXYzine (ATARAX) 25 MG tablet, TAKE 1 TABLET BY MOUTH EVERY 8 HOURS AS NEEDED FOR ITCHING., Disp: 90 tablet, Rfl: 1    levothyroxine (Synthroid) 150 MCG tablet, Take 1 tablet by mouth Daily., Disp: 30 tablet, Rfl: 1    magnesium oxide (MAG-OX) 400 MG tablet, Take 1 tablet by mouth Daily., Disp: 90 tablet, Rfl: 1    Nintedanib Esylate (Ofev) 150 MG capsule, Take 150 mg by mouth 2 (Two) Times a Day., Disp: 60 capsule, Rfl: 5    O2 (OXYGEN), Inhale 3 L/min Daily., Disp: , Rfl:     ondansetron (ZOFRAN) 4 MG tablet, Take 1 tablet by mouth Every 12 (Twelve) Hours As Needed for Vomiting or Nausea., Disp:  30 tablet, Rfl: 1    pilocarpine (SALAGEN) 5 MG tablet, Take 1 tablet by mouth 3 times a day., Disp: 90 tablet, Rfl: 1    promethazine-dextromethorphan (PROMETHAZINE-DM) 6.25-15 MG/5ML syrup, Take 5 mL by mouth At Night As Needed for Cough., Disp: 75 mL, Rfl: 0    rizatriptan (MAXALT) 10 MG tablet, Take 1 tablet by mouth As Needed (9) for up to 9 doses., Disp: 9 tablet, Rfl: 3    spironolactone (ALDACTONE) 50 MG tablet, Take 1 tablet by mouth Daily., Disp: 90 tablet, Rfl: 1    traZODone (DESYREL) 50 MG tablet, Take 1 tablet by mouth Every Night. Take 1/2 tablet for 3 night, then full tablet if no improvement w/ 1/2., Disp: 90 tablet, Rfl: 1    brompheniramine-pseudoephedrine-DM 30-2-10 MG/5ML syrup, Take 10 mL by mouth 4 (Four) Times a Day As Needed for Cough. (Patient not taking: Reported on 5/23/2024), Disp: 473 mL, Rfl: 0    montelukast (SINGULAIR) 10 MG tablet, TAKE 1 TABLET BY MOUTH EVERYDAY AT BEDTIME, Disp: 90 tablet, Rfl: 1    rOPINIRole (REQUIP) 1 MG tablet, TAKE 1 TABLET BY MOUTH EVERY DAY AT NIGHT, Disp: 90 tablet, Rfl: 1      Immunization History   Administered Date(s) Administered    Arexvy (RSV, Adults 60+ yrs) 04/08/2024    COVID-19 (SANDI) 03/29/2021    COVID-19 (PFIZER) Purple Cap Monovalent 12/16/2021    COVID-19 F23 (PFIZER) 12YRS+ (COMIRNATY) 04/08/2024    Flu Vaccine Intradermal Quad 18-64YR 01/02/2008, 11/10/2008, 11/05/2009    Flu Vaccine Quad PF >36MO 10/25/2017    Fluzone (or Fluarix & Flulaval for VFC) >6mos 10/25/2017, 11/18/2021, 10/12/2022, 11/29/2023    Hepatitis A 04/30/2018    Influenza Quad Vaccine (Inpatient) 08/27/2012, 10/15/2013    Influenza Seasonal Injectable 01/02/2008, 11/10/2008, 11/05/2009    Influenza, Unspecified 09/17/2020, 10/01/2021, 10/12/2022    Pneumococcal Conjugate 13-Valent (PCV13) 06/14/2018    Pneumococcal Polysaccharide (PPSV23) 08/26/2019    Shingrix 12/30/2022, 04/08/2024         Review of Systems       Objective       Vitals:    05/23/24 1143   BP: 138/86    BP Location: Right arm   Patient Position: Sitting   Cuff Size: Large Adult   Pulse: 86   Temp: 98 °F (36.7 °C)   TempSrc: Temporal   SpO2: 98%   Weight: 95.1 kg (209 lb 9.6 oz)     Body mass index is 35.98 kg/m².      Physical Exam  Vitals reviewed.   Constitutional:       Appearance: Normal appearance.   HENT:      Head: Normocephalic and atraumatic.      Nose: Nose normal.   Eyes:      Extraocular Movements: Extraocular movements intact.      Conjunctiva/sclera: Conjunctivae normal.   Cardiovascular:      Rate and Rhythm: Normal rate and regular rhythm.      Pulses: Normal pulses.      Heart sounds: Normal heart sounds.   Pulmonary:      Effort: Pulmonary effort is normal. No respiratory distress.      Breath sounds: Normal breath sounds.   Musculoskeletal:         General: Normal range of motion.   Skin:     General: Skin is warm and dry.   Neurological:      General: No focal deficit present.      Mental Status: She is alert and oriented to person, place, and time.      Cranial Nerves: No cranial nerve deficit.   Psychiatric:         Mood and Affect: Mood normal.         Behavior: Behavior normal.         Thought Content: Thought content normal.             Result Review :                           Assessment and Plan      Diagnoses and all orders for this visit:    1. Nasal congestion (Primary)  2. Acute cough  Acute w/ concern for bronchitis. POCT for flu and covid negative in office. Mucinex and cough rx sent in. Lung exam is reassuring. Pt afebrile. Holding off abx at this time given acute onset.   -     POCT SARS-CoV-2 + Flu Antigen JEN  -     guaiFENesin (Mucinex) 600 MG 12 hr tablet; Take 2 tablets by mouth 2 (Two) Times a Day.  Dispense: 14 tablet; Refill: 0    3. Hypothyroidism, unspecified type  Chronic, pt is overdue for repeat labs. States she is remembering to take the synthroid appropriately.  -     T4, Free  -     TSH    4. Acute bronchitis, unspecified organism  -      promethazine-dextromethorphan (PROMETHAZINE-DM) 6.25-15 MG/5ML syrup; Take 5 mL by mouth At Night As Needed for Cough.  Dispense: 75 mL; Refill: 0    5. Controlled substance agreement terminated  In office UDS + for THC. Pt states she was given a gummy and this was likely the source. Unfortunately this is the 2nd time her UDS  is abnormal and her contract with this office is now terminated. She has been on Norco for many years and likely will need a prolonged taper, although pt is hoping to establish with pain management. New referral for pain management placed. Last fill for Norco and gabapentin provided today.   -     POC 12 Panel Urine Drug Screen    6. Other chronic pain  Comments:  Chronic and stable. Due for refill of norco.     Orders:  -     gabapentin (NEURONTIN) 300 MG capsule; Take 1 capsule by mouth 3 (Three) Times a Day for 30 days.  Dispense: 90 capsule; Refill: 0  -     HYDROcodone-acetaminophen (NORCO) 7.5-325 MG per tablet; Take 1 tablet by mouth Every 12 (Twelve) Hours As Needed for Moderate Pain for up to 30 days.  Dispense: 60 tablet; Refill: 0  -     Ambulatory Referral to Pain Management          Orders Placed This Encounter   Procedures    Ambulatory Referral to Pain Management    POCT SARS-CoV-2 + Flu Antigen JEN    POC 12 Panel Urine Drug Screen     New Medications Ordered This Visit   Medications    guaiFENesin (Mucinex) 600 MG 12 hr tablet     Sig: Take 2 tablets by mouth 2 (Two) Times a Day.     Dispense:  14 tablet     Refill:  0    promethazine-dextromethorphan (PROMETHAZINE-DM) 6.25-15 MG/5ML syrup     Sig: Take 5 mL by mouth At Night As Needed for Cough.     Dispense:  75 mL     Refill:  0    gabapentin (NEURONTIN) 300 MG capsule     Sig: Take 1 capsule by mouth 3 (Three) Times a Day for 30 days.     Dispense:  90 capsule     Refill:  0    HYDROcodone-acetaminophen (NORCO) 7.5-325 MG per tablet     Sig: Take 1 tablet by mouth Every 12 (Twelve) Hours As Needed for Moderate Pain for up  to 30 days.     Dispense:  60 tablet     Refill:  0                     Follow Up     Return in about 3 months (around 8/23/2024) for hypothyroidism.    Patient was given instructions and counseling regarding her condition or for health maintenance advice. Please see specific information pulled into the AVS if appropriate.     Merline Enamorado MD   Internal Medicine-Pediatrics

## 2024-05-25 DIAGNOSIS — Z76.0 MEDICATION REFILL: ICD-10-CM

## 2024-05-28 RX ORDER — MONTELUKAST SODIUM 10 MG/1
10 TABLET ORAL
Qty: 90 TABLET | Refills: 1 | Status: SHIPPED | OUTPATIENT
Start: 2024-05-28

## 2024-05-28 RX ORDER — ROPINIROLE 1 MG/1
1 TABLET, FILM COATED ORAL
Qty: 90 TABLET | Refills: 1 | Status: SHIPPED | OUTPATIENT
Start: 2024-05-28

## 2024-06-07 DIAGNOSIS — E03.9 HYPOTHYROIDISM, UNSPECIFIED TYPE: Primary | ICD-10-CM

## 2024-06-16 DIAGNOSIS — E03.9 HYPOTHYROIDISM, UNSPECIFIED TYPE: ICD-10-CM

## 2024-06-17 RX ORDER — LEVOTHYROXINE SODIUM 0.15 MG/1
150 TABLET ORAL DAILY
Qty: 30 TABLET | Refills: 1 | Status: SHIPPED | OUTPATIENT
Start: 2024-06-17

## 2024-06-19 DIAGNOSIS — Z76.0 MEDICATION REFILL: ICD-10-CM

## 2024-06-19 RX ORDER — HYDROXYZINE HYDROCHLORIDE 25 MG/1
25 TABLET, FILM COATED ORAL EVERY 8 HOURS PRN
Qty: 90 TABLET | Refills: 1 | Status: SHIPPED | OUTPATIENT
Start: 2024-06-19

## 2024-07-30 DIAGNOSIS — Z76.0 MEDICATION REFILL: ICD-10-CM

## 2024-07-30 DIAGNOSIS — G47.00 INSOMNIA, UNSPECIFIED TYPE: ICD-10-CM

## 2024-07-30 RX ORDER — FUROSEMIDE 40 MG/1
40 TABLET ORAL DAILY
Qty: 90 TABLET | Refills: 1 | Status: SHIPPED | OUTPATIENT
Start: 2024-07-30

## 2024-07-30 RX ORDER — TRAZODONE HYDROCHLORIDE 50 MG/1
50 TABLET ORAL NIGHTLY
Qty: 90 TABLET | Refills: 1 | Status: SHIPPED | OUTPATIENT
Start: 2024-07-30

## 2024-07-31 ENCOUNTER — TRANSCRIBE ORDERS (OUTPATIENT)
Dept: ADMINISTRATIVE | Facility: HOSPITAL | Age: 59
End: 2024-07-31
Payer: MEDICARE

## 2024-07-31 DIAGNOSIS — Z12.31 VISIT FOR SCREENING MAMMOGRAM: Primary | ICD-10-CM

## 2024-08-06 ENCOUNTER — HOSPITAL ENCOUNTER (OUTPATIENT)
Dept: MAMMOGRAPHY | Facility: HOSPITAL | Age: 59
Discharge: HOME | End: 2024-08-06
Admitting: STUDENT IN AN ORGANIZED HEALTH CARE EDUCATION/TRAINING PROGRAM
Payer: MEDICARE

## 2024-08-06 DIAGNOSIS — Z12.31 VISIT FOR SCREENING MAMMOGRAM: ICD-10-CM

## 2024-08-06 PROCEDURE — 77067 SCR MAMMO BI INCL CAD: CPT

## 2024-08-06 PROCEDURE — 77063 BREAST TOMOSYNTHESIS BI: CPT

## 2024-08-07 ENCOUNTER — TELEPHONE (OUTPATIENT)
Dept: OBSTETRICS AND GYNECOLOGY | Facility: CLINIC | Age: 59
End: 2024-08-07
Payer: MEDICARE

## 2024-08-07 NOTE — TELEPHONE ENCOUNTER
----- Message from Sergio Fish sent at 8/7/2024 10:38 AM EDT -----  BI-RADS 2 mammogram, benign findings.  Recommendation for annual mammogram in 12 months.

## 2024-08-23 DIAGNOSIS — Z76.0 MEDICATION REFILL: ICD-10-CM

## 2024-08-23 DIAGNOSIS — E03.9 HYPOTHYROIDISM, UNSPECIFIED TYPE: ICD-10-CM

## 2024-08-23 RX ORDER — HYDROXYZINE HYDROCHLORIDE 25 MG/1
25 TABLET, FILM COATED ORAL EVERY 8 HOURS PRN
Qty: 90 TABLET | Refills: 1 | Status: SHIPPED | OUTPATIENT
Start: 2024-08-23

## 2024-08-23 RX ORDER — LEVOTHYROXINE SODIUM 150 UG/1
150 TABLET ORAL DAILY
Qty: 30 TABLET | Refills: 1 | Status: SHIPPED | OUTPATIENT
Start: 2024-08-23

## 2024-08-23 NOTE — TELEPHONE ENCOUNTER
Synthroid filled.     Please call pt and remind her that she has thyroid labs in the system that are overdue. Recommend she has them  done within the next couple of weeks. Thank You

## 2024-09-07 DIAGNOSIS — Z76.0 MEDICATION REFILL: ICD-10-CM

## 2024-09-07 DIAGNOSIS — J20.9 ACUTE BRONCHITIS, UNSPECIFIED ORGANISM: ICD-10-CM

## 2024-09-09 RX ORDER — DEXTROMETHORPHAN HYDROBROMIDE AND PROMETHAZINE HYDROCHLORIDE 15; 6.25 MG/5ML; MG/5ML
5 SYRUP ORAL NIGHTLY PRN
Qty: 75 ML | Refills: 0 | Status: SHIPPED | OUTPATIENT
Start: 2024-09-09

## 2024-09-09 RX ORDER — CYANOCOBALAMIN 1000 UG/ML
1000 INJECTION, SOLUTION INTRAMUSCULAR; SUBCUTANEOUS
Qty: 3 ML | Refills: 3 | Status: SHIPPED | OUTPATIENT
Start: 2024-09-09

## 2024-09-09 RX ORDER — MONTELUKAST SODIUM 10 MG/1
10 TABLET ORAL
Qty: 90 TABLET | Refills: 1 | Status: SHIPPED | OUTPATIENT
Start: 2024-09-09

## 2024-09-09 RX ORDER — ROPINIROLE 1 MG/1
1 TABLET, FILM COATED ORAL
Qty: 90 TABLET | Refills: 1 | Status: SHIPPED | OUTPATIENT
Start: 2024-09-09

## 2024-09-24 PROCEDURE — 87635 SARS-COV-2 COVID-19 AMP PRB: CPT | Performed by: FAMILY MEDICINE

## 2024-09-30 ENCOUNTER — OFFICE VISIT (OUTPATIENT)
Dept: ORTHOPEDIC SURGERY | Facility: CLINIC | Age: 59
End: 2024-09-30
Payer: MEDICARE

## 2024-09-30 VITALS
HEART RATE: 77 BPM | DIASTOLIC BLOOD PRESSURE: 84 MMHG | BODY MASS INDEX: 38.93 KG/M2 | HEIGHT: 64 IN | OXYGEN SATURATION: 84 % | WEIGHT: 228 LBS | SYSTOLIC BLOOD PRESSURE: 124 MMHG

## 2024-09-30 DIAGNOSIS — M25.511 RIGHT SHOULDER PAIN, UNSPECIFIED CHRONICITY: Primary | ICD-10-CM

## 2024-09-30 PROCEDURE — 20610 DRAIN/INJ JOINT/BURSA W/O US: CPT | Performed by: ORTHOPAEDIC SURGERY

## 2024-09-30 PROCEDURE — 99213 OFFICE O/P EST LOW 20 MIN: CPT | Performed by: ORTHOPAEDIC SURGERY

## 2024-09-30 PROCEDURE — 3074F SYST BP LT 130 MM HG: CPT | Performed by: ORTHOPAEDIC SURGERY

## 2024-09-30 PROCEDURE — 3079F DIAST BP 80-89 MM HG: CPT | Performed by: ORTHOPAEDIC SURGERY

## 2024-09-30 RX ORDER — LIDOCAINE HYDROCHLORIDE 10 MG/ML
5 INJECTION, SOLUTION INFILTRATION; PERINEURAL
Status: COMPLETED | OUTPATIENT
Start: 2024-09-30 | End: 2024-09-30

## 2024-09-30 RX ORDER — TRIAMCINOLONE ACETONIDE 40 MG/ML
40 INJECTION, SUSPENSION INTRA-ARTICULAR; INTRAMUSCULAR
Status: COMPLETED | OUTPATIENT
Start: 2024-09-30 | End: 2024-09-30

## 2024-09-30 RX ADMIN — LIDOCAINE HYDROCHLORIDE 5 ML: 10 INJECTION, SOLUTION INFILTRATION; PERINEURAL at 16:27

## 2024-09-30 RX ADMIN — TRIAMCINOLONE ACETONIDE 40 MG: 40 INJECTION, SUSPENSION INTRA-ARTICULAR; INTRAMUSCULAR at 16:27

## 2024-09-30 NOTE — PROGRESS NOTES
"Chief Complaint  Initial Evaluation of the Right Shoulder     Subjective      Aida Mcclure presents to [facility] for initial evaluation of the right shoulder. She has had pain for over a year.  She has pain on the top and anterior aspect of the shoulder.  She has pain in the bicipital groove.  She has pain with forward and upward ROM.      Allergies   Allergen Reactions    Aspirin Anaphylaxis     In high doses    Salsalate Hives, Anaphylaxis, Unknown - High Severity and Shortness Of Breath        Social History     Socioeconomic History    Marital status:    Tobacco Use    Smoking status: Never     Passive exposure: Past    Smokeless tobacco: Never   Vaping Use    Vaping status: Never Used   Substance and Sexual Activity    Alcohol use: Yes     Alcohol/week: 2.0 standard drinks of alcohol     Types: 2 Drinks containing 0.5 oz of alcohol per week     Comment: One or two wine coolers a week    Drug use: Never    Sexual activity: Not Currently     Partners: Male     Birth control/protection: Tubal ligation     Comment: Tubes tied        I reviewed the patient's chief complaint, history of present illness, review of systems, past medical history, surgical history, family history, social history, medications, and allergy list.     Review of Systems     Constitutional: Denies fevers, chills, weight loss  Cardiovascular: Denies chest pain, shortness of breath  Skin: Denies rashes, acute skin changes  Neurologic: Denies headache, loss of consciousness        Vital Signs:   /84   Pulse 77   Ht 162.6 cm (64\")   Wt 103 kg (228 lb)   SpO2 (!) 84%   BMI 39.14 kg/m²          Physical Exam  General: Alert. No acute distress    Ortho Exam        RIGHT SHOULDER Forward flexion 120. Abduction 100. External rotation 40. Internal rotation SI joint. Positive Cross body adduction. Supraspinatus strength 4/5. Infraspinatus Strength 5/5. Infrared subscap 5/5. Positive Emmanuel. " Positive Neer. Negative Apprehension. Negative Lift off. (Negative Obriens. Sensation intact to light touch, median, radial, ulnar nerve. Positive AIN, PIN, ulnar nerve motor. Positive pulses. Positive Impingement signs. Good strength in triceps, biceps, deltoid, wrist extensors and wrist flexors. Tender to palpation to the bicipital groove, anterior aspect of the shoulder and down the arm.        Large Joint Arthrocentesis: R subacromial bursa  Date/Time: 9/30/2024 4:27 PM  Consent given by: patient  Site marked: site marked  Timeout: Immediately prior to procedure a time out was called to verify the correct patient, procedure, equipment, support staff and site/side marked as required   Supporting Documentation  Indications: pain   Procedure Details  Location: shoulder - R subacromial bursa  Needle gauge: 21 G.  Medications administered: 5 mL lidocaine 1 %; 40 mg triamcinolone acetonide 40 MG/ML  Patient tolerance: patient tolerated the procedure well with no immediate complications        This injection documentation was Scribed for Long Elaine MD by Becca Gomez.  09/30/24   16:27 EDT      Imaging Results (Most Recent)       Procedure Component Value Units Date/Time    XR Scapula Right [[id number]] Resulted: 09/30/24 1626     Updated: 09/30/24 1627    Narrative:      X-Ray Report:  Right scapula X-Ray  Indication: Evaluation of the right scapula  AP/Lateral view(s)  Findings: Mild AC joint arthritis.    Prior studies available for comparison: no                Result Review :     X-Ray Report:  Right scapula X-Ray  Indication: Evaluation of the right scapula  AP/Lateral view(s)  Findings: Mild AC joint arthritis.    Prior studies available for comparison: no                Assessment and Plan     Diagnoses and all orders for this visit:    1. Right shoulder pain, unspecified chronicity (Primary)  -     XR Scapula Right  -     Large Joint Arthrocentesis: R subacromial bursa        Discussed the treatment plan with  the patient. I reviewed the X-rays that were obtained today with the patient.     Discussed the risks and benefits of conservative measures. The patient expressed understanding and wished to proceed with a right shoulder steroid injection. She tolerated the injection well.       Call or return if worsening symptoms.    Follow Up     4-6 weeks.  If injection didn't help will discuss MRI of the right shoulder.       Patient was given instructions and counseling regarding her condition or for health maintenance advice. Please see specific information pulled into the AVS if appropriate.     Scribed for Long Elaine MD by Kell Wells MA.  09/30/24   16:11 EDT    I have personally performed the services described in this document as scribed by the above individual and it is both accurate and complete. oLng Elaine MD 09/30/24

## 2024-10-07 ENCOUNTER — HOSPITAL ENCOUNTER (EMERGENCY)
Facility: HOSPITAL | Age: 59
Discharge: HOME | End: 2024-10-07
Attending: EMERGENCY MEDICINE | Admitting: EMERGENCY MEDICINE
Payer: MEDICARE

## 2024-10-07 ENCOUNTER — APPOINTMENT (OUTPATIENT)
Dept: GENERAL RADIOLOGY | Facility: HOSPITAL | Age: 59
End: 2024-10-07
Payer: MEDICARE

## 2024-10-07 VITALS
BODY MASS INDEX: 39.14 KG/M2 | DIASTOLIC BLOOD PRESSURE: 88 MMHG | HEIGHT: 64 IN | WEIGHT: 229.28 LBS | HEART RATE: 59 BPM | OXYGEN SATURATION: 97 % | SYSTOLIC BLOOD PRESSURE: 146 MMHG | RESPIRATION RATE: 20 BRPM | TEMPERATURE: 98.1 F

## 2024-10-07 DIAGNOSIS — J06.9 VIRAL URI WITH COUGH: Primary | ICD-10-CM

## 2024-10-07 LAB
ALBUMIN SERPL-MCNC: 3.8 G/DL (ref 3.5–5.2)
ALBUMIN/GLOB SERPL: 1 G/DL
ALP SERPL-CCNC: 61 U/L (ref 39–117)
ALT SERPL W P-5'-P-CCNC: 5 U/L (ref 1–33)
ANION GAP SERPL CALCULATED.3IONS-SCNC: 8.7 MMOL/L (ref 5–15)
AST SERPL-CCNC: 14 U/L (ref 1–32)
BASOPHILS # BLD AUTO: 0.07 10*3/MM3 (ref 0–0.2)
BASOPHILS NFR BLD AUTO: 0.7 % (ref 0–1.5)
BILIRUB SERPL-MCNC: 0.8 MG/DL (ref 0–1.2)
BUN SERPL-MCNC: 18 MG/DL (ref 8–23)
BUN/CREAT SERPL: 16.2 (ref 7–25)
CALCIUM SPEC-SCNC: 8.8 MG/DL (ref 8.6–10.5)
CHLORIDE SERPL-SCNC: 106 MMOL/L (ref 98–107)
CO2 SERPL-SCNC: 27.3 MMOL/L (ref 22–29)
CREAT SERPL-MCNC: 1.11 MG/DL (ref 0.57–1)
DEPRECATED RDW RBC AUTO: 48 FL (ref 37–54)
EGFRCR SERPLBLD CKD-EPI 2021: 56.7 ML/MIN/1.73
EOSINOPHIL # BLD AUTO: 0.19 10*3/MM3 (ref 0–0.4)
EOSINOPHIL NFR BLD AUTO: 2 % (ref 0.3–6.2)
ERYTHROCYTE [DISTWIDTH] IN BLOOD BY AUTOMATED COUNT: 14.9 % (ref 12.3–15.4)
GLOBULIN UR ELPH-MCNC: 3.9 GM/DL
GLUCOSE SERPL-MCNC: 91 MG/DL (ref 65–99)
HCT VFR BLD AUTO: 38.4 % (ref 34–46.6)
HGB BLD-MCNC: 12.3 G/DL (ref 12–15.9)
HOLD SPECIMEN: NORMAL
HOLD SPECIMEN: NORMAL
IMM GRANULOCYTES # BLD AUTO: 0.04 10*3/MM3 (ref 0–0.05)
IMM GRANULOCYTES NFR BLD AUTO: 0.4 % (ref 0–0.5)
LYMPHOCYTES # BLD AUTO: 1.79 10*3/MM3 (ref 0.7–3.1)
LYMPHOCYTES NFR BLD AUTO: 18.8 % (ref 19.6–45.3)
MCH RBC QN AUTO: 28.7 PG (ref 26.6–33)
MCHC RBC AUTO-ENTMCNC: 32 G/DL (ref 31.5–35.7)
MCV RBC AUTO: 89.5 FL (ref 79–97)
MONOCYTES # BLD AUTO: 0.63 10*3/MM3 (ref 0.1–0.9)
MONOCYTES NFR BLD AUTO: 6.6 % (ref 5–12)
NEUTROPHILS NFR BLD AUTO: 6.78 10*3/MM3 (ref 1.7–7)
NEUTROPHILS NFR BLD AUTO: 71.5 % (ref 42.7–76)
NRBC BLD AUTO-RTO: 0 /100 WBC (ref 0–0.2)
NT-PROBNP SERPL-MCNC: 237.4 PG/ML (ref 0–900)
PLATELET # BLD AUTO: 200 10*3/MM3 (ref 140–450)
PMV BLD AUTO: 10.9 FL (ref 6–12)
POTASSIUM SERPL-SCNC: 3.9 MMOL/L (ref 3.5–5.2)
PROT SERPL-MCNC: 7.7 G/DL (ref 6–8.5)
QT INTERVAL: 410 MS
QTC INTERVAL: 444 MS
RBC # BLD AUTO: 4.29 10*6/MM3 (ref 3.77–5.28)
SODIUM SERPL-SCNC: 142 MMOL/L (ref 136–145)
TROPONIN T SERPL HS-MCNC: 13 NG/L
WBC NRBC COR # BLD AUTO: 9.5 10*3/MM3 (ref 3.4–10.8)
WHOLE BLOOD HOLD COAG: NORMAL
WHOLE BLOOD HOLD SPECIMEN: NORMAL

## 2024-10-07 PROCEDURE — 93005 ELECTROCARDIOGRAM TRACING: CPT

## 2024-10-07 PROCEDURE — 80053 COMPREHEN METABOLIC PANEL: CPT | Performed by: EMERGENCY MEDICINE

## 2024-10-07 PROCEDURE — 25010000002 METHYLPREDNISOLONE PER 125 MG: Performed by: EMERGENCY MEDICINE

## 2024-10-07 PROCEDURE — 85025 COMPLETE CBC W/AUTO DIFF WBC: CPT | Performed by: EMERGENCY MEDICINE

## 2024-10-07 PROCEDURE — 84484 ASSAY OF TROPONIN QUANT: CPT | Performed by: EMERGENCY MEDICINE

## 2024-10-07 PROCEDURE — 83880 ASSAY OF NATRIURETIC PEPTIDE: CPT | Performed by: EMERGENCY MEDICINE

## 2024-10-07 PROCEDURE — 99284 EMERGENCY DEPT VISIT MOD MDM: CPT

## 2024-10-07 PROCEDURE — 96374 THER/PROPH/DIAG INJ IV PUSH: CPT

## 2024-10-07 PROCEDURE — 93005 ELECTROCARDIOGRAM TRACING: CPT | Performed by: EMERGENCY MEDICINE

## 2024-10-07 RX ORDER — PREDNISONE 50 MG/1
50 TABLET ORAL DAILY
Qty: 5 TABLET | Refills: 0 | Status: SHIPPED | OUTPATIENT
Start: 2024-10-07 | End: 2024-10-12

## 2024-10-07 RX ORDER — SODIUM CHLORIDE 0.9 % (FLUSH) 0.9 %
10 SYRINGE (ML) INJECTION AS NEEDED
Status: DISCONTINUED | OUTPATIENT
Start: 2024-10-07 | End: 2024-10-08 | Stop reason: HOSPADM

## 2024-10-07 RX ADMIN — METHYLPREDNISOLONE SODIUM SUCCINATE 125 MG: 125 INJECTION INTRAMUSCULAR; INTRAVENOUS at 22:27

## 2024-10-08 ENCOUNTER — LAB (OUTPATIENT)
Dept: LAB | Facility: HOSPITAL | Age: 59
End: 2024-10-08
Payer: MEDICARE

## 2024-10-08 ENCOUNTER — TELEPHONE (OUTPATIENT)
Dept: PULMONOLOGY | Facility: CLINIC | Age: 59
End: 2024-10-08

## 2024-10-08 ENCOUNTER — TRANSCRIBE ORDERS (OUTPATIENT)
Dept: LAB | Facility: HOSPITAL | Age: 59
End: 2024-10-08
Payer: MEDICARE

## 2024-10-08 DIAGNOSIS — N18.31 CHRONIC KIDNEY DISEASE (CKD) STAGE G3A/A1, MODERATELY DECREASED GLOMERULAR FILTRATION RATE (GFR) BETWEEN 45-59 ML/MIN/1.73 SQUARE METER AND ALBUMINURIA CREATININE RATIO LESS THAN 30 MG/G (CMS/H*: ICD-10-CM

## 2024-10-08 DIAGNOSIS — E03.9 HYPOTHYROIDISM, UNSPECIFIED TYPE: ICD-10-CM

## 2024-10-08 DIAGNOSIS — N18.31 CHRONIC KIDNEY DISEASE (CKD) STAGE G3A/A1, MODERATELY DECREASED GLOMERULAR FILTRATION RATE (GFR) BETWEEN 45-59 ML/MIN/1.73 SQUARE METER AND ALBUMINURIA CREATININE RATIO LESS THAN 30 MG/G (CMS/H*: Primary | ICD-10-CM

## 2024-10-08 LAB
ALBUMIN SERPL-MCNC: 4.3 G/DL (ref 3.5–5.2)
ANION GAP SERPL CALCULATED.3IONS-SCNC: 13 MMOL/L (ref 5–15)
BACTERIA UR QL AUTO: NORMAL /HPF
BILIRUB UR QL STRIP: NEGATIVE
BUN SERPL-MCNC: 26 MG/DL (ref 8–23)
BUN/CREAT SERPL: 22.2 (ref 7–25)
CALCIUM SPEC-SCNC: 9.6 MG/DL (ref 8.6–10.5)
CHLORIDE SERPL-SCNC: 104 MMOL/L (ref 98–107)
CLARITY UR: CLEAR
CO2 SERPL-SCNC: 23 MMOL/L (ref 22–29)
COLOR UR: YELLOW
CREAT SERPL-MCNC: 1.17 MG/DL (ref 0.57–1)
CREAT UR-MCNC: 140.5 MG/DL
DEPRECATED RDW RBC AUTO: 44.4 FL (ref 37–54)
EGFRCR SERPLBLD CKD-EPI 2021: 53.2 ML/MIN/1.73
ERYTHROCYTE [DISTWIDTH] IN BLOOD BY AUTOMATED COUNT: 13.1 % (ref 12.3–15.4)
GLUCOSE SERPL-MCNC: 124 MG/DL (ref 65–99)
GLUCOSE UR STRIP-MCNC: NEGATIVE MG/DL
HCT VFR BLD AUTO: 38.5 % (ref 34–46.6)
HGB BLD-MCNC: 12 G/DL (ref 12–15.9)
HGB UR QL STRIP.AUTO: ABNORMAL
HYALINE CASTS UR QL AUTO: NORMAL /LPF
KETONES UR QL STRIP: NEGATIVE
LEUKOCYTE ESTERASE UR QL STRIP.AUTO: NEGATIVE
MCH RBC QN AUTO: 28.8 PG (ref 26.6–33)
MCHC RBC AUTO-ENTMCNC: 31.2 G/DL (ref 31.5–35.7)
MCV RBC AUTO: 92.3 FL (ref 79–97)
NITRITE UR QL STRIP: NEGATIVE
PH UR STRIP.AUTO: 6 [PH] (ref 5–8)
PHOSPHATE SERPL-MCNC: 3.8 MG/DL (ref 2.5–4.5)
PLATELET # BLD AUTO: 198 10*3/MM3 (ref 140–450)
PMV BLD AUTO: 12.1 FL (ref 6–12)
POTASSIUM SERPL-SCNC: 3.8 MMOL/L (ref 3.5–5.2)
PROT ?TM UR-MCNC: >600 MG/DL
PROT UR QL STRIP: ABNORMAL
PROT/CREAT UR: NORMAL MG/G{CREAT}
PTH-INTACT SERPL-MCNC: 38 PG/ML (ref 15–65)
RBC # BLD AUTO: 4.17 10*6/MM3 (ref 3.77–5.28)
RBC # UR STRIP: NORMAL /HPF
REF LAB TEST METHOD: NORMAL
SODIUM SERPL-SCNC: 140 MMOL/L (ref 136–145)
SP GR UR STRIP: 1.03 (ref 1–1.03)
SQUAMOUS #/AREA URNS HPF: NORMAL /HPF
TSH SERPL DL<=0.05 MIU/L-ACNC: 11.6 UIU/ML (ref 0.27–4.2)
UROBILINOGEN UR QL STRIP: ABNORMAL
WBC # UR STRIP: NORMAL /HPF
WBC NRBC COR # BLD AUTO: 11.83 10*3/MM3 (ref 3.4–10.8)

## 2024-10-08 PROCEDURE — 84156 ASSAY OF PROTEIN URINE: CPT

## 2024-10-08 PROCEDURE — 80069 RENAL FUNCTION PANEL: CPT

## 2024-10-08 PROCEDURE — 81001 URINALYSIS AUTO W/SCOPE: CPT

## 2024-10-08 PROCEDURE — 85027 COMPLETE CBC AUTOMATED: CPT

## 2024-10-08 PROCEDURE — 36415 COLL VENOUS BLD VENIPUNCTURE: CPT

## 2024-10-08 PROCEDURE — 82570 ASSAY OF URINE CREATININE: CPT

## 2024-10-08 PROCEDURE — 83970 ASSAY OF PARATHORMONE: CPT

## 2024-10-08 PROCEDURE — 84443 ASSAY THYROID STIM HORMONE: CPT

## 2024-10-08 RX ORDER — ALBUTEROL SULFATE 90 UG/1
2 INHALANT RESPIRATORY (INHALATION) EVERY 4 HOURS PRN
Refills: 5 | OUTPATIENT
Start: 2024-10-08

## 2024-10-08 NOTE — ED PROVIDER NOTES
"Time: 10:00 PM EDT  Date of encounter:  10/7/2024  Independent Historian/Clinical History and Information was obtained by:   Patient    History is limited by: N/A    Chief Complaint: Difficulty breathing      History of Present Illness:  Patient is a 59 y.o. year old female who presents to the emergency department for evaluation of difficulty breathing, cough and bodyaches.  Patient states her concern is \"mainly the cough\".  Has some chest discomfort with coughing only.  Afebrile in the past 48 hours.  States she was on a Z-Jcarlos at the end of last week with no improvement.  Complains of runny nose and congestion along with cough and bodyaches.  Has a history of pulmonary fibrosis and wears home oxygen.      Patient Care Team  Primary Care Provider: Merline Enamorado MD    Past Medical History:     Allergies   Allergen Reactions    Aspirin Anaphylaxis     In high doses    Salsalate Hives, Anaphylaxis, Unknown - High Severity and Shortness Of Breath     Past Medical History:   Diagnosis Date    Anemia     Annual physical exam 11/06/2017    WILL RESCHEDULE MAMMOGRAM     Breast lump     Cataract 2010 ?    Had them in both eyes have been removed.    CHF (congestive heart failure) 11/06/2017    FOLLOWS WITH CARDS. CURRENTLY DOING WELL ON LASIX, LISINOPRIL, ALDACTONE, AND PROPANOLOL    Degeneration of lumbar intervertebral disc 06/12/2012    Diverticulitis     Diverticulosis     Essential hypertension 11/06/2017    WELL CONTROLLED ON CURRENT REGIMEN     GERD (gastroesophageal reflux disease)     Hamstring injury 08/17/2015    BILATERAL HAMSTRING INJURY/PAIN     Head injury     Hernia cerebri     Hyperthyroidism     Hyperthyroidism 11/06/2017    CURRENTLY ON METHIMAZOLE, PT REPORT POSSIBLY DUE TO GRAVE'S DISEASE. WILL NEED TO OBTAIN AND REVIEW MEDICAL RECORDS FROM [facility]. WILL REFER TO ENDO     Hypoxia     Insomnia 11/06/2017    DISCUSSED SLEEP HYGIENE. PT TO TRY AVOIDING BLUE LIGHT AT NIGHT. PT ALSO TO SET ROUTINE PRIOR TO " BEDTIME. WILL AVOID MEDS AT THIS TIME     Interstitial lung disease     Low back pain     Lumbosacral disc herniation 2012    LEFT L5-S1 SMALL NATURE. SHE HAS FAILED ALL CONSERVATIVE MEASURES AND DESIRES SURGERY     Lupus     SEES DR. ANDERSON    Migraines     Obesity     All my life    Pulmonary embolism     Respiratory failure with hypoxia 2021    Rheumatoid arthritis 2017    CURRENTLY CONTROLLED, BUT WITH RESIDUAL DEFORMITY. WILL FOLLOW WITH DR. ANDERSON     Seizures 06/15/1995    Gave birth to my baby daughter went home two days later went into seizures had to go back into the hospital for a week, that was the first and last one I had since that date.    Tachycardia     Total knee replacement status, right 2015     Past Surgical History:   Procedure Laterality Date    ANKLE SURGERY Bilateral     BREAST BIOPSY      CATARACT EXTRACTION WITH INTRAOCULAR LENS IMPLANT       SECTION      COLONOSCOPY      ENDOSCOPY N/A 2023    Procedure: ESOPHAGOGASTRODUODENOSCOPY WITH BIOPSY;  Surgeon: Shama Martin MD;  Location: [facility] ENDOSCOPY;  Service: Gastroenterology;  Laterality: N/A;  NORMAL EGD    EYE LENS IMPLANT SECONDARY      YES    EYE SURGERY      Cataracts removed from 05    FASCIOTOMY      LEFT ANTERIOR ARM     HAND SURGERY Left     JOINT REPLACEMENT      Right knee replacement    KNEE SURGERY      LYMPH NODE BIOPSY      To find my systemic lupus    OTHER SURGICAL HISTORY      LYMPH NODE EXCISION    PORTACATH PLACEMENT      POWER PORT PLACEMENT     TUBAL ABDOMINAL LIGATION      UPPER GASTROINTESTINAL ENDOSCOPY       Family History   Problem Relation Age of Onset    Stroke Father     Alcohol abuse Father     Arthritis Father     Hyperlipidemia Father     Lung cancer Other     Arthritis Other     Cancer Other     Parkinsonism Other     Diabetes type II Other     Alcohol abuse Sister     Thyroid disease Sister     Anxiety disorder Daughter        Home Medications:  Prior  to Admission medications    Medication Sig Start Date End Date Taking? Authorizing Provider   albuterol sulfate HFA (Proventil HFA) 108 (90 Base) MCG/ACT inhaler Every 6 (Six) Hours.    Kriss Wilson MD   ascorbic acid (VITAMIN C) 250 MG tablet Take 1 tablet by mouth Daily. 1/21/21   Kriss Wilson MD   azaTHIOprine (IMURAN) 50 MG tablet  10/23/23   Kriss Wilson MD   Azelastine HCl 137 MCG/SPRAY solution Administer 1 spray into the nostril(s) as directed by provider Daily. 5/15/24   Kriss Wilson MD   Calcium Carbonate-Vitamin D (CALTRATE 600+D PO)     Kriss Wilson MD   cetirizine (zyrTEC) 10 MG tablet Take 1 tablet by mouth Daily. 11/29/23   Merline Enamorado MD   cyanocobalamin 1000 MCG/ML injection INJECT 1 ML UNDER THE SKIN INTO THE APPROPRIATE AREA AS DIRECTED EVERY 30 (THIRTY) DAYS. 9/9/24   Merline Enamorado MD   cyclobenzaprine (FLEXERIL) 10 MG tablet Take 1 tablet by mouth 2 (Two) Times a Day As Needed for Muscle Spasms. 1/25/24   Merline Enamorado MD   Diclofenac Sodium (VOLTAREN) 1 % gel gel Apply 4 g topically to the appropriate area as directed 4 (Four) Times a Day As Needed (shoulder/hip pain). 11/29/23   Merline Enamorado MD   fluticasone (FLONASE) 50 MCG/ACT nasal spray SHAKE LIQUID AND USE 2 SPRAYS(100 MCG) IN EACH NOSTRIL EVERY DAY 12/28/21   Merline Enamorado MD   furosemide (LASIX) 40 MG tablet TAKE 1 TABLET BY MOUTH EVERY DAY 7/30/24   Merline Enamorado MD   gabapentin (NEURONTIN) 300 MG capsule Take 1 capsule by mouth 3 (Three) Times a Day for 30 days. 5/23/24 10/7/24  Merline Enamorado MD   HYDROcodone-acetaminophen (NORCO) 7.5-325 MG per tablet Take 1 tablet by mouth Every 12 (Twelve) Hours As Needed for Moderate Pain for up to 30 days. 5/23/24 6/22/24  Merline Enamordao MD   hydrOXYzine (ATARAX) 25 MG tablet TAKE 1 TABLET BY MOUTH EVERY 8 HOURS AS NEEDED FOR ITCHING. 8/23/24   Merline Enamorado MD   levothyroxine (SYNTHROID, LEVOTHROID) 150 MCG tablet  TAKE 1 TABLET BY MOUTH EVERY DAY 8/23/24   Merline Enamorado MD   magnesium oxide (MAG-OX) 400 MG tablet Take 1 tablet by mouth Daily. 1/11/24   Merline Enamorado MD   montelukast (SINGULAIR) 10 MG tablet TAKE 1 TABLET BY MOUTH EVERYDAY AT BEDTIME 9/9/24   Merline Enamorado MD   O2 (OXYGEN) Inhale 3 L/min Daily.    Emergency, Nurse Epic, RN   ondansetron (ZOFRAN) 4 MG tablet Take 1 tablet by mouth Every 12 (Twelve) Hours As Needed for Vomiting or Nausea. 11/29/23   Merline Enamorado MD   pilocarpine (SALAGEN) 5 MG tablet Take 1 tablet by mouth 3 times a day. 11/29/23   Merline Enamorado MD   promethazine-dextromethorphan (PROMETHAZINE-DM) 6.25-15 MG/5ML syrup Take 5 mL by mouth 4 (Four) Times a Day As Needed for Cough. 9/24/24   Hay Peguero MD   rizatriptan (MAXALT) 10 MG tablet Take 1 tablet by mouth As Needed (9) for up to 9 doses. 11/29/23   Merline Enamorado MD   rOPINIRole (REQUIP) 1 MG tablet TAKE 1 TABLET BY MOUTH EVERY DAY AT NIGHT 9/9/24   Merline Enamorado MD   spironolactone (ALDACTONE) 50 MG tablet Take 1 tablet by mouth Daily. 11/29/23   Merline Enamorado MD   traZODone (DESYREL) 50 MG tablet Take 1 tablet by mouth Daily. 7/30/24   Provider, MD Kriss   azithromycin (Zithromax Z-Jcarlos) 250 MG tablet Take 2 tablets by mouth on day 1, then 1 tablet daily on days 2-5 9/24/24 10/7/24  Hay Peguero MD   benzonatate (TESSALON) 200 MG capsule Take 1 capsule by mouth 3 (Three) Times a Day As Needed for Cough. 1/29/24 10/7/24  Casi Schaeffer, JULIO CESAR   guaiFENesin (Mucinex) 600 MG 12 hr tablet Take 2 tablets by mouth 2 (Two) Times a Day. 5/23/24 10/7/24  Merline Enamorado MD   hydroxychloroquine (PLAQUENIL) 200 MG tablet Take 1 tablet by mouth 2 (Two) Times a Day.  10/7/24  Provider, MD Kriss   Nintedanib Esylate (Ofev) 150 MG capsule Take 150 mg by mouth 2 (Two) Times a Day. 7/6/23 10/7/24  Kasey Desai, APRN        Social History:   Social History     Tobacco Use    Smoking  "status: Never     Passive exposure: Past    Smokeless tobacco: Never   Vaping Use    Vaping status: Never Used   Substance Use Topics    Alcohol use: Yes     Alcohol/week: 2.0 standard drinks of alcohol     Types: 2 Drinks containing 0.5 oz of alcohol per week     Comment: One or two wine coolers a week    Drug use: Never         Review of Systems:  Review of Systems   Constitutional:  Negative for chills and fever.   HENT:  Positive for congestion. Negative for rhinorrhea and sore throat.    Eyes:  Negative for photophobia.   Respiratory:  Positive for cough and shortness of breath. Negative for apnea and chest tightness.    Cardiovascular:  Negative for chest pain and palpitations.   Gastrointestinal:  Negative for abdominal pain, diarrhea, nausea and vomiting.   Endocrine: Negative.    Genitourinary:  Negative for difficulty urinating and dysuria.   Musculoskeletal:  Positive for myalgias. Negative for back pain and joint swelling.   Skin:  Negative for color change and wound.   Allergic/Immunologic: Negative.    Neurological:  Negative for seizures and headaches.   Psychiatric/Behavioral: Negative.     All other systems reviewed and are negative.       Physical Exam:  /88   Pulse 59   Temp 98.1 °F (36.7 °C) (Oral)   Resp 20   Ht 162.6 cm (64\")   Wt 104 kg (229 lb 4.5 oz)   SpO2 97%   BMI 39.36 kg/m²     Physical Exam  Vitals and nursing note reviewed.   Constitutional:       General: She is awake.      Appearance: Normal appearance. She is well-developed.   HENT:      Head: Normocephalic and atraumatic.      Nose: Nose normal.      Mouth/Throat:      Mouth: Mucous membranes are moist.   Eyes:      Extraocular Movements: Extraocular movements intact.      Pupils: Pupils are equal, round, and reactive to light.   Cardiovascular:      Rate and Rhythm: Normal rate and regular rhythm.      Heart sounds: Normal heart sounds.   Pulmonary:      Effort: Pulmonary effort is normal. No tachypnea, bradypnea, " accessory muscle usage or respiratory distress.      Breath sounds: Normal breath sounds. No wheezing, rhonchi or rales.      Comments: Patient currently on room air at the time of my history and physical exam with O2 sats at 98%.  Abdominal:      General: Bowel sounds are normal.      Palpations: Abdomen is soft.      Tenderness: There is no abdominal tenderness. There is no guarding or rebound.      Comments: No rigidity   Musculoskeletal:         General: No tenderness. Normal range of motion.      Cervical back: Normal range of motion and neck supple.   Skin:     General: Skin is warm and dry.      Coloration: Skin is not jaundiced.   Neurological:      General: No focal deficit present.      Mental Status: She is alert. Mental status is at baseline.   Psychiatric:         Mood and Affect: Mood normal.                  Procedures:  Procedures      Medical Decision Making:      Comorbidities that affect care:    Hypertension, CHF, anemia, GERD, lupus, diverticulitis, seizures    External Notes reviewed:    Previous Clinic Note: Orthopedics office visit 9/30/2024.  Description: Right shoulder pain, unspecified chronicity    Urgent care visit earlier today: Patient did test negative for flu and COVID per discharge instructions.  Chest x-ray reportedly shows no acute findings.    Prior imaging: Chest x-ray 2 view earlier today as follows:  Study Result    Narrative & Impression   XR CHEST 2 VW     Date of Exam: 10/7/2024 7:05 PM EDT     Indication: fever, cough, and shortness of breath since last week     Comparison: 9/24/2024     FINDINGS:     The lungs are well-expanded. The heart and pulmonary vasculature are within normal limits. No pleural effusions are identified. There are no active appearing infiltrates. Stable chronic changes in the lung fields consistent with interstitial lung   disease/pulmonary fibrosis. Stable left-sided cardiac pacemaker.     IMPRESSION:  No active disease.        Electronically  Signed: Kian Perez MD    10/7/2024 7:30 PM EDT    Workstation ID: [id number]           The following orders were placed and all results were independently analyzed by me:  Orders Placed This Encounter   Procedures    [place] Draw    Comprehensive Metabolic Panel    BNP    Single High Sensitivity Troponin T    CBC Auto Differential    NPO Diet NPO Type: Strict NPO    Undress & Gown    Continuous Pulse Oximetry    Vital Signs    Oxygen Therapy- Nasal Cannula; Titrate 1-6 LPM Per SpO2; 90 - 95%    ECG 12 Lead ED Triage Standing Order; SOA    Insert Peripheral IV    CBC & Differential    Green Top (Gel)    Lavender Top    Gold Top - SST    Light Blue Top       Medications Given in the Emergency Department:  Medications   sodium chloride 0.9 % flush 10 mL (has no administration in time range)   methylPREDNISolone sodium succinate (SOLU-Medrol) 125 mg in sterile water (preservative free) 2 mL (125 mg Intravenous Given 10/7/24 2227)        ED Course:    ED Course as of 10/07/24 2309   Mon Oct 07, 2024   2203 I have personally interpreted the EKG today and it shows no evidence of any acute ischemia or heart arrhythmia. [RP]      ED Course User Index  [RP] Ruddy Mendoza MD       Labs:    Lab Results (last 24 hours)       Procedure Component Value Units Date/Time    Covid-19 + Flu A&B AG, Veritor (ICL0290) [[id number]]  (Normal) Collected: 10/07/24 1930    Specimen: Swab Updated: 10/07/24 1931     SARS Antigen Not Detected     Influenza A Antigen JEN Not Detected     Influenza B Antigen JEN Not Detected     Internal Control Passed     Lot Number [id number]     Expiration Date 9/4/25    CBC & Differential [[id number]]  (Abnormal) Collected: 10/07/24 2209    Specimen: Blood Updated: 10/07/24 2215    Narrative:      The following orders were created for panel order CBC & Differential.  Procedure                               Abnormality         Status                     ---------                               -----------          ------                     CBC Auto Differential[[id number]]        Abnormal            Final result                 Please view results for these tests on the individual orders.    Comprehensive Metabolic Panel [[id number]]  (Abnormal) Collected: 10/07/24 2209    Specimen: Blood Updated: 10/07/24 2236     Glucose 91 mg/dL      BUN 18 mg/dL      Creatinine 1.11 mg/dL      Sodium 142 mmol/L      Potassium 3.9 mmol/L      Chloride 106 mmol/L      CO2 27.3 mmol/L      Calcium 8.8 mg/dL      Total Protein 7.7 g/dL      Albumin 3.8 g/dL      ALT (SGPT) 5 U/L      AST (SGOT) 14 U/L      Alkaline Phosphatase 61 U/L      Total Bilirubin 0.8 mg/dL      Globulin 3.9 gm/dL      A/G Ratio 1.0 g/dL      BUN/Creatinine Ratio 16.2     Anion Gap 8.7 mmol/L      eGFR 56.7 mL/min/1.73     Narrative:      GFR Normal >60  Chronic Kidney Disease <60  Kidney Failure <15      BNP [[id number]]  (Normal) Collected: 10/07/24 2209    Specimen: Blood Updated: 10/07/24 2234     proBNP 237.4 pg/mL     Narrative:      This assay is used as an aid in the diagnosis of individuals suspected of having heart failure. It can be used as an aid in the diagnosis of acute decompensated heart failure (ADHF) in patients presenting with signs and symptoms of ADHF to the emergency department (ED). In addition, NT-proBNP of <300 pg/mL indicates ADHF is not likely.    Age Range Result Interpretation  NT-proBNP Concentration (pg/mL:      <50             Positive            >450                   Gray                 300-450                    Negative             <300    50-75           Positive            >900                  Gray                300-900                  Negative            <300      >75             Positive            >1800                  Gray                300-1800                  Negative            <300    Single High Sensitivity Troponin T [[id number]]  (Normal) Collected: 10/07/24 2209    Specimen: Blood Updated: 10/07/24 2236     HS  Troponin T 13 ng/L     Narrative:      High Sensitive Troponin T Reference Range:  <14.0 ng/L- Negative Female for AMI  <22.0 ng/L- Negative Male for AMI  >=14 - Abnormal Female indicating possible myocardial injury.  >=22 - Abnormal Male indicating possible myocardial injury.   Clinicians would have to utilize clinical acumen, EKG, Troponin, and serial changes to determine if it is an Acute Myocardial Infarction or myocardial injury due to an underlying chronic condition.         CBC Auto Differential [[id number]]  (Abnormal) Collected: 10/07/24 2209    Specimen: Blood Updated: 10/07/24 2215     WBC 9.50 10*3/mm3      RBC 4.29 10*6/mm3      Hemoglobin 12.3 g/dL      Hematocrit 38.4 %      MCV 89.5 fL      MCH 28.7 pg      MCHC 32.0 g/dL      RDW 14.9 %      RDW-SD 48.0 fl      MPV 10.9 fL      Platelets 200 10*3/mm3      Neutrophil % 71.5 %      Lymphocyte % 18.8 %      Monocyte % 6.6 %      Eosinophil % 2.0 %      Basophil % 0.7 %      Immature Grans % 0.4 %      Neutrophils, Absolute 6.78 10*3/mm3      Lymphocytes, Absolute 1.79 10*3/mm3      Monocytes, Absolute 0.63 10*3/mm3      Eosinophils, Absolute 0.19 10*3/mm3      Basophils, Absolute 0.07 10*3/mm3      Immature Grans, Absolute 0.04 10*3/mm3      nRBC 0.0 /100 WBC              Imaging:    XR Chest 2 View    Result Date: 10/7/2024  XR CHEST 2 VW Date of Exam: 10/7/2024 7:05 PM EDT Indication: fever, cough, and shortness of breath since last week Comparison: 9/24/2024 FINDINGS: The lungs are well-expanded. The heart and pulmonary vasculature are within normal limits. No pleural effusions are identified. There are no active appearing infiltrates. Stable chronic changes in the lung fields consistent with interstitial lung disease/pulmonary fibrosis. Stable left-sided cardiac pacemaker.     No active disease. Electronically Signed: Kian Perez MD  10/7/2024 7:30 PM EDT  Workstation ID: [id number]       Differential Diagnosis and Discussion:    Cough: Differential  diagnosis includes but is not limited to pneumonia, acute bronchitis, upper respiratory infection, ACE inhibitor use, allergic reaction, epiglottitis, seasonal allergies, chemical irritants, exercise-induced asthma, viral syndrome.  Dyspnea: Differential diagnosis includes but is not limited to metabolic acidosis, neurological disorders, psychogenic, asthma, pneumothorax, upper airway obstruction, COPD, pneumonia, noncardiogenic pulmonary edema, interstitial lung disease, anemia, congestive heart failure, and pulmonary embolism    All labs were reviewed and interpreted by me.  EKG was interpreted by me.    [facility]                     Patient Care Considerations:    CT CHEST: I considered ordering a CT scan of the chest, however patient describes this as feeling like an infectious process with runny nose, cough, fevers and myalgias.  Not clinically consistent with pulmonary embolism or dissection.  Patient has already been on antibiotics and is very well-appearing on physical exam with no persistent tachycardia or increased work of breathing.      Consultants/Shared Management Plan:    None    Social Determinants of Health:    Patient is independent, reliable, and has access to care.       Disposition and Care Coordination:    Discharged: I considered escalation of care by admitting this patient to the hospital, however patient is not hypoxic.  She has no increased work of breathing.  Her chest x-ray from the urgent care is normal and her vital signs are stable.  Her lab workup today is unremarkable.    I have explained the patient´s condition, diagnoses and treatment plan based on the information available to me at this time. I have answered questions and addressed any concerns. The patient has a good  understanding of the patient´s diagnosis, condition, and treatment plan as can be expected at this point. The vital signs have been stable. The patient´s condition is stable and appropriate for discharge from the  emergency department.      The patient will pursue further outpatient evaluation with the primary care physician or other designated or consulting physician as outlined in the discharge instructions. They are agreeable to this plan of care and follow-up instructions have been explained in detail. The patient has received these instructions in written format and has expressed an understanding of the discharge instructions. The patient is aware that any significant change in condition or worsening of symptoms should prompt an immediate return to this or the closest emergency department or call to 911.    Final diagnoses:   Viral URI with cough        ED Disposition       ED Disposition   Discharge    Condition   Stable    Comment   --               This medical record created using voice recognition software.             Ruddy Mendoza MD  10/07/24 1676

## 2024-10-08 NOTE — DISCHARGE INSTRUCTIONS
Return to emergency department immediately for worsening difficulty breathing or chest pain.  Call Dr. Dennis's office tomorrow to discuss need for further outpatient testing or treatment.

## 2024-10-08 NOTE — TELEPHONE ENCOUNTER
Patient would like a prescription of her albuterol inhaler sent to the [facility] on [address] in [facility].

## 2024-10-08 NOTE — TELEPHONE ENCOUNTER
Caller: Aida Mcclure April    Relationship: Self    Best call back number: [phone]    Requested Prescriptions:   Requested Prescriptions     Pending Prescriptions Disp Refills    albuterol sulfate HFA (Proventil HFA) 108 (90 Base) MCG/ACT inhaler       Sig: Every 6 (Six) Hours.        Pharmacy where request should be sent: [id number] - [place], [place] - [address]  AT [address] include [address] beds at the [facility] & [facility] - [phone] [facility] [phone]      Last office visit with prescribing clinician: 5/23/2024   Last telemedicine visit with prescribing clinician: Visit date not found   Next office visit with prescribing clinician: 10/18/2024     Does the patient have less than a 3 day supply:  [x] Yes  [] No    Would you like a call back once the refill request has been completed: [] Yes [] No    If the office needs to give you a call back, can they leave a voicemail: [] Yes [] No    Sarah Chase Rep   10/08/24 08:51 EDT

## 2024-10-09 ENCOUNTER — OFFICE VISIT (OUTPATIENT)
Dept: PULMONOLOGY | Facility: CLINIC | Age: 59
End: 2024-10-09
Payer: MEDICARE

## 2024-10-09 VITALS
WEIGHT: 229 LBS | TEMPERATURE: 98.2 F | OXYGEN SATURATION: 97 % | BODY MASS INDEX: 39.09 KG/M2 | SYSTOLIC BLOOD PRESSURE: 146 MMHG | HEIGHT: 64 IN | HEART RATE: 72 BPM | DIASTOLIC BLOOD PRESSURE: 86 MMHG | RESPIRATION RATE: 18 BRPM

## 2024-10-09 DIAGNOSIS — J84.9 ILD (INTERSTITIAL LUNG DISEASE): Primary | ICD-10-CM

## 2024-10-09 DIAGNOSIS — R91.8 LUNG NODULES: ICD-10-CM

## 2024-10-09 DIAGNOSIS — Z51.81 THERAPEUTIC DRUG MONITORING: ICD-10-CM

## 2024-10-09 DIAGNOSIS — Z76.0 MEDICATION REFILL: ICD-10-CM

## 2024-10-09 DIAGNOSIS — I50.32 CHRONIC DIASTOLIC HEART FAILURE: ICD-10-CM

## 2024-10-09 DIAGNOSIS — Z86.711 PERSONAL HISTORY OF PE (PULMONARY EMBOLISM): ICD-10-CM

## 2024-10-09 DIAGNOSIS — J30.2 SEASONAL ALLERGIES: ICD-10-CM

## 2024-10-09 RX ORDER — ALBUTEROL SULFATE 90 UG/1
INHALANT RESPIRATORY (INHALATION) EVERY 6 HOURS SCHEDULED
OUTPATIENT
Start: 2024-10-09

## 2024-10-09 RX ORDER — ALBUTEROL SULFATE 90 UG/1
2 INHALANT RESPIRATORY (INHALATION) EVERY 4 HOURS PRN
Qty: 18 G | Refills: 2 | Status: SHIPPED | OUTPATIENT
Start: 2024-10-09 | End: 2024-11-08

## 2024-10-09 RX ORDER — AZATHIOPRINE 50 MG/1
150 TABLET ORAL DAILY
COMMUNITY

## 2024-10-09 RX ORDER — MONTELUKAST SODIUM 10 MG/1
10 TABLET ORAL
Qty: 90 TABLET | Refills: 1 | Status: SHIPPED | OUTPATIENT
Start: 2024-10-09

## 2024-10-09 NOTE — PROGRESS NOTES
Primary Care Provider  Merline Enamorado MD     Referring Provider  No ref. provider found     Chief Complaint  Follow-up (1 Year), ILD (interstitial lung disease), and Cough    Subjective          History of Presenting Illness  Patient is a 59-year-old -American female, patient of Dr. Flores who presents for management of interstitial lung disease secondary to connective tissue disease and diastolic heart failure who presents for a follow-up visit today.   Of note, patient has not followed up in the office since 8/31/2023.  Patient states that she stopped taking Ofev since last office visit due to medication causing her to have an upset stomach and diarrhea.  Patient states that she is taking Imuran and as prescribed.  Patient states that she recently had an ER visit on 10/7/2024 for a viral upper respiratory infection with cough and is currently taking prednisone.  Patient did have a chest x-ray completed on 10/7/2024.  Report states no active disease.  Patient states that she does get short of breath that is worse with exertion, moderate in severity, and improved with rest.  Patient states that she does have a dry cough.  Patient states that she has been out of her albuterol inhaler.  Patient states that she is taking Zyrtec, Singular, Astelin nasal spray, and Flonase nasal spray for seasonal allergies.  Patient is on 3 L of oxygen per minute via nasal cannula.  Patient states that she is under the care of Dr. Boone, for lupus.  Of note, last office visit a pulmonary function test, 6-minute walk test, and a chest CT scan were ordered, unfortunately patient did not have any of these test completed. Patient denies fever, chills, night sweats, swollen glands in the head and neck, unintentional weight loss, hemoptysis, purulent sputum production, dysphagia, chest pain, palpitations, chest tightness, abdominal pain, nausea, vomiting, and diarrhea.  Patient also denies any myalgias, changes in sense of  taste and/or smell, sore throat, any other coronavirus or flu-like symptoms.  Patient denies any leg swelling, orthopnea, paroxysmal nocturnal dyspnea.  Patient is able to perform activities of daily living.        Review of Systems     Family History   Problem Relation Age of Onset    Stroke Father     Alcohol abuse Father     Arthritis Father     Hyperlipidemia Father     Lung cancer Other     Arthritis Other     Cancer Other     Parkinsonism Other     Diabetes type II Other     Alcohol abuse Sister     Thyroid disease Sister     Anxiety disorder Daughter         Social History     Socioeconomic History    Marital status:    Tobacco Use    Smoking status: Never     Passive exposure: Past    Smokeless tobacco: Never   Vaping Use    Vaping status: Never Used   Substance and Sexual Activity    Alcohol use: Yes     Alcohol/week: 2.0 standard drinks of alcohol     Types: 2 Drinks containing 0.5 oz of alcohol per week     Comment: One or two wine coolers a week    Drug use: Never    Sexual activity: Not Currently     Partners: Male     Birth control/protection: Tubal ligation     Comment: Tubes tied        Past Medical History:   Diagnosis Date    Anemia     Annual physical exam 11/06/2017    WILL RESCHEDULE MAMMOGRAM     Breast lump     Cataract 2010 ?    Had them in both eyes have been removed.    CHF (congestive heart failure) 11/06/2017    FOLLOWS WITH CARDS. CURRENTLY DOING WELL ON LASIX, LISINOPRIL, ALDACTONE, AND PROPANOLOL    Degeneration of lumbar intervertebral disc 06/12/2012    Diverticulitis     Diverticulosis     Essential hypertension 11/06/2017    WELL CONTROLLED ON CURRENT REGIMEN     GERD (gastroesophageal reflux disease)     Hamstring injury 08/17/2015    BILATERAL HAMSTRING INJURY/PAIN     Head injury     Hernia cerebri     Hyperthyroidism     Hyperthyroidism 11/06/2017    CURRENTLY ON METHIMAZOLE, PT REPORT POSSIBLY DUE TO GRAVE'S DISEASE. WILL NEED TO OBTAIN AND REVIEW MEDICAL RECORDS FROM  [facility]. WILL REFER TO ENDO     Hypoxia     Insomnia 11/06/2017    DISCUSSED SLEEP HYGIENE. PT TO TRY AVOIDING BLUE LIGHT AT NIGHT. PT ALSO TO SET ROUTINE PRIOR TO BEDTIME. WILL AVOID MEDS AT THIS TIME     Interstitial lung disease     Low back pain     Lumbosacral disc herniation 06/12/2012    LEFT L5-S1 SMALL NATURE. SHE HAS FAILED ALL CONSERVATIVE MEASURES AND DESIRES SURGERY     Lupus     SEES DR. ANDERSON    Migraines     Obesity     All my life    Pulmonary embolism     Respiratory failure with hypoxia 02/04/2021    Rheumatoid arthritis 11/06/2017    CURRENTLY CONTROLLED, BUT WITH RESIDUAL DEFORMITY. WILL FOLLOW WITH DR. ANDERSON     Seizures 06/15/1995    Gave birth to my baby daughter went home two days later went into seizures had to go back into the hospital for a week, that was the first and last one I had since that date.    Tachycardia     Total knee replacement status, right 08/17/2015        Immunization History   Administered Date(s) Administered    Arexvy (RSV, Adults 60+ yrs) 04/08/2024    COVID-19 (SANDI) 03/29/2021    COVID-19 (PFIZER) 12YRS+ (COMIRNATY) 04/08/2024, 09/16/2024    COVID-19 (PFIZER) Purple Cap Monovalent 12/16/2021    Flu Vaccine Intradermal Quad 18-64YR 01/02/2008, 11/10/2008, 11/05/2009    Flu Vaccine Quad PF >36MO 10/25/2017    Fluzone  >6mos 08/27/2012, 10/15/2013    Fluzone (or Fluarix & Flulaval for VFC) >6mos 10/25/2017, 11/18/2021, 10/12/2022, 11/29/2023    Hepatitis A 04/30/2018    Influenza Inj [facility] Preserative Free 09/16/2024    Influenza Seasonal Injectable 01/02/2008, 11/10/2008, 11/05/2009    Influenza, Unspecified 09/17/2020, 10/01/2021, 10/12/2022    Pneumococcal Conjugate 13-Valent (PCV13) 06/14/2018    Pneumococcal Polysaccharide (PPSV23) 08/26/2019    Shingrix 12/30/2022, 04/08/2024    Tdap 09/16/2024       Allergies   Allergen Reactions    Aspirin Anaphylaxis     In high doses    Salsalate Hives, Anaphylaxis, Unknown - High Severity and Shortness Of Breath           Current Outpatient Medications:     albuterol sulfate HFA (Proventil HFA) 108 (90 Base) MCG/ACT inhaler, Inhale 2 puffs Every 4 (Four) Hours As Needed for Wheezing for up to 30 days., Disp: 18 g, Rfl: 2    ascorbic acid (VITAMIN C) 250 MG tablet, Take 1 tablet by mouth Daily., Disp: , Rfl:     Azelastine HCl 137 MCG/SPRAY solution, Administer 1 spray into the nostril(s) as directed by provider Daily., Disp: , Rfl:     Calcium Carbonate-Vitamin D (CALTRATE 600+D PO), , Disp: , Rfl:     cetirizine (zyrTEC) 10 MG tablet, Take 1 tablet by mouth Daily., Disp: 90 tablet, Rfl: 1    cyanocobalamin 1000 MCG/ML injection, INJECT 1 ML UNDER THE SKIN INTO THE APPROPRIATE AREA AS DIRECTED EVERY 30 (THIRTY) DAYS., Disp: 3 mL, Rfl: 3    cyclobenzaprine (FLEXERIL) 10 MG tablet, Take 1 tablet by mouth 2 (Two) Times a Day As Needed for Muscle Spasms., Disp: 60 tablet, Rfl: 3    Diclofenac Sodium (VOLTAREN) 1 % gel gel, Apply 4 g topically to the appropriate area as directed 4 (Four) Times a Day As Needed (shoulder/hip pain)., Disp: 100 g, Rfl: 3    fluticasone (FLONASE) 50 MCG/ACT nasal spray, SHAKE LIQUID AND USE 2 SPRAYS(100 MCG) IN EACH NOSTRIL EVERY DAY, Disp: 16 g, Rfl: 3    furosemide (LASIX) 40 MG tablet, TAKE 1 TABLET BY MOUTH EVERY DAY, Disp: 90 tablet, Rfl: 1    hydrOXYzine (ATARAX) 25 MG tablet, TAKE 1 TABLET BY MOUTH EVERY 8 HOURS AS NEEDED FOR ITCHING., Disp: 90 tablet, Rfl: 1    levothyroxine (SYNTHROID, LEVOTHROID) 150 MCG tablet, TAKE 1 TABLET BY MOUTH EVERY DAY, Disp: 30 tablet, Rfl: 1    magnesium oxide (MAG-OX) 400 MG tablet, Take 1 tablet by mouth Daily., Disp: 90 tablet, Rfl: 1    montelukast (SINGULAIR) 10 MG tablet, Take 1 tablet by mouth every night at bedtime., Disp: 90 tablet, Rfl: 1    O2 (OXYGEN), Inhale 3 L/min Daily., Disp: , Rfl:     ondansetron (ZOFRAN) 4 MG tablet, Take 1 tablet by mouth Every 12 (Twelve) Hours As Needed for Vomiting or Nausea., Disp: 30 tablet, Rfl: 1    pilocarpine (SALAGEN) 5  MG tablet, Take 1 tablet by mouth 3 times a day., Disp: 90 tablet, Rfl: 1    predniSONE (DELTASONE) 50 MG tablet, Take 1 tablet by mouth Daily for 5 days., Disp: 5 tablet, Rfl: 0    promethazine-dextromethorphan (PROMETHAZINE-DM) 6.25-15 MG/5ML syrup, Take 5 mL by mouth 4 (Four) Times a Day As Needed for Cough., Disp: 120 mL, Rfl: 0    rizatriptan (MAXALT) 10 MG tablet, Take 1 tablet by mouth As Needed (9) for up to 9 doses., Disp: 9 tablet, Rfl: 3    rOPINIRole (REQUIP) 1 MG tablet, TAKE 1 TABLET BY MOUTH EVERY DAY AT NIGHT, Disp: 90 tablet, Rfl: 1    spironolactone (ALDACTONE) 50 MG tablet, Take 1 tablet by mouth Daily., Disp: 90 tablet, Rfl: 1    traZODone (DESYREL) 50 MG tablet, Take 1 tablet by mouth Daily., Disp: , Rfl:     azaTHIOprine (IMURAN) 50 MG tablet, Take 3 tablets by mouth Daily., Disp: , Rfl:     gabapentin (NEURONTIN) 300 MG capsule, Take 1 capsule by mouth 3 (Three) Times a Day for 30 days., Disp: 90 capsule, Rfl: 0    HYDROcodone-acetaminophen (NORCO) 7.5-325 MG per tablet, Take 1 tablet by mouth Every 12 (Twelve) Hours As Needed for Moderate Pain for up to 30 days., Disp: 60 tablet, Rfl: 0     Objective     Physical Exam  Vital Signs:   WDWN, Alert, NAD.    HEENT:  PERRL, EOMI.  OP, nares clear, no sinus tenderness  Neck:  Supple, no JVD, no thyromegaly.  Lymph: no axillary, cervical, supraclavicular lymphadenopathy noted bilaterally  Chest:  Mildly decreased breath sounds throughout with inspiratory Velcro-like crackles at the bases. Normal work of breathing noted. Patient is able speak full sentences without difficulty.  Patient is on 3 L of oxygen per minute via nasal cannula.   CV: RRR, no MGR, pulses 2+, equal.  Abd:  Soft, NT, ND, + BS, no HSM  EXT:  no clubbing, no cyanosis, no edema, no joint tenderness  Neuro:  A&Ox3, CN grossly intact, no focal deficits.  Skin: No rashes or lesions noted.    /86 (BP Location: Right arm, Patient Position: Sitting, Cuff Size: Adult)   Pulse 72    "Temp 98.2 °F (36.8 °C) (Temporal)   Resp 18   Ht 162.6 cm (64\")   Wt 104 kg (229 lb)   SpO2 97% Comment: 1L PD  BMI 39.31 kg/m²         Result Review :   I have reviewed my last office visit note.  I also reviewed ER visit summary report, labs, and chest x-ray report dated from 10/7/2024.  See scanned reports.    Procedures:    CMP          3/26/2024    18:29 10/7/2024    22:09 10/8/2024    15:12   CMP   Glucose 105  91  124    BUN 23  18  26    Creatinine 1.38  1.11  1.17    EGFR 43.9  56.7  53.2    Sodium 140  142  140    Potassium 4.1  3.9  3.8    Chloride 107  106  104    Calcium 8.8  8.8  9.6    Total Protein 6.9  7.7     Albumin 4.0  3.8  4.3    Globulin 2.9  3.9     Total Bilirubin 0.5  0.8     Alkaline Phosphatase 88  61     AST (SGOT) 14  14     ALT (SGPT) 7  5     Albumin/Globulin Ratio 1.4  1.0     BUN/Creatinine Ratio 16.7  16.2  22.2    Anion Gap 9.3  8.7  13.0      CBC          3/26/2024    18:29 10/7/2024    22:09 10/8/2024    15:12   CBC   WBC 7.06  9.50  11.83    RBC 3.99  4.29  4.17    Hemoglobin 11.8  12.3  12.0    Hematocrit 36.8  38.4  38.5    MCV 92.2  89.5  92.3    MCH 29.6  28.7  28.8    MCHC 32.1  32.0  31.2    RDW 13.7  14.9  13.1    Platelets 150  200  198      CBC w/diff          3/26/2024    18:29 10/7/2024    22:09 10/8/2024    15:12   CBC w/Diff   WBC 7.06  9.50  11.83    RBC 3.99  4.29  4.17    Hemoglobin 11.8  12.3  12.0    Hematocrit 36.8  38.4  38.5    MCV 92.2  89.5  92.3    MCH 29.6  28.7  28.8    MCHC 32.1  32.0  31.2    RDW 13.7  14.9  13.1    Platelets 150  200  198    Neutrophil Rel % 50.8  71.5     Immature Granulocyte Rel % 0.1  0.4     Lymphocyte Rel % 34.8  18.8     Monocyte Rel % 8.9  6.6     Eosinophil Rel % 4.1  2.0     Basophil Rel % 1.3  0.7       XR Chest 2 View    Result Date: 10/7/2024  No active disease. Electronically Signed: Kian Perez MD  10/7/2024 7:30 PM EDT  Workstation ID: [id number]          Assessment and Plan      Assessment:  1. Interstitial lung " disease secondary to mixed connective tissue disease without exacerbation.   2. Known mixed connective tissue disease.       3. Chronic compensated diastolic congestive heart failure.   4. Chronic dyspnea at baseline.       5. Immunosuppressed status on Imuran.       6. Therapeutic drug monitoring on Imuran.       7. History of PE on anticoagulation in the past.     8.  Lung nodules.  9.  Seasonal allergies.  10.  Lupus: Patient is under the care of Dr. Boone.  11. Never smoker.         Plan:  1.  Patient states that she stopped taking Ofev due to medication making her stomach upset and having diarrhea.  2.  Will order an updated pulmonary function test, 6-minute walk test, and chest CT scan.  Will wait for results before starting medication.  3.  Patient recently had a CBC and CMP completed on 10/7/2024.  White blood cell count normal.  LFTs within normal range.  See scanned reports.  4.  Continue Imuran as prescribed.  5.  Continue albuterol inhaler as needed.  Refills for albuterol sent to the pharmacy today.  4.  Continue Zyrtec, Singulair, Astelin nasal spray, and Flonase nasal spray for seasonal allergies.  5.  Continue albuterol inhaler as needed.  6.  Continue oxygen to keep SPO2 at 89% and above.  7.  Follow-up with Dr. Boone, hematologist also for therapeutic drug monitoring and lupus as scheduled.  8.  Follow-up with cardiology as scheduled.  9.  Vaccination status: patient reports they are up-to-date with flu, pneumonia, and Covid vaccines.  Patient is advised to continue to follow CDC recommendations such as social distancing wearing a mask and washing hands for at least 20 seconds.  10.  Smoking status: Never smoker.  11.  Patient to call the office, 911, or go to the ER with new or worsening symptoms.  12.  Follow-up in 2 to 3 weeks, sooner if needed.          Follow Up   No follow-ups on file.  Patient was given instructions and counseling regarding her condition or for health maintenance  advice. Please see specific information pulled into the AVS if appropriate.

## 2024-10-09 NOTE — PATIENT INSTRUCTIONS
Pulmonary Fibrosis    Pulmonary fibrosis is a type of lung disease that causes scarring. Over time, the scar tissue builds up in the air sacs of your lungs (alveoli). This makes it hard for you to breathe because less oxygen gets into your bloodstream.  Scarring from pulmonary fibrosis is permanent and may lead to other serious health problems.  What are the causes?  There are many different causes of pulmonary fibrosis. In some cases, the cause is not known. This is called idiopathic pulmonary fibrosis. Other causes include:  Exposure to chemicals and substances found in agricultural, farm, construction, or factory work. These include mold, asbestos, silica, metal dusts, and toxic fumes.  Sarcoidosis. In this disease, areas of inflammatory cells (granulomas) form and most often affect the lungs.  Autoimmune diseases. These include diseases such as rheumatoid arthritis, systemic sclerosis, or connective tissue disease.  Taking certain medicines. These include drugs used in radiation therapy or used to treat seizures, heart problems, and some infections.  What increases the risk?  You are more likely to develop this condition if:  You have a family history of the disease.  You are an older person. The condition is more common in older adults.  You have a history of smoking.  You have a job that exposes you to certain chemicals.  You have gastroesophageal reflux disease (GERD).  What are the signs or symptoms?  Symptoms of this condition include:  Difficulty breathing that gets worse with activity.  Shortness of breath (dyspnea).  Dry, hacking cough.  Rapid, shallow breathing during exercise or while at rest.  Other symptoms may include:  Loss of appetite or weight loss  Tiredness (fatigue) or weakness.  Bluish skin and lips.  Rounded and enlarged fingertips (clubbing).  How is this diagnosed?  This condition may be diagnosed based on:  Your symptoms and medical history.  A physical exam.  You may also have tests,  including:  A test that involves looking inside your lungs with an instrument (bronchoscopy).  Imaging studies of your lungs and heart.  Tests to measure how well you are breathing (pulmonary function tests).  Blood tests.  Tests to see how well your lungs work while you are walking (pulmonary stress test).  A procedure to remove a lung tissue sample to look at it under a microscope (biopsy).  How is this treated?  There is no cure for pulmonary fibrosis. Treatment focuses on managing symptoms and preventing scarring from getting worse. This may include:  Medicines, such as:  Steroids to prevent permanent lung changes.  Medicines to suppress your body's defense system (immune system).  Medicines to help with lung function by reducing inflammation or scarring.  Ongoing monitoring with X-rays and lab work.  Oxygen therapy.  Pulmonary rehabilitation.  Surgery. In some cases, a lung transplant is possible.  Follow these instructions at home:    Medicines  Take over-the-counter and prescription medicines only as told by your health care provider.  Keep your vaccinations up to date as recommended by your health care provider.  Activity  Get regular exercise, but do not pick activities that are too strenuous for you. Ask your health care provider what activities are safe for you.  If you have physical limitations, you may get exercise by walking, using a stationary bike, or doing chair exercises.  Ask your health care provider about using oxygen while exercising.  Do breathing exercises as told by your health care provider.  Plan rest periods when you get tired.  General instructions  Do not use any products that contain nicotine or tobacco. These products include cigarettes, chewing tobacco, and vaping devices, such as e-cigarettes. If you need help quitting, ask your health care provider.  If you are exposed to chemicals and substances at work, make sure that you wear a mask or respirator at all times.  Learn to manage  stress. If you need help to do this, ask your health care provider.  Join a pulmonary rehabilitation program or a support group for people with pulmonary fibrosis.  Eat small meals often so you do not get too full. Overeating can make breathing trouble worse.  Maintain a healthy weight. Lose weight if you need to.  Keep all follow-up visits. This is important.  Where to find more information  American Lung Association: www.lung.org  National Heart, Lung, and Blood [place]: www.nhlbi.nih.gov  Pulmonary Fibrosis Foundation: pulmonaryfibrosis.org  Contact a health care provider if:  You have symptoms that do not get better with medicines.  You are not able to be as active as usual.  You have trouble taking a deep breath.  You have a fever or chills.  You have blue lips or skin.  You have a lot of headaches.  You cough up mucus that is dark in color.  You have feelings of depression or sadness.  You are unable to sleep because it is hard to breathe.  Get help right away if:  Your symptoms suddenly worsen.  You have chest pain.  You cough up blood.  You get very confused or sleepy.  These symptoms may be an emergency. Get help right away. Call 911.  Do not wait to see if the symptoms will go away.  Do not drive yourself to the hospital.  Summary  Pulmonary fibrosis is a type of lung disease that causes scar tissue to build up in the air sacs of your lungs (alveoli) over time. This makes it hard for you to breathe because less oxygen gets into your bloodstream.  Scarring from pulmonary fibrosis is permanent and may lead to other serious health problems.  You are more likely to develop this condition if you have a family history of the condition or a job that exposes you to certain chemicals.  There is no cure for pulmonary fibrosis. Treatment focuses on managing symptoms and preventing scarring from getting worse.  This information is not intended to replace advice given to you by your health care provider. Make sure  you discuss any questions you have with your health care provider.  Document Revised: 08/09/2022 Document Reviewed: 08/09/2022  Elsevier Patient Education © 2024 [employer]

## 2024-10-15 ENCOUNTER — TELEPHONE (OUTPATIENT)
Dept: OBSTETRICS AND GYNECOLOGY | Facility: CLINIC | Age: 59
End: 2024-10-15
Payer: MEDICARE

## 2024-10-15 NOTE — TELEPHONE ENCOUNTER
Returned patient call informed her if she was still having the heavy bleeding she needs to go to ED

## 2024-10-18 ENCOUNTER — OFFICE VISIT (OUTPATIENT)
Dept: INTERNAL MEDICINE | Facility: CLINIC | Age: 59
End: 2024-10-18
Payer: MEDICARE

## 2024-10-18 VITALS
SYSTOLIC BLOOD PRESSURE: 134 MMHG | OXYGEN SATURATION: 100 % | HEART RATE: 88 BPM | BODY MASS INDEX: 38.59 KG/M2 | DIASTOLIC BLOOD PRESSURE: 86 MMHG | TEMPERATURE: 97.2 F | WEIGHT: 224.8 LBS

## 2024-10-18 DIAGNOSIS — E03.9 HYPOTHYROIDISM, UNSPECIFIED TYPE: ICD-10-CM

## 2024-10-18 DIAGNOSIS — D72.829 LEUKOCYTOSIS, UNSPECIFIED TYPE: ICD-10-CM

## 2024-10-18 DIAGNOSIS — N95.0 POST-MENOPAUSAL BLEEDING: Primary | ICD-10-CM

## 2024-10-18 DIAGNOSIS — E66.9 OBESITY, UNSPECIFIED CLASS, UNSPECIFIED OBESITY TYPE, UNSPECIFIED WHETHER SERIOUS COMORBIDITY PRESENT: ICD-10-CM

## 2024-10-18 PROCEDURE — 3075F SYST BP GE 130 - 139MM HG: CPT | Performed by: STUDENT IN AN ORGANIZED HEALTH CARE EDUCATION/TRAINING PROGRAM

## 2024-10-18 PROCEDURE — 99214 OFFICE O/P EST MOD 30 MIN: CPT | Performed by: STUDENT IN AN ORGANIZED HEALTH CARE EDUCATION/TRAINING PROGRAM

## 2024-10-18 PROCEDURE — 3079F DIAST BP 80-89 MM HG: CPT | Performed by: STUDENT IN AN ORGANIZED HEALTH CARE EDUCATION/TRAINING PROGRAM

## 2024-10-18 PROCEDURE — 1125F AMNT PAIN NOTED PAIN PRSNT: CPT | Performed by: STUDENT IN AN ORGANIZED HEALTH CARE EDUCATION/TRAINING PROGRAM

## 2024-10-18 NOTE — PROGRESS NOTES
"Chief Complaint  Hypothyroidism (Follow up ), Cough (Notes she has had this cold for 3 weeks now. Coughing up green mucus. ), and Menstrual Problem (Notes she has not had a period in 12 years, but had one this past Friday, and ended yesterday. )    Subjective            Aida Mcclure presents to [facility]  History of Present Illness    Hypothyroidism:  Chronic, presenting for lab result review.   Pt with markedly elevated TSH intermittently in the past.   She is on synthroid, which she states taking first thing in the AM with milk. States she typically will take her other medications about 2-3hrs later.     Cough:   Chronic hypoxic respiratory failure:  Patient with ILD and pulmonary fibrosis.   Seen in the ER on 10/7 for cough, with negative cxr and poct.  Seen on 10/9 by pulmonology for f/u (after being lost to f/u, her last apt w/ pulm was on 08/2023). CT chest, PFT and walk test were ordered and scheduled. Pt cancelled PFT apt on 10/10 and no showed apt for CT chest on 10/14.     States she did show up for her PFT and walk test appointment on 10/10 however she was turned away because she had a fever.   Today patient reports she was not aware of schedule CT chest on 10/14.     Patient is on continuous oxygen, 3L, continuous.   States she has enriqueta having intermittent fevers with tmax of 101. Last febrile 2d ago.     Menstrual problem:  Pt reports vaginal bleeding which started about 7 days ago (10/10), and ended on 10/23.   States on the first day she thought it was related to hemorrhoid since it was just a spot, however states she had 'globs' or clot fall in the toilet bowl. States she continues to have bleeding over the next few days and had to wear diapers. States \"flow was not heavy\".    Patient did call her ob/gyn office on 10/15 and was told that her previous ob/gyn Dr. Braga was no longer with the practice and that they \"were not the procedure was\" " and recommended that patient present to the ER. States she was told that they could not find her name in their system.     Patient states she would like to discuss weight loss medications.   States she's heard friends and family talk about ozempic.       Past Medical History:   Diagnosis Date    Anemia     Annual physical exam 11/06/2017    WILL RESCHEDULE MAMMOGRAM     Breast lump     Cataract 2010 ?    Had them in both eyes have been removed.    CHF (congestive heart failure) 11/06/2017    FOLLOWS WITH CARDS. CURRENTLY DOING WELL ON LASIX, LISINOPRIL, ALDACTONE, AND PROPANOLOL    Degeneration of lumbar intervertebral disc 06/12/2012    Diverticulitis     Diverticulosis     Essential hypertension 11/06/2017    WELL CONTROLLED ON CURRENT REGIMEN     GERD (gastroesophageal reflux disease)     Hamstring injury 08/17/2015    BILATERAL HAMSTRING INJURY/PAIN     Head injury     Hernia cerebri     Hyperthyroidism     Hyperthyroidism 11/06/2017    CURRENTLY ON METHIMAZOLE, PT REPORT POSSIBLY DUE TO GRAVE'S DISEASE. WILL NEED TO OBTAIN AND REVIEW MEDICAL RECORDS FROM [facility]. WILL REFER TO ENDO     Hypoxia     Insomnia 11/06/2017    DISCUSSED SLEEP HYGIENE. PT TO TRY AVOIDING BLUE LIGHT AT NIGHT. PT ALSO TO SET ROUTINE PRIOR TO BEDTIME. WILL AVOID MEDS AT THIS TIME     Interstitial lung disease     Low back pain     Lumbosacral disc herniation 06/12/2012    LEFT L5-S1 SMALL NATURE. SHE HAS FAILED ALL CONSERVATIVE MEASURES AND DESIRES SURGERY     Lupus     SEES DR. ANDERSON    Migraines     Obesity     All my life    Pulmonary embolism     Respiratory failure with hypoxia 02/04/2021    Rheumatoid arthritis 11/06/2017    CURRENTLY CONTROLLED, BUT WITH RESIDUAL DEFORMITY. WILL FOLLOW WITH DR. ANDERSON     Seizures 06/15/1995    Gave birth to my baby daughter went home two days later went into seizures had to go back into the hospital for a week, that was the first and last one I had since that date.    Tachycardia     Total knee  replacement status, right 2015       Allergies:   Allergies   Allergen Reactions    Aspirin Anaphylaxis     In high doses    Salsalate Hives, Anaphylaxis, Unknown - High Severity and Shortness Of Breath          Past Surgical History:   Procedure Laterality Date    ANKLE SURGERY Bilateral     BREAST BIOPSY      CATARACT EXTRACTION WITH INTRAOCULAR LENS IMPLANT       SECTION      COLONOSCOPY      ENDOSCOPY N/A 2023    Procedure: ESOPHAGOGASTRODUODENOSCOPY WITH BIOPSY;  Surgeon: Shama Martin MD;  Location: [facility] ENDOSCOPY;  Service: Gastroenterology;  Laterality: N/A;  NORMAL EGD    EYE LENS IMPLANT SECONDARY      YES    EYE SURGERY      Cataracts removed from 05    FASCIOTOMY      LEFT ANTERIOR ARM     HAND SURGERY Left     JOINT REPLACEMENT      Right knee replacement    KNEE SURGERY      LYMPH NODE BIOPSY      To find my systemic lupus    OTHER SURGICAL HISTORY      LYMPH NODE EXCISION    PORTACATH PLACEMENT      POWER PORT PLACEMENT     TUBAL ABDOMINAL LIGATION      UPPER GASTROINTESTINAL ENDOSCOPY            Social History     Socioeconomic History    Marital status:    Tobacco Use    Smoking status: Never     Passive exposure: Past    Smokeless tobacco: Never   Vaping Use    Vaping status: Never Used   Substance and Sexual Activity    Alcohol use: Yes     Alcohol/week: 2.0 standard drinks of alcohol     Types: 2 Drinks containing 0.5 oz of alcohol per week     Comment: One or two wine coolers a week    Drug use: Never    Sexual activity: Not Currently     Partners: Male     Birth control/protection: Tubal ligation     Comment: Tubes tied         Family History   Problem Relation Age of Onset    Stroke Father     Alcohol abuse Father     Arthritis Father     Hyperlipidemia Father     Lung cancer Other     Arthritis Other     Cancer Other     Parkinsonism Other     Diabetes type II Other     Alcohol abuse Sister     Thyroid disease Sister     Anxiety disorder Daughter            Health Maintenance Due   Topic Date Due    Pneumococcal Vaccine 0-64 (3 of 3 - PPSV23 or PCV20) 08/26/2024    ANNUAL WELLNESS VISIT  11/29/2024            Current Outpatient Medications:     albuterol sulfate HFA (Proventil HFA) 108 (90 Base) MCG/ACT inhaler, Inhale 2 puffs Every 4 (Four) Hours As Needed for Wheezing for up to 30 days., Disp: 18 g, Rfl: 2    ascorbic acid (VITAMIN C) 250 MG tablet, Take 1 tablet by mouth Daily., Disp: , Rfl:     azaTHIOprine (IMURAN) 50 MG tablet, Take 3 tablets by mouth Daily., Disp: , Rfl:     Azelastine HCl 137 MCG/SPRAY solution, Administer 1 spray into the nostril(s) as directed by provider Daily., Disp: , Rfl:     Calcium Carbonate-Vitamin D (CALTRATE 600+D PO), , Disp: , Rfl:     cetirizine (zyrTEC) 10 MG tablet, Take 1 tablet by mouth Daily., Disp: 90 tablet, Rfl: 1    cyanocobalamin 1000 MCG/ML injection, INJECT 1 ML UNDER THE SKIN INTO THE APPROPRIATE AREA AS DIRECTED EVERY 30 (THIRTY) DAYS., Disp: 3 mL, Rfl: 3    cyclobenzaprine (FLEXERIL) 10 MG tablet, Take 1 tablet by mouth 2 (Two) Times a Day As Needed for Muscle Spasms., Disp: 60 tablet, Rfl: 3    Diclofenac Sodium (VOLTAREN) 1 % gel gel, Apply 4 g topically to the appropriate area as directed 4 (Four) Times a Day As Needed (shoulder/hip pain)., Disp: 100 g, Rfl: 3    fluticasone (FLONASE) 50 MCG/ACT nasal spray, SHAKE LIQUID AND USE 2 SPRAYS(100 MCG) IN EACH NOSTRIL EVERY DAY, Disp: 16 g, Rfl: 3    furosemide (LASIX) 40 MG tablet, TAKE 1 TABLET BY MOUTH EVERY DAY, Disp: 90 tablet, Rfl: 1    hydrOXYzine (ATARAX) 25 MG tablet, TAKE 1 TABLET BY MOUTH EVERY 8 HOURS AS NEEDED FOR ITCHING., Disp: 90 tablet, Rfl: 1    levothyroxine (SYNTHROID, LEVOTHROID) 150 MCG tablet, TAKE 1 TABLET BY MOUTH EVERY DAY, Disp: 30 tablet, Rfl: 1    magnesium oxide (MAG-OX) 400 MG tablet, Take 1 tablet by mouth Daily., Disp: 90 tablet, Rfl: 1    montelukast (SINGULAIR) 10 MG tablet, Take 1 tablet by mouth every night at bedtime.,  Disp: 90 tablet, Rfl: 1    O2 (OXYGEN), Inhale 3 L/min Daily., Disp: , Rfl:     ondansetron (ZOFRAN) 4 MG tablet, Take 1 tablet by mouth Every 12 (Twelve) Hours As Needed for Vomiting or Nausea., Disp: 30 tablet, Rfl: 1    pilocarpine (SALAGEN) 5 MG tablet, Take 1 tablet by mouth 3 times a day., Disp: 90 tablet, Rfl: 1    promethazine-dextromethorphan (PROMETHAZINE-DM) 6.25-15 MG/5ML syrup, Take 5 mL by mouth 4 (Four) Times a Day As Needed for Cough., Disp: 120 mL, Rfl: 0    rizatriptan (MAXALT) 10 MG tablet, Take 1 tablet by mouth As Needed (9) for up to 9 doses., Disp: 9 tablet, Rfl: 3    rOPINIRole (REQUIP) 1 MG tablet, TAKE 1 TABLET BY MOUTH EVERY DAY AT NIGHT, Disp: 90 tablet, Rfl: 1    spironolactone (ALDACTONE) 50 MG tablet, Take 1 tablet by mouth Daily., Disp: 90 tablet, Rfl: 1    traZODone (DESYREL) 50 MG tablet, Take 1 tablet by mouth Daily., Disp: , Rfl:     gabapentin (NEURONTIN) 300 MG capsule, Take 1 capsule by mouth 3 (Three) Times a Day for 30 days., Disp: 90 capsule, Rfl: 0    HYDROcodone-acetaminophen (NORCO) 7.5-325 MG per tablet, Take 1 tablet by mouth Every 12 (Twelve) Hours As Needed for Moderate Pain for up to 30 days., Disp: 60 tablet, Rfl: 0      Immunization History   Administered Date(s) Administered    Arexvy (RSV, Adults 60+ yrs) 04/08/2024    COVID-19 (SANDI) 03/29/2021    COVID-19 (PFIZER) 12YRS+ (COMIRNATY) 04/08/2024, 09/16/2024    COVID-19 (PFIZER) Purple Cap Monovalent 12/16/2021    Flu Vaccine Intradermal Quad 18-64YR 01/02/2008, 11/10/2008, 11/05/2009    Flu Vaccine Quad PF >36MO 10/25/2017    Fluzone  >6mos 08/27/2012, 10/15/2013    Fluzone (or Fluarix & Flulaval for VFC) >6mos 10/25/2017, 11/18/2021, 10/12/2022, 11/29/2023    Hepatitis A 04/30/2018    Influenza Inj MDCK Preserative Free 09/16/2024    Influenza Seasonal Injectable 01/02/2008, 11/10/2008, 11/05/2009    Influenza, Unspecified 09/17/2020, 10/01/2021, 10/12/2022    Pneumococcal Conjugate 13-Valent (PCV13)  06/14/2018    Pneumococcal Polysaccharide (PPSV23) 08/26/2019    Shingrix 12/30/2022, 04/08/2024    Tdap 09/16/2024         Review of Systems       Objective       Vitals:    10/18/24 1444   BP: 134/86   BP Location: Right arm   Patient Position: Sitting   Cuff Size: Large Adult   Pulse: 88   Temp: 97.2 °F (36.2 °C)   TempSrc: Temporal   SpO2: 100%   Weight: 102 kg (224 lb 12.8 oz)        Physical Exam  Vitals reviewed.   Constitutional:       Appearance: Normal appearance.   HENT:      Head: Normocephalic and atraumatic.      Nose: Nose normal.   Eyes:      Extraocular Movements: Extraocular movements intact.      Conjunctiva/sclera: Conjunctivae normal.   Pulmonary:      Effort: Pulmonary effort is normal. No respiratory distress.   Musculoskeletal:         General: Normal range of motion.   Skin:     General: Skin is warm and dry.   Neurological:      General: No focal deficit present.      Mental Status: She is alert and oriented to person, place, and time.      Cranial Nerves: No cranial nerve deficit.   Psychiatric:         Mood and Affect: Mood normal.         Behavior: Behavior normal.         Thought Content: Thought content normal.             Result Review :     The following data was reviewed by: Merline Enamorado MD on 10/18/2024:    Common labs          3/26/2024    18:29 10/7/2024    22:09 10/8/2024    15:12   Common Labs   Glucose 105  91  124    BUN 23  18  26    Creatinine 1.38  1.11  1.17    Sodium 140  142  140    Potassium 4.1  3.9  3.8    Chloride 107  106  104    Calcium 8.8  8.8  9.6    Albumin 4.0  3.8  4.3    Total Bilirubin 0.5  0.8     Alkaline Phosphatase 88  61     AST (SGOT) 14  14     ALT (SGPT) 7  5     WBC 7.06  9.50  11.83    Hemoglobin 11.8  12.3  12.0    Hematocrit 36.8  38.4  38.5    Platelets 150  200  198      TSH          3/26/2024    18:29 5/23/2024    12:46 10/8/2024    15:12   TSH   TSH 26.100  2.580  11.600                      Assessment and Plan      Diagnoses and all  orders for this visit:    1. Post-menopausal bleeding (Primary)  Recurrent urgent referral to oB placed. Pelvic US also ordered for further eval.   -     US Non-ob Transvaginal  -     Ambulatory Referral to Obstetrics / Gynecology    2. Hypothyroidism, unspecified type  Chronic, labs remain abnormal, likely due to pt takng medications inappropriately. Reviewed best way to take synthroid w/ plan for repeat labs in 6 wks.  -     US Thyroid  -     TSH; Future  -     T4, Free; Future    3. Leukocytosis, unspecified type  Mild and acute,   -     CBC w AUTO Differential; Future    4. Obesity, unspecified class, unspecified obesity type, unspecified whether serious comorbidity present  Chronic, discussed need for healthy eating and exercise as tolerated.         Orders Placed This Encounter   Procedures    US Non-ob Transvaginal    US Thyroid    TSH    T4, Free    Ambulatory Referral to Obstetrics / Gynecology    CBC w AUTO Differential              Follow Up     Return in about 7 weeks (around 12/3/2024) for Medicare Wellness, and thyroid .    Patient was given instructions and counseling regarding her condition or for health maintenance advice. Please see specific information pulled into the AVS if appropriate.     Merline Enamorado MD   Internal Medicine-Pediatrics

## 2024-10-20 DIAGNOSIS — E03.9 HYPOTHYROIDISM, UNSPECIFIED TYPE: ICD-10-CM

## 2024-10-22 RX ORDER — LEVOTHYROXINE SODIUM 150 UG/1
150 TABLET ORAL DAILY
Qty: 30 TABLET | Refills: 1 | Status: SHIPPED | OUTPATIENT
Start: 2024-10-22

## 2024-10-23 NOTE — PROGRESS NOTES
"GYN Visit    Chief Complaint   Patient presents with    Follow-up     FUUS       HPI:   59 y.o. here to f/u ultrasound. Pt states s/p menopause and had no bleeding x 11 years.  Pt states had episode of bleeding last month x7 days. She had an ultrasound done and is here today to review the results.      History: PMHx, Meds, Allergies, PSHx, Social Hx, and POBHx all reviewed and updated.    PHYSICAL EXAM:  /95   Pulse 70   Ht 162.6 cm (64\")   Wt 101 kg (223 lb)   Breastfeeding No   BMI 38.28 kg/m² Chaperone present  General- NAD, alert and oriented, appropriate  Psych- Normal mood, good memory      External genitalia- Normal female, no lesions  Urethra/meatus- Normal, no masses, non tender, no prolapse  Bladder- Normal, no masses, non tender, no prolapse  Vagina- Normal, + atrophy, no lesions, no discharge, no prolapse  Cvx- Normal, no lesions, no discharge, No cervical motion tenderness  Uterus- Normal size, shape & consistency.  Non tender, mobile.  Adnexa- No mass, non tender  Anus/Rectum/Perineum- Not performed    Endometrial Biopsy    Date/Time: 10/24/2024 12:20 PM    Performed by: Sherrie Lynch DO  Authorized by: Sherrie Lynch DO    Consent:     Consent obtained: written    Consent given by: patient    Risks discussed: bleeding  Indications:     Indications: postmenopausal bleeding      Chronicity of post-menopausal bleeding: recurrent    Progression of post-menopausal bleeding: unchanged  Pre-procedure:     Urine pregnancy test: N/A    Procedure:     A bimanual exam was performed: yes      Uterus size: <6 weeks    Uterus position: midposition    Prepped with: Betadine    Tenaculum used: yes      A local block was performed: no      Cervix dilated: no      Number of passes: 2  Findings:     Cervix: normal      Uterus depth by sound (cm): 6    Specimen collected: specimen collected and sent to pathology      Patient tolerance: tolerated well, no immediate complications     US Non-ob " Transvaginal (11/09/2022 11:09)  Previous notes have been reviewed by this provider.  ASSESSMENT AND PLAN:  Diagnoses and all orders for this visit:    1. Post-menopause bleeding (Primary)  -     Tissue Pathology Exam    Due to recent evidence based medicine that Black women may have EIN even with normal endometrial stripe and since this is this patient's second episode of post menopausal bleeding despite a normal endometrial stripe, I have recommended endometrial biopsy. The patient is offered hysteroscopy D&C  for the sake of completeness and the difference between blind office biopsy vs hysteroscopy D&C have been reviewed with the patient. She has opted to proceed with office endometrial biopsy.        Follow Up:  No follow-ups on file.          Sherrie Lynch,   10/24/2024    [facility]  [address] [place] [address]  Dept: [phone]  Dept Fax: [phone]  Loc: [phone]  Loc Fax: [phone]

## 2024-10-24 ENCOUNTER — LAB (OUTPATIENT)
Dept: LAB | Facility: HOSPITAL | Age: 59
End: 2024-10-24
Payer: MEDICARE

## 2024-10-24 ENCOUNTER — OFFICE VISIT (OUTPATIENT)
Dept: OBSTETRICS AND GYNECOLOGY | Facility: CLINIC | Age: 59
End: 2024-10-24
Payer: MEDICARE

## 2024-10-24 ENCOUNTER — TRANSCRIBE ORDERS (OUTPATIENT)
Dept: ADMINISTRATIVE | Facility: HOSPITAL | Age: 59
End: 2024-10-24
Payer: MEDICARE

## 2024-10-24 VITALS
SYSTOLIC BLOOD PRESSURE: 154 MMHG | HEIGHT: 64 IN | BODY MASS INDEX: 38.07 KG/M2 | WEIGHT: 223 LBS | HEART RATE: 70 BPM | DIASTOLIC BLOOD PRESSURE: 95 MMHG

## 2024-10-24 DIAGNOSIS — N95.0 POST-MENOPAUSE BLEEDING: Primary | ICD-10-CM

## 2024-10-24 DIAGNOSIS — N18.31 CHRONIC KIDNEY DISEASE (CKD) STAGE G3A/A1, MODERATELY DECREASED GLOMERULAR FILTRATION RATE (GFR) BETWEEN 45-59 ML/MIN/1.73 SQUARE METER AND ALBUMINURIA CREATININE RATIO LESS THAN 30 MG/G (CMS/H*: Primary | ICD-10-CM

## 2024-10-24 DIAGNOSIS — N18.31 CHRONIC KIDNEY DISEASE (CKD) STAGE G3A/A1, MODERATELY DECREASED GLOMERULAR FILTRATION RATE (GFR) BETWEEN 45-59 ML/MIN/1.73 SQUARE METER AND ALBUMINURIA CREATININE RATIO LESS THAN 30 MG/G (CMS/H*: ICD-10-CM

## 2024-10-24 DIAGNOSIS — E03.9 HYPOTHYROIDISM, UNSPECIFIED TYPE: ICD-10-CM

## 2024-10-24 DIAGNOSIS — D72.829 LEUKOCYTOSIS, UNSPECIFIED TYPE: ICD-10-CM

## 2024-10-24 LAB
ALBUMIN SERPL-MCNC: 3.7 G/DL (ref 3.5–5.2)
ANION GAP SERPL CALCULATED.3IONS-SCNC: 7.5 MMOL/L (ref 5–15)
BASOPHILS # BLD AUTO: 0.09 10*3/MM3 (ref 0–0.2)
BASOPHILS NFR BLD AUTO: 1.2 % (ref 0–1.5)
BUN SERPL-MCNC: 16 MG/DL (ref 8–23)
BUN/CREAT SERPL: 13.4 (ref 7–25)
CALCIUM SPEC-SCNC: 8.8 MG/DL (ref 8.6–10.5)
CHLORIDE SERPL-SCNC: 109 MMOL/L (ref 98–107)
CO2 SERPL-SCNC: 28.5 MMOL/L (ref 22–29)
CREAT SERPL-MCNC: 1.19 MG/DL (ref 0.57–1)
DEPRECATED RDW RBC AUTO: 45.7 FL (ref 37–54)
EGFRCR SERPLBLD CKD-EPI 2021: 52.1 ML/MIN/1.73
EOSINOPHIL # BLD AUTO: 0.29 10*3/MM3 (ref 0–0.4)
EOSINOPHIL NFR BLD AUTO: 3.8 % (ref 0.3–6.2)
ERYTHROCYTE [DISTWIDTH] IN BLOOD BY AUTOMATED COUNT: 13.5 % (ref 12.3–15.4)
GLUCOSE SERPL-MCNC: 95 MG/DL (ref 65–99)
HCT VFR BLD AUTO: 37.4 % (ref 34–46.6)
HGB BLD-MCNC: 11.7 G/DL (ref 12–15.9)
IMM GRANULOCYTES # BLD AUTO: 0.03 10*3/MM3 (ref 0–0.05)
IMM GRANULOCYTES NFR BLD AUTO: 0.4 % (ref 0–0.5)
LYMPHOCYTES # BLD AUTO: 1.67 10*3/MM3 (ref 0.7–3.1)
LYMPHOCYTES NFR BLD AUTO: 21.9 % (ref 19.6–45.3)
MCH RBC QN AUTO: 29 PG (ref 26.6–33)
MCHC RBC AUTO-ENTMCNC: 31.3 G/DL (ref 31.5–35.7)
MCV RBC AUTO: 92.6 FL (ref 79–97)
MONOCYTES # BLD AUTO: 0.49 10*3/MM3 (ref 0.1–0.9)
MONOCYTES NFR BLD AUTO: 6.4 % (ref 5–12)
NEUTROPHILS NFR BLD AUTO: 5.04 10*3/MM3 (ref 1.7–7)
NEUTROPHILS NFR BLD AUTO: 66.3 % (ref 42.7–76)
NRBC BLD AUTO-RTO: 0 /100 WBC (ref 0–0.2)
PHOSPHATE SERPL-MCNC: 3.1 MG/DL (ref 2.5–4.5)
PLATELET # BLD AUTO: 187 10*3/MM3 (ref 140–450)
PMV BLD AUTO: 12.2 FL (ref 6–12)
POTASSIUM SERPL-SCNC: 4.2 MMOL/L (ref 3.5–5.2)
PTH-INTACT SERPL-MCNC: 74.4 PG/ML (ref 15–65)
RBC # BLD AUTO: 4.04 10*6/MM3 (ref 3.77–5.28)
SODIUM SERPL-SCNC: 145 MMOL/L (ref 136–145)
WBC NRBC COR # BLD AUTO: 7.61 10*3/MM3 (ref 3.4–10.8)

## 2024-10-24 PROCEDURE — 83970 ASSAY OF PARATHORMONE: CPT

## 2024-10-24 PROCEDURE — 36415 COLL VENOUS BLD VENIPUNCTURE: CPT

## 2024-10-24 PROCEDURE — 88305 TISSUE EXAM BY PATHOLOGIST: CPT | Performed by: OBSTETRICS & GYNECOLOGY

## 2024-10-24 PROCEDURE — 80069 RENAL FUNCTION PANEL: CPT

## 2024-10-24 PROCEDURE — 85025 COMPLETE CBC W/AUTO DIFF WBC: CPT

## 2024-10-24 PROCEDURE — 84439 ASSAY OF FREE THYROXINE: CPT

## 2024-10-24 PROCEDURE — 84443 ASSAY THYROID STIM HORMONE: CPT

## 2024-10-25 LAB
T4 FREE SERPL-MCNC: 2.22 NG/DL (ref 0.92–1.68)
TSH SERPL DL<=0.05 MIU/L-ACNC: 1.32 UIU/ML (ref 0.27–4.2)

## 2024-10-28 ENCOUNTER — TELEPHONE (OUTPATIENT)
Dept: OBSTETRICS AND GYNECOLOGY | Facility: CLINIC | Age: 59
End: 2024-10-28
Payer: MEDICARE

## 2024-10-28 LAB
CYTO UR: NORMAL
LAB AP CASE REPORT: NORMAL
LAB AP CLINICAL INFORMATION: NORMAL
PATH REPORT.FINAL DX SPEC: NORMAL
PATH REPORT.GROSS SPEC: NORMAL

## 2024-10-30 ENCOUNTER — HOSPITAL ENCOUNTER (OUTPATIENT)
Dept: CT IMAGING | Facility: HOSPITAL | Age: 59
Discharge: HOME | End: 2024-10-30
Admitting: NURSE PRACTITIONER
Payer: MEDICARE

## 2024-10-30 DIAGNOSIS — J84.9 ILD (INTERSTITIAL LUNG DISEASE): ICD-10-CM

## 2024-10-30 DIAGNOSIS — Z51.81 THERAPEUTIC DRUG MONITORING: ICD-10-CM

## 2024-10-30 DIAGNOSIS — Z86.711 PERSONAL HISTORY OF PE (PULMONARY EMBOLISM): ICD-10-CM

## 2024-10-30 DIAGNOSIS — R91.8 LUNG NODULES: ICD-10-CM

## 2024-10-30 DIAGNOSIS — I50.32 CHRONIC DIASTOLIC HEART FAILURE: ICD-10-CM

## 2024-10-30 DIAGNOSIS — J30.2 SEASONAL ALLERGIES: ICD-10-CM

## 2024-10-30 PROCEDURE — 71250 CT THORAX DX C-: CPT

## 2024-11-03 DIAGNOSIS — J84.9 ILD (INTERSTITIAL LUNG DISEASE): ICD-10-CM

## 2024-11-03 DIAGNOSIS — R91.8 LUNG NODULES: Primary | ICD-10-CM

## 2024-11-03 DIAGNOSIS — Z76.0 MEDICATION REFILL: ICD-10-CM

## 2024-11-05 ENCOUNTER — LAB (OUTPATIENT)
Facility: HOSPITAL | Age: 59
End: 2024-11-05
Payer: MEDICARE

## 2024-11-05 ENCOUNTER — HOSPITAL ENCOUNTER (OUTPATIENT)
Dept: RESPIRATORY THERAPY | Facility: HOSPITAL | Age: 59
Discharge: HOME | End: 2024-11-05
Payer: MEDICARE

## 2024-11-05 ENCOUNTER — TRANSCRIBE ORDERS (OUTPATIENT)
Dept: ADMINISTRATIVE | Facility: HOSPITAL | Age: 59
End: 2024-11-05
Payer: MEDICARE

## 2024-11-05 ENCOUNTER — HOSPITAL ENCOUNTER (OUTPATIENT)
Dept: ULTRASOUND IMAGING | Facility: HOSPITAL | Age: 59
Discharge: HOME | End: 2024-11-05
Payer: MEDICARE

## 2024-11-05 DIAGNOSIS — Z86.711 PERSONAL HISTORY OF PE (PULMONARY EMBOLISM): ICD-10-CM

## 2024-11-05 DIAGNOSIS — R80.9 PROTEINURIA, UNSPECIFIED TYPE: ICD-10-CM

## 2024-11-05 DIAGNOSIS — I50.32 CHRONIC DIASTOLIC HEART FAILURE: ICD-10-CM

## 2024-11-05 DIAGNOSIS — R91.8 LUNG NODULES: ICD-10-CM

## 2024-11-05 DIAGNOSIS — R80.9 PROTEINURIA, UNSPECIFIED TYPE: Primary | ICD-10-CM

## 2024-11-05 DIAGNOSIS — J84.9 ILD (INTERSTITIAL LUNG DISEASE): ICD-10-CM

## 2024-11-05 DIAGNOSIS — Z51.81 THERAPEUTIC DRUG MONITORING: ICD-10-CM

## 2024-11-05 DIAGNOSIS — J30.2 SEASONAL ALLERGIES: ICD-10-CM

## 2024-11-05 PROCEDURE — 94729 DIFFUSING CAPACITY: CPT

## 2024-11-05 PROCEDURE — 94726 PLETHYSMOGRAPHY LUNG VOLUMES: CPT

## 2024-11-05 PROCEDURE — 76536 US EXAM OF HEAD AND NECK: CPT

## 2024-11-05 PROCEDURE — 94618 PULMONARY STRESS TESTING: CPT

## 2024-11-05 PROCEDURE — 94060 EVALUATION OF WHEEZING: CPT

## 2024-11-05 RX ORDER — HYDROXYZINE HYDROCHLORIDE 25 MG/1
25 TABLET, FILM COATED ORAL EVERY 8 HOURS PRN
Qty: 90 TABLET | Refills: 1 | Status: SHIPPED | OUTPATIENT
Start: 2024-11-05

## 2024-11-05 RX ORDER — ALBUTEROL SULFATE 0.83 MG/ML
2.5 SOLUTION RESPIRATORY (INHALATION) ONCE
Status: COMPLETED | OUTPATIENT
Start: 2024-11-05 | End: 2024-11-05

## 2024-11-05 RX ADMIN — ALBUTEROL SULFATE 2.5 MG: 2.5 SOLUTION RESPIRATORY (INHALATION) at 12:01

## 2024-11-05 NOTE — PROGRESS NOTES
Exercise Oximetry    Patient Name:Aida Mcclure   MRN: [id number]   Date: 11/05/24             ROOM AIR BASELINE   SpO2% 93   Heart Rate 84        EXERCISE ON ROOM AIR SpO2% EXERCISE ON O2 @  LPM SpO2%   1 MINUTE 89 1 MINUTE       PD 1   89   2 MINUTES 86 2 MINUTES     PD 2  90   3 MINUTES  3 MINUTES     PD 3 93   4 MINUTES  4 MINUTES     PD 3 92   5 MINUTES  5 MINUTES      PD 3 92   6 MINUTES  6 MINUTES      PD 3 91              Distance Walked  400 Feet Distance Walked   Dyspnea (Kathy Scale)  0 Dyspnea (Kathy Scale)   Fatigue (Kathy Scale)  8 Fatigue (Kathy Scale)   SpO2% Post Exercise  93 SpO2% Post Exercise   HR Post Exercise  87 HR Post Exercise   Time to Recovery  5 Minute Time to Recovery     Comments: Patient stated that he did not feel short of air when walking.

## 2024-11-08 ENCOUNTER — OFFICE VISIT (OUTPATIENT)
Dept: ORTHOPEDIC SURGERY | Facility: CLINIC | Age: 59
End: 2024-11-08
Payer: MEDICARE

## 2024-11-08 VITALS — HEIGHT: 64 IN | WEIGHT: 223 LBS | BODY MASS INDEX: 38.07 KG/M2

## 2024-11-08 DIAGNOSIS — M25.511 RIGHT SHOULDER PAIN, UNSPECIFIED CHRONICITY: Primary | ICD-10-CM

## 2024-11-08 DIAGNOSIS — M75.51 BURSITIS OF RIGHT SHOULDER: ICD-10-CM

## 2024-11-08 NOTE — PROGRESS NOTES
"Chief Complaint  Follow-up of the Right Shoulder     Subjective      Aida Mcclure presents to [facility] for follow up of the right shoulder.  She had an injection 9/30/24 that helped for a week or two.  She has had pain for over 6 months.  She has pain on the top and anterior aspect of the shoulder. She has pain in the bicipital groove. She has pain with forward and upward ROM. She is on O2 and notes the pain is affecting her sleep and daily tasks.     Allergies   Allergen Reactions    Aspirin Anaphylaxis     In high doses    Salsalate Hives, Anaphylaxis, Unknown - High Severity and Shortness Of Breath        Social History     Socioeconomic History    Marital status:    Tobacco Use    Smoking status: Never     Passive exposure: Past    Smokeless tobacco: Never   Vaping Use    Vaping status: Never Used   Substance and Sexual Activity    Alcohol use: Yes     Alcohol/week: 2.0 standard drinks of alcohol     Types: 2 Drinks containing 0.5 oz of alcohol per week     Comment: One or two wine coolers a week    Drug use: Never    Sexual activity: Not Currently     Partners: Male     Birth control/protection: Tubal ligation     Comment: Tubes tied        I reviewed the patient's chief complaint, history of present illness, review of systems, past medical history, surgical history, family history, social history, medications, and allergy list.     Review of Systems     Constitutional: Denies fevers, chills, weight loss  Cardiovascular: Denies chest pain, shortness of breath  Skin: Denies rashes, acute skin changes  Neurologic: Denies headache, loss of consciousness      Vital Signs:   Ht 162.6 cm (64\")   Wt 101 kg (223 lb)   BMI 38.28 kg/m²          Physical Exam  General: Alert. No acute distress    Ortho Exam        RIGHT SHOULDER Forward flexion 120. Abduction 100. External rotation 40. Internal rotation SI joint. Positive Cross body adduction. Supraspinatus strength 4/5. " Infraspinatus Strength 5/5. Infrared subscap 5/5. Positive Emmanuel. Positive Neer. Negative Apprehension. Negative Lift off. (Negative Obriens. Sensation intact to light touch, median, radial, ulnar nerve. Positive AIN, PIN, ulnar nerve motor. Positive pulses. Positive Impingement signs. Good strength in triceps, biceps, deltoid, wrist extensors and wrist flexors. Tender to palpation to the bicipital groove, anterior aspect of the shoulder and down the arm.           Procedures        Imaging Results (Most Recent)       None             Result Review :                    Assessment and Plan     Diagnoses and all orders for this visit:    1. Right shoulder pain, unspecified chronicity (Primary)    2. Bursitis of right shoulder        Discussed the treatment plan with the patient.     Prescribed physical therapy.        Call or return if worsening symptoms.    Follow Up     4-6 weeks.  If therapy isn't helpful discuss another injection vs MRI of the right shoulder.       Patient was given instructions and counseling regarding her condition or for health maintenance advice. Please see specific information pulled into the AVS if appropriate.     Scribed for Long Elaine MD by Kell Wells MA.  11/08/24   16:13 EST      I have personally performed the services described in this document as scribed by the above individual and it is both accurate and complete. Long Elaine MD 11/08/24

## 2024-11-10 DIAGNOSIS — R79.89 ABNORMAL THYROID BLOOD TEST: ICD-10-CM

## 2024-11-10 DIAGNOSIS — R94.6 ABNORMAL RESULTS OF THYROID FUNCTION STUDIES: ICD-10-CM

## 2024-11-10 DIAGNOSIS — R79.89 HIGH SERUM PARATHYROID HORMONE (PTH): Primary | ICD-10-CM

## 2024-11-10 DIAGNOSIS — E55.9 VITAMIN D DEFICIENCY: ICD-10-CM

## 2024-11-10 LAB
QT INTERVAL: 410 MS
QTC INTERVAL: 444 MS

## 2024-11-19 ENCOUNTER — TELEPHONE (OUTPATIENT)
Dept: INTERNAL MEDICINE | Facility: CLINIC | Age: 59
End: 2024-11-19
Payer: MEDICARE

## 2024-11-19 ENCOUNTER — OFFICE VISIT (OUTPATIENT)
Dept: PULMONOLOGY | Facility: CLINIC | Age: 59
End: 2024-11-19
Payer: MEDICARE

## 2024-11-19 VITALS
HEIGHT: 64 IN | HEART RATE: 82 BPM | BODY MASS INDEX: 37.56 KG/M2 | SYSTOLIC BLOOD PRESSURE: 126 MMHG | WEIGHT: 220 LBS | DIASTOLIC BLOOD PRESSURE: 74 MMHG | RESPIRATION RATE: 16 BRPM | OXYGEN SATURATION: 97 % | TEMPERATURE: 97 F

## 2024-11-19 DIAGNOSIS — R06.09 CHRONIC DYSPNEA: ICD-10-CM

## 2024-11-19 DIAGNOSIS — J84.9 ILD (INTERSTITIAL LUNG DISEASE): ICD-10-CM

## 2024-11-19 DIAGNOSIS — E66.812 CLASS 2 OBESITY: ICD-10-CM

## 2024-11-19 DIAGNOSIS — I50.32 CHRONIC DIASTOLIC HEART FAILURE: ICD-10-CM

## 2024-11-19 DIAGNOSIS — Z51.81 THERAPEUTIC DRUG MONITORING: ICD-10-CM

## 2024-11-19 DIAGNOSIS — R91.8 LUNG NODULES: Primary | ICD-10-CM

## 2024-11-19 DIAGNOSIS — J98.4 RESTRICTIVE LUNG DISEASE: ICD-10-CM

## 2024-11-19 PROCEDURE — 1159F MED LIST DOCD IN RCRD: CPT | Performed by: INTERNAL MEDICINE

## 2024-11-19 PROCEDURE — 99215 OFFICE O/P EST HI 40 MIN: CPT | Performed by: INTERNAL MEDICINE

## 2024-11-19 PROCEDURE — 3078F DIAST BP <80 MM HG: CPT | Performed by: INTERNAL MEDICINE

## 2024-11-19 PROCEDURE — 3074F SYST BP LT 130 MM HG: CPT | Performed by: INTERNAL MEDICINE

## 2024-11-19 PROCEDURE — 1160F RVW MEDS BY RX/DR IN RCRD: CPT | Performed by: INTERNAL MEDICINE

## 2024-11-19 RX ORDER — NINTEDANIB 100 MG/1
1 CAPSULE ORAL EVERY 12 HOURS SCHEDULED
COMMUNITY

## 2024-11-19 RX ORDER — ALBUTEROL SULFATE 90 UG/1
2 INHALANT RESPIRATORY (INHALATION) EVERY 6 HOURS PRN
Qty: 18 G | Refills: 11 | Status: SHIPPED | OUTPATIENT
Start: 2024-11-19

## 2024-11-19 RX ORDER — ALBUTEROL SULFATE 90 UG/1
INHALANT RESPIRATORY (INHALATION)
COMMUNITY
Start: 2024-11-06 | End: 2024-11-19 | Stop reason: SDUPTHER

## 2024-11-19 RX ORDER — AZATHIOPRINE 50 MG/1
150 TABLET ORAL DAILY
Qty: 90 TABLET | Refills: 11 | Status: SHIPPED | OUTPATIENT
Start: 2024-11-19

## 2024-11-19 NOTE — PROGRESS NOTES
Primary Care Provider  Merline Enamorado MD     Referring Provider  No ref. provider found     Chief Complaint  Follow-up (1 month follow up), Results (CT- 10/30- PFT- 11/5), interstitial lung disease, Sleep Apnea, Shortness of Breath, and Cough (Productive- green)    Subjective    History of Presenting Illness  59-year-old female with CT ILD, diastolic dysfunction, immunosuppressed on Imuran here for follow-up.  Overall doing well.  She did stop taking her Ofev due to intractable nausea vomiting.  Annual PFTs and 6-minute walk test were performed showing stable walk distance.  Also stable restrictive lung defect compared to previous year.  She uses 2 to 3 L of oxygen with activity.  2024 chest CT was checked and personally viewed showing stable pulmonary fibrosis compared to the year prior.  She is on diuretics and sees a cardiologist.  No leg swelling or orthopnea.  Dyspnea is at baseline.  She gets short of breath walking about 500 feet.  Dyspnea is moderate severity, worse with activity relieved with rest.  She has had some increasing cough over the last months that is related to allergies and postnasal drip.  She is taking Flonase more often which is helping some.  Does not feel like her mucus is coming from deep within her chest.  She is on Imuran and has been doing well with this.  Therapeutic drug monitoring including CBC and CMP have been grossly unremarkable for this.  Patient states that she is taking Zyrtec, Singular, Astelin nasal spray, and Flonase nasal spray for seasonal allergies.  Patient is on 3 L of oxygen per minute via nasal cannula.  Patient states that she is under the care of Dr. Boone, for lupus.  Patient denies fever, chills, night sweats, swollen glands in the head and neck, unintentional weight loss, hemoptysis, purulent sputum production, dysphagia, chest pain, palpitations, chest tightness, abdominal pain, nausea, vomiting, and diarrhea.  Patient also denies any myalgias, changes in  sense of taste and/or smell, sore throat, any other coronavirus or flu-like symptoms.  Patient denies any leg swelling, orthopnea, paroxysmal nocturnal dyspnea.  Patient is able to perform activities of daily living.        Family History   Problem Relation Age of Onset    Stroke Father     Alcohol abuse Father     Arthritis Father     Hyperlipidemia Father     Parkinsonism Father     Lung cancer Mother     Diabetes Mother     Alcohol abuse Sister     Thyroid disease Sister     Anxiety disorder Daughter         Social History     Socioeconomic History    Marital status:    Tobacco Use    Smoking status: Never     Passive exposure: Past    Smokeless tobacco: Never   Vaping Use    Vaping status: Never Used   Substance and Sexual Activity    Alcohol use: Yes     Alcohol/week: 2.0 standard drinks of alcohol     Types: 2 Drinks containing 0.5 oz of alcohol per week     Comment: One or two wine coolers a week    Drug use: Never    Sexual activity: Not Currently     Partners: Male     Birth control/protection: Tubal ligation     Comment: Tubes tied        Past Medical History:   Diagnosis Date    Anemia     Annual physical exam 11/06/2017    WILL RESCHEDULE MAMMOGRAM     Breast lump     Cataract 2010 ?    Had them in both eyes have been removed.    CHF (congestive heart failure) 11/06/2017    FOLLOWS WITH CARDS. CURRENTLY DOING WELL ON LASIX, LISINOPRIL, ALDACTONE, AND PROPANOLOL    Degeneration of lumbar intervertebral disc 06/12/2012    Diverticulitis     Diverticulosis     Essential hypertension 11/06/2017    WELL CONTROLLED ON CURRENT REGIMEN     GERD (gastroesophageal reflux disease)     Hamstring injury 08/17/2015    BILATERAL HAMSTRING INJURY/PAIN     Head injury     Hernia cerebri     Hyperthyroidism     Hyperthyroidism 11/06/2017    CURRENTLY ON METHIMAZOLE, PT REPORT POSSIBLY DUE TO GRAVE'S DISEASE. WILL NEED TO OBTAIN AND REVIEW MEDICAL RECORDS FROM [facility]. WILL REFER TO ENDO     Hypoxia     Insomnia  11/06/2017    DISCUSSED SLEEP HYGIENE. PT TO TRY AVOIDING BLUE LIGHT AT NIGHT. PT ALSO TO SET ROUTINE PRIOR TO BEDTIME. WILL AVOID MEDS AT THIS TIME     Interstitial lung disease     Low back pain     Lumbosacral disc herniation 06/12/2012    LEFT L5-S1 SMALL NATURE. SHE HAS FAILED ALL CONSERVATIVE MEASURES AND DESIRES SURGERY     Lupus     SEES DR. ANDERSON    Migraines     Obesity     All my life    Pulmonary embolism     Respiratory failure with hypoxia 02/04/2021    Rheumatoid arthritis 11/06/2017    CURRENTLY CONTROLLED, BUT WITH RESIDUAL DEFORMITY. WILL FOLLOW WITH DR. ANDERSON     Seizures 06/15/1995    Gave birth to my baby daughter went home two days later went into seizures had to go back into the hospital for a week, that was the first and last one I had since that date.    Tachycardia     Total knee replacement status, right 08/17/2015        Immunization History   Administered Date(s) Administered    Arexvy (RSV, Adults 60+ yrs) 04/08/2024    COVID-19 (SANDI) 03/29/2021    COVID-19 (PFIZER) 12YRS+ (COMIRNATY) 04/08/2024, 09/16/2024    COVID-19 (PFIZER) Purple Cap Monovalent 12/16/2021    Flu Vaccine Intradermal Quad 18-64YR 01/02/2008, 11/10/2008, 11/05/2009    Flu Vaccine Quad PF >36MO 10/25/2017    Fluzone  >6mos 08/27/2012, 10/15/2013    Fluzone (or Fluarix & Flulaval for VFC) >6mos 10/25/2017, 11/18/2021, 10/12/2022, 11/29/2023    Hepatitis A 04/30/2018    Influenza Inj MDCK Preserative Free 09/16/2024    Influenza Seasonal Injectable 01/02/2008, 11/10/2008, 11/05/2009    Influenza, Unspecified 09/17/2020, 10/01/2021, 10/12/2022    Pneumococcal Conjugate 13-Valent (PCV13) 06/14/2018    Pneumococcal Polysaccharide (PPSV23) 08/26/2019    Shingrix 12/30/2022, 04/08/2024    Tdap 09/16/2024       Allergies   Allergen Reactions    Aspirin Anaphylaxis     In high doses    Salsalate Hives, Anaphylaxis, Unknown - High Severity and Shortness Of Breath          Current Outpatient Medications:     albuterol  sulfate  (90 Base) MCG/ACT inhaler, Inhale 2 puffs Every 6 (Six) Hours As Needed for Wheezing., Disp: 18 g, Rfl: 11    ascorbic acid (VITAMIN C) 250 MG tablet, Take 1 tablet by mouth Daily., Disp: , Rfl:     azaTHIOprine (IMURAN) 50 MG tablet, Take 3 tablets by mouth Daily., Disp: 90 tablet, Rfl: 11    Azelastine HCl 137 MCG/SPRAY solution, Administer 1 spray into the nostril(s) as directed by provider Daily., Disp: , Rfl:     Calcium Carbonate-Vitamin D (CALTRATE 600+D PO), , Disp: , Rfl:     cetirizine (zyrTEC) 10 MG tablet, Take 1 tablet by mouth Daily., Disp: 90 tablet, Rfl: 1    cyanocobalamin 1000 MCG/ML injection, INJECT 1 ML UNDER THE SKIN INTO THE APPROPRIATE AREA AS DIRECTED EVERY 30 (THIRTY) DAYS., Disp: 3 mL, Rfl: 3    cyclobenzaprine (FLEXERIL) 10 MG tablet, Take 1 tablet by mouth 2 (Two) Times a Day As Needed for Muscle Spasms., Disp: 60 tablet, Rfl: 3    Diclofenac Sodium (VOLTAREN) 1 % gel gel, Apply 4 g topically to the appropriate area as directed 4 (Four) Times a Day As Needed (shoulder/hip pain)., Disp: 100 g, Rfl: 3    fluticasone (FLONASE) 50 MCG/ACT nasal spray, SHAKE LIQUID AND USE 2 SPRAYS(100 MCG) IN EACH NOSTRIL EVERY DAY, Disp: 16 g, Rfl: 3    furosemide (LASIX) 40 MG tablet, TAKE 1 TABLET BY MOUTH EVERY DAY, Disp: 90 tablet, Rfl: 1    gabapentin (NEURONTIN) 300 MG capsule, Take 1 capsule by mouth 3 (Three) Times a Day for 30 days., Disp: 90 capsule, Rfl: 0    hydrOXYzine (ATARAX) 25 MG tablet, TAKE 1 TABLET BY MOUTH EVERY 8 HOURS AS NEEDED FOR ITCHING., Disp: 90 tablet, Rfl: 1    levothyroxine (SYNTHROID, LEVOTHROID) 150 MCG tablet, TAKE 1 TABLET BY MOUTH EVERY DAY, Disp: 30 tablet, Rfl: 1    magnesium oxide (MAG-OX) 400 MG tablet, Take 1 tablet by mouth Daily., Disp: 90 tablet, Rfl: 1    montelukast (SINGULAIR) 10 MG tablet, Take 1 tablet by mouth every night at bedtime., Disp: 90 tablet, Rfl: 1    Nintedanib Esylate (Ofev) 100 MG capsule, 1 capsule Every 12 (Twelve) Hours.,  "Disp: , Rfl:     O2 (OXYGEN), Inhale 3 L/min Daily., Disp: , Rfl:     ondansetron (ZOFRAN) 4 MG tablet, Take 1 tablet by mouth Every 12 (Twelve) Hours As Needed for Vomiting or Nausea., Disp: 30 tablet, Rfl: 1    pilocarpine (SALAGEN) 5 MG tablet, Take 1 tablet by mouth 3 times a day., Disp: 90 tablet, Rfl: 1    promethazine-dextromethorphan (PROMETHAZINE-DM) 6.25-15 MG/5ML syrup, Take 5 mL by mouth 4 (Four) Times a Day As Needed for Cough., Disp: 120 mL, Rfl: 0    rizatriptan (MAXALT) 10 MG tablet, Take 1 tablet by mouth As Needed (9) for up to 9 doses., Disp: 9 tablet, Rfl: 3    rOPINIRole (REQUIP) 1 MG tablet, TAKE 1 TABLET BY MOUTH EVERY DAY AT NIGHT, Disp: 90 tablet, Rfl: 1    spironolactone (ALDACTONE) 50 MG tablet, Take 1 tablet by mouth Daily., Disp: 90 tablet, Rfl: 1    traZODone (DESYREL) 50 MG tablet, Take 1 tablet by mouth Daily., Disp: , Rfl:      Objective     Physical Exam  Vital Signs:   WDWN, Alert, NAD.    HEENT:  PERRL, EOMI.  OP, nares clear  Chest:  Mildly decreased breath sounds throughout with inspiratory Velcro-like crackles at the bases. Normal work of breathing noted. Patient is able speak full sentences without difficulty.  Patient is on 3 L of oxygen per minute via nasal cannula.   CV: RRR, no MGR, pulses 2+, equal.  Abd:  Soft, NT, ND, + BS, no HSM, obese  EXT:  no clubbing, no cyanosis, no edema  Neuro:  A&Ox3, CN grossly intact, no focal deficits.  Skin: No rashes or lesions noted.    /74 (BP Location: Left arm, Patient Position: Sitting, Cuff Size: Large Adult)   Pulse 82   Temp 97 °F (36.1 °C) (Oral)   Resp 16   Ht 162.6 cm (64\")   Wt 99.8 kg (220 lb)   SpO2 97% Comment: 2 liters  BMI 37.76 kg/m²         Result Review :   Personally reviewed Kasey Desai's last office note.  Personally PFT showing stable restrictive lung defect compared to previous year.  Personally reviewed 6-minute walk test showing desaturation with submaximal exertion but stabilized on home " oxygen.  No change in walk test compared to previous year.  Personally reviewed 2024 high-resolution chest CT.  Compared to last year's images there is stable pulmonary fibrosis/interstitial lung disease.  Therapeutic Pernell CBC and CMP performed.  Creatinine at baseline.  No evidence of liver dysfunction.  No evidence of drug-induced cytopenias.      CMP          10/7/2024    22:09 10/8/2024    15:12 10/24/2024    17:54   CMP   Glucose 91  124  95    BUN 18  26  16    Creatinine 1.11  1.17  1.19    EGFR 56.7  53.2  52.1    Sodium 142  140  145    Potassium 3.9  3.8  4.2    Chloride 106  104  109    Calcium 8.8  9.6  8.8    Total Protein 7.7      Albumin 3.8  4.3  3.7    Globulin 3.9      Total Bilirubin 0.8      Alkaline Phosphatase 61      AST (SGOT) 14      ALT (SGPT) 5      Albumin/Globulin Ratio 1.0      BUN/Creatinine Ratio 16.2  22.2  13.4    Anion Gap 8.7  13.0  7.5      CBC          10/7/2024    22:09 10/8/2024    15:12 10/24/2024    17:54   CBC   WBC 9.50  11.83  7.61    RBC 4.29  4.17  4.04    Hemoglobin 12.3  12.0  11.7    Hematocrit 38.4  38.5  37.4    MCV 89.5  92.3  92.6    MCH 28.7  28.8  29.0    MCHC 32.0  31.2  31.3    RDW 14.9  13.1  13.5    Platelets 200  198  187      CBC w/diff          3/26/2024    18:29 10/7/2024    22:09 10/8/2024    15:12   CBC w/Diff   WBC 7.06  9.50  11.83    RBC 3.99  4.29  4.17    Hemoglobin 11.8  12.3  12.0    Hematocrit 36.8  38.4  38.5    MCV 92.2  89.5  92.3    MCH 29.6  28.7  28.8    MCHC 32.1  32.0  31.2    RDW 13.7  14.9  13.1    Platelets 150  200  198    Neutrophil Rel % 50.8  71.5     Immature Granulocyte Rel % 0.1  0.4     Lymphocyte Rel % 34.8  18.8     Monocyte Rel % 8.9  6.6     Eosinophil Rel % 4.1  2.0     Basophil Rel % 1.3  0.7       XR Chest 2 View    Result Date: 10/7/2024  No active disease. Electronically Signed: Kian Perez MD  10/7/2024 7:30 PM EDT  Workstation ID: [id number]          Assessment and Plan      Assessment:  1. Interstitial lung  disease secondary to mixed connective tissue disease without exacerbation.   2. Known mixed connective tissue disease.       3. Chronic compensated diastolic congestive heart failure.   4. Chronic dyspnea at baseline.       5. Immunosuppressed status on Imuran.       6. Therapeutic drug monitoring on Imuran.       7. History of PE on anticoagulation in the past.     8.  Lung nodules.  9.  Seasonal allergies.  10.  ?Lupus: Patient is under the care of Dr. Boone.  11. Never smoker.   12.  Class II obesity BMI 37.7    Plan:  Patient stopped Ofev citing intractable diarrhea.  Likely her chest CT and PFT due to showed stable disease compared to previous year.  However, I do think she would benefit from being on antifibrotic.  Will discuss with my nurse navigator about trying to get the patient set up with [facility]  Continue annual PFT and 6-minute walk test.  Next due in October 2025  Continue therapeutic drug monitor with CBC and CMP.  October labs reviewed with no significant changes.  Continue to check every 6 months  Continue current dose of Imuran 150 mg daily  Continue albuterol as needed  Continue Singulair, Zyrtec, Astelin and Flonase  Continue 2 to 3 L of oxygen to keep SpO2 greater than 90%  Continue diuretics with Lasix and Aldactone.  Volume status adequate at this time.  Has ongoing cardiology follow-up  Diet and exercise counseling provided today.  Recommended 30 minutes daily exercise well is an 1800 -calorie/day diet.  Patient verbalized understanding.  Total time counseling the patient today was 3 minutes  Vaccination status: patient reports they are up-to-date with flu, pneumonia, and Covid vaccines.    Smoking status: Never smoker.  Medications reviewed, reconciled and refilled    40 minutes of time spent managing this patient today.  This included personally reviewing labs, imaging, PFTs, documentation.  This also included time spent counseling the patient regarding her test results, interstitial  lung disease, disease progression and management.    Follow Up   Return in about 3 months (around 2/19/2025).  Patient was given instructions and counseling regarding her condition or for health maintenance advice. Please see specific information pulled into the AVS if appropriate.     Electronically signed by Saeid Dennis MD, 11/19/24, 3:32 PM EST.

## 2024-11-22 ENCOUNTER — TELEPHONE (OUTPATIENT)
Dept: INTERNAL MEDICINE | Facility: CLINIC | Age: 59
End: 2024-11-22
Payer: MEDICARE

## 2024-11-22 DIAGNOSIS — Z76.0 MEDICATION REFILL: ICD-10-CM

## 2024-11-22 DIAGNOSIS — E03.9 HYPOTHYROIDISM, UNSPECIFIED TYPE: ICD-10-CM

## 2024-11-22 RX ORDER — ROPINIROLE 1 MG/1
1 TABLET, FILM COATED ORAL
Qty: 90 TABLET | Refills: 1 | Status: SHIPPED | OUTPATIENT
Start: 2024-11-22

## 2024-11-22 RX ORDER — MONTELUKAST SODIUM 10 MG/1
10 TABLET ORAL
Qty: 90 TABLET | Refills: 1 | Status: SHIPPED | OUTPATIENT
Start: 2024-11-22

## 2024-11-22 RX ORDER — CYANOCOBALAMIN 1000 UG/ML
1000 INJECTION, SOLUTION INTRAMUSCULAR; SUBCUTANEOUS
Qty: 3 ML | Refills: 3 | Status: SHIPPED | OUTPATIENT
Start: 2024-11-22

## 2024-11-22 RX ORDER — FUROSEMIDE 40 MG/1
40 TABLET ORAL DAILY
Qty: 90 TABLET | Refills: 1 | Status: SHIPPED | OUTPATIENT
Start: 2024-11-22

## 2024-11-22 RX ORDER — HYDROXYZINE HYDROCHLORIDE 25 MG/1
25 TABLET, FILM COATED ORAL EVERY 8 HOURS PRN
Qty: 90 TABLET | Refills: 1 | Status: SHIPPED | OUTPATIENT
Start: 2024-11-22

## 2024-11-22 RX ORDER — LEVOTHYROXINE SODIUM 150 UG/1
150 TABLET ORAL DAILY
Qty: 30 TABLET | Refills: 1 | Status: SHIPPED | OUTPATIENT
Start: 2024-11-22

## 2024-11-22 RX ORDER — CYCLOBENZAPRINE HCL 10 MG
10 TABLET ORAL 2 TIMES DAILY PRN
Qty: 60 TABLET | Refills: 3 | Status: SHIPPED | OUTPATIENT
Start: 2024-11-22

## 2024-11-22 NOTE — TELEPHONE ENCOUNTER
Call pt lv to call us back    Need to verify with her which pharmacy she would be using now, we have receive a med refill request from [facility].

## 2024-12-04 LAB
BACTERIA UR QL AUTO: ABNORMAL /HPF
BILIRUB UR QL STRIP: NEGATIVE
CLARITY UR: CLEAR
COLLECT DURATION TIME UR: 24 HRS
COLOR UR: YELLOW
CREAT UR-MCNC: 92.2 MG/DL
GLUCOSE UR STRIP-MCNC: NEGATIVE MG/DL
HGB UR QL STRIP.AUTO: NEGATIVE
HYALINE CASTS UR QL AUTO: ABNORMAL /LPF
KETONES UR QL STRIP: NEGATIVE
LEUKOCYTE ESTERASE UR QL STRIP.AUTO: ABNORMAL
NITRITE UR QL STRIP: NEGATIVE
PH UR STRIP.AUTO: 5.5 [PH] (ref 5–8)
PROT 24H UR-MRATE: 1015.5 MG/24HOURS (ref 0–150)
PROT ?TM UR-MCNC: 72 MG/DL
PROT UR QL STRIP: ABNORMAL
PROT/CREAT UR: 0.78 MG/G{CREAT}
RBC # UR STRIP: ABNORMAL /HPF
REF LAB TEST METHOD: ABNORMAL
SP GR UR STRIP: 1.02 (ref 1–1.03)
SPECIMEN VOL 24H UR: 1500 ML
SQUAMOUS #/AREA URNS HPF: ABNORMAL /HPF
UROBILINOGEN UR QL STRIP: ABNORMAL
WBC # UR STRIP: ABNORMAL /HPF

## 2024-12-04 PROCEDURE — 81001 URINALYSIS AUTO W/SCOPE: CPT

## 2024-12-04 PROCEDURE — 81050 URINALYSIS VOLUME MEASURE: CPT

## 2024-12-04 PROCEDURE — 84156 ASSAY OF PROTEIN URINE: CPT

## 2024-12-04 PROCEDURE — 82570 ASSAY OF URINE CREATININE: CPT

## 2024-12-05 ENCOUNTER — TELEPHONE (OUTPATIENT)
Dept: INTERNAL MEDICINE | Facility: CLINIC | Age: 59
End: 2024-12-05
Payer: MEDICARE

## 2024-12-05 NOTE — TELEPHONE ENCOUNTER
Called and spoke with patient regarding Rx paperwork, patient does want this filled out. Placed back into provider box.

## 2024-12-08 ENCOUNTER — HOSPITAL ENCOUNTER (OUTPATIENT)
Facility: HOSPITAL | Age: 59
Setting detail: OBSERVATION
Discharge: HOME | End: 2024-12-10
Attending: EMERGENCY MEDICINE | Admitting: INTERNAL MEDICINE
Payer: COMMERCIAL

## 2024-12-08 ENCOUNTER — APPOINTMENT (OUTPATIENT)
Dept: GENERAL RADIOLOGY | Facility: HOSPITAL | Age: 59
End: 2024-12-08
Payer: COMMERCIAL

## 2024-12-08 ENCOUNTER — APPOINTMENT (OUTPATIENT)
Dept: CT IMAGING | Facility: HOSPITAL | Age: 59
End: 2024-12-08
Payer: COMMERCIAL

## 2024-12-08 DIAGNOSIS — J96.11 CHRONIC HYPOXIC RESPIRATORY FAILURE: ICD-10-CM

## 2024-12-08 DIAGNOSIS — J84.9 INTERSTITIAL LUNG DISEASE: ICD-10-CM
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
## 2024-12-08 DIAGNOSIS — S20.212A CONTUSION OF LEFT CHEST WALL, INITIAL ENCOUNTER: Primary | ICD-10-CM

## 2024-12-08 LAB
ALBUMIN SERPL-MCNC: 4.2 G/DL (ref 3.5–5.2)
ALBUMIN/GLOB SERPL: 1.1 G/DL
ALP SERPL-CCNC: 90 U/L (ref 39–117)
ALT SERPL W P-5'-P-CCNC: 9 U/L (ref 1–33)
ANION GAP SERPL CALCULATED.3IONS-SCNC: 13.1 MMOL/L (ref 5–15)
AST SERPL-CCNC: 25 U/L (ref 1–32)
BASOPHILS # BLD AUTO: 0.1 10*3/MM3 (ref 0–0.2)
BASOPHILS NFR BLD AUTO: 1.5 % (ref 0–1.5)
BILIRUB SERPL-MCNC: 0.6 MG/DL (ref 0–1.2)
BUN SERPL-MCNC: 20 MG/DL (ref 8–23)
BUN/CREAT SERPL: 12 (ref 7–25)
CALCIUM SPEC-SCNC: 9.2 MG/DL (ref 8.6–10.5)
CHLORIDE SERPL-SCNC: 104 MMOL/L (ref 98–107)
CO2 SERPL-SCNC: 24.9 MMOL/L (ref 22–29)
CREAT SERPL-MCNC: 1.66 MG/DL (ref 0.57–1)
DEPRECATED RDW RBC AUTO: 51.4 FL (ref 37–54)
EGFRCR SERPLBLD CKD-EPI 2021: 35 ML/MIN/1.73
EOSINOPHIL # BLD AUTO: 0.22 10*3/MM3 (ref 0–0.4)
EOSINOPHIL NFR BLD AUTO: 3.4 % (ref 0.3–6.2)
ERYTHROCYTE [DISTWIDTH] IN BLOOD BY AUTOMATED COUNT: 14.6 % (ref 12.3–15.4)
GEN 5 1HR TROPONIN T REFLEX: 13 NG/L
GLOBULIN UR ELPH-MCNC: 3.8 GM/DL
GLUCOSE SERPL-MCNC: 85 MG/DL (ref 65–99)
HCT VFR BLD AUTO: 44.2 % (ref 34–46.6)
HGB BLD-MCNC: 13.1 G/DL (ref 12–15.9)
HOLD SPECIMEN: NORMAL
HOLD SPECIMEN: NORMAL
IMM GRANULOCYTES # BLD AUTO: 0.06 10*3/MM3 (ref 0–0.05)
IMM GRANULOCYTES NFR BLD AUTO: 0.9 % (ref 0–0.5)
LYMPHOCYTES # BLD AUTO: 1.66 10*3/MM3 (ref 0.7–3.1)
LYMPHOCYTES NFR BLD AUTO: 25.4 % (ref 19.6–45.3)
MCH RBC QN AUTO: 28.2 PG (ref 26.6–33)
MCHC RBC AUTO-ENTMCNC: 29.6 G/DL (ref 31.5–35.7)
MCV RBC AUTO: 95.1 FL (ref 79–97)
MONOCYTES # BLD AUTO: 0.68 10*3/MM3 (ref 0.1–0.9)
MONOCYTES NFR BLD AUTO: 10.4 % (ref 5–12)
NEUTROPHILS NFR BLD AUTO: 3.81 10*3/MM3 (ref 1.7–7)
NEUTROPHILS NFR BLD AUTO: 58.4 % (ref 42.7–76)
NRBC BLD AUTO-RTO: 0 /100 WBC (ref 0–0.2)
PLATELET # BLD AUTO: 180 10*3/MM3 (ref 140–450)
PMV BLD AUTO: 11.7 FL (ref 6–12)
POTASSIUM SERPL-SCNC: 5 MMOL/L (ref 3.5–5.2)
PROT SERPL-MCNC: 8 G/DL (ref 6–8.5)
QT INTERVAL: 387 MS
QTC INTERVAL: 452 MS
RBC # BLD AUTO: 4.65 10*6/MM3 (ref 3.77–5.28)
SODIUM SERPL-SCNC: 142 MMOL/L (ref 136–145)
TROPONIN T DELTA: 1 NG/L
TROPONIN T SERPL HS-MCNC: 12 NG/L
WBC NRBC COR # BLD AUTO: 6.53 10*3/MM3 (ref 3.4–10.8)
WHOLE BLOOD HOLD COAG: NORMAL
WHOLE BLOOD HOLD SPECIMEN: NORMAL

## 2024-12-08 PROCEDURE — G0378 HOSPITAL OBSERVATION PER HR: HCPCS

## 2024-12-08 PROCEDURE — 36415 COLL VENOUS BLD VENIPUNCTURE: CPT

## 2024-12-08 PROCEDURE — 99285 EMERGENCY DEPT VISIT HI MDM: CPT

## 2024-12-08 PROCEDURE — 85025 COMPLETE CBC W/AUTO DIFF WBC: CPT

## 2024-12-08 PROCEDURE — 84484 ASSAY OF TROPONIN QUANT: CPT | Performed by: EMERGENCY MEDICINE

## 2024-12-08 PROCEDURE — 71250 CT THORAX DX C-: CPT

## 2024-12-08 PROCEDURE — 73560 X-RAY EXAM OF KNEE 1 OR 2: CPT

## 2024-12-08 PROCEDURE — 93010 ELECTROCARDIOGRAM REPORT: CPT | Performed by: INTERNAL MEDICINE

## 2024-12-08 PROCEDURE — 71045 X-RAY EXAM CHEST 1 VIEW: CPT

## 2024-12-08 PROCEDURE — 76376 3D RENDER W/INTRP POSTPROCES: CPT

## 2024-12-08 PROCEDURE — 93005 ELECTROCARDIOGRAM TRACING: CPT | Performed by: EMERGENCY MEDICINE

## 2024-12-08 PROCEDURE — 84484 ASSAY OF TROPONIN QUANT: CPT

## 2024-12-08 PROCEDURE — 80053 COMPREHEN METABOLIC PANEL: CPT

## 2024-12-08 PROCEDURE — 93005 ELECTROCARDIOGRAM TRACING: CPT

## 2024-12-08 RX ORDER — HYDROCODONE BITARTRATE AND ACETAMINOPHEN 5; 325 MG/1; MG/1
1 TABLET ORAL ONCE
Status: COMPLETED | OUTPATIENT
Start: 2024-12-08 | End: 2024-12-08

## 2024-12-08 RX ORDER — SODIUM CHLORIDE 0.9 % (FLUSH) 0.9 %
10 SYRINGE (ML) INJECTION AS NEEDED
Status: DISCONTINUED | OUTPATIENT
Start: 2024-12-08 | End: 2024-12-10 | Stop reason: HOSPADM

## 2024-12-08 RX ORDER — ONDANSETRON 2 MG/ML
4 INJECTION INTRAMUSCULAR; INTRAVENOUS ONCE
Status: COMPLETED | OUTPATIENT
Start: 2024-12-08 | End: 2024-12-09

## 2024-12-08 RX ORDER — MORPHINE SULFATE 2 MG/ML
2 INJECTION, SOLUTION INTRAMUSCULAR; INTRAVENOUS ONCE
Status: COMPLETED | OUTPATIENT
Start: 2024-12-08 | End: 2024-12-09

## 2024-12-08 RX ADMIN — HYDROCODONE BITARTRATE AND ACETAMINOPHEN 1 TABLET: 5; 325 TABLET ORAL at 21:45

## 2024-12-08 NOTE — Clinical Note
[facility] EMERGENCY DEPARTMENT  [address] [facility] [address]  Phone: [phone]    Saji Fowler accompanied Aida Mcclure to the emergency department on 12/8/2024. They may return to work on 12/09/2024.        Thank you for choosing [facility].    Natalia Thakur RN
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
## 2024-12-09 ENCOUNTER — APPOINTMENT (OUTPATIENT)
Dept: GENERAL RADIOLOGY | Facility: HOSPITAL | Age: 59
End: 2024-12-09
Payer: COMMERCIAL

## 2024-12-09 ENCOUNTER — APPOINTMENT (OUTPATIENT)
Dept: CT IMAGING | Facility: HOSPITAL | Age: 59
End: 2024-12-09
Payer: COMMERCIAL

## 2024-12-09 PROBLEM — J96.11 CHRONIC HYPOXEMIC RESPIRATORY FAILURE: Status: ACTIVE | Noted: 2021-02-04

## 2024-12-09 PROBLEM — J44.9 COPD (CHRONIC OBSTRUCTIVE PULMONARY DISEASE): Status: ACTIVE | Noted: 2024-12-09

## 2024-12-09 PROBLEM — N18.30 CHRONIC KIDNEY FAILURE, STAGE 3 (MODERATE): Status: ACTIVE | Noted: 2024-12-09

## 2024-12-09 PROBLEM — E03.9 HYPOTHYROIDISM (ACQUIRED): Status: ACTIVE | Noted: 2024-12-09

## 2024-12-09 PROBLEM — M25.50 ARTHRALGIA: Status: ACTIVE | Noted: 2024-12-09

## 2024-12-09 PROBLEM — G47.30 SLEEP APNEA: Status: ACTIVE | Noted: 2021-09-01
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
## 2024-12-09 LAB
ANION GAP SERPL CALCULATED.3IONS-SCNC: 6 MMOL/L (ref 5–15)
BUN SERPL-MCNC: 21 MG/DL (ref 8–23)
BUN/CREAT SERPL: 16.4 (ref 7–25)
CALCIUM SPEC-SCNC: 8.2 MG/DL (ref 8.6–10.5)
CHLORIDE SERPL-SCNC: 107 MMOL/L (ref 98–107)
CO2 SERPL-SCNC: 28 MMOL/L (ref 22–29)
CREAT SERPL-MCNC: 1.28 MG/DL (ref 0.57–1)
DEPRECATED RDW RBC AUTO: 50.5 FL (ref 37–54)
EGFRCR SERPLBLD CKD-EPI 2021: 47.8 ML/MIN/1.73
ERYTHROCYTE [DISTWIDTH] IN BLOOD BY AUTOMATED COUNT: 14.6 % (ref 12.3–15.4)
GLUCOSE SERPL-MCNC: 85 MG/DL (ref 65–99)
HCT VFR BLD AUTO: 36.4 % (ref 34–46.6)
HGB BLD-MCNC: 11 G/DL (ref 12–15.9)
MCH RBC QN AUTO: 28.6 PG (ref 26.6–33)
MCHC RBC AUTO-ENTMCNC: 30.2 G/DL (ref 31.5–35.7)
MCV RBC AUTO: 94.5 FL (ref 79–97)
PLATELET # BLD AUTO: 154 10*3/MM3 (ref 140–450)
PMV BLD AUTO: 11 FL (ref 6–12)
POTASSIUM SERPL-SCNC: 4.2 MMOL/L (ref 3.5–5.2)
RBC # BLD AUTO: 3.85 10*6/MM3 (ref 3.77–5.28)
SODIUM SERPL-SCNC: 141 MMOL/L (ref 136–145)
TSH SERPL DL<=0.05 MIU/L-ACNC: 7.28 UIU/ML (ref 0.27–4.2)
WBC NRBC COR # BLD AUTO: 6.56 10*3/MM3 (ref 3.4–10.8)

## 2024-12-09 PROCEDURE — 96361 HYDRATE IV INFUSION ADD-ON: CPT

## 2024-12-09 PROCEDURE — 73030 X-RAY EXAM OF SHOULDER: CPT

## 2024-12-09 PROCEDURE — 94799 UNLISTED PULMONARY SVC/PX: CPT

## 2024-12-09 PROCEDURE — 72070 X-RAY EXAM THORAC SPINE 2VWS: CPT

## 2024-12-09 PROCEDURE — 85027 COMPLETE CBC AUTOMATED: CPT

## 2024-12-09 PROCEDURE — 72125 CT NECK SPINE W/O DYE: CPT

## 2024-12-09 PROCEDURE — 96375 TX/PRO/DX INJ NEW DRUG ADDON: CPT

## 2024-12-09 PROCEDURE — 96374 THER/PROPH/DIAG INJ IV PUSH: CPT

## 2024-12-09 PROCEDURE — 94640 AIRWAY INHALATION TREATMENT: CPT

## 2024-12-09 PROCEDURE — 25810000003 SODIUM CHLORIDE 0.9 % SOLUTION

## 2024-12-09 PROCEDURE — 63710000001 AZATHIOPRINE PER 50 MG: Performed by: INTERNAL MEDICINE

## 2024-12-09 PROCEDURE — 80048 BASIC METABOLIC PNL TOTAL CA: CPT

## 2024-12-09 PROCEDURE — 25010000002 ONDANSETRON PER 1 MG: Performed by: EMERGENCY MEDICINE

## 2024-12-09 PROCEDURE — 94664 DEMO&/EVAL PT USE INHALER: CPT

## 2024-12-09 PROCEDURE — 25010000002 MORPHINE PER 10 MG: Performed by: EMERGENCY MEDICINE

## 2024-12-09 PROCEDURE — G0378 HOSPITAL OBSERVATION PER HR: HCPCS

## 2024-12-09 PROCEDURE — 94761 N-INVAS EAR/PLS OXIMETRY MLT: CPT

## 2024-12-09 PROCEDURE — 84443 ASSAY THYROID STIM HORMONE: CPT | Performed by: INTERNAL MEDICINE

## 2024-12-09 RX ORDER — HYDROXYZINE HYDROCHLORIDE 25 MG/1
25 TABLET, FILM COATED ORAL EVERY 8 HOURS PRN
Status: DISCONTINUED | OUTPATIENT
Start: 2024-12-09 | End: 2024-12-10 | Stop reason: HOSPADM

## 2024-12-09 RX ORDER — ONDANSETRON 4 MG/1
4 TABLET, ORALLY DISINTEGRATING ORAL EVERY 6 HOURS PRN
Status: DISCONTINUED | OUTPATIENT
Start: 2024-12-09 | End: 2024-12-10 | Stop reason: HOSPADM

## 2024-12-09 RX ORDER — AZATHIOPRINE 50 MG/1
150 TABLET ORAL DAILY
Status: DISCONTINUED | OUTPATIENT
Start: 2024-12-09 | End: 2024-12-10 | Stop reason: HOSPADM

## 2024-12-09 RX ORDER — SPIRONOLACTONE 25 MG/1
50 TABLET ORAL DAILY
Status: DISCONTINUED | OUTPATIENT
Start: 2024-12-09 | End: 2024-12-10 | Stop reason: HOSPADM

## 2024-12-09 RX ORDER — FLUTICASONE PROPIONATE 50 MCG
2 SPRAY, SUSPENSION (ML) NASAL DAILY
Status: DISCONTINUED | OUTPATIENT
Start: 2024-12-09 | End: 2024-12-10 | Stop reason: HOSPADM

## 2024-12-09 RX ORDER — SUMATRIPTAN 50 MG/1
50 TABLET, FILM COATED ORAL
Status: DISCONTINUED | OUTPATIENT
Start: 2024-12-09 | End: 2024-12-10 | Stop reason: HOSPADM

## 2024-12-09 RX ORDER — LEVOTHYROXINE SODIUM 75 UG/1
150 TABLET ORAL DAILY
Status: DISCONTINUED | OUTPATIENT
Start: 2024-12-09 | End: 2024-12-10 | Stop reason: HOSPADM

## 2024-12-09 RX ORDER — ROPINIROLE 1 MG/1
1 TABLET, FILM COATED ORAL NIGHTLY
Status: DISCONTINUED | OUTPATIENT
Start: 2024-12-09 | End: 2024-12-10 | Stop reason: HOSPADM

## 2024-12-09 RX ORDER — GABAPENTIN 300 MG/1
300 CAPSULE ORAL 3 TIMES DAILY
Status: DISCONTINUED | OUTPATIENT
Start: 2024-12-09 | End: 2024-12-10 | Stop reason: HOSPADM

## 2024-12-09 RX ORDER — BUDESONIDE AND FORMOTEROL FUMARATE DIHYDRATE 160; 4.5 UG/1; UG/1
2 AEROSOL RESPIRATORY (INHALATION)
Status: DISCONTINUED | OUTPATIENT
Start: 2024-12-09 | End: 2024-12-10 | Stop reason: HOSPADM

## 2024-12-09 RX ORDER — SODIUM CHLORIDE 0.9 % (FLUSH) 0.9 %
20 SYRINGE (ML) INJECTION AS NEEDED
Status: DISCONTINUED | OUTPATIENT
Start: 2024-12-08 | End: 2024-12-10 | Stop reason: HOSPADM

## 2024-12-09 RX ORDER — FUROSEMIDE 40 MG/1
40 TABLET ORAL DAILY
Status: DISCONTINUED | OUTPATIENT
Start: 2024-12-09 | End: 2024-12-10 | Stop reason: HOSPADM

## 2024-12-09 RX ORDER — ACETAMINOPHEN 500 MG
500 TABLET ORAL EVERY 6 HOURS
Status: DISCONTINUED | OUTPATIENT
Start: 2024-12-09 | End: 2024-12-10 | Stop reason: HOSPADM

## 2024-12-09 RX ORDER — CETIRIZINE HYDROCHLORIDE 10 MG/1
10 TABLET ORAL DAILY
Status: DISCONTINUED | OUTPATIENT
Start: 2024-12-09 | End: 2024-12-10 | Stop reason: HOSPADM

## 2024-12-09 RX ORDER — LIDOCAINE 4 G/G
1 PATCH TOPICAL
Status: DISCONTINUED | OUTPATIENT
Start: 2024-12-09 | End: 2024-12-10 | Stop reason: HOSPADM

## 2024-12-09 RX ORDER — SODIUM CHLORIDE 0.9 % (FLUSH) 0.9 %
10 SYRINGE (ML) INJECTION AS NEEDED
Status: DISCONTINUED | OUTPATIENT
Start: 2024-12-08 | End: 2024-12-10 | Stop reason: HOSPADM

## 2024-12-09 RX ORDER — ASCORBIC ACID 500 MG
250 TABLET ORAL DAILY
Status: DISCONTINUED | OUTPATIENT
Start: 2024-12-09 | End: 2024-12-10 | Stop reason: HOSPADM

## 2024-12-09 RX ORDER — SODIUM CHLORIDE 9 MG/ML
100 INJECTION, SOLUTION INTRAVENOUS CONTINUOUS
Status: ACTIVE | OUTPATIENT
Start: 2024-12-09 | End: 2024-12-09

## 2024-12-09 RX ORDER — BISACODYL 5 MG/1
5 TABLET, DELAYED RELEASE ORAL DAILY PRN
Status: DISCONTINUED | OUTPATIENT
Start: 2024-12-09 | End: 2024-12-10 | Stop reason: HOSPADM

## 2024-12-09 RX ORDER — HYDROCODONE BITARTRATE AND ACETAMINOPHEN 5; 325 MG/1; MG/1
1 TABLET ORAL EVERY 6 HOURS PRN
Status: COMPLETED | OUTPATIENT
Start: 2024-12-09 | End: 2024-12-09

## 2024-12-09 RX ORDER — ACETAMINOPHEN 325 MG/1
650 TABLET ORAL EVERY 4 HOURS PRN
Status: DISCONTINUED | OUTPATIENT
Start: 2024-12-09 | End: 2024-12-10 | Stop reason: HOSPADM

## 2024-12-09 RX ORDER — BISACODYL 10 MG
10 SUPPOSITORY, RECTAL RECTAL DAILY PRN
Status: DISCONTINUED | OUTPATIENT
Start: 2024-12-09 | End: 2024-12-10 | Stop reason: HOSPADM

## 2024-12-09 RX ORDER — ALBUTEROL SULFATE 90 UG/1
2 INHALANT RESPIRATORY (INHALATION) EVERY 6 HOURS PRN
Status: DISCONTINUED | OUTPATIENT
Start: 2024-12-09 | End: 2024-12-10 | Stop reason: HOSPADM

## 2024-12-09 RX ORDER — NITROGLYCERIN 0.4 MG/1
0.4 TABLET SUBLINGUAL
Status: DISCONTINUED | OUTPATIENT
Start: 2024-12-09 | End: 2024-12-10 | Stop reason: HOSPADM

## 2024-12-09 RX ORDER — CYANOCOBALAMIN 1000 UG/ML
1000 INJECTION, SOLUTION INTRAMUSCULAR; SUBCUTANEOUS
Status: DISCONTINUED | OUTPATIENT
Start: 2025-01-07 | End: 2024-12-10 | Stop reason: HOSPADM

## 2024-12-09 RX ORDER — GUAIFENESIN 600 MG/1
600 TABLET, EXTENDED RELEASE ORAL EVERY 12 HOURS SCHEDULED
Status: DISCONTINUED | OUTPATIENT
Start: 2024-12-09 | End: 2024-12-10 | Stop reason: HOSPADM

## 2024-12-09 RX ORDER — SODIUM CHLORIDE 0.9 % (FLUSH) 0.9 %
10 SYRINGE (ML) INJECTION EVERY 12 HOURS SCHEDULED
Status: DISCONTINUED | OUTPATIENT
Start: 2024-12-09 | End: 2024-12-10 | Stop reason: HOSPADM

## 2024-12-09 RX ORDER — AMOXICILLIN 250 MG
2 CAPSULE ORAL 2 TIMES DAILY PRN
Status: DISCONTINUED | OUTPATIENT
Start: 2024-12-09 | End: 2024-12-10 | Stop reason: HOSPADM

## 2024-12-09 RX ORDER — SODIUM CHLORIDE 9 MG/ML
40 INJECTION, SOLUTION INTRAVENOUS AS NEEDED
Status: DISCONTINUED | OUTPATIENT
Start: 2024-12-08 | End: 2024-12-10 | Stop reason: HOSPADM

## 2024-12-09 RX ORDER — IPRATROPIUM BROMIDE AND ALBUTEROL SULFATE 2.5; .5 MG/3ML; MG/3ML
3 SOLUTION RESPIRATORY (INHALATION)
Status: DISCONTINUED | OUTPATIENT
Start: 2024-12-09 | End: 2024-12-10 | Stop reason: HOSPADM

## 2024-12-09 RX ORDER — MONTELUKAST SODIUM 10 MG/1
10 TABLET ORAL DAILY
Status: DISCONTINUED | OUTPATIENT
Start: 2024-12-09 | End: 2024-12-10 | Stop reason: HOSPADM

## 2024-12-09 RX ORDER — POLYETHYLENE GLYCOL 3350 17 G/17G
17 POWDER, FOR SOLUTION ORAL DAILY PRN
Status: DISCONTINUED | OUTPATIENT
Start: 2024-12-09 | End: 2024-12-10 | Stop reason: HOSPADM

## 2024-12-09 RX ORDER — HEPARIN SODIUM (PORCINE) LOCK FLUSH IV SOLN 100 UNIT/ML 100 UNIT/ML
5 SOLUTION INTRAVENOUS AS NEEDED
Status: DISCONTINUED | OUTPATIENT
Start: 2024-12-08 | End: 2024-12-10 | Stop reason: HOSPADM

## 2024-12-09 RX ORDER — TRAZODONE HYDROCHLORIDE 100 MG/1
50 TABLET ORAL NIGHTLY
Status: DISCONTINUED | OUTPATIENT
Start: 2024-12-09 | End: 2024-12-10 | Stop reason: HOSPADM

## 2024-12-09 RX ORDER — PILOCARPINE HYDROCHLORIDE 5 MG/1
5 TABLET, FILM COATED ORAL 3 TIMES DAILY
Status: DISCONTINUED | OUTPATIENT
Start: 2024-12-09 | End: 2024-12-10 | Stop reason: HOSPADM

## 2024-12-09 RX ORDER — ONDANSETRON 2 MG/ML
4 INJECTION INTRAMUSCULAR; INTRAVENOUS EVERY 6 HOURS PRN
Status: DISCONTINUED | OUTPATIENT
Start: 2024-12-09 | End: 2024-12-10 | Stop reason: HOSPADM

## 2024-12-09 RX ORDER — CYCLOBENZAPRINE HCL 10 MG
10 TABLET ORAL 2 TIMES DAILY PRN
Status: DISCONTINUED | OUTPATIENT
Start: 2024-12-09 | End: 2024-12-10 | Stop reason: HOSPADM

## 2024-12-09 RX ORDER — TRAZODONE HYDROCHLORIDE 100 MG/1
50 TABLET ORAL DAILY
Status: DISCONTINUED | OUTPATIENT
Start: 2024-12-09 | End: 2024-12-09

## 2024-12-09 RX ADMIN — FUROSEMIDE 40 MG: 40 TABLET ORAL at 13:48

## 2024-12-09 RX ADMIN — MONTELUKAST SODIUM 10 MG: 10 TABLET, FILM COATED ORAL at 13:48

## 2024-12-09 RX ADMIN — LEVOTHYROXINE SODIUM 150 MCG: 0.07 TABLET ORAL at 13:48

## 2024-12-09 RX ADMIN — SODIUM CHLORIDE 100 ML/HR: 9 INJECTION, SOLUTION INTRAVENOUS at 02:19

## 2024-12-09 RX ADMIN — AZATHIOPRINE 150 MG: 50 TABLET ORAL at 13:49

## 2024-12-09 RX ADMIN — GUAIFENESIN 600 MG: 600 TABLET, MULTILAYER, EXTENDED RELEASE ORAL at 20:04

## 2024-12-09 RX ADMIN — FLUTICASONE PROPIONATE 2 SPRAY: 50 SPRAY, METERED NASAL at 16:03

## 2024-12-09 RX ADMIN — CETIRIZINE HYDROCHLORIDE 10 MG: 10 TABLET, FILM COATED ORAL at 13:49

## 2024-12-09 RX ADMIN — MORPHINE SULFATE 2 MG: 2 INJECTION, SOLUTION INTRAMUSCULAR; INTRAVENOUS at 00:42

## 2024-12-09 RX ADMIN — GABAPENTIN 300 MG: 300 CAPSULE ORAL at 20:04

## 2024-12-09 RX ADMIN — ACETAMINOPHEN 500 MG: 500 TABLET ORAL at 17:32

## 2024-12-09 RX ADMIN — IPRATROPIUM BROMIDE AND ALBUTEROL SULFATE 3 ML: 2.5; .5 SOLUTION RESPIRATORY (INHALATION) at 20:22

## 2024-12-09 RX ADMIN — HYDROCODONE BITARTRATE AND ACETAMINOPHEN 1 TABLET: 5; 325 TABLET ORAL at 02:34

## 2024-12-09 RX ADMIN — PILOCARPINE HYDRCHLORIDE 5 MG: 5 TABLET, FILM COATED ORAL at 20:04

## 2024-12-09 RX ADMIN — SPIRONOLACTONE 50 MG: 25 TABLET ORAL at 13:48

## 2024-12-09 RX ADMIN — LIDOCAINE 1 PATCH: 4 PATCH TOPICAL at 16:03

## 2024-12-09 RX ADMIN — GUAIFENESIN 600 MG: 600 TABLET, MULTILAYER, EXTENDED RELEASE ORAL at 13:48

## 2024-12-09 RX ADMIN — ROPINIROLE 1 MG: 1 TABLET, FILM COATED ORAL at 20:04

## 2024-12-09 RX ADMIN — OXYCODONE HYDROCHLORIDE AND ACETAMINOPHEN 250 MG: 500 TABLET ORAL at 13:48

## 2024-12-09 RX ADMIN — ACETAMINOPHEN 500 MG: 500 TABLET ORAL at 13:48

## 2024-12-09 RX ADMIN — PILOCARPINE HYDRCHLORIDE 5 MG: 5 TABLET, FILM COATED ORAL at 16:04

## 2024-12-09 RX ADMIN — TRAZODONE HYDROCHLORIDE 50 MG: 100 TABLET ORAL at 20:04

## 2024-12-09 RX ADMIN — GABAPENTIN 300 MG: 300 CAPSULE ORAL at 16:04

## 2024-12-09 RX ADMIN — ONDANSETRON 4 MG: 2 INJECTION, SOLUTION INTRAMUSCULAR; INTRAVENOUS at 00:42

## 2024-12-09 RX ADMIN — Medication 10 ML: at 01:56

## 2024-12-09 RX ADMIN — Medication 10 ML: at 08:09

## 2024-12-09 RX ADMIN — IPRATROPIUM BROMIDE AND ALBUTEROL SULFATE 3 ML: 2.5; .5 SOLUTION RESPIRATORY (INHALATION) at 13:40

## 2024-12-09 RX ADMIN — Medication 10 ML: at 20:04

## 2024-12-09 RX ADMIN — HYDROCODONE BITARTRATE AND ACETAMINOPHEN 1 TABLET: 5; 325 TABLET ORAL at 08:09

## 2024-12-09 NOTE — PLAN OF CARE
Problem: Adult Inpatient Plan of Care  Goal: Plan of Care Review  Outcome: Progressing  Flowsheets (Taken 12/9/2024 1552)  Progress: no change  Outcome Evaluation: Pt admitted from ED this morning. Admission complete. Wearing 3L NC with humidity. Has NS running at 100. IV port accessed. Pt safety maintained.  Plan of Care Reviewed With: patient

## 2024-12-09 NOTE — CASE MANAGEMENT/SOCIAL WORK
Continued Stay Note  [facility]     Patient Name: Aida Mcclure  MRN: [id number]  Today's Date: 12/9/2024    Admit Date: 12/8/2024    Plan: Return home   Discharge Plan       Row Name 12/09/24 1537       Plan    Plan Return home    Patient/Family in Agreement with Plan yes    Plan Comments Patient noted to be up ad luci, independent with no DME. Patient's discharge plan is to return home, no needs. CCP to follow. CD, CSW.                   Discharge Codes    No documentation.                 Expected Discharge Date and Time       Expected Discharge Date Expected Discharge Time    Dec 10, 2024

## 2024-12-09 NOTE — SIGNIFICANT NOTE
12/09/24 1500   OTHER   Discipline physical therapist   Rehab Time/Intention   Session Not Performed   (Pt up ad luci in room, making her own bed.  Reports 02 at baseline, lives in senior apt., no DC equip needs or concerns about DC to home.  No inpatient PT needs, DC PT, pt and RN aware.)

## 2024-12-09 NOTE — PLAN OF CARE
Goal Outcome Evaluation:  Plan of Care Reviewed With: patient        Progress: no change  Outcome Evaluation: Vitals stable. Pt ambulating independently. Pt maintaining O2 sat on 2L NC. CHG bath complete. Pt needs met and questions answered. Will continue to monitor
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
## 2024-12-10 ENCOUNTER — READMISSION MANAGEMENT (OUTPATIENT)
Dept: CALL CENTER | Facility: HOSPITAL | Age: 59
End: 2024-12-10
Payer: COMMERCIAL

## 2024-12-10 VITALS
OXYGEN SATURATION: 93 % | HEIGHT: 64 IN | WEIGHT: 231.92 LBS | RESPIRATION RATE: 18 BRPM | BODY MASS INDEX: 39.6 KG/M2 | SYSTOLIC BLOOD PRESSURE: 116 MMHG | TEMPERATURE: 97.9 F | HEART RATE: 98 BPM | DIASTOLIC BLOOD PRESSURE: 74 MMHG

## 2024-12-10 PROBLEM — M54.6 DORSALGIA OF THORACIC REGION: Status: ACTIVE | Noted: 2024-12-10

## 2024-12-10 PROBLEM — M54.2 CERVICALGIA: Status: ACTIVE | Noted: 2024-12-10

## 2024-12-10 LAB
ANION GAP SERPL CALCULATED.3IONS-SCNC: 9.5 MMOL/L (ref 5–15)
BASOPHILS # BLD AUTO: 0.05 10*3/MM3 (ref 0–0.2)
BASOPHILS NFR BLD AUTO: 0.8 % (ref 0–1.5)
BUN SERPL-MCNC: 24 MG/DL (ref 8–23)
BUN/CREAT SERPL: 19.8 (ref 7–25)
CALCIUM SPEC-SCNC: 8 MG/DL (ref 8.6–10.5)
CHLORIDE SERPL-SCNC: 103 MMOL/L (ref 98–107)
CO2 SERPL-SCNC: 28.5 MMOL/L (ref 22–29)
CREAT SERPL-MCNC: 1.21 MG/DL (ref 0.57–1)
DEPRECATED RDW RBC AUTO: 48 FL (ref 37–54)
EGFRCR SERPLBLD CKD-EPI 2021: 51.1 ML/MIN/1.73
EOSINOPHIL # BLD AUTO: 0.21 10*3/MM3 (ref 0–0.4)
EOSINOPHIL NFR BLD AUTO: 3.4 % (ref 0.3–6.2)
ERYTHROCYTE [DISTWIDTH] IN BLOOD BY AUTOMATED COUNT: 14.4 % (ref 12.3–15.4)
GLUCOSE SERPL-MCNC: 91 MG/DL (ref 65–99)
HCT VFR BLD AUTO: 33.3 % (ref 34–46.6)
HGB BLD-MCNC: 11 G/DL (ref 12–15.9)
IMM GRANULOCYTES # BLD AUTO: 0.02 10*3/MM3 (ref 0–0.05)
IMM GRANULOCYTES NFR BLD AUTO: 0.3 % (ref 0–0.5)
LYMPHOCYTES # BLD AUTO: 1.53 10*3/MM3 (ref 0.7–3.1)
LYMPHOCYTES NFR BLD AUTO: 24.9 % (ref 19.6–45.3)
MCH RBC QN AUTO: 30.3 PG (ref 26.6–33)
MCHC RBC AUTO-ENTMCNC: 33 G/DL (ref 31.5–35.7)
MCV RBC AUTO: 91.7 FL (ref 79–97)
MONOCYTES # BLD AUTO: 0.72 10*3/MM3 (ref 0.1–0.9)
MONOCYTES NFR BLD AUTO: 11.7 % (ref 5–12)
NEUTROPHILS NFR BLD AUTO: 3.62 10*3/MM3 (ref 1.7–7)
NEUTROPHILS NFR BLD AUTO: 58.9 % (ref 42.7–76)
NRBC BLD AUTO-RTO: 0 /100 WBC (ref 0–0.2)
PLATELET # BLD AUTO: 163 10*3/MM3 (ref 140–450)
PMV BLD AUTO: 11.5 FL (ref 6–12)
POTASSIUM SERPL-SCNC: 3.9 MMOL/L (ref 3.5–5.2)
RBC # BLD AUTO: 3.63 10*6/MM3 (ref 3.77–5.28)
SODIUM SERPL-SCNC: 141 MMOL/L (ref 136–145)
WBC NRBC COR # BLD AUTO: 6.15 10*3/MM3 (ref 3.4–10.8)

## 2024-12-10 PROCEDURE — 80048 BASIC METABOLIC PNL TOTAL CA: CPT | Performed by: INTERNAL MEDICINE

## 2024-12-10 PROCEDURE — 94799 UNLISTED PULMONARY SVC/PX: CPT

## 2024-12-10 PROCEDURE — G0378 HOSPITAL OBSERVATION PER HR: HCPCS

## 2024-12-10 PROCEDURE — 63710000001 AZATHIOPRINE PER 50 MG: Performed by: INTERNAL MEDICINE

## 2024-12-10 PROCEDURE — 25010000002 HEPARIN LOCK FLUSH PER 10 UNITS: Performed by: INTERNAL MEDICINE

## 2024-12-10 PROCEDURE — 94761 N-INVAS EAR/PLS OXIMETRY MLT: CPT

## 2024-12-10 PROCEDURE — 85025 COMPLETE CBC W/AUTO DIFF WBC: CPT | Performed by: INTERNAL MEDICINE

## 2024-12-10 PROCEDURE — 94664 DEMO&/EVAL PT USE INHALER: CPT

## 2024-12-10 RX ORDER — LIDOCAINE 4 G/G
1 PATCH TOPICAL
Qty: 30 PATCH | Refills: 0 | Status: SHIPPED | OUTPATIENT
Start: 2024-12-10

## 2024-12-10 RX ORDER — ACETAMINOPHEN 500 MG
500 TABLET ORAL EVERY 6 HOURS
Qty: 120 TABLET | Refills: 0 | Status: SHIPPED | OUTPATIENT
Start: 2024-12-10

## 2024-12-10 RX ORDER — HYDROCODONE BITARTRATE AND ACETAMINOPHEN 5; 325 MG/1; MG/1
1 TABLET ORAL EVERY 6 HOURS PRN
Qty: 12 TABLET | Refills: 0 | Status: SHIPPED | OUTPATIENT
Start: 2024-12-10

## 2024-12-10 RX ORDER — BUDESONIDE AND FORMOTEROL FUMARATE DIHYDRATE 160; 4.5 UG/1; UG/1
2 AEROSOL RESPIRATORY (INHALATION)
Qty: 10.2 G | Refills: 12 | Status: SHIPPED | OUTPATIENT
Start: 2024-12-10

## 2024-12-10 RX ORDER — GUAIFENESIN 600 MG/1
600 TABLET, EXTENDED RELEASE ORAL EVERY 12 HOURS SCHEDULED
Qty: 60 TABLET | Refills: 3 | Status: SHIPPED | OUTPATIENT
Start: 2024-12-10

## 2024-12-10 RX ADMIN — SPIRONOLACTONE 50 MG: 25 TABLET ORAL at 08:30

## 2024-12-10 RX ADMIN — CETIRIZINE HYDROCHLORIDE 10 MG: 10 TABLET, FILM COATED ORAL at 08:30

## 2024-12-10 RX ADMIN — GABAPENTIN 300 MG: 300 CAPSULE ORAL at 16:01

## 2024-12-10 RX ADMIN — FLUTICASONE PROPIONATE 2 SPRAY: 50 SPRAY, METERED NASAL at 08:34

## 2024-12-10 RX ADMIN — ACETAMINOPHEN 500 MG: 500 TABLET ORAL at 00:35

## 2024-12-10 RX ADMIN — PILOCARPINE HYDRCHLORIDE 5 MG: 5 TABLET, FILM COATED ORAL at 08:30

## 2024-12-10 RX ADMIN — GABAPENTIN 300 MG: 300 CAPSULE ORAL at 08:30

## 2024-12-10 RX ADMIN — IPRATROPIUM BROMIDE AND ALBUTEROL SULFATE 3 ML: 2.5; .5 SOLUTION RESPIRATORY (INHALATION) at 15:00

## 2024-12-10 RX ADMIN — ACETAMINOPHEN 500 MG: 500 TABLET ORAL at 06:23

## 2024-12-10 RX ADMIN — OXYCODONE HYDROCHLORIDE AND ACETAMINOPHEN 250 MG: 500 TABLET ORAL at 08:30

## 2024-12-10 RX ADMIN — HEPARIN 500 UNITS: 100 SYRINGE at 18:13

## 2024-12-10 RX ADMIN — GUAIFENESIN 600 MG: 600 TABLET, MULTILAYER, EXTENDED RELEASE ORAL at 08:30

## 2024-12-10 RX ADMIN — AZATHIOPRINE 150 MG: 50 TABLET ORAL at 08:30

## 2024-12-10 RX ADMIN — FUROSEMIDE 40 MG: 40 TABLET ORAL at 08:30

## 2024-12-10 RX ADMIN — Medication 10 ML: at 08:34

## 2024-12-10 RX ADMIN — ACETAMINOPHEN 500 MG: 500 TABLET ORAL at 11:39

## 2024-12-10 RX ADMIN — PILOCARPINE HYDRCHLORIDE 5 MG: 5 TABLET, FILM COATED ORAL at 16:02

## 2024-12-10 RX ADMIN — LIDOCAINE 1 PATCH: 4 PATCH TOPICAL at 09:31

## 2024-12-10 RX ADMIN — LEVOTHYROXINE SODIUM 150 MCG: 0.07 TABLET ORAL at 08:30

## 2024-12-10 RX ADMIN — MONTELUKAST SODIUM 10 MG: 10 TABLET, FILM COATED ORAL at 08:30

## 2024-12-10 RX ADMIN — ACETAMINOPHEN 500 MG: 500 TABLET ORAL at 17:48

## 2024-12-10 NOTE — DISCHARGE PLACEMENT REQUEST
"Aida Nuñez April (59 y.o. Female)       Date of Birth   1965    Social Security Number       Address   [address] [place] [address] [address] [place] [address]    Home Phone   [phone]    MRN   [id number]       [facility]    Marital Status                               Admission Date   12/8/24    Admission Type   Emergency    Admitting Provider   Deo Adames MD    Attending Provider   Deo Adames MD    Department, Room/Bed   [address], [address]/1       Discharge Date       Discharge Disposition   Home or Self Care    Discharge Destination                                 Attending Provider: Deo Adames MD    Allergies: Aspirin, Salsalate    Isolation: None   Infection: None   Code Status: CPR    Ht: 162.6 cm (64\")   Wt: 105 kg (231 lb 14.8 oz)    Admission Cmt: None   Principal Problem: Chest wall contusion, left, initial encounter [S20.212A]                   Active Insurance as of 12/8/2024       Primary Coverage       Payor Plan Insurance Group Employer/Plan Group      AUTO [id number]       Payor Plan Address Payor Plan Phone Number Payor Plan Fax Number Effective Dates    [address] [phone]  12/8/2024 - None Entered    [place] [id number]         Subscriber Name Subscriber Birth Date Member ID       AIDA NUÑEZ APRIL 1965 [id number]               Secondary Coverage       Payor Plan Insurance Group Employer/Plan Group    ANTHEM MEDICARE REPLACEMENT ANTHEM MED ADV PPO [id number]       Payor Plan Address Payor Plan Phone Number Payor Plan Fax Number Effective Dates    [address] [phone]  1/1/2024 - None Entered    [facility] [id number]         Subscriber Name Subscriber Birth Date Member ID       IADA NUÑEZ APRIL 1965 [id number]                     Emergency Contacts        (Rel.) Home Phone Work Phone Mobile Phone    Valencia Ariza (Sister) [phone] -- [phone]    Mustapha Nuñez (Daughter) [phone] -- " [phone]

## 2024-12-10 NOTE — PLAN OF CARE
Problem: Adult Inpatient Plan of Care  Goal: Plan of Care Review  Outcome: Progressing  Flowsheets (Taken 12/10/2024 3168)  Outcome Evaluation: Pt VS stable. Up ad luci. O2 sats maintained on 2L NC. scheduled meds given for pain. Pt needs met at this time. Pt safety maintained.  Plan of Care Reviewed With: patient

## 2024-12-10 NOTE — PLAN OF CARE
Goal Outcome Evaluation:  Plan of Care Reviewed With: patient        Progress: improving  Outcome Evaluation: Pt stable. Pt to dc home w daughter this evening at 2300

## 2024-12-11 ENCOUNTER — TRANSITIONAL CARE MANAGEMENT TELEPHONE ENCOUNTER (OUTPATIENT)
Dept: CALL CENTER | Facility: HOSPITAL | Age: 59
End: 2024-12-11
Payer: COMMERCIAL

## 2024-12-11 RX ORDER — TRAZODONE HYDROCHLORIDE 50 MG/1
50 TABLET, FILM COATED ORAL DAILY
Qty: 30 TABLET | Refills: 0 | Status: SHIPPED | OUTPATIENT
Start: 2024-12-11 | End: 2024-12-13 | Stop reason: SDUPTHER

## 2024-12-11 RX ORDER — ALBUTEROL SULFATE 90 UG/1
2 INHALANT RESPIRATORY (INHALATION) EVERY 6 HOURS PRN
Qty: 18 G | Refills: 11 | Status: SHIPPED | OUTPATIENT
Start: 2024-12-11

## 2024-12-11 NOTE — OUTREACH NOTE
Prep Survey      Flowsheet Row Responses   Religious facility patient discharged from? [place]   Is LACE score < 7 ? No   Eligibility [facility]   Date of Admission 12/08/24   Date of Discharge 12/10/24   Discharge Disposition Home or Self Care   Discharge diagnosis Chest was contusion   Does the patient have one of the following disease processes/diagnoses(primary or secondary)? Other   Does the patient have Home health ordered? No   Is there a DME ordered? Yes   What DME was ordered? [employer] for DME   Prep survey completed? Yes            MERCEDEZ A - Registered Nurse
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
## 2024-12-11 NOTE — CASE MANAGEMENT/SOCIAL WORK
Case Management Discharge Note      Final Note: Home. Has home O2@[employer]LNC through [employer] and no portable tank. Called [employer] and portable O2 tank delivered to room. Pt states she has no one to transport her home and unable to pay the $100 for cab/Lyft to [facility]. Discussed with [facility] managers and [facility] will not cover cost. Discussed with pt and her daughter will pick her up at 2300 when her daughter gets off work. No additional needs.         Selected Continued Care - Discharged on 12/10/2024 Admission date: 12/8/2024 - Discharge disposition: Home or Self Care      Destination    No services have been selected for the patient.                Durable Medical Equipment Coordination complete.      Service Provider Services Address Phone Fax Patient Preferred    [facility] [address] [place] [address] [phone] --              Dialysis/Infusion    No services have been selected for the patient.                Home Medical Care    No services have been selected for the patient.                Therapy    No services have been selected for the patient.                Community Resources    No services have been selected for the patient.                Community & DME    No services have been selected for the patient.                    Transportation Services  Private: Car    Final Discharge Disposition Code: 01 - home or self-care

## 2024-12-11 NOTE — TELEPHONE ENCOUNTER
Caller: [facility] - [place], [place] - [address] [facility] - [phone] [facility] [phone] FX    Relationship: Pharmacy    Best call back number: [phone]     Requested Prescriptions:   Requested Prescriptions     Pending Prescriptions Disp Refills    traZODone (DESYREL) 50 MG tablet       Sig: Take 1 tablet by mouth Daily.    albuterol sulfate  (90 Base) MCG/ACT inhaler 18 g 11     Sig: Inhale 2 puffs Every 6 (Six) Hours As Needed for Wheezing.        Pharmacy where request should be sent: [facility] ([place] - [place], [place] - [address] [facility] - [phone] [facility] [phone] FX     Last office visit with prescribing clinician: 10/18/2024   Last telemedicine visit with prescribing clinician: Visit date not found   Next office visit with prescribing clinician: 12/13/2024     Does the patient have less than a 3 day supply:  [] Yes  [] No    Would you like a call back once the refill request has been completed: [] Yes [] No    If the office needs to give you a call back, can they leave a voicemail: [] Yes [] No    Sarah Browning Rep   12/11/24 14:25 EST

## 2024-12-11 NOTE — OUTREACH NOTE
Call Center TCM Note      Flowsheet Row Responses   [facility] patient discharged from? [place]   Does the patient have one of the following disease processes/diagnoses(primary or secondary)? Other   TCM attempt successful? Yes   Call start time 0850   Call end time 0852   Discharge diagnosis Chest was contusion   Meds reviewed with patient/caregiver? Yes   Is the patient having any side effects they believe may be caused by any medication additions or changes? No   Does the patient have all medications ordered at discharge? Yes   Is the patient taking all medications as directed (includes completed medication regime)? Yes   Comments PCP appt listed for 12/13/24 3 pm. Please note this appt does not state HOSP DC FU appt but does meet tcm guidelines.   Does the patient have an appointment with their PCP within 7-14 days of discharge? Yes   Psychosocial issues? No   Did the patient receive a copy of their discharge instructions? Yes   Nursing interventions Reviewed instructions with patient   What is the patient's perception of their health status since discharge? Same   Is the patient/caregiver able to teach back signs and symptoms related to disease process for when to call PCP? Yes   Is the patient/caregiver able to teach back signs and symptoms related to disease process for when to call 911? Yes   Is the patient/caregiver able to teach back the hierarchy of who to call/visit for symptoms/problems? PCP, Specialist, Home health nurse, Urgent Care, ED, 911 Yes   TCM call completed? Yes   Wrap up additional comments Pt reports she is still really sore.   Call end time 0852            Becca Gomez RN    12/11/2024, 08:53 EST

## 2024-12-12 ENCOUNTER — TELEPHONE (OUTPATIENT)
Dept: INTERNAL MEDICINE | Facility: CLINIC | Age: 59
End: 2024-12-12
Payer: COMMERCIAL

## 2024-12-13 ENCOUNTER — OFFICE VISIT (OUTPATIENT)
Dept: INTERNAL MEDICINE | Facility: CLINIC | Age: 59
End: 2024-12-13
Payer: MEDICARE

## 2024-12-13 VITALS
RESPIRATION RATE: 18 BRPM | WEIGHT: 224 LBS | DIASTOLIC BLOOD PRESSURE: 82 MMHG | BODY MASS INDEX: 38.45 KG/M2 | SYSTOLIC BLOOD PRESSURE: 124 MMHG | TEMPERATURE: 96.6 F | OXYGEN SATURATION: 99 % | HEART RATE: 70 BPM

## 2024-12-13 DIAGNOSIS — S20.212A CHEST WALL CONTUSION, LEFT, INITIAL ENCOUNTER: ICD-10-CM
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
## 2024-12-13 DIAGNOSIS — Z76.0 MEDICATION REFILL: ICD-10-CM

## 2024-12-13 DIAGNOSIS — Z00.00 MEDICARE ANNUAL WELLNESS VISIT, SUBSEQUENT: Primary | ICD-10-CM

## 2024-12-13 DIAGNOSIS — R79.89 ABNORMAL THYROID BLOOD TEST: ICD-10-CM

## 2024-12-13 PROCEDURE — G0439 PPPS, SUBSEQ VISIT: HCPCS | Performed by: STUDENT IN AN ORGANIZED HEALTH CARE EDUCATION/TRAINING PROGRAM

## 2024-12-13 PROCEDURE — 3079F DIAST BP 80-89 MM HG: CPT | Performed by: STUDENT IN AN ORGANIZED HEALTH CARE EDUCATION/TRAINING PROGRAM

## 2024-12-13 PROCEDURE — 3074F SYST BP LT 130 MM HG: CPT | Performed by: STUDENT IN AN ORGANIZED HEALTH CARE EDUCATION/TRAINING PROGRAM

## 2024-12-13 PROCEDURE — 99214 OFFICE O/P EST MOD 30 MIN: CPT | Performed by: STUDENT IN AN ORGANIZED HEALTH CARE EDUCATION/TRAINING PROGRAM

## 2024-12-13 PROCEDURE — 1125F AMNT PAIN NOTED PAIN PRSNT: CPT | Performed by: STUDENT IN AN ORGANIZED HEALTH CARE EDUCATION/TRAINING PROGRAM

## 2024-12-13 RX ORDER — TRAZODONE HYDROCHLORIDE 50 MG/1
50 TABLET, FILM COATED ORAL DAILY
Qty: 30 TABLET | Refills: 3 | Status: SHIPPED | OUTPATIENT
Start: 2024-12-13

## 2024-12-13 RX ORDER — DEXTROMETHORPHAN HYDROBROMIDE AND PROMETHAZINE HYDROCHLORIDE 15; 6.25 MG/5ML; MG/5ML
5 SYRUP ORAL 4 TIMES DAILY PRN
Qty: 120 ML | Refills: 0 | Status: SHIPPED | OUTPATIENT
Start: 2024-12-13

## 2024-12-13 RX ORDER — SPIRONOLACTONE 50 MG/1
50 TABLET, FILM COATED ORAL DAILY
Qty: 90 TABLET | Refills: 1 | Status: SHIPPED | OUTPATIENT
Start: 2024-12-13

## 2024-12-13 NOTE — ASSESSMENT & PLAN NOTE
Orders:    traZODone (DESYREL) 50 MG tablet; Take 1 tablet by mouth Daily.    Diclofenac Sodium (VOLTAREN) 1 % gel gel; Apply 4 g topically to the appropriate area as directed 4 (Four) Times a Day As Needed (shoulder/hip pain).    spironolactone (ALDACTONE) 50 MG tablet; Take 1 tablet by mouth Daily.    promethazine-dextromethorphan (PROMETHAZINE-DM) 6.25-15 MG/5ML syrup; Take 5 mL by mouth 4 (Four) Times a Day As Needed for Cough.
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
## 2024-12-18 ENCOUNTER — READMISSION MANAGEMENT (OUTPATIENT)
Dept: CALL CENTER | Facility: HOSPITAL | Age: 59
End: 2024-12-18
Payer: COMMERCIAL

## 2024-12-18 NOTE — OUTREACH NOTE
Medical Week 2 Survey      Flowsheet Row Responses   [facility] patient discharged from? [place]   Does the patient have one of the following disease processes/diagnoses(primary or secondary)? Other   Week 2 attempt successful? No   Unsuccessful attempts Attempt 1            Mariam PINZON - Licensed Nurse

## 2024-12-20 DIAGNOSIS — E03.9 HYPOTHYROIDISM, UNSPECIFIED TYPE: ICD-10-CM

## 2024-12-20 RX ORDER — LEVOTHYROXINE SODIUM 150 UG/1
150 TABLET ORAL DAILY
Qty: 30 TABLET | Refills: 1 | Status: SHIPPED | OUTPATIENT
Start: 2024-12-20

## 2024-12-30 DIAGNOSIS — R79.89 ABNORMAL THYROID BLOOD TEST: Primary | ICD-10-CM

## 2024-12-31 DIAGNOSIS — J84.9 ILD (INTERSTITIAL LUNG DISEASE): ICD-10-CM

## 2024-12-31 DIAGNOSIS — J84.10 PULMONARY FIBROSIS: Primary | ICD-10-CM

## 2024-12-31 RX ORDER — PIRFENIDONE 267 MG/1
267 CAPSULE ORAL
Qty: 90 CAPSULE | Refills: 3 | Status: SHIPPED | OUTPATIENT
Start: 2024-12-31

## 2025-01-02 ENCOUNTER — READMISSION MANAGEMENT (OUTPATIENT)
Dept: CALL CENTER | Facility: HOSPITAL | Age: 60
End: 2025-01-02
Payer: COMMERCIAL

## 2025-01-02 NOTE — OUTREACH NOTE
Medical Week 3 Survey      Flowsheet Row Responses   [facility] patient discharged from? [place]   Does the patient have one of the following disease processes/diagnoses(primary or secondary)? Other   Week 3 attempt successful? No   Unsuccessful attempts Attempt 1            WENDY DE LEON - Registered Nurse

## 2025-01-06 DIAGNOSIS — Z76.0 MEDICATION REFILL: ICD-10-CM

## 2025-01-07 ENCOUNTER — READMISSION MANAGEMENT (OUTPATIENT)
Dept: CALL CENTER | Facility: HOSPITAL | Age: 60
End: 2025-01-07
Payer: COMMERCIAL

## 2025-01-07 RX ORDER — HYDROXYZINE HYDROCHLORIDE 25 MG/1
25 TABLET, FILM COATED ORAL EVERY 8 HOURS PRN
Qty: 90 TABLET | Refills: 1 | Status: SHIPPED | OUTPATIENT
Start: 2025-01-07

## 2025-01-07 NOTE — OUTREACH NOTE
Medical Week 3 Survey      Flowsheet Row Responses   [facility] patient discharged from? [place]   Does the patient have one of the following disease processes/diagnoses(primary or secondary)? Other   Week 3 attempt successful? No   Unsuccessful attempts Attempt 2            Mariam PINZON - Licensed Nurse

## 2025-01-23 DIAGNOSIS — J84.9 ILD (INTERSTITIAL LUNG DISEASE): ICD-10-CM

## 2025-01-23 DIAGNOSIS — J84.10 PULMONARY FIBROSIS: ICD-10-CM

## 2025-01-24 RX ORDER — PIRFENIDONE 267 MG/1
CAPSULE ORAL
Qty: 21 CAPSULE | Refills: 0 | OUTPATIENT
Start: 2025-01-24

## 2025-01-29 DIAGNOSIS — Z76.0 MEDICATION REFILL: ICD-10-CM

## 2025-01-29 DIAGNOSIS — E03.9 HYPOTHYROIDISM, UNSPECIFIED TYPE: ICD-10-CM

## 2025-01-29 RX ORDER — MONTELUKAST SODIUM 10 MG/1
10 TABLET ORAL
Qty: 90 TABLET | Refills: 1 | Status: SHIPPED | OUTPATIENT
Start: 2025-01-29

## 2025-01-29 RX ORDER — LEVOTHYROXINE SODIUM 150 UG/1
150 TABLET ORAL DAILY
Qty: 30 TABLET | Refills: 1 | Status: SHIPPED | OUTPATIENT
Start: 2025-01-29

## 2025-01-29 RX ORDER — ROPINIROLE 1 MG/1
1 TABLET, FILM COATED ORAL
Qty: 90 TABLET | Refills: 1 | Status: SHIPPED | OUTPATIENT
Start: 2025-01-29

## 2025-01-29 NOTE — TELEPHONE ENCOUNTER
[place]er: [facility] [place] - [place], [place] - [address] [facility] - [phone] [facility] [phone] FX    Relationship: Pharmacy    Best call back number: [phone]     Requested Prescriptions:   Requested Prescriptions     Pending Prescriptions Disp Refills    rOPINIRole (REQUIP) 1 MG tablet 90 tablet 1     Sig: Take 1 tablet by mouth every night at bedtime.    levothyroxine (SYNTHROID, LEVOTHROID) 150 MCG tablet 30 tablet 1     Sig: Take 1 tablet by mouth Daily.    montelukast (SINGULAIR) 10 MG tablet 90 tablet 1     Sig: Take 1 tablet by mouth every night at bedtime.        Pharmacy where request should be sent: [facility] - [place] - [address] [facility] - [phone] [facility] [phone] FX     Last office visit with prescribing clinician: 12/13/2024   Last telemedicine visit with prescribing clinician: Visit date not found   Next office visit with prescribing clinician: 2/11/2025     Additional details provided by patient: PATIENT IS ALMOST OUT OF MEDICATION    Does the patient have less than a 3 day supply:  [] Yes  [x] No    Would you like a call back once the refill request has been completed: [] Yes [] No    If the office needs to give you a call back, can they leave a voicemail: [] Yes [] No    Sarah Verduzco Rep   01/29/25 09:05 EST

## 2025-02-10 ENCOUNTER — LAB (OUTPATIENT)
Facility: HOSPITAL | Age: 60
End: 2025-02-10
Payer: MEDICARE

## 2025-02-10 DIAGNOSIS — R94.6 ABNORMAL RESULTS OF THYROID FUNCTION STUDIES: ICD-10-CM

## 2025-02-10 DIAGNOSIS — E55.9 VITAMIN D DEFICIENCY: ICD-10-CM

## 2025-02-10 DIAGNOSIS — R79.89 HIGH SERUM PARATHYROID HORMONE (PTH): ICD-10-CM

## 2025-02-10 DIAGNOSIS — R79.89 ABNORMAL THYROID BLOOD TEST: ICD-10-CM

## 2025-02-10 LAB
25(OH)D3 SERPL-MCNC: 23.4 NG/ML (ref 30–100)
CA-I SERPL ISE-MCNC: 1.22 MMOL/L (ref 1.15–1.35)
PTH-INTACT SERPL-MCNC: 43.9 PG/ML (ref 15–65)
T3FREE SERPL-MCNC: 2.03 PG/ML (ref 2–4.4)
T4 FREE SERPL-MCNC: 1.66 NG/DL (ref 0.92–1.68)
TSH SERPL DL<=0.05 MIU/L-ACNC: 30.7 UIU/ML (ref 0.27–4.2)

## 2025-02-10 PROCEDURE — 36415 COLL VENOUS BLD VENIPUNCTURE: CPT

## 2025-02-10 PROCEDURE — 80053 COMPREHEN METABOLIC PANEL: CPT | Performed by: STUDENT IN AN ORGANIZED HEALTH CARE EDUCATION/TRAINING PROGRAM

## 2025-02-10 PROCEDURE — 84443 ASSAY THYROID STIM HORMONE: CPT

## 2025-02-10 PROCEDURE — 83970 ASSAY OF PARATHORMONE: CPT

## 2025-02-10 PROCEDURE — 82306 VITAMIN D 25 HYDROXY: CPT

## 2025-02-10 PROCEDURE — 84481 FREE ASSAY (FT-3): CPT

## 2025-02-10 PROCEDURE — 84439 ASSAY OF FREE THYROXINE: CPT

## 2025-02-10 PROCEDURE — 80061 LIPID PANEL: CPT | Performed by: STUDENT IN AN ORGANIZED HEALTH CARE EDUCATION/TRAINING PROGRAM

## 2025-02-10 PROCEDURE — 82330 ASSAY OF CALCIUM: CPT

## 2025-02-11 ENCOUNTER — OFFICE VISIT (OUTPATIENT)
Dept: INTERNAL MEDICINE | Facility: CLINIC | Age: 60
End: 2025-02-11
Payer: MEDICARE

## 2025-02-11 VITALS
OXYGEN SATURATION: 98 % | BODY MASS INDEX: 38.24 KG/M2 | HEART RATE: 89 BPM | HEIGHT: 64 IN | DIASTOLIC BLOOD PRESSURE: 82 MMHG | TEMPERATURE: 97.3 F | RESPIRATION RATE: 18 BRPM | SYSTOLIC BLOOD PRESSURE: 130 MMHG | WEIGHT: 224 LBS

## 2025-02-11 DIAGNOSIS — N18.31 STAGE 3A CHRONIC KIDNEY DISEASE: ICD-10-CM

## 2025-02-11 DIAGNOSIS — Z76.0 MEDICATION REFILL: ICD-10-CM

## 2025-02-11 DIAGNOSIS — M54.2 NECK PAIN ON LEFT SIDE: ICD-10-CM

## 2025-02-11 DIAGNOSIS — K11.7 XEROSTOMIA: Primary | ICD-10-CM

## 2025-02-11 DIAGNOSIS — E55.9 VITAMIN D DEFICIENCY: ICD-10-CM

## 2025-02-11 DIAGNOSIS — R79.89 ELEVATED TSH: ICD-10-CM

## 2025-02-11 DIAGNOSIS — E03.9 HYPOTHYROIDISM, UNSPECIFIED TYPE: ICD-10-CM

## 2025-02-11 LAB
ALBUMIN SERPL-MCNC: 4.2 G/DL (ref 3.5–5.2)
ALBUMIN/GLOB SERPL: 1.3 G/DL
ALP SERPL-CCNC: 102 U/L (ref 39–117)
ALT SERPL W P-5'-P-CCNC: 6 U/L (ref 1–33)
ANION GAP SERPL CALCULATED.3IONS-SCNC: 14.9 MMOL/L (ref 5–15)
AST SERPL-CCNC: 17 U/L (ref 1–32)
BILIRUB SERPL-MCNC: 0.4 MG/DL (ref 0–1.2)
BUN SERPL-MCNC: 15 MG/DL (ref 8–23)
BUN/CREAT SERPL: 12 (ref 7–25)
CALCIUM SPEC-SCNC: 8.9 MG/DL (ref 8.6–10.5)
CHLORIDE SERPL-SCNC: 104 MMOL/L (ref 98–107)
CHOLEST SERPL-MCNC: 220 MG/DL (ref 0–200)
CO2 SERPL-SCNC: 24.1 MMOL/L (ref 22–29)
CREAT SERPL-MCNC: 1.25 MG/DL (ref 0.57–1)
EGFRCR SERPLBLD CKD-EPI 2021: 49.1 ML/MIN/1.73
GLOBULIN UR ELPH-MCNC: 3.3 GM/DL
GLUCOSE SERPL-MCNC: 83 MG/DL (ref 65–99)
HDLC SERPL-MCNC: 59 MG/DL (ref 40–60)
LDLC SERPL CALC-MCNC: 146 MG/DL (ref 0–100)
LDLC/HDLC SERPL: 2.43 {RATIO}
POTASSIUM SERPL-SCNC: 4.5 MMOL/L (ref 3.5–5.2)
PROT SERPL-MCNC: 7.5 G/DL (ref 6–8.5)
SODIUM SERPL-SCNC: 143 MMOL/L (ref 136–145)
TRIGL SERPL-MCNC: 87 MG/DL (ref 0–150)
VLDLC SERPL-MCNC: 15 MG/DL (ref 5–40)

## 2025-02-11 PROCEDURE — 3075F SYST BP GE 130 - 139MM HG: CPT | Performed by: STUDENT IN AN ORGANIZED HEALTH CARE EDUCATION/TRAINING PROGRAM

## 2025-02-11 PROCEDURE — 99214 OFFICE O/P EST MOD 30 MIN: CPT | Performed by: STUDENT IN AN ORGANIZED HEALTH CARE EDUCATION/TRAINING PROGRAM

## 2025-02-11 PROCEDURE — 3079F DIAST BP 80-89 MM HG: CPT | Performed by: STUDENT IN AN ORGANIZED HEALTH CARE EDUCATION/TRAINING PROGRAM

## 2025-02-11 PROCEDURE — 1125F AMNT PAIN NOTED PAIN PRSNT: CPT | Performed by: STUDENT IN AN ORGANIZED HEALTH CARE EDUCATION/TRAINING PROGRAM

## 2025-02-11 RX ORDER — LEVOTHYROXINE SODIUM 150 UG/1
150 TABLET ORAL DAILY
Qty: 30 TABLET | Refills: 1 | Status: SHIPPED | OUTPATIENT
Start: 2025-02-11

## 2025-02-11 RX ORDER — PILOCARPINE HYDROCHLORIDE 5 MG/1
5 TABLET, FILM COATED ORAL 3 TIMES DAILY
Qty: 90 TABLET | Refills: 1 | Status: SHIPPED | OUTPATIENT
Start: 2025-02-11

## 2025-02-11 RX ORDER — LIDOCAINE 4 G/G
1 PATCH TOPICAL
Qty: 30 PATCH | Refills: 0 | Status: SHIPPED | OUTPATIENT
Start: 2025-02-11
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
## 2025-02-13 DIAGNOSIS — E78.00 HYPERCHOLESTEROLEMIA: Primary | ICD-10-CM

## 2025-02-18 ENCOUNTER — OFFICE VISIT (OUTPATIENT)
Dept: PULMONOLOGY | Facility: CLINIC | Age: 60
End: 2025-02-18
Payer: MEDICARE

## 2025-02-18 VITALS
HEIGHT: 64 IN | HEART RATE: 88 BPM | RESPIRATION RATE: 20 BRPM | DIASTOLIC BLOOD PRESSURE: 96 MMHG | BODY MASS INDEX: 38.79 KG/M2 | TEMPERATURE: 97.9 F | OXYGEN SATURATION: 96 % | SYSTOLIC BLOOD PRESSURE: 155 MMHG | WEIGHT: 227.2 LBS

## 2025-02-18 DIAGNOSIS — J84.9 ILD (INTERSTITIAL LUNG DISEASE): ICD-10-CM

## 2025-02-18 DIAGNOSIS — D84.9 IMMUNOSUPPRESSED STATUS: ICD-10-CM

## 2025-02-18 DIAGNOSIS — Z51.81 THERAPEUTIC DRUG MONITORING: ICD-10-CM

## 2025-02-18 DIAGNOSIS — J30.2 SEASONAL ALLERGIES: Primary | ICD-10-CM

## 2025-02-18 DIAGNOSIS — J84.10 PULMONARY FIBROSIS: ICD-10-CM

## 2025-02-18 DIAGNOSIS — I50.32 CHRONIC DIASTOLIC CHF (CONGESTIVE HEART FAILURE): ICD-10-CM

## 2025-02-18 DIAGNOSIS — Z86.711 PERSONAL HISTORY OF PE (PULMONARY EMBOLISM): ICD-10-CM

## 2025-02-18 DIAGNOSIS — R06.09 CHRONIC DYSPNEA: ICD-10-CM

## 2025-02-18 DIAGNOSIS — M35.1 MIXED CONNECTIVE TISSUE DISEASE: ICD-10-CM

## 2025-02-18 RX ORDER — PIRFENIDONE 267 MG/1
267 CAPSULE ORAL
Qty: 90 CAPSULE | Refills: 5 | Status: SHIPPED | OUTPATIENT
Start: 2025-02-18

## 2025-02-18 NOTE — PATIENT INSTRUCTIONS
Pulmonary Fibrosis    Pulmonary fibrosis is a type of lung disease that causes scarring. Over time, the scar tissue builds up in the air sacs of your lungs (alveoli). This makes it hard for you to breathe because less oxygen gets into your bloodstream.  Scarring from pulmonary fibrosis is permanent and may lead to other serious health problems.  What are the causes?  There are many different causes of pulmonary fibrosis. In some cases, the cause is not known. This is called idiopathic pulmonary fibrosis. Other causes include:  Exposure to chemicals and substances found in agricultural, farm, construction, or factory work. These include mold, asbestos, silica, metal dusts, and toxic fumes.  Sarcoidosis. In this disease, areas of inflammatory cells (granulomas) form and most often affect the lungs.  Autoimmune diseases. These include diseases such as rheumatoid arthritis, systemic sclerosis, or connective tissue disease.  Taking certain medicines. These include drugs used in radiation therapy or used to treat seizures, heart problems, and some infections.  What increases the risk?  You are more likely to develop this condition if:  You have a family history of the disease.  You are an older person. The condition is more common in older adults.  You have a history of smoking.  You have a job that exposes you to certain chemicals.  You have gastroesophageal reflux disease (GERD).  What are the signs or symptoms?  Symptoms of this condition include:  Difficulty breathing that gets worse with activity.  Shortness of breath (dyspnea).  Dry, hacking cough.  Rapid, shallow breathing during exercise or while at rest.  Other symptoms may include:  Loss of appetite or weight loss  Tiredness (fatigue) or weakness.  Bluish skin and lips.  Rounded and enlarged fingertips (clubbing).  How is this diagnosed?  This condition may be diagnosed based on:  Your symptoms and medical history.  A physical exam.  You may also have tests,  including:  A test that involves looking inside your lungs with an instrument (bronchoscopy).  Imaging studies of your lungs and heart.  Tests to measure how well you are breathing (pulmonary function tests).  Blood tests.  Tests to see how well your lungs work while you are walking (pulmonary stress test).  A procedure to remove a lung tissue sample to look at it under a microscope (biopsy).  How is this treated?  There is no cure for pulmonary fibrosis. Treatment focuses on managing symptoms and preventing scarring from getting worse. This may include:  Medicines, such as:  Steroids to prevent permanent lung changes.  Medicines to suppress your body's defense system (immune system).  Medicines to help with lung function by reducing inflammation or scarring.  Ongoing monitoring with X-rays and lab work.  Oxygen therapy.  Pulmonary rehabilitation.  Surgery. In some cases, a lung transplant is possible.  Follow these instructions at home:    Medicines  Take over-the-counter and prescription medicines only as told by your health care provider.  Keep your vaccinations up to date as recommended by your health care provider.  Activity  Get regular exercise, but do not pick activities that are too strenuous for you. Ask your health care provider what activities are safe for you.  If you have physical limitations, you may get exercise by walking, using a stationary bike, or doing chair exercises.  Ask your health care provider about using oxygen while exercising.  Do breathing exercises as told by your health care provider.  Plan rest periods when you get tired.  General instructions  Do not use any products that contain nicotine or tobacco. These products include cigarettes, chewing tobacco, and vaping devices, such as e-cigarettes. If you need help quitting, ask your health care provider.  If you are exposed to chemicals and substances at work, make sure that you wear a mask or respirator at all times.  Learn to manage  stress. If you need help to do this, ask your health care provider.  Join a pulmonary rehabilitation program or a support group for people with pulmonary fibrosis.  Eat small meals often so you do not get too full. Overeating can make breathing trouble worse.  Maintain a healthy weight. Lose weight if you need to.  Keep all follow-up visits. This is important.  Where to find more information  American Lung Association: www.lung.org  National Heart, Lung, and Blood [place]: www.nhlbi.nih.gov  Pulmonary Fibrosis Foundation: pulmonaryfibrosis.org  Contact a health care provider if:  You have symptoms that do not get better with medicines.  You are not able to be as active as usual.  You have trouble taking a deep breath.  You have a fever or chills.  You have blue lips or skin.  You have a lot of headaches.  You cough up mucus that is dark in color.  You have feelings of depression or sadness.  You are unable to sleep because it is hard to breathe.  Get help right away if:  Your symptoms suddenly worsen.  You have chest pain.  You cough up blood.  You get very confused or sleepy.  These symptoms may be an emergency. Get help right away. Call 911.  Do not wait to see if the symptoms will go away.  Do not drive yourself to the hospital.  Summary  Pulmonary fibrosis is a type of lung disease that causes scar tissue to build up in the air sacs of your lungs (alveoli) over time. This makes it hard for you to breathe because less oxygen gets into your bloodstream.  Scarring from pulmonary fibrosis is permanent and may lead to other serious health problems.  You are more likely to develop this condition if you have a family history of the condition or a job that exposes you to certain chemicals.  There is no cure for pulmonary fibrosis. Treatment focuses on managing symptoms and preventing scarring from getting worse.  This information is not intended to replace advice given to you by your health care provider. Make sure  you discuss any questions you have with your health care provider.  Document Revised: 08/09/2022 Document Reviewed: 08/09/2022  Elsevier Patient Education © 2024 [employer]

## 2025-02-18 NOTE — PROGRESS NOTES
Primary Care Provider  Merline Enamorado MD     Referring Provider  No ref. provider found     Chief Complaint  Shortness of Breath, Wheezing, Cough, Follow-up (3 month. ), and ILD (interstitial lung disease)    Subjective          History of Presenting Illness  Patient is a 59-year-old female, patient of Dr. Flores who presents for management of connective tissue disease ILD, immunosuppressed on Imuran, and diastolic dysfunction who presents for a follow-up visit today.  Patient stopped taking Ofev due to intractable nausea and vomiting.  Patient was started on Esbriet.  Patient states that she is taking aspirin and is tolerating medication well.  Patient states that she does get short of breath that is worse with exertion, moderate severity, and improved with rest. Patient states that she does have a dry cough.   Patient states that she is taking Symbicort and uses albuterol inhaler as needed.   Patient states that she is taking Zyrtec, Singular, Astelin nasal spray, and Flonase nasal spray for seasonal allergies.  Patient is on 3 L of oxygen per minute via nasal cannula.  Patient states that she is under the care of Dr. Boone, for lupus.    Patient is on diuretics and sees a cardiologist. Patient denies fever, chills, night sweats, swollen glands in the head and neck, unintentional weight loss, hemoptysis, purulent sputum production, dysphagia, chest pain, palpitations, chest tightness, abdominal pain, nausea, vomiting, and diarrhea.  Patient also denies any myalgias, changes in sense of taste and/or smell, sore throat, any other coronavirus or flu-like symptoms.  Patient denies any leg swelling, orthopnea, paroxysmal nocturnal dyspnea.  Patient is able to perform activities of daily living.        Review of Systems     Family History   Problem Relation Age of Onset    Stroke Father     Alcohol abuse Father     Arthritis Father     Hyperlipidemia Father     Parkinsonism Father     Lung cancer Mother      Diabetes Mother     Alcohol abuse Sister     Thyroid disease Sister     Anxiety disorder Daughter         Social History     Socioeconomic History    Marital status:    Tobacco Use    Smoking status: Never     Passive exposure: Past    Smokeless tobacco: Never   Vaping Use    Vaping status: Never Used   Substance and Sexual Activity    Alcohol use: Yes     Alcohol/week: 2.0 standard drinks of alcohol     Types: 2 Drinks containing 0.5 oz of alcohol per week     Comment: One or two wine coolers a week    Drug use: Never    Sexual activity: Not Currently     Partners: Male     Birth control/protection: Tubal ligation     Comment: Tubes tied        Past Medical History:   Diagnosis Date    Anemia     Annual physical exam 11/06/2017    WILL RESCHEDULE MAMMOGRAM     Breast lump     Cataract 2010 ?    Had them in both eyes have been removed.    CHF (congestive heart failure) 11/06/2017    FOLLOWS WITH CARDS. CURRENTLY DOING WELL ON LASIX, LISINOPRIL, ALDACTONE, AND PROPANOLOL    Degeneration of lumbar intervertebral disc 06/12/2012    Diverticulitis     Diverticulosis     Essential hypertension 11/06/2017    WELL CONTROLLED ON CURRENT REGIMEN     GERD (gastroesophageal reflux disease)     Hamstring injury 08/17/2015    BILATERAL HAMSTRING INJURY/PAIN     Head injury     Hernia cerebri     Hyperthyroidism     Hyperthyroidism 11/06/2017    CURRENTLY ON METHIMAZOLE, PT REPORT POSSIBLY DUE TO GRAVE'S DISEASE. WILL NEED TO OBTAIN AND REVIEW MEDICAL RECORDS FROM [facility]. WILL REFER TO ENDO     Hypoxia     Insomnia 11/06/2017    DISCUSSED SLEEP HYGIENE. PT TO TRY AVOIDING BLUE LIGHT AT NIGHT. PT ALSO TO SET ROUTINE PRIOR TO BEDTIME. WILL AVOID MEDS AT THIS TIME     Interstitial lung disease     Low back pain     Lumbosacral disc herniation 06/12/2012    LEFT L5-S1 SMALL NATURE. SHE HAS FAILED ALL CONSERVATIVE MEASURES AND DESIRES SURGERY     Lupus     SEES DR. ANDERSON    Migraines     Obesity     All my life    Pulmonary  embolism     Respiratory failure with hypoxia 02/04/2021    Rheumatoid arthritis 11/06/2017    CURRENTLY CONTROLLED, BUT WITH RESIDUAL DEFORMITY. WILL FOLLOW WITH DR. ANDERSON     Seizures 06/15/1995    Gave birth to my baby daughter went home two days later went into seizures had to go back into the hospital for a week, that was the first and last one I had since that date.    Tachycardia     Total knee replacement status, right 08/17/2015        Immunization History   Administered Date(s) Administered    Arexvy (RSV, Adults 60+ yrs) 04/08/2024    COVID-19 (SANDI) 03/29/2021    COVID-19 (PFIZER) 12YRS+ (COMIRNATY) 04/08/2024, 09/16/2024    COVID-19 (PFIZER) Purple Cap Monovalent 12/16/2021    Flu Vaccine Intradermal Quad 18-64YR 01/02/2008, 11/10/2008, 11/05/2009    Flu Vaccine Quad PF >36MO 10/25/2017    Fluzone  >6mos 08/27/2012, 10/15/2013    Fluzone (or Fluarix & Flulaval for VFC) >6mos 10/25/2017, 11/18/2021, 10/12/2022, 11/29/2023    Hepatitis A 04/30/2018    Influenza Inj MDCK Preserative Free 09/16/2024    Influenza Seasonal Injectable 01/02/2008, 11/10/2008, 11/05/2009    Influenza, Unspecified 09/17/2020, 10/01/2021, 10/12/2022    Pneumococcal Conjugate 13-Valent (PCV13) 06/14/2018    Pneumococcal Conjugate 20-Valent (PCV20) 11/19/2024    Pneumococcal Polysaccharide (PPSV23) 08/26/2019    Shingrix 12/30/2022, 04/08/2024    Tdap 09/16/2024       Allergies   Allergen Reactions    Aspirin Anaphylaxis     In high doses    Salsalate Hives, Anaphylaxis, Unknown - High Severity and Shortness Of Breath          Current Outpatient Medications:     acetaminophen (TYLENOL) 500 MG tablet, Take 1 tablet by mouth Every 6 (Six) Hours., Disp: 120 tablet, Rfl: 0    albuterol sulfate  (90 Base) MCG/ACT inhaler, Inhale 2 puffs Every 6 (Six) Hours As Needed for Wheezing., Disp: 18 g, Rfl: 11    ascorbic acid (VITAMIN C) 250 MG tablet, Take 1 tablet by mouth Daily., Disp: , Rfl:     azaTHIOprine (IMURAN) 50 MG tablet,  Take 3 tablets by mouth Daily., Disp: 90 tablet, Rfl: 11    Azelastine HCl 137 MCG/SPRAY solution, Administer 1 spray into the nostril(s) as directed by provider Daily., Disp: , Rfl:     budesonide-formoterol (SYMBICORT) 160-4.5 MCG/ACT inhaler, Inhale 2 puffs 2 (Two) Times a Day., Disp: 10.2 g, Rfl: 12    Calcium Carbonate-Vitamin D (CALTRATE 600+D PO), , Disp: , Rfl:     cetirizine (zyrTEC) 10 MG tablet, Take 1 tablet by mouth Daily., Disp: 90 tablet, Rfl: 1    cyanocobalamin 1000 MCG/ML injection, Inject 1 mL under the skin into the appropriate area as directed Every 30 (Thirty) Days., Disp: 3 mL, Rfl: 3    cyclobenzaprine (FLEXERIL) 10 MG tablet, Take 1 tablet by mouth 2 (Two) Times a Day As Needed for Muscle Spasms., Disp: 60 tablet, Rfl: 3    Diclofenac Sodium (VOLTAREN) 1 % gel gel, Apply 4 g topically to the appropriate area as directed 4 (Four) Times a Day As Needed (shoulder/hip pain)., Disp: 100 g, Rfl: 3    fluticasone (FLONASE) 50 MCG/ACT nasal spray, SHAKE LIQUID AND USE 2 SPRAYS(100 MCG) IN EACH NOSTRIL EVERY DAY, Disp: 16 g, Rfl: 3    furosemide (LASIX) 40 MG tablet, Take 1 tablet by mouth Daily., Disp: 90 tablet, Rfl: 1    gabapentin (NEURONTIN) 300 MG capsule, Take 1 capsule by mouth 3 (Three) Times a Day for 30 days. (Patient taking differently: Take 1 capsule by mouth 1 time.), Disp: 90 capsule, Rfl: 0    guaiFENesin (MUCINEX) 600 MG 12 hr tablet, Take 1 tablet by mouth Every 12 (Twelve) Hours., Disp: 60 tablet, Rfl: 3    hydrOXYzine (ATARAX) 25 MG tablet, TAKE ONE TABLET BY MOUTH EVERY 8 HOURS AS NEEDED FOR ITCHING, Disp: 90 tablet, Rfl: 1    levothyroxine (SYNTHROID, LEVOTHROID) 150 MCG tablet, Take 1 tablet by mouth Daily., Disp: 30 tablet, Rfl: 1    Lidocaine 4 %, Place 1 patch on the skin as directed by provider Daily. Remove & Discard patch within 12 hours or as directed by MD, Disp: 30 patch, Rfl: 0    magnesium oxide (MAG-OX) 400 MG tablet, Take 1 tablet by mouth Daily., Disp: 90 tablet,  Rfl: 1    montelukast (SINGULAIR) 10 MG tablet, Take 1 tablet by mouth every night at bedtime., Disp: 90 tablet, Rfl: 1    O2 (OXYGEN), Inhale 3 L/min Daily., Disp: , Rfl:     ondansetron (ZOFRAN) 4 MG tablet, Take 1 tablet by mouth Every 12 (Twelve) Hours As Needed for Vomiting or Nausea., Disp: 30 tablet, Rfl: 1    pilocarpine (SALAGEN) 5 MG tablet, Take 1 tablet by mouth 3 times a day., Disp: 90 tablet, Rfl: 1    Pirfenidone 267 MG capsule, Take 267 mg by mouth 3 (Three) Times a Day With Meals., Disp: 90 capsule, Rfl: 5    promethazine-dextromethorphan (PROMETHAZINE-DM) 6.25-15 MG/5ML syrup, Take 5 mL by mouth 4 (Four) Times a Day As Needed for Cough., Disp: 120 mL, Rfl: 0    rizatriptan (MAXALT) 10 MG tablet, Take 1 tablet by mouth As Needed (9) for up to 9 doses., Disp: 9 tablet, Rfl: 3    rOPINIRole (REQUIP) 1 MG tablet, Take 1 tablet by mouth every night at bedtime., Disp: 90 tablet, Rfl: 1    spironolactone (ALDACTONE) 50 MG tablet, Take 1 tablet by mouth Daily., Disp: 90 tablet, Rfl: 1    traZODone (DESYREL) 50 MG tablet, Take 1 tablet by mouth Daily., Disp: 30 tablet, Rfl: 3     Objective     Physical Exam  Vital Signs:   Obese, WDWN, Alert, NAD.    HEENT:  PERRL, EOMI.  OP, nares clear, no sinus tenderness  Neck:  Supple, no JVD, no thyromegaly.  Lymph: no axillary, cervical, supraclavicular lymphadenopathy noted bilaterally  Chest:  Mildly decreased breath sounds throughout with inspiratory Velcro-like crackles at the bases. Normal work of breathing noted. Patient is able speak full sentences without difficulty. Patient is on 3 L of oxygen per minute via nasal cannula.   CV: RRR, no MGR, pulses 2+, equal.  Abd:  Soft, NT, ND, + BS, no HSM  EXT:  no clubbing, no cyanosis, no edema, no joint tenderness  Neuro:  A&Ox3, CN grossly intact, no focal deficits.  Skin: No rashes or lesions noted.    /96 (BP Location: Right arm, Patient Position: Sitting, Cuff Size: Large Adult)   Pulse 88   Temp 97.9 °F  "(36.6 °C) (Oral)   Resp 20   Ht 162.6 cm (64.02\")   Wt 103 kg (227 lb 3.2 oz)   SpO2 96% Comment: 3 liters of oxygen  BMI 38.98 kg/m²         Result Review :   I have reviewed Dr. Dennis's last office visit note.    Procedures:            Assessment and Plan      Assessment:  1. Interstitial lung disease secondary to mixed connective tissue disease without exacerbation.   2. Known mixed connective tissue disease.       3. Chronic compensated diastolic congestive heart failure.   4. Chronic dyspnea at baseline.       5. Immunosuppressed status on Imuran.       6. Therapeutic drug monitoring on Imuran.       7. History of PE on anticoagulation in the past.     8.  Lung nodules.  9.  Seasonal allergies.  10. Lupus: Patient is under the care of Dr. Boone.  11. Never smoker.   12.  Class II obesity BMI 38.9.        Plan:  1.  Continue Esbriet as prescribed.  2.  Patient is scheduled have a chest CT scan on 11/3/2025 at 4:30 PM.  3.  Patient will be due for pulmonary function test and 6-minute walk test in November 2025.  Orders placed today.  4.  Will order CBC and CMP for medication monitoring.  5.  Continue Imuran as prescribed.  6.  Continue albuterol inhaler as needed.  Refills for albuterol sent to the pharmacy today.  7.  Continue Zyrtec, Singulair, Astelin nasal spray, and Flonase nasal spray for seasonal allergies.  8.  Continue albuterol inhaler as needed.  9.  Continue oxygen to keep SPO2 at 89% and above.  10.  Follow-up with Dr. Boone, hematologist also for therapeutic drug monitoring and lupus as scheduled.  11.  Follow-up with cardiology as scheduled.  12.  Vaccination status: patient reports they are up-to-date with flu, pneumonia, and Covid vaccines.  Patient is advised to continue to follow CDC recommendations such as social distancing wearing a mask and washing hands for at least 20 seconds.  13.  Smoking status: Never smoker.  14.  Patient to call the office, 911, or go to the ER with new " or worsening symptoms.  15.  Follow-up in 3 months, sooner if needed.          Follow Up   Return in about 3 months (around 5/18/2025) for with Dr. Dennis.  Patient was given instructions and counseling regarding her condition or for health maintenance advice. Please see specific information pulled into the AVS if appropriate.

## 2025-02-19 ENCOUNTER — PREP FOR SURGERY (OUTPATIENT)
Dept: OTHER | Facility: HOSPITAL | Age: 60
End: 2025-02-19
Payer: MEDICARE

## 2025-02-19 ENCOUNTER — OFFICE VISIT (OUTPATIENT)
Dept: ORTHOPEDIC SURGERY | Facility: CLINIC | Age: 60
End: 2025-02-19
Payer: MEDICARE

## 2025-02-19 VITALS
WEIGHT: 228.4 LBS | HEART RATE: 84 BPM | HEIGHT: 64 IN | DIASTOLIC BLOOD PRESSURE: 81 MMHG | OXYGEN SATURATION: 92 % | SYSTOLIC BLOOD PRESSURE: 126 MMHG | BODY MASS INDEX: 38.99 KG/M2

## 2025-02-19 DIAGNOSIS — M25.562 LEFT KNEE PAIN, UNSPECIFIED CHRONICITY: Primary | ICD-10-CM

## 2025-02-19 PROBLEM — M17.12 PRIMARY OSTEOARTHRITIS OF LEFT KNEE: Status: ACTIVE | Noted: 2025-02-19

## 2025-02-19 RX ORDER — TRANEXAMIC ACID 10 MG/ML
1000 INJECTION, SOLUTION INTRAVENOUS ONCE
OUTPATIENT
Start: 2025-02-19 | End: 2025-02-19

## 2025-02-19 NOTE — PROGRESS NOTES
"Chief Complaint  Initial Evaluation of the Left Knee     Subjective      Aida Mcclure presents to [facility] for initial evaluation of the left knee. She has had pain in the left knee for years and the pain has increased the last few months.  She has had injections, therapy and anti inflammatory in the past.  She has Lupus.  She has increase pain with ADL and daily tasks.  She lives by herself at home and stayed at rehab at the hospital for the right knee and then went home and things went smoothly.  She wants to stay at Rehab again also.      Allergies   Allergen Reactions    Aspirin Anaphylaxis     In high doses    Salsalate Hives, Anaphylaxis, Unknown - High Severity and Shortness Of Breath        Social History     Socioeconomic History    Marital status:    Tobacco Use    Smoking status: Never     Passive exposure: Past    Smokeless tobacco: Never   Vaping Use    Vaping status: Never Used   Substance and Sexual Activity    Alcohol use: Yes     Alcohol/week: 2.0 standard drinks of alcohol     Types: 2 Drinks containing 0.5 oz of alcohol per week     Comment: One or two wine coolers a week    Drug use: Never    Sexual activity: Not Currently     Partners: Male     Birth control/protection: Tubal ligation     Comment: Tubes tied        I reviewed the patient's chief complaint, history of present illness, review of systems, past medical history, surgical history, family history, social history, medications, and allergy list.     Review of Systems     Constitutional: Denies fevers, chills, weight loss  Cardiovascular: Denies chest pain, shortness of breath  Skin: Denies rashes, acute skin changes  Neurologic: Denies headache, loss of consciousness        Vital Signs:   /81   Pulse 84   Ht 162.6 cm (64.02\")   Wt 104 kg (228 lb 6.4 oz)   SpO2 92%   BMI 39.18 kg/m²          Physical Exam  General: Alert. No acute distress    Ortho Exam        LEFT KNEE Flexion " 110. Extension -5. Stable to varus/valgus stress. Stable to anterior/posterior drawer. Neurovascularly intact. Negative Jayleen. Negative Lachman. Positive EHL, FHL, HS and TA. Sensation intact to light touch all 5 nerves of the foot. Ambulates with Antalgic gait. Patella is well tracking. Calf supple, non-tender. Positive tenderness to the medial joint line. Positive tenderness to the lateral joint line. Positive Crepitus. Good strength to hamstrings, quadriceps, dorsiflexors, and plantar flexors.  Knee Extensor Mechanism intact Mild swelling.  Kellgren Morgan grading scale is a 4.        Procedures        Imaging Results (Most Recent)       Procedure Component Value Units Date/Time    XR Knee 3 View Left [[id number]] Resulted: 02/19/25 1438     Updated: 02/19/25 1445             Result Review :     X-Ray Report:  Left knee X-Ray  Indication: Evaluation of the left knee  AP/Lateral and [place] view(s)  Findings: Advanced degenerative end stage bone on bone arthritis.    Prior studies available for comparison: no             Assessment and Plan     Diagnoses and all orders for this visit:    1. Left knee pain, unspecified chronicity (Primary)  -     XR Knee 3 View Left        Discussed the treatment plan with the patient. I reviewed the X-rays that were obtained today with the patient.     Discussed the treatment options with the patient, operative vs non-operative. The patient expressed understanding and wished to proceed with a left total knee arthroplasty.        Discussed surgery., Risks/benefits discussed with patient including, but not limited to: infection, bleeding, neurovascular damage, re-rupture, aesthetic deformity, need for further surgery, and death., Discussed with patient the implant type being used during surgery and patient understands., Surgery pamphlet given., Call or return if worsening symptoms., DME order for a 3 in 1 given today due to patient will be confined to one room/level of the home  that does not offer a toilet during postop recovery. , and Patient does not have any metal allergies.     Follow Up     2 weeks postoperatively       Patient was given instructions and counseling regarding her condition or for health maintenance advice. Please see specific information pulled into the AVS if appropriate.     Scribed for Long Elaine MD by Kell Wells MA.  02/19/25   14:35 EST      I have personally performed the services described in this document as scribed by the above individual and it is both accurate and complete. Long Elaine MD 02/19/25

## 2025-02-21 ENCOUNTER — OFFICE VISIT (OUTPATIENT)
Dept: ORTHOPEDIC SURGERY | Facility: CLINIC | Age: 60
End: 2025-02-21
Payer: MEDICARE

## 2025-02-21 VITALS
WEIGHT: 228 LBS | OXYGEN SATURATION: 90 % | DIASTOLIC BLOOD PRESSURE: 90 MMHG | HEART RATE: 81 BPM | SYSTOLIC BLOOD PRESSURE: 151 MMHG | HEIGHT: 64 IN | BODY MASS INDEX: 38.93 KG/M2

## 2025-02-21 DIAGNOSIS — M25.511 RIGHT SHOULDER PAIN, UNSPECIFIED CHRONICITY: Primary | ICD-10-CM

## 2025-02-21 DIAGNOSIS — M75.41 IMPINGEMENT SYNDROME OF RIGHT SHOULDER: ICD-10-CM

## 2025-02-21 DIAGNOSIS — Z76.0 MEDICATION REFILL: ICD-10-CM

## 2025-02-21 RX ORDER — LIDOCAINE HYDROCHLORIDE 10 MG/ML
5 INJECTION, SOLUTION INFILTRATION; PERINEURAL
Status: COMPLETED | OUTPATIENT
Start: 2025-02-21 | End: 2025-02-21

## 2025-02-21 RX ORDER — TRIAMCINOLONE ACETONIDE 40 MG/ML
40 INJECTION, SUSPENSION INTRA-ARTICULAR; INTRAMUSCULAR
Status: COMPLETED | OUTPATIENT
Start: 2025-02-21 | End: 2025-02-21

## 2025-02-21 RX ADMIN — LIDOCAINE HYDROCHLORIDE 5 ML: 10 INJECTION, SOLUTION INFILTRATION; PERINEURAL at 16:04

## 2025-02-21 RX ADMIN — TRIAMCINOLONE ACETONIDE 40 MG: 40 INJECTION, SUSPENSION INTRA-ARTICULAR; INTRAMUSCULAR at 16:04

## 2025-02-21 NOTE — PROGRESS NOTES
"Chief Complaint  Follow-up of the Right Shoulder     Subjective      Aida Mcclure presents to [facility] for follow up of the right shoulder.  She has treated her shoulder pain conservatively with injections in the past. She has pain with forward and upward ROM of the shoulder.  She has had no recent injury or fall.      Allergies   Allergen Reactions    Aspirin Anaphylaxis     In high doses    Salsalate Hives, Anaphylaxis, Unknown - High Severity and Shortness Of Breath        Social History     Socioeconomic History    Marital status:    Tobacco Use    Smoking status: Never     Passive exposure: Past    Smokeless tobacco: Never   Vaping Use    Vaping status: Never Used   Substance and Sexual Activity    Alcohol use: Yes     Alcohol/week: 2.0 standard drinks of alcohol     Types: 2 Drinks containing 0.5 oz of alcohol per week     Comment: One or two wine coolers a week    Drug use: Never    Sexual activity: Not Currently     Partners: Male     Birth control/protection: Tubal ligation     Comment: Tubes tied        I reviewed the patient's chief complaint, history of present illness, review of systems, past medical history, surgical history, family history, social history, medications, and allergy list.     Review of Systems     Constitutional: Denies fevers, chills, weight loss  Cardiovascular: Denies chest pain, shortness of breath  Skin: Denies rashes, acute skin changes  Neurologic: Denies headache, loss of consciousness        Vital Signs:   /90   Pulse 81   Ht 162.6 cm (64.02\")   Wt 103 kg (228 lb)   SpO2 90%   BMI 39.11 kg/m²          Physical Exam  General: Alert. No acute distress    Ortho Exam        RIGHT SHOULDER Forward flexion 140. Abduction 100. External rotation 40. Internal rotation SI joint.  Sensation intact to light touch, median, radial, ulnar nerve. Positive AIN, PIN, ulnar nerve motor. Positive pulses. Positive Impingement signs.  Tender " to palpation to the bicipital groove, anterior aspect of the shoulder and down the arm.           Large Joint Arthrocentesis: R subacromial bursa  Date/Time: 2/21/2025 4:04 PM  Consent given by: patient  Site marked: site marked  Timeout: Immediately prior to procedure a time out was called to verify the correct patient, procedure, equipment, support staff and site/side marked as required   Supporting Documentation  Indications: pain   Procedure Details  Location: shoulder - R subacromial bursa  Needle gauge: 21 G.  Medications administered: 5 mL lidocaine 1 %; 40 mg triamcinolone acetonide 40 MG/ML  Patient tolerance: patient tolerated the procedure well with no immediate complications      This injection documentation was Scribed for Long Elaine MD by Becca Gomez.  02/21/25   16:05 EST      Imaging Results (Most Recent)       None             Result Review :                    Assessment and Plan     Diagnoses and all orders for this visit:    1. Right shoulder pain, unspecified chronicity (Primary)    2. Impingement syndrome of right shoulder        Discussed the treatment plan with the patient.     Discussed the risks and benefits of conservative measures. The patient expressed understanding and wished to proceed with a right shoulder steroid injection.  She tolerated the injection well.      Discussed with the patient that due to the steroid injection given today in the office they may see an increase in blood sugar for a few days. Advised patient to monitor sugar after receiving the injection.     Discussed possibility of a reaction from the injection.  Discussed the possibility that the injection may not completely improve or remove the pain.  Discussed the risk of infection.      Call or return if worsening symptoms.    Follow Up     PRN      Patient was given instructions and counseling regarding her condition or for health maintenance advice. Please see specific information pulled into the AVS if  appropriate.     Scribed for Long Elaine MD by Kell Wells MA.  02/21/25   15:59 EST      I have personally performed the services described in this document as scribed by the above individual and it is both accurate and complete. Long Elaine MD 02/21/25

## 2025-02-24 RX ORDER — TRAZODONE HYDROCHLORIDE 50 MG/1
TABLET ORAL
Qty: 90 TABLET | Refills: 1 | Status: SHIPPED | OUTPATIENT
Start: 2025-02-24

## 2025-02-24 RX ORDER — CYCLOBENZAPRINE HCL 10 MG
10 TABLET ORAL 2 TIMES DAILY PRN
Qty: 60 TABLET | Refills: 3 | Status: SHIPPED | OUTPATIENT
Start: 2025-02-24

## 2025-02-26 RX ORDER — MAGNESIUM OXIDE 400 MG/1
1 TABLET ORAL DAILY
Qty: 90 TABLET | Refills: 1 | Status: SHIPPED | OUTPATIENT
Start: 2025-02-26

## 2025-02-27 ENCOUNTER — TELEPHONE (OUTPATIENT)
Dept: PULMONOLOGY | Facility: CLINIC | Age: 60
End: 2025-02-27

## 2025-02-27 ENCOUNTER — TELEPHONE (OUTPATIENT)
Dept: PULMONOLOGY | Facility: CLINIC | Age: 60
End: 2025-02-27
Payer: MEDICARE

## 2025-02-27 NOTE — TELEPHONE ENCOUNTER
Caller: [facility] - [place], [place] - [address] - [phone] [facility] [phone] FX    Relationship to patient: Pharmacy    Best call back number: [phone]     Patient is needing: PHARMACY IS NEEDED CLARIFICATION ON A PRESCRIPTION WE SENT OVER       pilocarpine (SALAGEN) 5 MG tablet [20800] (Order [id number]

## 2025-02-27 NOTE — TELEPHONE ENCOUNTER
Caller: Aida Mcclure April    Relationship: Self    Best call back number:     [phone]        Requested Prescriptions:   Pirfenidone 267 MG capsule  267 mg, 3 Times Daily With Meals        Pharmacy where request should be sent:  [facility] [place] - [facility] [place] - [address] - [phone]  - [phone]     Last office visit with prescribing clinician: 2/18/2025   Last telemedicine visit with prescribing clinician: Visit date not found   Next office visit with prescribing clinician: Visit date not found     Additional details provided by patient: THE PT WOULD LIKE REFILLS WITH THIS PRESCRIPTION. PLEASE ADVISE.    Does the patient have less than a 3 day supply:  [x] Yes  [] No    Would you like a call back once the refill request has been completed: [] Yes [] No    If the office needs to give you a call back, can they leave a voicemail: [] Yes [] No    Sarah Gibson Rep   02/27/25 16:28 EST

## 2025-02-28 NOTE — TELEPHONE ENCOUNTER
Called and spoke with [facility] and the representative stated they do not have the prescription listed on file for pt through their pharmacy.

## 2025-03-03 DIAGNOSIS — J84.9 ILD (INTERSTITIAL LUNG DISEASE): ICD-10-CM

## 2025-03-03 DIAGNOSIS — J84.10 PULMONARY FIBROSIS: Primary | ICD-10-CM

## 2025-03-03 RX ORDER — PIRFENIDONE 267 MG/1
267 CAPSULE ORAL
Qty: 90 CAPSULE | Refills: 5 | Status: SHIPPED | OUTPATIENT
Start: 2025-03-03

## 2025-03-04 ENCOUNTER — TREATMENT (OUTPATIENT)
Dept: PHYSICAL THERAPY | Facility: CLINIC | Age: 60
End: 2025-03-04
Payer: MEDICARE

## 2025-03-04 DIAGNOSIS — M54.2 CERVICAL PAIN: Primary | ICD-10-CM

## 2025-03-04 DIAGNOSIS — M62.838 MUSCLE SPASMS OF NECK: ICD-10-CM
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
## 2025-03-05 DIAGNOSIS — Z76.0 MEDICATION REFILL: ICD-10-CM

## 2025-03-05 RX ORDER — TRAZODONE HYDROCHLORIDE 50 MG/1
TABLET ORAL
Qty: 30 TABLET | Refills: 11 | OUTPATIENT
Start: 2025-03-05

## 2025-03-05 RX ORDER — HYDROXYZINE HYDROCHLORIDE 25 MG/1
25 TABLET, FILM COATED ORAL EVERY 8 HOURS PRN
Qty: 90 TABLET | Refills: 1 | Status: SHIPPED | OUTPATIENT
Start: 2025-03-05

## 2025-03-06 DIAGNOSIS — Z76.0 MEDICATION REFILL: ICD-10-CM

## 2025-03-06 NOTE — TELEPHONE ENCOUNTER
[place]er: [facility] ([place]) - [place], [place] - [address] - [phone] [facility] [phone]    Relationship: Pharmacy         Requested Prescriptions:   Requested Prescriptions     Pending Prescriptions Disp Refills    traZODone (DESYREL) 50 MG tablet 90 tablet 1    hydrOXYzine (ATARAX) 25 MG tablet 90 tablet 1     Sig: Take 1 tablet by mouth Every 8 (Eight) Hours As Needed for Itching.        Pharmacy where request should be sent: [facility] ([place]) - [place] [id number] [facility] - [phone] [facility] [phone] FX     Last office visit with prescribing clinician: 2/11/2025   Last telemedicine visit with prescribing clinician: Visit date not found   Next office visit with prescribing clinician: 3/25/2025          Sarah Morris Rep   03/06/25 12:42 EST

## 2025-03-07 RX ORDER — HYDROXYZINE HYDROCHLORIDE 25 MG/1
25 TABLET, FILM COATED ORAL EVERY 8 HOURS PRN
Qty: 90 TABLET | Refills: 1 | OUTPATIENT
Start: 2025-03-07

## 2025-03-07 RX ORDER — TRAZODONE HYDROCHLORIDE 50 MG/1
TABLET ORAL
Qty: 90 TABLET | Refills: 1 | OUTPATIENT
Start: 2025-03-07

## 2025-03-12 ENCOUNTER — PATIENT ROUNDING (BHMG ONLY) (OUTPATIENT)
Dept: URGENT CARE | Facility: CLINIC | Age: 60
End: 2025-03-12
Payer: MEDICARE

## 2025-03-12 NOTE — ED NOTES
Thank you for letting us care for you in your recent visit to our urgent care center. We would love to hear about your experience with us. Was this the first time you have visited our location?    We're always looking for ways to make our patients' experiences even better. Do you have any recommendations on ways we may improve?     I appreciate you taking the time to respond. Please be on the lookout for a survey about your recent visit from [facility] via text or email. We would greatly appreciate if you could fill that out and turn it back in. We want your voice to be heard and we value your feedback.   Thank you for choosing [facility] for your healthcare needs.

## 2025-03-17 ENCOUNTER — TREATMENT (OUTPATIENT)
Dept: PHYSICAL THERAPY | Facility: CLINIC | Age: 60
End: 2025-03-17
Payer: MEDICARE

## 2025-03-17 DIAGNOSIS — M54.2 CERVICAL PAIN: Primary | ICD-10-CM

## 2025-03-17 DIAGNOSIS — M62.838 MUSCLE SPASMS OF NECK: ICD-10-CM
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
## 2025-03-19 ENCOUNTER — LAB (OUTPATIENT)
Facility: HOSPITAL | Age: 60
End: 2025-03-19
Payer: MEDICARE

## 2025-03-19 ENCOUNTER — TRANSCRIBE ORDERS (OUTPATIENT)
Dept: ADMINISTRATIVE | Facility: HOSPITAL | Age: 60
End: 2025-03-19
Payer: MEDICARE

## 2025-03-19 ENCOUNTER — LAB (OUTPATIENT)
Dept: LAB | Facility: HOSPITAL | Age: 60
End: 2025-03-19
Payer: MEDICARE

## 2025-03-19 DIAGNOSIS — N18.31 STAGE 3A CHRONIC KIDNEY DISEASE: ICD-10-CM

## 2025-03-19 DIAGNOSIS — E03.9 HYPOTHYROIDISM, UNSPECIFIED TYPE: ICD-10-CM

## 2025-03-19 DIAGNOSIS — N18.30 STAGE 3 CHRONIC KIDNEY DISEASE, UNSPECIFIED WHETHER STAGE 3A OR 3B CKD: ICD-10-CM

## 2025-03-19 DIAGNOSIS — I50.32 CHRONIC DIASTOLIC CHF (CONGESTIVE HEART FAILURE): ICD-10-CM

## 2025-03-19 DIAGNOSIS — M25.562 LEFT KNEE PAIN, UNSPECIFIED CHRONICITY: ICD-10-CM

## 2025-03-19 DIAGNOSIS — J30.2 SEASONAL ALLERGIES: ICD-10-CM

## 2025-03-19 DIAGNOSIS — E78.00 HYPERCHOLESTEROLEMIA: ICD-10-CM

## 2025-03-19 DIAGNOSIS — J84.9 ILD (INTERSTITIAL LUNG DISEASE): ICD-10-CM

## 2025-03-19 DIAGNOSIS — Z51.81 THERAPEUTIC DRUG MONITORING: ICD-10-CM

## 2025-03-19 DIAGNOSIS — M35.1 MIXED CONNECTIVE TISSUE DISEASE: ICD-10-CM

## 2025-03-19 DIAGNOSIS — R06.09 CHRONIC DYSPNEA: ICD-10-CM

## 2025-03-19 DIAGNOSIS — D84.9 IMMUNOSUPPRESSED STATUS: ICD-10-CM

## 2025-03-19 DIAGNOSIS — Z86.711 PERSONAL HISTORY OF PE (PULMONARY EMBOLISM): ICD-10-CM

## 2025-03-19 DIAGNOSIS — R79.89 ELEVATED TSH: ICD-10-CM

## 2025-03-19 DIAGNOSIS — N18.30 STAGE 3 CHRONIC KIDNEY DISEASE, UNSPECIFIED WHETHER STAGE 3A OR 3B CKD: Primary | ICD-10-CM

## 2025-03-19 DIAGNOSIS — E55.9 VITAMIN D DEFICIENCY: ICD-10-CM

## 2025-03-19 LAB
25(OH)D3 SERPL-MCNC: 31.6 NG/ML (ref 30–100)
ALBUMIN SERPL-MCNC: 3.9 G/DL (ref 3.5–5.2)
ALBUMIN/GLOB SERPL: 1.1 G/DL
ALP SERPL-CCNC: 90 U/L (ref 39–117)
ALT SERPL W P-5'-P-CCNC: 8 U/L (ref 1–33)
ANION GAP SERPL CALCULATED.3IONS-SCNC: 9.5 MMOL/L (ref 5–15)
AST SERPL-CCNC: 21 U/L (ref 1–32)
BACTERIA UR QL AUTO: ABNORMAL /HPF
BASOPHILS # BLD AUTO: 0.03 10*3/MM3 (ref 0–0.2)
BASOPHILS NFR BLD AUTO: 0.5 % (ref 0–1.5)
BILIRUB SERPL-MCNC: 0.6 MG/DL (ref 0–1.2)
BILIRUB UR QL STRIP: NEGATIVE
BUN SERPL-MCNC: 20 MG/DL (ref 8–23)
BUN/CREAT SERPL: 14.4 (ref 7–25)
CALCIUM SPEC-SCNC: 9.5 MG/DL (ref 8.6–10.5)
CHLORIDE SERPL-SCNC: 106 MMOL/L (ref 98–107)
CHOLEST SERPL-MCNC: 183 MG/DL (ref 0–200)
CLARITY UR: CLEAR
CO2 SERPL-SCNC: 24.5 MMOL/L (ref 22–29)
COLOR UR: YELLOW
CREAT SERPL-MCNC: 1.39 MG/DL (ref 0.57–1)
CREAT UR-MCNC: 120.7 MG/DL
DEPRECATED RDW RBC AUTO: 41.6 FL (ref 37–54)
EGFRCR SERPLBLD CKD-EPI 2021: 43.3 ML/MIN/1.73
EOSINOPHIL # BLD AUTO: 0.21 10*3/MM3 (ref 0–0.4)
EOSINOPHIL NFR BLD AUTO: 3.8 % (ref 0.3–6.2)
ERYTHROCYTE [DISTWIDTH] IN BLOOD BY AUTOMATED COUNT: 12.5 % (ref 12.3–15.4)
GLOBULIN UR ELPH-MCNC: 3.7 GM/DL
GLUCOSE SERPL-MCNC: 75 MG/DL (ref 65–99)
GLUCOSE UR STRIP-MCNC: NEGATIVE MG/DL
HCT VFR BLD AUTO: 38.9 % (ref 34–46.6)
HDLC SERPL-MCNC: 62 MG/DL (ref 40–60)
HGB BLD-MCNC: 12.6 G/DL (ref 12–15.9)
HGB UR QL STRIP.AUTO: NEGATIVE
HYALINE CASTS UR QL AUTO: ABNORMAL /LPF
IMM GRANULOCYTES # BLD AUTO: 0.02 10*3/MM3 (ref 0–0.05)
IMM GRANULOCYTES NFR BLD AUTO: 0.4 % (ref 0–0.5)
KETONES UR QL STRIP: NEGATIVE
LDLC SERPL CALC-MCNC: 107 MG/DL (ref 0–100)
LDLC/HDLC SERPL: 1.7 {RATIO}
LEUKOCYTE ESTERASE UR QL STRIP.AUTO: NEGATIVE
LYMPHOCYTES # BLD AUTO: 1.4 10*3/MM3 (ref 0.7–3.1)
LYMPHOCYTES NFR BLD AUTO: 25.5 % (ref 19.6–45.3)
MCH RBC QN AUTO: 29.4 PG (ref 26.6–33)
MCHC RBC AUTO-ENTMCNC: 32.4 G/DL (ref 31.5–35.7)
MCV RBC AUTO: 90.9 FL (ref 79–97)
MONOCYTES # BLD AUTO: 0.56 10*3/MM3 (ref 0.1–0.9)
MONOCYTES NFR BLD AUTO: 10.2 % (ref 5–12)
NEUTROPHILS NFR BLD AUTO: 3.28 10*3/MM3 (ref 1.7–7)
NEUTROPHILS NFR BLD AUTO: 59.6 % (ref 42.7–76)
NITRITE UR QL STRIP: NEGATIVE
NRBC BLD AUTO-RTO: 0 /100 WBC (ref 0–0.2)
PH UR STRIP.AUTO: 6.5 [PH] (ref 5–8)
PLATELET # BLD AUTO: 154 10*3/MM3 (ref 140–450)
PMV BLD AUTO: 12.3 FL (ref 6–12)
POTASSIUM SERPL-SCNC: 4.1 MMOL/L (ref 3.5–5.2)
PROT ?TM UR-MCNC: 54.2 MG/DL
PROT SERPL-MCNC: 7.6 G/DL (ref 6–8.5)
PROT UR QL STRIP: ABNORMAL
PROT/CREAT UR: 0.45 MG/G{CREAT}
RBC # BLD AUTO: 4.28 10*6/MM3 (ref 3.77–5.28)
RBC # UR STRIP: ABNORMAL /HPF
REF LAB TEST METHOD: ABNORMAL
SODIUM SERPL-SCNC: 140 MMOL/L (ref 136–145)
SP GR UR STRIP: 1.02 (ref 1–1.03)
SQUAMOUS #/AREA URNS HPF: ABNORMAL /HPF
T4 FREE SERPL-MCNC: 1.52 NG/DL (ref 0.92–1.68)
TRIGL SERPL-MCNC: 78 MG/DL (ref 0–150)
TSH SERPL DL<=0.05 MIU/L-ACNC: 1.38 UIU/ML (ref 0.27–4.2)
UROBILINOGEN UR QL STRIP: ABNORMAL
VLDLC SERPL-MCNC: 14 MG/DL (ref 5–40)
WBC # UR STRIP: ABNORMAL /HPF
WBC NRBC COR # BLD AUTO: 5.5 10*3/MM3 (ref 3.4–10.8)

## 2025-03-19 PROCEDURE — 83036 HEMOGLOBIN GLYCOSYLATED A1C: CPT

## 2025-03-19 PROCEDURE — 84156 ASSAY OF PROTEIN URINE: CPT

## 2025-03-19 PROCEDURE — 84439 ASSAY OF FREE THYROXINE: CPT

## 2025-03-19 PROCEDURE — 82570 ASSAY OF URINE CREATININE: CPT

## 2025-03-19 PROCEDURE — 80061 LIPID PANEL: CPT

## 2025-03-19 PROCEDURE — 36415 COLL VENOUS BLD VENIPUNCTURE: CPT

## 2025-03-19 PROCEDURE — 82306 VITAMIN D 25 HYDROXY: CPT

## 2025-03-19 PROCEDURE — 85025 COMPLETE CBC W/AUTO DIFF WBC: CPT

## 2025-03-19 PROCEDURE — 84443 ASSAY THYROID STIM HORMONE: CPT

## 2025-03-19 PROCEDURE — 81001 URINALYSIS AUTO W/SCOPE: CPT

## 2025-03-19 PROCEDURE — 80053 COMPREHEN METABOLIC PANEL: CPT

## 2025-03-20 ENCOUNTER — SPECIALTY PHARMACY (OUTPATIENT)
Dept: PULMONOLOGY | Facility: CLINIC | Age: 60
End: 2025-03-20
Payer: MEDICARE

## 2025-03-20 ENCOUNTER — TREATMENT (OUTPATIENT)
Dept: PHYSICAL THERAPY | Facility: CLINIC | Age: 60
End: 2025-03-20
Payer: MEDICARE

## 2025-03-20 ENCOUNTER — TELEPHONE (OUTPATIENT)
Dept: PULMONOLOGY | Facility: CLINIC | Age: 60
End: 2025-03-20

## 2025-03-20 DIAGNOSIS — M62.838 MUSCLE SPASMS OF NECK: ICD-10-CM

## 2025-03-20 DIAGNOSIS — M54.2 CERVICAL PAIN: Primary | ICD-10-CM

## 2025-03-20 LAB — HBA1C MFR BLD: 5.4 % (ref 4.8–5.6)

## 2025-03-20 NOTE — PROGRESS NOTES
Physical Therapy Daily Treatment Note                          Patient: Aida Mcclure   : 1965  Diagnosis/ICD-10 Code:  Cervical pain [M54.2]  Referring practitioner: Merline Enamorado MD  Date of Initial Visit: Type: THERAPY  Noted: 3/4/2025  Today's Date: 3/20/2025  Patient seen for 3 sessions           Subjective   The patient reported no current pain but is tired from work.     Objective   See Exercise, Manual, and Modality Logs for complete treatment.     Assessment/Plan     Patient tolerated today's treatment without complaints of pain. Improvements noted in pain and ROM following manual intervention. Strength, ROM, and increased pain with activity deficits still limits patient's ability to perform ADLs. Further skilled care indicated at this time.       Timed:  Manual Therapy:    10     mins  66137;  Therapeutic Exercise:    20     mins  49266;     Neuromuscular Eric:   0    mins  26821;    Therapeutic Activity:     0     mins  49448;     Gait Trainin     mins  32664;     Aquatics                         0      mins  15461    Un-timed:  Mechanical Traction      0     mins  88546  Dry Needling     0     mins self-pay  Electrical Stimulation:    0     mins  91502 ( );      Timed Treatment:   30   mins   Total Treatment:     30   mins    Al Walls PT  Physical Therapist    Electronically signed 3/20/2025    [place] License: PT - [id number]

## 2025-03-21 NOTE — TELEPHONE ENCOUNTER
"Relay     \"Patient's creatinine is elevated.  Patient to follow-up with her PCP for this finding.   \"                 "

## 2025-03-22 DIAGNOSIS — Z76.0 MEDICATION REFILL: ICD-10-CM

## 2025-03-24 ENCOUNTER — TREATMENT (OUTPATIENT)
Dept: PHYSICAL THERAPY | Facility: CLINIC | Age: 60
End: 2025-03-24
Payer: MEDICARE

## 2025-03-24 ENCOUNTER — OFFICE VISIT (OUTPATIENT)
Dept: ORTHOPEDIC SURGERY | Facility: CLINIC | Age: 60
End: 2025-03-24
Payer: MEDICARE

## 2025-03-24 VITALS
SYSTOLIC BLOOD PRESSURE: 124 MMHG | OXYGEN SATURATION: 94 % | HEIGHT: 64 IN | HEART RATE: 111 BPM | DIASTOLIC BLOOD PRESSURE: 75 MMHG | WEIGHT: 224 LBS | BODY MASS INDEX: 38.24 KG/M2

## 2025-03-24 DIAGNOSIS — M62.838 MUSCLE SPASMS OF NECK: ICD-10-CM

## 2025-03-24 DIAGNOSIS — M25.562 LEFT KNEE PAIN, UNSPECIFIED CHRONICITY: Primary | ICD-10-CM

## 2025-03-24 DIAGNOSIS — M17.12 OSTEOARTHRITIS OF LEFT KNEE, UNSPECIFIED OSTEOARTHRITIS TYPE: ICD-10-CM

## 2025-03-24 DIAGNOSIS — M54.2 CERVICAL PAIN: Primary | ICD-10-CM

## 2025-03-24 PROCEDURE — 97140 MANUAL THERAPY 1/> REGIONS: CPT

## 2025-03-24 PROCEDURE — 97110 THERAPEUTIC EXERCISES: CPT

## 2025-03-24 PROCEDURE — 20610 DRAIN/INJ JOINT/BURSA W/O US: CPT | Performed by: ORTHOPAEDIC SURGERY

## 2025-03-24 PROCEDURE — 3074F SYST BP LT 130 MM HG: CPT | Performed by: ORTHOPAEDIC SURGERY

## 2025-03-24 PROCEDURE — 3078F DIAST BP <80 MM HG: CPT | Performed by: ORTHOPAEDIC SURGERY

## 2025-03-24 RX ORDER — PIRFENIDONE 267 MG/1
801 CAPSULE ORAL 3 TIMES DAILY
Qty: 270 CAPSULE | Refills: 10 | Status: SHIPPED | OUTPATIENT
Start: 2025-03-24

## 2025-03-24 RX ORDER — FUROSEMIDE 40 MG/1
40 TABLET ORAL DAILY
Qty: 90 TABLET | Refills: 1 | Status: SHIPPED | OUTPATIENT
Start: 2025-03-24 | End: 2025-03-26 | Stop reason: SDUPTHER

## 2025-03-24 RX ORDER — PIRFENIDONE 267 MG/1
CAPSULE ORAL
Qty: 206 CAPSULE | Refills: 0 | Status: SHIPPED | OUTPATIENT
Start: 2025-03-24 | End: 2025-04-23

## 2025-03-24 RX ADMIN — TRIAMCINOLONE ACETONIDE 40 MG: 40 INJECTION, SUSPENSION INTRA-ARTICULAR; INTRAMUSCULAR at 15:33

## 2025-03-24 RX ADMIN — LIDOCAINE HYDROCHLORIDE 5 ML: 10 INJECTION, SOLUTION INFILTRATION; PERINEURAL at 15:33

## 2025-03-24 NOTE — PROGRESS NOTES
Physical Therapy Daily Treatment Note  [facility] [address] [place] [address]    Patient: Aida Mcclure   : 1965  Referring practitioner: Merline Enamorado MD  Date of Initial Visit: Type: THERAPY  Noted: 3/4/2025  Today's Date: 3/24/2025  Patient seen for 4 sessions           Subjective  Aida Mcclure reports: her pain at arrival is 4/10. After session she noted her pain reduced to 3/10. She did report feeling the L side of her neck spasm, and this was evident when PTA palpated.    Objective   See Exercise, Manual, and Modality Logs for complete treatment.     Assessment/Plan  Aida is to attend Wednesday and then leave for a trip to see a good friend. She will return after that to undergo a re-evaluation.     Visit Diagnoses:    ICD-10-CM ICD-9-CM   1. Cervical pain  M54.2 723.1   2. Muscle spasms of neck  M62.838 728.85        Timed:  Manual Therapy:    13     mins  87051;  Therapeutic Exercise:    14     mins  39722;     Neuromuscular Eric:        mins  25536;    Therapeutic Activity:          mins  76937;     Gait Training:           mins  25101;     Ultrasound:          mins  71938;    Electrical Stimulation:         mins  88589 ( );  Aquatics  __   mins   43231    Untimed:  Electrical Stimulation:         mins  84376 ( );  Mechanical Traction:         mins  41042;     Timed Treatment:   27   mins   Total Treatment:     27   mins    Electronically Signed:  Berenice Nava PTA  Physical Therapist Assistant    [place] PTA license [id number]

## 2025-03-24 NOTE — PROGRESS NOTES
Specialty Pharmacy Patient Management Program  Pulmonology Initial Assessment     Aida Mcclure is a 59 y.o. female with ILD seen by a Pulmonology provider and enrolled in the Pulmonology Patient Management program offered by [facility].  An initial outreach was conducted, including assessment of therapy appropriateness and specialty medication education for Pirfenidone. The patient was introduced to services offered by [facility], including: regular assessments, refill coordination, curbside pick-up or mail order delivery options, prior authorization maintenance, and financial assistance programs as applicable. The patient was also provided with contact information for the pharmacy team.     Insurance Coverage & Financial Support  PA obtained, $0 copay    Relevant Past Medical History and Comorbidities  Relevant medical history and concomitant health conditions were discussed with the patient. The patient's chart has been reviewed for relevant past medical history and comorbid health conditions and updated as necessary.   Past Medical History:   Diagnosis Date    Anemia     Annual physical exam 11/06/2017    WILL RESCHEDULE MAMMOGRAM     Breast lump     Cataract 2010 ?    Had them in both eyes have been removed.    CHF (congestive heart failure) 11/06/2017    FOLLOWS WITH CARDS. CURRENTLY DOING WELL ON LASIX, LISINOPRIL, ALDACTONE, AND PROPANOLOL    Degeneration of lumbar intervertebral disc 06/12/2012    Diverticulitis     Diverticulosis     Essential hypertension 11/06/2017    WELL CONTROLLED ON CURRENT REGIMEN     GERD (gastroesophageal reflux disease)     Hamstring injury 08/17/2015    BILATERAL HAMSTRING INJURY/PAIN     Head injury     Hernia cerebri     Hyperthyroidism     Hyperthyroidism 11/06/2017    CURRENTLY ON METHIMAZOLE, PT REPORT POSSIBLY DUE TO GRAVE'S DISEASE. WILL NEED TO OBTAIN AND REVIEW MEDICAL RECORDS FROM [facility]. WILL REFER TO KELIN      Hypoxia     Insomnia 11/06/2017    DISCUSSED SLEEP HYGIENE. PT TO TRY AVOIDING BLUE LIGHT AT NIGHT. PT ALSO TO SET ROUTINE PRIOR TO BEDTIME. WILL AVOID MEDS AT THIS TIME     Interstitial lung disease     Low back pain     Lumbosacral disc herniation 06/12/2012    LEFT L5-S1 SMALL NATURE. SHE HAS FAILED ALL CONSERVATIVE MEASURES AND DESIRES SURGERY     Lupus     SEES DR. ANDERSON    Migraines     Obesity     All my life    Pulmonary embolism     Respiratory failure with hypoxia 02/04/2021    Rheumatoid arthritis 11/06/2017    CURRENTLY CONTROLLED, BUT WITH RESIDUAL DEFORMITY. WILL FOLLOW WITH DR. ANDERSON     Seizures 06/15/1995    Gave birth to my baby daughter went home two days later went into seizures had to go back into the hospital for a week, that was the first and last one I had since that date.    Tachycardia     Total knee replacement status, right 08/17/2015     Social History     Socioeconomic History    Marital status:    Tobacco Use    Smoking status: Never     Passive exposure: Past    Smokeless tobacco: Never   Vaping Use    Vaping status: Never Used   Substance and Sexual Activity    Alcohol use: Yes     Alcohol/week: 2.0 standard drinks of alcohol     Types: 2 Drinks containing 0.5 oz of alcohol per week     Comment: One or two wine coolers a week    Drug use: Never    Sexual activity: Not Currently     Partners: Male     Birth control/protection: Tubal ligation     Comment: Tubes tied     Problem list reviewed by Jordyn James RPH on 3/24/2025 at  9:49 AM    Allergies  Known allergies and reactions were discussed with the patient. The patient's chart has been reviewed for  allergy information and updated as necessary.   Allergies   Allergen Reactions    Aspirin Anaphylaxis     In high doses    Salsalate Hives, Anaphylaxis, Unknown - High Severity and Shortness Of Breath     Allergies reviewed by Jordyn James RPH on 3/24/2025 at  9:49 AM    Relevant Laboratory Values  Common labs           12/10/2024    04:11 12/10/2024    05:34 2/10/2025    07:58 3/19/2025    17:52   Common Labs   Glucose  91  83  75    BUN  24  15  20    Creatinine  1.21  1.25  1.39    Sodium  141  143  140    Potassium  3.9  4.5  4.1    Chloride  103  104  106    Calcium  8.0  8.9  9.5    Albumin   4.2  3.9    Total Bilirubin   0.4  0.6    Alkaline Phosphatase   102  90    AST (SGOT)   17  21    ALT (SGPT)   6  8    WBC 6.15    5.50    Hemoglobin 11.0    12.6    Hematocrit 33.3    38.9    Platelets 163    154    Total Cholesterol   220  183    Triglycerides   87  78    HDL Cholesterol   59  62    LDL Cholesterol    146  107    Hemoglobin A1C    5.40        Lab Assessment  The above labs have been reviewed. No dose adjustments are needed for the specialty medication(s) based on the labs.     Current Medication List  This medication list has been reviewed with the patient and evaluated for any interactions or necessary modifications/recommendations, and updated to include all prescription medications, OTC medications, and supplements the patient is currently taking.  This list reflects what is contained in the patient's profile, which has also been marked as reviewed to communicate to other providers it is the most up to date version of the patient's current medication therapy.     Current Outpatient Medications:     acetaminophen (TYLENOL) 500 MG tablet, Take 1 tablet by mouth Every 6 (Six) Hours., Disp: 120 tablet, Rfl: 0    albuterol sulfate  (90 Base) MCG/ACT inhaler, Inhale 2 puffs Every 6 (Six) Hours As Needed for Wheezing., Disp: 18 g, Rfl: 11    ascorbic acid (VITAMIN C) 250 MG tablet, Take 1 tablet by mouth Daily., Disp: , Rfl:     azaTHIOprine (IMURAN) 50 MG tablet, Take 3 tablets by mouth Daily., Disp: 90 tablet, Rfl: 11    Azelastine HCl 137 MCG/SPRAY solution, Administer 1 spray into the nostril(s) as directed by provider Daily., Disp: , Rfl:     budesonide-formoterol (SYMBICORT) 160-4.5 MCG/ACT inhaler, Inhale 2  puffs 2 (Two) Times a Day., Disp: 10.2 g, Rfl: 12    Calcium Carbonate-Vitamin D (CALTRATE 600+D PO), , Disp: , Rfl:     cetirizine (zyrTEC) 10 MG tablet, Take 1 tablet by mouth Daily., Disp: 90 tablet, Rfl: 1    cyanocobalamin 1000 MCG/ML injection, Inject 1 mL under the skin into the appropriate area as directed Every 30 (Thirty) Days., Disp: 3 mL, Rfl: 3    cyclobenzaprine (FLEXERIL) 10 MG tablet, TAKE 1 TABLET BY MOUTH 2 TIMES A DAY AS NEEDED FOR MUSCLE SPASMS., Disp: 60 tablet, Rfl: 3    Diclofenac Sodium (VOLTAREN) 1 % gel gel, Apply 4 g topically to the appropriate area as directed 4 (Four) Times a Day As Needed (shoulder/hip pain)., Disp: 100 g, Rfl: 3    fluticasone (FLONASE) 50 MCG/ACT nasal spray, SHAKE LIQUID AND USE 2 SPRAYS(100 MCG) IN EACH NOSTRIL EVERY DAY, Disp: 16 g, Rfl: 3    furosemide (LASIX) 40 MG tablet, TAKE 1 TABLET BY MOUTH EVERY DAY, Disp: 90 tablet, Rfl: 1    gabapentin (NEURONTIN) 300 MG capsule, Take 1 capsule by mouth 3 (Three) Times a Day for 30 days. (Patient taking differently: Take 1 capsule by mouth 1 time.), Disp: 90 capsule, Rfl: 0    guaiFENesin (MUCINEX) 600 MG 12 hr tablet, Take 1 tablet by mouth Every 12 (Twelve) Hours., Disp: 60 tablet, Rfl: 3    hydrOXYzine (ATARAX) 25 MG tablet, TAKE ONE TABLET BY MOUTH EVERY 8 HOURS AS NEEDED FOR ITCHING, Disp: 90 tablet, Rfl: 1    levothyroxine (SYNTHROID, LEVOTHROID) 150 MCG tablet, Take 1 tablet by mouth Daily., Disp: 30 tablet, Rfl: 1    Lidocaine 4 %, Place 1 patch on the skin as directed by provider Daily. Remove & Discard patch within 12 hours or as directed by MD, Disp: 30 patch, Rfl: 0    magnesium oxide (MAG-OX) 400 MG tablet, TAKE 1 TABLET BY MOUTH EVERY DAY, Disp: 90 tablet, Rfl: 1    montelukast (SINGULAIR) 10 MG tablet, Take 1 tablet by mouth every night at bedtime., Disp: 90 tablet, Rfl: 1    O2 (OXYGEN), Inhale 3 L/min Daily., Disp: , Rfl:     olopatadine (PATANOL) 0.1 % ophthalmic solution, Apply 1 drop to eye(s) as  directed by provider 2 (Two) Times a Day., Disp: 5 mL, Rfl: 0    ondansetron (ZOFRAN) 4 MG tablet, Take 1 tablet by mouth Every 12 (Twelve) Hours As Needed for Vomiting or Nausea., Disp: 30 tablet, Rfl: 1    pilocarpine (SALAGEN) 5 MG tablet, Take 1 tablet by mouth 3 times a day., Disp: 90 tablet, Rfl: 1    Pirfenidone 267 MG capsule, Take 1 capsule by mouth 3 times a day for 7 days, THEN 2 capsules 3 times a day for 7 days, THEN 3 capsules 3 times a day, Disp: 206 capsule, Rfl: 0    Pirfenidone 267 MG capsule, Take 3 capsules by mouth 3 times a day., Disp: 270 capsule, Rfl: 10    promethazine-dextromethorphan (PROMETHAZINE-DM) 6.25-15 MG/5ML syrup, Take 5 mL by mouth 4 (Four) Times a Day As Needed for Cough., Disp: 120 mL, Rfl: 0    rizatriptan (MAXALT) 10 MG tablet, Take 1 tablet by mouth As Needed (9) for up to 9 doses., Disp: 9 tablet, Rfl: 3    rOPINIRole (REQUIP) 1 MG tablet, Take 1 tablet by mouth every night at bedtime., Disp: 90 tablet, Rfl: 1    spironolactone (ALDACTONE) 50 MG tablet, Take 1 tablet by mouth Daily., Disp: 90 tablet, Rfl: 1    traZODone (DESYREL) 50 MG tablet, TAKE 1 TABLET BY MOUTH EVERY NIGHT. TAKE 1/2 TABLET FOR 3 NIGHT, THEN FULL TABLET IF NO IMPROVEMENT W/ 1/2., Disp: 90 tablet, Rfl: 1    Medicines reviewed by Jordyn James [facility] on 3/24/2025 at  9:49 AM    Drug Interactions  None identified     Initial Education Provided for Specialty Medication  The patient has been provided with the following education and any applicable administration techniques (i.e. self-injection) have been demonstrated for the therapies indicated. All questions and concerns have been addressed prior to the patient receiving the medication, and the patient has verbalized understanding of the education and any materials provided.  Additional patient education shall be provided and documented upon request by the patient, provider or payer.         Esbriet (pirfenidone)         Medication Expectations   Why am I  taking this medication? You are taking this medication for idiopathic pulmonary fibrosis (IPF).   What should I expect while on this medication? You should expect a decrease in the frequency and severity of symptoms.   How does the medication work? Pirfenidone's mechanism of action isn't fully understood, but it may exert antifibrotic properties by decreasing fibroblast proliferation and the production of fibrosis-associated proteins and cytokines. It is believed to exert anti-inflammatory properties by decreasing the accumulation of inflammatory cells.   How long will I be on this medication for? The amount of time you will be on this medication will be determined by your doctor and your response to the medication.    How do I take this medication? You will start with one 267mg tablet three times daily for 7 days, then increase to two 267mg tablets three times daily for 7 days, then increase to three 267mg tablets three times daily as your maintenance dose.   What are some possible side effects? Feeling dizzy, tired or weak; headache; joint pain; upset stomach; trouble sleeping; change in taste.   What happens if I miss a dose? If you miss a dose, take it as soon as you remember. If it is close to the time for your next dose, skip the missed dose and go back to your normal time.             Medication Safety   What are things I should warn my doctor immediately about? Allergic reaction such has hives or trouble breathing. If you develop symptoms of bleeding (especially nose bleeding), chest pain, or sunburn.   What are things that I should be cautious of? GI bleeding, cardiovascular events such as MI or stroke, diarrhea, nausea, vomiting, elevated liver enzymes (especially within first 3 months of treatment)   What are some medications that can interact with this one? Tobacco, Ciprofloxacin, Cannabis, etc. Inform your doctor or pharmacist of any new OTC or Rx medications            Medication Storage/Handling   How  should I handle this medication? Keep this medication our of reach of pets/children in original container. Swallow capsules/tablets whole with liquid, do not open, chew, or crush. If capsule contents are opened, wash hands immediately and thoroughly.   How does this medication need to be stored? Store at room temperature in medication bottle.   How should I dispose of this medication? You can dispose of the medication by mixing the capsules with something undesirable such as used coffee grounds, dirt, or cat litter; afterward, dispose of container and mixture in garbage. DO NOT FLUSH DOWN TOILET OR POUR OUT IN SINK.            Resources/Support   How can I remind myself to take this medication? You can download a reminder jered on your phone or use a calandar  to help with your three times daily administration   Is financial support available?  Yes, [employer] has patient assistance available. Foundation assistance may be available as well.   Which vaccines are recommended for me? Talk to your doctor about these vaccines: Flu, Coronavirus (COVID-19), Pneumococcal (pneumonia), Tdap, Hepatitis B, Zoster (shingles).                 Adherence and Self-Administration  Adherence related to the patient's specialty therapy was discussed with the patient. The Adherence segment of this outreach has been reviewed and updated.   Is there a concern with patient's ability to self administer the medication correctly and without issue?: No  Were any potential barriers to adherence identified during the initial assessment or patient education?: No  Are there any concerns regarding the patient's understanding of the importance of medication adherence?: No  Methods for Supporting Patient Adherence and/or Self-Administration: Advised patient of titration instructions    Goals of Therapy  Goals related to the patient's specialty therapy were discussed with the patient. The Patient Goals segment of this outreach has been reviewed and  updated.   Goals Addressed Today        Specialty Pharmacy General Goal      Improve symptoms of pulmonary fibrosis and/or interstitial lung disease while avoiding adverse. Decrease lung deterioration over time.                  Reassessment Plan & Follow-Up  Medication Therapy Changes: Restart Esbriet titration to goal of 801mg TID  Related Plans, Therapy Recommendations, or Therapy Problems to Be Addressed: Patient advised of titration scheduled, but that if she was experiencing side effects to reach out to that schedule may be altered.   Pharmacist to perform regular reassessments no more than (6) months from the previous assessment.  Care Coordinator to set up future refill outreaches, coordinate prescription delivery, and escalate clinical questions to pharmacist.   Welcome information and patient satisfaction survey to be sent by specialty pharmacy team with patient's initial fill.    Attestation  Therapeutic appropriateness: Appropriate   I attest the patient was actively involved in and has agreed to the above plan of care. If the prescribed therapy is at any point deemed not appropriate based on the current or future assessments, a consultation will be initiated with the patient's specialty care provider to determine the best course of action. The revised plan of therapy will be documented along with any additional patient education provided. Discussed aforementioned material with patient via telemedicine.    Jordyn James, PharmD  Clinic Specialty Pharmacist, Pulmonology  3/24/2025  09:49 EDT

## 2025-03-24 NOTE — PROGRESS NOTES
"Chief Complaint  Follow-up of the Left Knee     Subjective      Aida Mcclure presents to [facility] for follow up of the left knee.  She has had pain in the left knee for years and the pain has increased the last few months. She has had injections, therapy and anti inflammatory in the past. She has Lupus. She is on O2.  She wants to try injections prior to surgery.      Allergies   Allergen Reactions    Aspirin Anaphylaxis     In high doses    Salsalate Hives, Anaphylaxis, Unknown - High Severity and Shortness Of Breath        Social History     Socioeconomic History    Marital status:    Tobacco Use    Smoking status: Never     Passive exposure: Past    Smokeless tobacco: Never   Vaping Use    Vaping status: Never Used   Substance and Sexual Activity    Alcohol use: Yes     Alcohol/week: 2.0 standard drinks of alcohol     Types: 2 Drinks containing 0.5 oz of alcohol per week     Comment: One or two wine coolers a week    Drug use: Never    Sexual activity: Not Currently     Partners: Male     Birth control/protection: Tubal ligation     Comment: Tubes tied        I reviewed the patient's chief complaint, history of present illness, review of systems, past medical history, surgical history, family history, social history, medications, and allergy list.     Review of Systems     Constitutional: Denies fevers, chills, weight loss  Cardiovascular: Denies chest pain, shortness of breath  Skin: Denies rashes, acute skin changes  Neurologic: Denies headache, loss of consciousness        Vital Signs:   /75   Pulse 111   Ht 162.6 cm (64\")   Wt 102 kg (224 lb)   SpO2 94%   BMI 38.45 kg/m²          Physical Exam  General: Alert. No acute distress    Ortho Exam        LEFT KNEE Flexion 110. Extension -5. Stable to varus/valgus stress. Stable to anterior/posterior drawer. Neurovascularly intact. Negative Jayleen. Negative Lachman. Positive EHL, FHL, HS and TA. Sensation " intact to light touch all 5 nerves of the foot. Ambulates with Antalgic gait. Patella is well tracking. Calf supple, non-tender. Positive tenderness to the medial joint line. Positive tenderness to the lateral joint line. Positive Crepitus. Good strength to hamstrings, quadriceps, dorsiflexors, and plantar flexors.  Knee Extensor Mechanism intact Mild swelling.  Kellgren Morgan grading scale is a 4.           Large Joint Arthrocentesis: L knee  Date/Time: 3/24/2025 3:33 PM  Consent given by: patient  Site marked: site marked  Timeout: Immediately prior to procedure a time out was called to verify the correct patient, procedure, equipment, support staff and site/side marked as required   Supporting Documentation  Indications: pain   Procedure Details  Location: knee - L knee  Preparation: Patient was prepped and draped in the usual sterile fashion  Needle gauge: 21 G.  Medications administered: 5 mL lidocaine 1 %; 40 mg triamcinolone acetonide 40 MG/ML  Patient tolerance: patient tolerated the procedure well with no immediate complications    This injection documentation was Scribed for Long Elaine MD by JERSEY Meade.  03/24/25   15:34 EDT       Imaging Results (Most Recent)       None             Result Review :          Assessment and Plan     Diagnoses and all orders for this visit:    1. Left knee pain, unspecified chronicity (Primary)    2. Osteoarthritis of left knee, unspecified osteoarthritis type        Discussed the treatment plan with the patient.     Discussed the risks and benefits of conservative measures. The patient expressed understanding and wished to proceed with a left knee steroid injection.  She tolerated the injection well      Discussed with the patient that due to the steroid injection given today in the office they may see an increase in blood sugar for a few days. Advised patient to monitor sugar after receiving the injection.     Discussed possibility of a reaction from the  injection.  Discussed the possibility that the injection may not completely improve or remove the pain.  Discussed the risk of infection.    She wants to hold off on surgery and assess if injection is helpful.          Call or return if worsening symptoms.    Follow Up     PRN      Patient was given instructions and counseling regarding her condition or for health maintenance advice. Please see specific information pulled into the AVS if appropriate.     Scribed for Long Elaine MD by Kell Wells MA.  03/24/25   15:17 EDT    I have personally performed the services described in this document as scribed by the above individual and it is both accurate and complete. Long Elaine MD 03/25/25

## 2025-03-25 RX ORDER — LIDOCAINE HYDROCHLORIDE 10 MG/ML
5 INJECTION, SOLUTION INFILTRATION; PERINEURAL
Status: COMPLETED | OUTPATIENT
Start: 2025-03-24 | End: 2025-03-24

## 2025-03-25 RX ORDER — TRIAMCINOLONE ACETONIDE 40 MG/ML
40 INJECTION, SUSPENSION INTRA-ARTICULAR; INTRAMUSCULAR
Status: COMPLETED | OUTPATIENT
Start: 2025-03-24 | End: 2025-03-24

## 2025-03-26 ENCOUNTER — TREATMENT (OUTPATIENT)
Dept: PHYSICAL THERAPY | Facility: CLINIC | Age: 60
End: 2025-03-26
Payer: MEDICARE

## 2025-03-26 DIAGNOSIS — M62.838 MUSCLE SPASMS OF NECK: ICD-10-CM

## 2025-03-26 DIAGNOSIS — Z76.0 MEDICATION REFILL: ICD-10-CM

## 2025-03-26 DIAGNOSIS — M54.2 CERVICAL PAIN: Primary | ICD-10-CM

## 2025-03-26 RX ORDER — TRAZODONE HYDROCHLORIDE 50 MG/1
TABLET ORAL
Qty: 30 TABLET | Refills: 0 | Status: SHIPPED | OUTPATIENT
Start: 2025-03-26

## 2025-03-26 RX ORDER — FUROSEMIDE 40 MG/1
40 TABLET ORAL DAILY
Qty: 90 TABLET | Refills: 1 | Status: SHIPPED | OUTPATIENT
Start: 2025-03-26

## 2025-03-26 NOTE — TELEPHONE ENCOUNTER
[place]: [facility] [place] - [place], [place] - [address] - [phone] [facility] [phone] FX    Relationship: Pharmacy    Best call back number: [phone]     Requested Prescriptions:   Requested Prescriptions     Pending Prescriptions Disp Refills    furosemide (LASIX) 40 MG tablet 90 tablet 1     Sig: Take 1 tablet by mouth Daily.        Pharmacy where request should be sent: [facility] ([place]) - [place], [place] - [address] [facility] - [phone] [facility] [phone] FX     Last office visit with prescribing clinician: 2/11/2025   Last telemedicine visit with prescribing clinician: Visit date not found   Next office visit with prescribing clinician: 5/1/2025       Does the patient have less than a 3 day supply:  [] Yes  [] No    Would you like a call back once the refill request has been completed: [] Yes [] No    If the office needs to give you a call back, can they leave a voicemail: [] Yes [] No    Sarah Rodriguez Rep   03/26/25 10:23 EDT

## 2025-03-26 NOTE — PROGRESS NOTES
"Physical Therapy Daily Treatment Note  [place] [facility] [address] [place] [address]    Patient: Aida Mcclure   : 1965  Referring practitioner: Merline Enamorado MD  Date of Initial Visit: Type: THERAPY  Noted: 3/4/2025  Today's Date: 3/26/2025  Patient seen for 5 sessions           Subjective  Aida Mcclure reports:  she is feeling fair today. She noted she did change her schedule due to upcoming trip. \"I just need to take you with me to keep me tuned up,\" joking with PTA.     Objective   See Exercise, Manual, and Modality Logs for complete treatment.     Assessment/Plan  Patient tolerated today's treatment without complaints of pain. Improvements noted in cervical AROM following  session. Strength, ROM, and increased pain with activity deficits still limits patient's ability to perform ADL's. Further skilled care indicated at this time.      Visit Diagnoses:    ICD-10-CM ICD-9-CM   1. Cervical pain  M54.2 723.1   2. Muscle spasms of neck  M62.838 728.85       Progress per Plan of Care and Progress strengthening /stabilization /functional activity       Timed:  Manual Therapy:    25     mins  50693;  Therapeutic Exercise:         mins  09415;     Neuromuscular Eric:        mins  96576;    Therapeutic Activity:          mins  98372;     Gait Training:           mins  22255;     Ultrasound:          mins  28303;    Electrical Stimulation:         mins  14629 ( );  Aquatics  __   mins   39425    Untimed:  Electrical Stimulation:         mins  27283 ( );  Mechanical Traction:         mins  00141;     Timed Treatment:   25   mins   Total Treatment:     25   mins    Electronically Signed:  Berenice Nava PTA  Physical Therapist Assistant    [place] [facility] license [id number]            "

## 2025-04-16 ENCOUNTER — SPECIALTY PHARMACY (OUTPATIENT)
Dept: PULMONOLOGY | Facility: CLINIC | Age: 60
End: 2025-04-16
Payer: MEDICARE

## 2025-04-16 NOTE — PROGRESS NOTES
Specialty Pharmacy Patient Management Program  Refill Outreach     [facility] was contacted today regarding refills of their medication(s). Pirfenidone 267 mg caps.    Refill Questions      Flowsheet Row Most Recent Value   Changes to allergies? No   Changes to medications? No   New conditions or infections since last clinic visit No   Unplanned office visit, urgent care, ED, or hospital admission in the last 4 weeks  No   How does patient/caregiver feel medication is working? Very good   Financial problems or insurance changes  No   Since the previous refill, were any specialty medication doses or scheduled injections missed or delayed?  No   Does this patient require a clinical escalation to a pharmacist? No            Delivery Questions      Flowsheet Row Most Recent Value   Delivery method UPS   Delivery address verified with patient/caregiver? Yes   Delivery address Home   Number of medications in delivery 1   Medication(s) being filled and delivered Pirfenidone   Doses left of specialty medications 1 week   Copay verified? Yes   Copay amount $0.00   Copay form of payment No copayment ($0)   Delivery Date Selection 04/17/25   Signature Required No   Do you consent to receive electronic handouts?  Yes                 Follow-up: 30  day(s)     Paola Tinoco, Pharmacy Technician  4/16/2025  11:20 EDT

## 2025-04-22 ENCOUNTER — TELEPHONE (OUTPATIENT)
Dept: ORTHOPEDICS | Facility: OTHER | Age: 60
End: 2025-04-22
Payer: MEDICARE

## 2025-04-22 ENCOUNTER — TELEPHONE (OUTPATIENT)
Dept: ORTHOPEDIC SURGERY | Facility: CLINIC | Age: 60
End: 2025-04-22
Payer: MEDICARE

## 2025-04-22 NOTE — TELEPHONE ENCOUNTER
SPOKE WITH PATIENT, SX & PRE-OP APPTS HAVE BEEN CX'D PER PATIENT'S REQUEST. SHE STATES SHE WILL CALL BACK TO RS AT A LATER TIME.     SPOKE WITH FRANNY IN SCHEDULING TO CX SX FOR 5/20/2025.

## 2025-04-22 NOTE — TELEPHONE ENCOUNTER
DELETE AFTER REVIEWING: Send this encounter to the appropriate pool. See your Call Action Grid or Workflows for direction.        Hub staff attempted to follow warm transfer process and was unsuccessful     Caller:     Relationship to patient:     Best call back number: [phone] (home)       Patient is needing: PATIENT IS SCHEDULED FOR SX ON MAY 20TH. SHE NEEDS TO CANCEL  PATIENT IS UNURE OF WHEN BUT SHE DOES WANT TO RS    DR BEEN   LEFT TOTAL KNEE ARTHROPLASTY: SURGICAL PROCEDURE TO RESURFACE DAMAGED LEFT KNEE AND REPAIR WITH ARTIFICIAL PARTS

## 2025-04-29 ENCOUNTER — TREATMENT (OUTPATIENT)
Dept: PHYSICAL THERAPY | Facility: CLINIC | Age: 60
End: 2025-04-29
Payer: MEDICARE

## 2025-04-29 DIAGNOSIS — M62.838 MUSCLE SPASMS OF NECK: ICD-10-CM

## 2025-04-29 DIAGNOSIS — M54.2 CERVICAL PAIN: Primary | ICD-10-CM

## 2025-04-29 NOTE — PROGRESS NOTES
Progress Note      Patient: Aida Mcclure   : 1965  Diagnosis/ICD-10 Code:  Cervical pain [M54.2]  Referring practitioner: Merline Enamorado MD  Date of Initial Visit: Type: THERAPY  Noted: 3/4/2025  Today's Date: 2025  Patient seen for 6 sessions      Subjective:   Subjective Questionnaire: NDI:not collected  Clinical Progress: improved  Home Program Compliance: Yes  Treatment has included: therapeutic exercise, neuromuscular re-education, manual therapy, and therapeutic activity    Subjective   Patient reports overall improvement but is still having catching occur and pain with moving her head and while at work.   Objective          Postural Observations    Additional Postural Observation Details  Fwd head, rounded shoulders    Palpation   Left   Hypertonic in the scalenes.   Tenderness of the scalenes.     Tenderness   Cervical Spine   Tenderness in the left 1st rib.     Additional Tenderness Details  Tender to C6 and C7    Active Range of Motion   Cervical/Thoracic Spine   Cervical    Flexion: WFL  Extension: 68 (pulling) degrees with pain  Left lateral flexion: 44 degrees   Right lateral flexion: 43 (pulling in the spasm spot) degrees with pain  Left rotation: 81 degrees   Right rotation: 72 degrees with pain    Strength/Myotome Testing   Cervical Spine     Left   Normal strength    Right   Normal strength      See Exercise, Manual, and Modality Logs for complete treatment.     Assessment/Plan  Patient has progressed well since beginning skilled care. Significant improvement noted in both strength and ROM allowing for improved capacity to driving and light tasks in the home. They are still limited in ROM, strength, neurodynamics and by increased pain which limit their ability to perform heavy tasks in the home, work tasks, and resting comfortably. Further skilled care indicated at this time to address remaining deficits.     Progress toward previous goals: Partially Met   1. The patient has  limited ROM.                       LTG 1: 12 weeks:  The patient will demonstrate 65° of cervical spine rotation, 35° of cervical side bending, 55° of cervical flexion, and 60° of cervical extension in order to adequately monitor blind spots while driving and improve ability to perform activities of daily living.                          STATUS:  progressing    STG 1a: 6 weeks:  The patient will demonstrate 60° of cervical spine rotation, 30° of cervical side bending, 50° of cervical flexion, and 55° of cervical extension in order to adequately monitor blind spots while driving and improve ability to perform activities of daily living.                                      STATUS:  progressing                  2. The patient has complaints of pain.              LTG 2: 12 weeks:  The patient will report 1/10 pain in order to more easily tolerate activities of daily living and improve sleep quality.                          STATUS:  progressing                STG 2a: 6 weeks:  The patient will report 4/10 pain.                          STATUS:  met     3. The patient reports radicular symptoms in the upper extremity.              LTG 3: 12 weeks:  The patient will report a decrease in spasm symptoms in the upper extremity by 90%.                          STATUS:  progressing                STG 3a: 6 weeks:  The patient will report a decrease in spasm symptoms in the upper extremity by 50%.                          STATUS:  met     4. Carrying, Moving, and Handling Objects Functional Limitation                               LTG 4: 12 weeks:  The patient will demonstrate improved function by achieving a score of 5 on the Neck Disability Index.                          STATUS:  progressing                STG 4a: 6 weeks:  The patient will demonstrate improved function by achieving a score of 10 on the Neck Disability Index.                            STATUS:  progressing     Recommendations: Continue as  planned  Timeframe: 1 month  Prognosis to achieve goals: good    PT Signature: Al Walls, PT    Electronically signed 2025    [place] License: PT - [id number]       Timed:  Manual Therapy:    15     mins  94736;  Therapeutic Exercise:    10     mins  50994;     Neuromuscular Eric:    0    mins  31454;    Therapeutic Activity:     0     mins  38038;     Gait Trainin     mins  27894;     Aquatics                         0      mins  15425    Un-timed:  Mechanical Traction      0     mins  61423  Dry Needling     0     mins self-pay  Electrical Stimulation:    0     mins  87536 ( );  5 minutes re-eval [id number]  Timed Treatment:   25   mins   Total Treatment:     30   mins

## 2025-05-02 DIAGNOSIS — E03.9 HYPOTHYROIDISM, UNSPECIFIED TYPE: ICD-10-CM

## 2025-05-02 DIAGNOSIS — Z76.0 MEDICATION REFILL: ICD-10-CM

## 2025-05-02 RX ORDER — HYDROXYZINE HYDROCHLORIDE 25 MG/1
25 TABLET, FILM COATED ORAL EVERY 8 HOURS PRN
Qty: 90 TABLET | Refills: 1 | Status: SHIPPED | OUTPATIENT
Start: 2025-05-02

## 2025-05-02 RX ORDER — LEVOTHYROXINE SODIUM 150 UG/1
TABLET ORAL
Qty: 30 TABLET | Refills: 11 | Status: SHIPPED | OUTPATIENT
Start: 2025-05-02

## 2025-05-06 ENCOUNTER — TELEPHONE (OUTPATIENT)
Dept: PHYSICAL THERAPY | Facility: CLINIC | Age: 60
End: 2025-05-06

## 2025-05-06 NOTE — TELEPHONE ENCOUNTER
Caller: Aida Mcclure April    Relationship: Self    What was the call regarding: FAMILY EMERGENCY.

## 2025-05-07 ENCOUNTER — TELEPHONE (OUTPATIENT)
Dept: INTERNAL MEDICINE | Facility: CLINIC | Age: 60
End: 2025-05-07
Payer: MEDICARE

## 2025-05-07 DIAGNOSIS — Z76.0 MEDICATION REFILL: ICD-10-CM

## 2025-05-07 DIAGNOSIS — E03.9 HYPOTHYROIDISM, UNSPECIFIED TYPE: ICD-10-CM

## 2025-05-07 NOTE — TELEPHONE ENCOUNTER
Caller: [facility]      Requested Prescriptions:   levothyroxine (SYNTHROID, LEVOTHROID) 150 MCG tablet     hydrOXYzine (ATARAX) 25 MG tablet      Pharmacy where request should be sent:   [facility]    Additional details provided by patient:      Does the patient have less than a 3 day supply:  [x] Yes  [] No     Would you like a call back once the refill request has been completed: [x] Yes [] No     If the office needs to give you a call back, can they leave a voicemail: [x] Yes [] No

## 2025-05-12 RX ORDER — HYDROXYZINE HYDROCHLORIDE 25 MG/1
25 TABLET, FILM COATED ORAL EVERY 8 HOURS PRN
Qty: 90 TABLET | Refills: 1 | Status: SHIPPED | OUTPATIENT
Start: 2025-05-12

## 2025-05-12 RX ORDER — LEVOTHYROXINE SODIUM 150 UG/1
150 TABLET ORAL
Qty: 30 TABLET | Refills: 2 | Status: SHIPPED | OUTPATIENT
Start: 2025-05-12

## 2025-05-13 ENCOUNTER — TREATMENT (OUTPATIENT)
Dept: PHYSICAL THERAPY | Facility: CLINIC | Age: 60
End: 2025-05-13
Payer: MEDICARE

## 2025-05-13 DIAGNOSIS — M54.2 CERVICAL PAIN: Primary | ICD-10-CM

## 2025-05-13 DIAGNOSIS — M62.838 MUSCLE SPASMS OF NECK: ICD-10-CM

## 2025-05-13 PROCEDURE — 97140 MANUAL THERAPY 1/> REGIONS: CPT

## 2025-05-13 PROCEDURE — 97110 THERAPEUTIC EXERCISES: CPT

## 2025-05-13 NOTE — PROGRESS NOTES
Physical Therapy Daily Treatment Note                          Patient: Aida Mcclure   : 1965  Diagnosis/ICD-10 Code:  Cervical pain [M54.2]  Referring practitioner: Merline Enamorado MD  Date of Initial Visit: Type: THERAPY  Noted: 3/4/2025  Today's Date: 2025  Patient seen for 7 sessions           Subjective   The patient reported overall improvement.     Objective   See Exercise, Manual, and Modality Logs for complete treatment.     Assessment/Plan     Patient tolerated today's treatment without complaints of pain. New strengthening tolerated well with modification required for R shoulder pain. Strength, ROM, and increased pain with activity deficits still limits patient's ability to perform ADLs. Further skilled care indicated at this time.       Timed:  Manual Therapy:    15     mins  17759;  Therapeutic Exercise:    15     mins  81341;     Neuromuscular Eric:   0    mins  87716;    Therapeutic Activity:     0     mins  66006;     Gait Trainin     mins  42090;     Aquatics                         0      mins  85078    Un-timed:  Mechanical Traction      0     mins  03385  Dry Needling     0     mins self-pay  Electrical Stimulation:    0     mins  99971 ( );      Timed Treatment:   30   mins   Total Treatment:     30   mins    Al Walls PT  Physical Therapist    Electronically signed 2025    [place] License: PT - [id number]

## 2025-05-15 ENCOUNTER — TREATMENT (OUTPATIENT)
Dept: PHYSICAL THERAPY | Facility: CLINIC | Age: 60
End: 2025-05-15
Payer: MEDICARE

## 2025-05-15 ENCOUNTER — OFFICE VISIT (OUTPATIENT)
Dept: PULMONOLOGY | Facility: CLINIC | Age: 60
End: 2025-05-15
Payer: MEDICARE

## 2025-05-15 VITALS
WEIGHT: 225.6 LBS | HEIGHT: 64 IN | HEART RATE: 90 BPM | TEMPERATURE: 98.1 F | BODY MASS INDEX: 38.51 KG/M2 | SYSTOLIC BLOOD PRESSURE: 126 MMHG | RESPIRATION RATE: 18 BRPM | DIASTOLIC BLOOD PRESSURE: 77 MMHG | OXYGEN SATURATION: 95 %

## 2025-05-15 DIAGNOSIS — M54.2 CERVICAL PAIN: Primary | ICD-10-CM

## 2025-05-15 DIAGNOSIS — Z51.81 THERAPEUTIC DRUG MONITORING: ICD-10-CM

## 2025-05-15 DIAGNOSIS — J96.11 CHRONIC RESPIRATORY FAILURE WITH HYPOXIA: ICD-10-CM

## 2025-05-15 DIAGNOSIS — J84.9 ILD (INTERSTITIAL LUNG DISEASE): Primary | ICD-10-CM

## 2025-05-15 DIAGNOSIS — B37.0 THRUSH: ICD-10-CM

## 2025-05-15 DIAGNOSIS — D84.9 IMMUNOSUPPRESSED STATUS: ICD-10-CM

## 2025-05-15 DIAGNOSIS — I50.32 CHRONIC DIASTOLIC CHF (CONGESTIVE HEART FAILURE): ICD-10-CM

## 2025-05-15 DIAGNOSIS — M62.838 MUSCLE SPASMS OF NECK: ICD-10-CM

## 2025-05-15 DIAGNOSIS — R06.09 CHRONIC DYSPNEA: ICD-10-CM

## 2025-05-15 DIAGNOSIS — J84.10 PULMONARY FIBROSIS: ICD-10-CM

## 2025-05-15 DIAGNOSIS — M35.1 MIXED CONNECTIVE TISSUE DISEASE: ICD-10-CM

## 2025-05-15 DIAGNOSIS — E66.812 CLASS 2 OBESITY: ICD-10-CM

## 2025-05-15 RX ORDER — ALBUTEROL SULFATE 1.25 MG/3ML
1 SOLUTION RESPIRATORY (INHALATION) EVERY 6 HOURS PRN
Qty: 120 EACH | Refills: 11 | Status: SHIPPED | OUTPATIENT
Start: 2025-05-15

## 2025-05-15 RX ORDER — NYSTATIN 100000 [USP'U]/ML
500000 SUSPENSION ORAL 4 TIMES DAILY
Qty: 280 ML | Refills: 0 | Status: SHIPPED | OUTPATIENT
Start: 2025-05-15

## 2025-05-15 NOTE — PROGRESS NOTES
Physical Therapy Daily Treatment Note                          Patient: Aida Mcclure   : 1965  Diagnosis/ICD-10 Code:  Cervical pain [M54.2]  Referring practitioner: Merline Enamorado MD  Date of Initial Visit: Type: THERAPY  Noted: 3/4/2025  Today's Date: 5/15/2025  Patient seen for 8 sessions           Subjective   The patient reported being very tired and tight from a busy day of work.     Objective   See Exercise, Manual, and Modality Logs for complete treatment.     Assessment/Plan     Patient tolerated today's treatment without complaints of pain. Continued strengthening per tolerance of right shoulder. Strength, ROM, and increased pain with activity deficits still limits patient's ability to perform ADLs. Further skilled care indicated at this time.       Timed:  Manual Therapy:    15     mins  22941;  Therapeutic Exercise:    15     mins  24578;     Neuromuscular Eric:   0    mins  98549;    Therapeutic Activity:     0     mins  16898;     Gait Trainin     mins  19943;     Aquatics                         0      mins  21282    Un-timed:  Mechanical Traction      0     mins  59539  Dry Needling     0     mins self-pay  Electrical Stimulation:    0     mins  20914 ( );      Timed Treatment:   30   mins   Total Treatment:     30   mins    Al Walls PT  Physical Therapist    Electronically signed 5/15/2025    [place] License: PT - [id number]

## 2025-05-15 NOTE — PROGRESS NOTES
Primary Care Provider  Merline Enamorado MD     Referring Provider  No ref. provider found     Chief Complaint  Lung Nodule, ILD (interstitial lung disease), Follow-up (3 Month ), and Shortness of Breath (Patient states her shortness of breath has worsened within the last few months. /With ambulation )    Subjective    History of Presenting Illness  60-year-old female with CT ILD, diastolic dysfunction, immunosuppressed on Imuran here for follow-up.  Since last visit she has had increased cough.  Secretions are thick and having trouble cleaning her airways.  She is not on any nebulizers at home.  Does have stable pulmonary fibrosis and PFT showed stable restrictive lung defect.  She wears 2 to 3 L of oxygen continuously with activity.  Has known restrictive lung defect due to her CT ILD.  Remains on Imuran at a therapeutic dose.  Dyspnea is at baseline.  She does get short of breath very easily.  She can only walk about 500 feet before stopping.  Dyspnea is moderate severity, worse with activity and relieved with rest.  No hemoptysis or wheezing.  She has had some hoarseness the last few months and has had a previous history of thrush.  She states that there are some white plaques in the back of her throat.    Patient states that she is taking Zyrtec, Singular, Astelin nasal spray, and Flonase nasal spray for seasonal allergies.  Patient is on 3 L of oxygen per minute via nasal cannula.  Patient states that she is under the care of Dr. Boone, for lupus.  Patient denies fever, chills, night sweats, swollen glands in the head and neck, unintentional weight loss, hemoptysis, purulent sputum production, dysphagia, chest pain, palpitations, chest tightness, abdominal pain, nausea, vomiting, and diarrhea.  Patient also denies any myalgias, changes in sense of taste and/or smell, sore throat, any other coronavirus or flu-like symptoms.  Patient denies any leg swelling, orthopnea, paroxysmal nocturnal dyspnea.  Patient  is able to perform activities of daily living.      Family History   Problem Relation Age of Onset    Stroke Father     Alcohol abuse Father     Arthritis Father     Hyperlipidemia Father     Parkinsonism Father     Lung cancer Mother     Diabetes Mother     Alcohol abuse Sister     Thyroid disease Sister     Anxiety disorder Daughter         Social History     Socioeconomic History    Marital status:    Tobacco Use    Smoking status: Never     Passive exposure: Past    Smokeless tobacco: Never   Vaping Use    Vaping status: Never Used   Substance and Sexual Activity    Alcohol use: Yes     Alcohol/week: 2.0 standard drinks of alcohol     Types: 2 Drinks containing 0.5 oz of alcohol per week     Comment: One or two wine coolers a week    Drug use: Never    Sexual activity: Not Currently     Partners: Male     Birth control/protection: Tubal ligation     Comment: Tubes tied        Past Medical History:   Diagnosis Date    Anemia     Annual physical exam 11/06/2017    WILL RESCHEDULE MAMMOGRAM     Breast lump     Cataract 2010 ?    Had them in both eyes have been removed.    CHF (congestive heart failure) 11/06/2017    FOLLOWS WITH CARDS. CURRENTLY DOING WELL ON LASIX, LISINOPRIL, ALDACTONE, AND PROPANOLOL    Cholelithiasis 11/1/22    Went to ER was diagnosed with it that evening    Chronic constipation 2000    All this was from all the different medications I've been taking since that time.    Degeneration of lumbar intervertebral disc 06/12/2012    Diverticulitis     Diverticulitis of colon 11/8/22    Went to ER was diagnosed with it    Diverticulosis     Essential hypertension 11/06/2017    WELL CONTROLLED ON CURRENT REGIMEN     GERD (gastroesophageal reflux disease)     Hamstring injury 08/17/2015    BILATERAL HAMSTRING INJURY/PAIN     Head injury     Hernia cerebri     Hyperthyroidism     Hyperthyroidism 11/06/2017    CURRENTLY ON METHIMAZOLE, PT REPORT POSSIBLY DUE TO GRAVE'S DISEASE. WILL NEED TO OBTAIN  AND REVIEW MEDICAL RECORDS FROM [place]. 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    Insomnia 11/06/2017    DISCUSSED SLEEP HYGIENE. PT TO TRY AVOIDING BLUE LIGHT AT NIGHT. PT ALSO TO SET ROUTINE PRIOR TO BEDTIME. WILL AVOID MEDS AT THIS TIME     Interstitial lung disease     Low back pain     Lumbosacral disc herniation 06/12/2012    LEFT L5-S1 SMALL NATURE. SHE HAS FAILED ALL CONSERVATIVE MEASURES AND DESIRES SURGERY     Lupus     SEES DR. ANDERSON    Migraines     Obesity     All my life    Pulmonary embolism     Respiratory failure with hypoxia 02/04/2021    Rheumatoid arthritis 11/06/2017    CURRENTLY CONTROLLED, BUT WITH RESIDUAL DEFORMITY. WILL FOLLOW WITH DR. ANDERSON     Seizures 06/15/1995    Gave birth to my baby daughter went home two days later went into seizures had to go back into the hospital for a week, that was the first and last one I had since that date.    Shortness of breath 2005    With the combination of my Systemic lupus, Overactive Thyroid and Congestive Heart Failure makes it hard to breathe sometimes.    Tachycardia     Total knee replacement status, right 08/17/2015        Immunization History   Administered Date(s) Administered    Arexvy (RSV, Adults 60+ yrs) 04/08/2024    COVID-19 (SANDI) 03/29/2021    COVID-19 (PFIZER) 12YRS+ (COMIRNATY) 04/08/2024, 09/16/2024    COVID-19 (PFIZER) Purple Cap Monovalent 12/16/2021    Flu Vaccine Intradermal Quad 18-64YR 01/02/2008, 11/10/2008, 11/05/2009    Flu Vaccine Quad PF >36MO 10/25/2017    Fluzone  >6mos 08/27/2012, 10/15/2013    Fluzone (or Fluarix & Flulaval for VFC) >6mos 10/25/2017, 11/18/2021, 10/12/2022, 11/29/2023    Hepatitis A 04/30/2018    Influenza Inj MDCK Preserative Free 09/16/2024    Influenza Seasonal Injectable 01/02/2008, 11/10/2008, 11/05/2009     Influenza, Unspecified 09/17/2020, 10/01/2021, 10/12/2022    Pneumococcal Conjugate 13-Valent (PCV13) 06/14/2018    Pneumococcal Conjugate 20-Valent (PCV20) 11/19/2024    Pneumococcal Polysaccharide (PPSV23) 08/26/2019    Shingrix 12/30/2022, 04/08/2024    Tdap 09/16/2024       Allergies   Allergen Reactions    Aspirin Anaphylaxis     In high doses    Salsalate Hives, Anaphylaxis, Unknown - High Severity and Shortness Of Breath          Current Outpatient Medications:     acetaminophen (TYLENOL) 500 MG tablet, Take 1 tablet by mouth Every 6 (Six) Hours., Disp: 120 tablet, Rfl: 0    albuterol sulfate  (90 Base) MCG/ACT inhaler, Inhale 2 puffs Every 6 (Six) Hours As Needed for Wheezing., Disp: 18 g, Rfl: 11    ascorbic acid (VITAMIN C) 250 MG tablet, Take 1 tablet by mouth Daily., Disp: , Rfl:     azaTHIOprine (IMURAN) 50 MG tablet, Take 3 tablets by mouth Daily., Disp: 90 tablet, Rfl: 11    Azelastine HCl 137 MCG/SPRAY solution, Administer 1 spray into the nostril(s) as directed by provider Daily., Disp: , Rfl:     budesonide-formoterol (SYMBICORT) 160-4.5 MCG/ACT inhaler, Inhale 2 puffs 2 (Two) Times a Day., Disp: 10.2 g, Rfl: 12    Calcium Carbonate-Vitamin D (CALTRATE 600+D PO), , Disp: , Rfl:     cetirizine (zyrTEC) 10 MG tablet, Take 1 tablet by mouth Daily., Disp: 90 tablet, Rfl: 1    cyanocobalamin 1000 MCG/ML injection, Inject 1 mL under the skin into the appropriate area as directed Every 30 (Thirty) Days., Disp: 3 mL, Rfl: 3    cyclobenzaprine (FLEXERIL) 10 MG tablet, TAKE 1 TABLET BY MOUTH 2 TIMES A DAY AS NEEDED FOR MUSCLE SPASMS., Disp: 60 tablet, Rfl: 3    Diclofenac Sodium (VOLTAREN) 1 % gel gel, Apply 4 g topically to the appropriate area as directed 4 (Four) Times a Day As Needed (shoulder/hip pain)., Disp: 100 g, Rfl: 3    fluticasone (FLONASE) 50 MCG/ACT nasal spray, SHAKE LIQUID AND USE 2 SPRAYS(100 MCG) IN EACH NOSTRIL EVERY DAY, Disp: 16 g, Rfl: 3    furosemide (LASIX) 40 MG tablet, Take  1 tablet by mouth Daily., Disp: 90 tablet, Rfl: 1    guaiFENesin (MUCINEX) 600 MG 12 hr tablet, Take 1 tablet by mouth Every 12 (Twelve) Hours., Disp: 60 tablet, Rfl: 3    hydrOXYzine (ATARAX) 25 MG tablet, Take 1 tablet by mouth Every 8 (Eight) Hours As Needed for Itching., Disp: 90 tablet, Rfl: 1    levothyroxine (SYNTHROID, LEVOTHROID) 150 MCG tablet, Take 1 tablet by mouth Every Morning., Disp: 30 tablet, Rfl: 2    Lidocaine 4 %, Place 1 patch on the skin as directed by provider Daily. Remove & Discard patch within 12 hours or as directed by MD, Disp: 30 patch, Rfl: 0    magnesium oxide (MAG-OX) 400 MG tablet, TAKE 1 TABLET BY MOUTH EVERY DAY, Disp: 90 tablet, Rfl: 1    montelukast (SINGULAIR) 10 MG tablet, Take 1 tablet by mouth every night at bedtime., Disp: 90 tablet, Rfl: 1    O2 (OXYGEN), Inhale 3 L/min Daily., Disp: , Rfl:     olopatadine (PATANOL) 0.1 % ophthalmic solution, Apply 1 drop to eye(s) as directed by provider 2 (Two) Times a Day., Disp: 5 mL, Rfl: 0    ondansetron (ZOFRAN) 4 MG tablet, Take 1 tablet by mouth Every 12 (Twelve) Hours As Needed for Vomiting or Nausea., Disp: 30 tablet, Rfl: 1    pilocarpine (SALAGEN) 5 MG tablet, Take 1 tablet by mouth 3 times a day., Disp: 90 tablet, Rfl: 1    Pirfenidone 267 MG capsule, Take 3 capsules by mouth 3 times a day., Disp: 270 capsule, Rfl: 10    promethazine-dextromethorphan (PROMETHAZINE-DM) 6.25-15 MG/5ML syrup, Take 5 mL by mouth 4 (Four) Times a Day As Needed for Cough., Disp: 120 mL, Rfl: 0    rizatriptan (MAXALT) 10 MG tablet, Take 1 tablet by mouth As Needed (9) for up to 9 doses., Disp: 9 tablet, Rfl: 3    rOPINIRole (REQUIP) 1 MG tablet, Take 1 tablet by mouth every night at bedtime., Disp: 90 tablet, Rfl: 1    spironolactone (ALDACTONE) 50 MG tablet, Take 1 tablet by mouth Daily., Disp: 90 tablet, Rfl: 1    traZODone (DESYREL) 50 MG tablet, TAKE ONE TABLET BY MOUTH DAILY AT 9 PM, Disp: 30 tablet, Rfl: 0    albuterol (ACCUNEB) 1.25 MG/3ML  "nebulizer solution, Take 3 mL by nebulization Every 6 (Six) Hours As Needed for Wheezing., Disp: 120 each, Rfl: 11    gabapentin (NEURONTIN) 300 MG capsule, Take 1 capsule by mouth 3 (Three) Times a Day for 30 days. (Patient taking differently: Take 1 capsule by mouth 1 time.), Disp: 90 capsule, Rfl: 0    nystatin (MYCOSTATIN) 100,000 unit/mL suspension, Take 5 mL by mouth 4 (Four) Times a Day., Disp: 280 mL, Rfl: 0     Objective     Physical Exam  Vital Signs:   WDWN, Alert, NAD.    HEENT:  PERRL, EOMI.  OP with erythema, nares clear  Chest:  Mildly decreased breath sounds throughout with inspiratory Velcro-like crackles at the bases. Normal work of breathing noted. Patient is able speak full sentences without difficulty.  Patient is on 3 L of oxygen per minute via nasal cannula.   CV: RRR, no MGR, pulses 2+, equal.  Abd:  Soft, NT, ND, + BS, no HSM, obese  EXT:  no clubbing, no cyanosis, trace BLE edema  Neuro:  A&Ox3, CN grossly intact, no focal deficits.  Skin: No rashes or lesions noted.    /77 (BP Location: Left arm, Patient Position: Sitting, Cuff Size: Adult) Comment (BP Location): forearm  Pulse 90   Temp 98.1 °F (36.7 °C) (Oral)   Resp 18   Ht 162.6 cm (64\")   Wt 102 kg (225 lb 9.6 oz)   SpO2 95% Comment: 3L  BMI 38.72 kg/m²         Result Review :   Personally reviewed Kasey Desai's last office note.  Last PFT showed a stable restrictive lung defect.  Desaturations noted on 6-minute walk test but walk test stable.  No change in ILD on chest CT.  CBC and CMP grossly unremarkable.  Creatinine at baseline.  Creatinine at baseline.  No evidence of liver dysfunction.  No evidence of drug-induced cytopenias.      CMP          12/10/2024    05:34 2/10/2025    07:58 3/19/2025    17:52   CMP   Glucose 91  83  75    BUN 24  15  20    Creatinine 1.21  1.25  1.39    EGFR 51.1  49.1  43.3    Sodium 141  143  140    Potassium 3.9  4.5  4.1    Chloride 103  104  106    Calcium 8.0  8.9  9.5    Total " Protein  7.5  7.6    Albumin  4.2  3.9    Globulin  3.3  3.7    Total Bilirubin  0.4  0.6    Alkaline Phosphatase  102  90    AST (SGOT)  17  21    ALT (SGPT)  6  8    Albumin/Globulin Ratio  1.3  1.1    BUN/Creatinine Ratio 19.8  12.0  14.4    Anion Gap 9.5  14.9  9.5      CBC          12/9/2024    06:06 12/10/2024    04:11 3/19/2025    17:52   CBC   WBC 6.56  6.15  5.50    RBC 3.85  3.63  4.28    Hemoglobin 11.0  11.0  12.6    Hematocrit 36.4  33.3  38.9    MCV 94.5  91.7  90.9    MCH 28.6  30.3  29.4    MCHC 30.2  33.0  32.4    RDW 14.6  14.4  12.5    Platelets 154  163  154              Assessment and Plan      Assessment:  1. Interstitial lung disease secondary to mixed connective tissue disease without exacerbation.   2. Known mixed connective tissue disease.       3. Chronic compensated diastolic congestive heart failure.   4. Chronic dyspnea at baseline.       5. Immunosuppressed status on Imuran.       6. Therapeutic drug monitoring on Imuran and Esbriet.       7. History of PE on anticoagulation in the past.     8.  Lung nodules.  9.  Seasonal allergies.  10.  ?Lupus: Patient is under the care of Dr. Boone.  11. Never smoker.   12.  Class II obesity BMI 38.7  13.  Oropharyngeal candidiasis    Plan:  Will give course of nystatin swish and swallow for oral pharyngeal candidiasis  Patient seems to be tolerating Esbriet well.  Continue for now on current dose  Continue annual PFT and 6-minute walk test.  Next due in October 2025.  2024 PFT showed stable restrictive lung defect  Continue therapeutic drug monitor with CBC and CMP.  October labs reviewed with no significant changes.  Continue to check every 6 months  Continue current dose of Imuran 150 mg daily  Continue albuterol as needed  Will give albuterol nebulizer as needed and nebulizer equipment today.  Nebulizer education provided today.  Continue Singulair, Zyrtec, Astelin and Flonase  Continue 2 to 3 L of oxygen to keep SpO2 greater than  90%  Continue diuretics with Lasix and Aldactone.  Volume status adequate at this time.  Has ongoing cardiology follow-up  Diet and exercise counseling provided today.  Recommended 30 minutes daily exercise well is an 1800 -calorie/day diet.  Patient verbalized understanding.  Total time counseling the patient today was 3 minutes  Vaccination status: patient reports they are up-to-date with flu, pneumonia, and Covid vaccines.    Smoking status: Never smoker.  Medications reviewed, reconciled    40 minutes of time spent managing this patient today.  This included personally reviewing labs, imaging, PFTs, documentation.  This also included time spent counseling the patient regarding her test results, interstitial lung disease, disease progression and management.    Follow Up   Return in about 2 months (around 7/15/2025).  Patient was given instructions and counseling regarding her condition or for health maintenance advice. Please see specific information pulled into the AVS if appropriate.     Electronically signed by Saeid Dennis MD, 05/15/25, 2:30 PM EDT.

## 2025-05-16 DIAGNOSIS — E03.9 HYPOTHYROIDISM, UNSPECIFIED TYPE: ICD-10-CM

## 2025-05-16 DIAGNOSIS — Z76.0 MEDICATION REFILL: ICD-10-CM

## 2025-05-16 RX ORDER — HYDROXYZINE HYDROCHLORIDE 25 MG/1
25 TABLET, FILM COATED ORAL EVERY 8 HOURS PRN
Qty: 90 TABLET | Refills: 1 | OUTPATIENT
Start: 2025-05-16

## 2025-05-16 RX ORDER — LEVOTHYROXINE SODIUM 150 UG/1
150 TABLET ORAL
Qty: 30 TABLET | Refills: 2 | OUTPATIENT
Start: 2025-05-16

## 2025-05-16 NOTE — TELEPHONE ENCOUNTER
Caller: [facility] [place] - [place], [place] - [address] [facility] - [phone] [facility] [phone] FX    Relationship: Pharmacy    Best call back number: [phone]    Requested Prescriptions:   Requested Prescriptions     Pending Prescriptions Disp Refills    levothyroxine (SYNTHROID, LEVOTHROID) 150 MCG tablet 30 tablet 2     Sig: Take 1 tablet by mouth Every Morning.    hydrOXYzine (ATARAX) 25 MG tablet 90 tablet 1     Sig: Take 1 tablet by mouth Every 8 (Eight) Hours As Needed for Itching.        Pharmacy where request should be sent: [facility] - [place], [place] - [address] [facility] - [phone]  - [phone] FX     Last office visit with prescribing clinician: 2/11/2025   Last telemedicine visit with prescribing clinician: Visit date not found   Next office visit with prescribing clinician: Visit date not found     Additional details provided by patient: PATIENT IS NEEDING REFILLS.     Does the patient have less than a 3 day supply:  [] Yes  [] No    Would you like a call back once the refill request has been completed: [] Yes [] No    If the office needs to give you a call back, can they leave a voicemail: [] Yes [] No    Sarah Rucker Rep   05/16/25 16:20 EDT

## 2025-05-22 ENCOUNTER — TREATMENT (OUTPATIENT)
Dept: PHYSICAL THERAPY | Facility: CLINIC | Age: 60
End: 2025-05-22
Payer: MEDICARE

## 2025-05-22 DIAGNOSIS — M54.2 CERVICAL PAIN: Primary | ICD-10-CM

## 2025-05-22 DIAGNOSIS — M62.838 MUSCLE SPASMS OF NECK: ICD-10-CM

## 2025-05-22 NOTE — PROGRESS NOTES
Physical Therapy Daily Treatment Note                          Patient: Aida Mcclure   : 1965  Diagnosis/ICD-10 Code:  Cervical pain [M54.2]  Referring practitioner: Merline Enamorado MD  Date of Initial Visit: No linked episodes  Today's Date: 2025  Patient seen for Visit count could not be calculated. Make sure you are using a visit which is associated with an episode. sessions           Subjective   The patient reported being tired from work today.    Objective   See Exercise, Manual, and Modality Logs for complete treatment.     Assessment/Plan     Patient tolerated today's treatment without complaints of pain. Held on strengthening due to presence of fatigue, TP noted in R rhomboid. Strength, ROM, and increased pain with activity deficits still limits patient's ability to perform ADLs. Further skilled care indicated at this time.     Timed:  Manual Therapy:    24     mins  26433;  Therapeutic Exercise:    0     mins  36025;     Neuromuscular Eric:   0    mins  43210;    Therapeutic Activity:     0     mins  49700;     Gait Trainin     mins  29988;     Aquatics                         0      mins  59283    Un-timed:  Mechanical Traction      0     mins  19942  Dry Needling     0     mins self-pay  Electrical Stimulation:    0     mins  24320 ( );      Timed Treatment:   24   mins   Total Treatment:     24   mins    Al Walls PT  Physical Therapist    Electronically signed 2025    [place] License: PT - [id number]

## 2025-05-30 DIAGNOSIS — Z76.0 MEDICATION REFILL: ICD-10-CM

## 2025-05-30 RX ORDER — CYCLOBENZAPRINE HCL 10 MG
10 TABLET ORAL 2 TIMES DAILY PRN
Qty: 60 TABLET | Refills: 3 | Status: SHIPPED | OUTPATIENT
Start: 2025-05-30

## 2025-06-02 DIAGNOSIS — Z76.0 MEDICATION REFILL: ICD-10-CM

## 2025-06-02 RX ORDER — TRAZODONE HYDROCHLORIDE 50 MG/1
TABLET ORAL
Qty: 30 TABLET | Refills: 11 | Status: SHIPPED | OUTPATIENT
Start: 2025-06-02

## 2025-06-04 DIAGNOSIS — Z76.0 MEDICATION REFILL: ICD-10-CM

## 2025-06-04 RX ORDER — SPIRONOLACTONE 50 MG/1
50 TABLET, FILM COATED ORAL DAILY
Qty: 90 TABLET | Refills: 1 | Status: SHIPPED | OUTPATIENT
Start: 2025-06-04

## 2025-06-06 ENCOUNTER — OFFICE VISIT (OUTPATIENT)
Dept: ORTHOPEDIC SURGERY | Facility: CLINIC | Age: 60
End: 2025-06-06
Payer: MEDICARE

## 2025-06-06 VITALS — BODY MASS INDEX: 38.41 KG/M2 | HEIGHT: 64 IN | WEIGHT: 225 LBS

## 2025-06-06 DIAGNOSIS — M25.561 RIGHT KNEE PAIN, UNSPECIFIED CHRONICITY: Primary | ICD-10-CM

## 2025-06-06 DIAGNOSIS — M17.11 OSTEOARTHRITIS OF RIGHT KNEE, UNSPECIFIED OSTEOARTHRITIS TYPE: ICD-10-CM

## 2025-06-06 RX ORDER — TRIAMCINOLONE ACETONIDE 40 MG/ML
40 INJECTION, SUSPENSION INTRA-ARTICULAR; INTRAMUSCULAR
Status: COMPLETED | OUTPATIENT
Start: 2025-06-06 | End: 2025-06-06

## 2025-06-06 RX ORDER — LIDOCAINE HYDROCHLORIDE 10 MG/ML
5 INJECTION, SOLUTION INFILTRATION; PERINEURAL
Status: COMPLETED | OUTPATIENT
Start: 2025-06-06 | End: 2025-06-06

## 2025-06-06 RX ADMIN — LIDOCAINE HYDROCHLORIDE 5 ML: 10 INJECTION, SOLUTION INFILTRATION; PERINEURAL at 14:20

## 2025-06-06 RX ADMIN — TRIAMCINOLONE ACETONIDE 40 MG: 40 INJECTION, SUSPENSION INTRA-ARTICULAR; INTRAMUSCULAR at 14:20

## 2025-06-06 NOTE — PROGRESS NOTES
"Chief Complaint  Follow-up of the Right Knee     Subjective      Aida Mcclure presents to [facility] for follow up of the right knee.  She has had pain in the right knee for years and the pain has increased the last few months. She has had injections, therapy and anti inflammatory in the past. She has Lupus. She is on O2. She has had no recent injury or fall.   Allergies   Allergen Reactions    Aspirin Anaphylaxis     In high doses    Salsalate Hives, Anaphylaxis, Unknown - High Severity and Shortness Of Breath        Social History     Socioeconomic History    Marital status:    Tobacco Use    Smoking status: Never     Passive exposure: Past    Smokeless tobacco: Never   Vaping Use    Vaping status: Never Used   Substance and Sexual Activity    Alcohol use: Yes     Alcohol/week: 2.0 standard drinks of alcohol     Types: 2 Drinks containing 0.5 oz of alcohol per week     Comment: One or two wine coolers a week    Drug use: Never    Sexual activity: Not Currently     Partners: Male     Birth control/protection: Tubal ligation     Comment: Tubes tied        I reviewed the patient's chief complaint, history of present illness, review of systems, past medical history, surgical history, family history, social history, medications, and allergy list.     Review of Systems     Constitutional: Denies fevers, chills, weight loss  Cardiovascular: Denies chest pain, shortness of breath  Skin: Denies rashes, acute skin changes  Neurologic: Denies headache, loss of consciousness      Vital Signs:   Ht 162.6 cm (64\")   Wt 102 kg (225 lb)   BMI 38.62 kg/m²          Physical Exam  General: Alert. No acute distress    Ortho Exam        LEFT KNEE Flexion 110. Extension -5. Stable to varus/valgus stress. Stable to anterior/posterior drawer. Neurovascularly intact.  Positive EHL, FHL, HS and TA. Sensation intact to light touch all 5 nerves of the foot. Ambulates with Antalgic gait. Patella " is well tracking. Calf supple, non-tender.  Knee Extensor Mechanism intact Mild swelling.      Large Joint Arthrocentesis: R knee  Date/Time: 6/6/2025 2:20 PM  Consent given by: patient  Site marked: site marked  Timeout: Immediately prior to procedure a time out was called to verify the correct patient, procedure, equipment, support staff and site/side marked as required   Supporting Documentation  Indications: pain   Procedure Details  Location: knee - R knee  Preparation: Patient was prepped and draped in the usual sterile fashion  Needle gauge: 21 G.  Medications administered: 5 mL lidocaine 1 %; 40 mg triamcinolone acetonide 40 MG/ML  Patient tolerance: patient tolerated the procedure well with no immediate complications        This injection documentation was Scribed for Long Elaine MD by JERSEY Meade.  06/06/25   14:21 EDT      Imaging Results (Most Recent)       None             Result Review :             Assessment and Plan     Diagnoses and all orders for this visit:    1. Right knee pain, unspecified chronicity (Primary)    2. Osteoarthritis of right knee, unspecified osteoarthritis type    Other orders  -     Large Joint Arthrocentesis: R knee        Discussed the treatment plan with the patient.     Discussed the risks and benefits of conservative measures. The patient expressed understanding and wished to proceed with a right knee steroid injection.  She tolerated the injection well.     Discussed with the patient that due to the steroid injection given today in the office they may see an increase in blood sugar for a few days. Advised patient to monitor sugar after receiving the injection.     Discussed possibility of a reaction from the injection.  Discussed the possibility that the injection may not completely improve or remove the pain.  Discussed the risk of infection.      Call or return if worsening symptoms.    Follow Up     PRN      Patient was given instructions and counseling  regarding her condition or for health maintenance advice. Please see specific information pulled into the AVS if appropriate.     Scribed for Long Elaine MD by Kell Wells MA.  06/06/25   14:19 EDT    I have personally performed the services described in this document as scribed by the above individual and it is both accurate and complete. Long Elaine MD 06/06/25      [employer]

## 2025-06-15 DIAGNOSIS — E03.9 HYPOTHYROIDISM, UNSPECIFIED TYPE: ICD-10-CM

## 2025-06-15 DIAGNOSIS — G89.29 OTHER CHRONIC PAIN: ICD-10-CM

## 2025-06-15 DIAGNOSIS — Z76.0 MEDICATION REFILL: ICD-10-CM

## 2025-06-16 RX ORDER — LEVOTHYROXINE SODIUM 150 UG/1
150 TABLET ORAL EVERY MORNING
Qty: 90 TABLET | Refills: 0 | Status: SHIPPED | OUTPATIENT
Start: 2025-06-16

## 2025-06-16 NOTE — TELEPHONE ENCOUNTER
Last follow up visit date: 2/11/2025    Last urine drug screen date: 5/23/2024    Last consent/contract date:2/10/2023    Does patient utilize [facility] pharmacy (yes or no)?  no

## 2025-06-17 RX ORDER — GABAPENTIN 300 MG/1
300 CAPSULE ORAL 3 TIMES DAILY
Qty: 90 CAPSULE | OUTPATIENT
Start: 2025-06-17

## 2025-06-17 RX ORDER — DEXTROMETHORPHAN HYDROBROMIDE AND PROMETHAZINE HYDROCHLORIDE 15; 6.25 MG/5ML; MG/5ML
SYRUP ORAL
Qty: 75 ML | Refills: 1 | Status: SHIPPED | OUTPATIENT
Start: 2025-06-17

## 2025-06-24 ENCOUNTER — TELEPHONE (OUTPATIENT)
Dept: OTOLARYNGOLOGY | Facility: CLINIC | Age: 60
End: 2025-06-24

## 2025-06-24 NOTE — TELEPHONE ENCOUNTER
The [facility] received a fax that requires your attention. The document has been indexed to the patient’s chart for your review.      Reason for sending: NEEDS REVIEW    Documents Description: LABS    Name of Sender: [facility]    Date Indexed: 06/24/25

## 2025-07-01 DIAGNOSIS — Z76.0 MEDICATION REFILL: ICD-10-CM

## 2025-07-01 RX ORDER — MONTELUKAST SODIUM 10 MG/1
TABLET ORAL
Qty: 90 TABLET | Refills: 11 | Status: SHIPPED | OUTPATIENT
Start: 2025-07-01

## 2025-07-01 RX ORDER — ROPINIROLE 1 MG/1
TABLET, FILM COATED ORAL
Qty: 90 TABLET | Refills: 11 | Status: SHIPPED | OUTPATIENT
Start: 2025-07-01

## 2025-07-07 ENCOUNTER — TREATMENT (OUTPATIENT)
Dept: PHYSICAL THERAPY | Facility: CLINIC | Age: 60
End: 2025-07-07
Payer: MEDICARE

## 2025-07-07 DIAGNOSIS — M54.2 CERVICAL PAIN: Primary | ICD-10-CM

## 2025-07-07 DIAGNOSIS — M62.838 MUSCLE SPASMS OF NECK: ICD-10-CM

## 2025-07-07 PROCEDURE — 97164 PT RE-EVAL EST PLAN CARE: CPT

## 2025-07-07 PROCEDURE — 97110 THERAPEUTIC EXERCISES: CPT

## 2025-07-07 PROCEDURE — 97140 MANUAL THERAPY 1/> REGIONS: CPT

## 2025-07-07 NOTE — PROGRESS NOTES
Primary Care Provider  Merline Enamorado MD     Referring Provider  No ref. provider found     Chief Complaint  Cough, Shortness of Breath, Wheezing, Seasonal allergies, and Follow-up (2 month. )    Subjective          History of Presenting Illness  Patient is a 60-year-old female, patient of Dr. Flores who presents for management of cardiac tissue disease ILD, diastolic dysfunction and immunosuppressive Imuran who presents for a follow-up visit today.    Patient states that she does get short of breath that is worse with exertion, moderate in severity, and improved with rest.  Patient states that she does have a dry cough.  Patient states that she has been out of Symbicort, however has been taking albuterol inhaler as needed.  Patient is needing a refill on Symbicort today. Patient states that she is taking Zyrtec, Singular, Astelin nasal spray, and Flonase nasal spray for seasonal allergies. Patient is on 3 L of oxygen per minute via nasal cannula. Patient states that she is under the care of Dr. Boone, for lupus. Patient is on diuretics and sees a cardiologist.     Patient denies fever, chills, night sweats, swollen glands in the head and neck, unintentional weight loss, hemoptysis, purulent sputum production, dysphagia, chest pain, palpitations, chest tightness, abdominal pain, nausea, vomiting, and diarrhea.   Patient denies any leg swelling, orthopnea, paroxysmal nocturnal dyspnea.  Patient is able to perform activities of daily living.        Review of Systems     Family History   Problem Relation Age of Onset    Stroke Father     Alcohol abuse Father     Arthritis Father     Hyperlipidemia Father     Parkinsonism Father     Lung cancer Mother     Diabetes Mother     Alcohol abuse Sister     Thyroid disease Sister     Anxiety disorder Daughter         Social History     Socioeconomic History    Marital status:    Tobacco Use    Smoking status: Never     Passive exposure: Past    Smokeless  tobacco: Never   Vaping Use    Vaping status: Never Used   Substance and Sexual Activity    Alcohol use: Yes     Alcohol/week: 2.0 standard drinks of alcohol     Types: 2 Drinks containing 0.5 oz of alcohol per week     Comment: One or two wine coolers a week    Drug use: Never    Sexual activity: Not Currently     Partners: Male     Birth control/protection: Tubal ligation     Comment: Tubes tied        Past Medical History:   Diagnosis Date    Anemia     Annual physical exam 11/06/2017    WILL RESCHEDULE MAMMOGRAM     Breast lump     Cataract 2010 ?    Had them in both eyes have been removed.    CHF (congestive heart failure) 11/06/2017    FOLLOWS WITH CARDS. CURRENTLY DOING WELL ON LASIX, LISINOPRIL, ALDACTONE, AND PROPANOLOL    Cholelithiasis 11/1/22    Went to ER was diagnosed with it that evening    Chronic constipation 2000    All this was from all the different medications I've been taking since that time.    Degeneration of lumbar intervertebral disc 06/12/2012    Diverticulitis     Diverticulitis of colon 11/8/22    Went to ER was diagnosed with it    Diverticulosis     Essential hypertension 11/06/2017    WELL CONTROLLED ON CURRENT REGIMEN     GERD (gastroesophageal reflux disease)     Hamstring injury 08/17/2015    BILATERAL HAMSTRING INJURY/PAIN     Head injury     Hernia cerebri     Hyperthyroidism     Hyperthyroidism 11/06/2017    CURRENTLY ON METHIMAZOLE, PT REPORT POSSIBLY DUE TO GRAVE'S DISEASE. WILL NEED TO OBTAIN AND REVIEW MEDICAL RECORDS FROM [facility]. 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    Insomnia 11/06/2017    DISCUSSED SLEEP HYGIENE. PT TO TRY AVOIDING BLUE LIGHT AT NIGHT. PT ALSO TO SET ROUTINE PRIOR TO BEDTIME. WILL AVOID MEDS AT THIS TIME     Interstitial lung disease     Low back pain     Lumbosacral disc  herniation 06/12/2012    LEFT L5-S1 SMALL NATURE. SHE HAS FAILED ALL CONSERVATIVE MEASURES AND DESIRES SURGERY     Lupus     SEES DR. ANDERSON    Migraines     Obesity     All my life    Pulmonary embolism     Respiratory failure with hypoxia 02/04/2021    Rheumatoid arthritis 11/06/2017    CURRENTLY CONTROLLED, BUT WITH RESIDUAL DEFORMITY. WILL FOLLOW WITH DR. ANDERSON     Seizures 06/15/1995    Gave birth to my baby daughter went home two days later went into seizures had to go back into the hospital for a week, that was the first and last one I had since that date.    Shortness of breath 2005    With the combination of my Systemic lupus, Overactive Thyroid and Congestive Heart Failure makes it hard to breathe sometimes.    Tachycardia     Total knee replacement status, right 08/17/2015        Immunization History   Administered Date(s) Administered    Arexvy (RSV, Adults 60+ yrs) 04/08/2024    COVID-19 (SANDI) 03/29/2021    COVID-19 (PFIZER) 12YRS+ (COMIRNATY) 04/08/2024, 09/16/2024    COVID-19 (PFIZER) Purple Cap Monovalent 12/16/2021    Flu Vaccine Intradermal Quad 18-64YR 01/02/2008, 11/10/2008, 11/05/2009    Flu Vaccine Quad PF >36MO 10/25/2017    Fluzone  >6mos 08/27/2012, 10/15/2013    Fluzone (or Fluarix & Flulaval for VFC) >6mos 10/25/2017, 11/18/2021, 10/12/2022, 11/29/2023    Hepatitis A 04/30/2018    Influenza Inj MDCK Preserative Free 09/16/2024    Influenza Seasonal Injectable 01/02/2008, 11/10/2008, 11/05/2009    Influenza, Unspecified 09/17/2020, 10/01/2021, 10/12/2022    Pneumococcal Conjugate 13-Valent (PCV13) 06/14/2018    Pneumococcal Conjugate 20-Valent (PCV20) 11/19/2024    Pneumococcal Polysaccharide (PPSV23) 08/26/2019    Shingrix 12/30/2022, 04/08/2024    Tdap 09/16/2024       Allergies   Allergen Reactions    Aspirin Anaphylaxis     In high doses    Salsalate Hives, Anaphylaxis, Unknown - High Severity and Shortness Of Breath          Current Outpatient Medications:     acetaminophen  (TYLENOL) 500 MG tablet, Take 1 tablet by mouth Every 6 (Six) Hours., Disp: 120 tablet, Rfl: 0    albuterol (ACCUNEB) 1.25 MG/3ML nebulizer solution, Take 3 mL by nebulization Every 6 (Six) Hours As Needed for Wheezing., Disp: 120 each, Rfl: 11    albuterol sulfate  (90 Base) MCG/ACT inhaler, Inhale 2 puffs Every 6 (Six) Hours As Needed for Wheezing., Disp: 18 g, Rfl: 11    ascorbic acid (VITAMIN C) 250 MG tablet, Take 1 tablet by mouth Daily., Disp: , Rfl:     azaTHIOprine (IMURAN) 50 MG tablet, Take 3 tablets by mouth Daily., Disp: 90 tablet, Rfl: 11    Azelastine HCl 137 MCG/SPRAY solution, Administer 1 spray into the nostril(s) as directed by provider Daily., Disp: , Rfl:     budesonide-formoterol (SYMBICORT) 160-4.5 MCG/ACT inhaler, Inhale 2 puffs 2 (Two) Times a Day. Rinse mouth out after each use, Disp: 10.2 g, Rfl: 5    Calcium Carbonate-Vitamin D (CALTRATE 600+D PO), , Disp: , Rfl:     cetirizine (zyrTEC) 10 MG tablet, Take 1 tablet by mouth Daily., Disp: 90 tablet, Rfl: 1    cyanocobalamin 1000 MCG/ML injection, Inject 1 mL under the skin into the appropriate area as directed Every 30 (Thirty) Days., Disp: 3 mL, Rfl: 3    cyclobenzaprine (FLEXERIL) 10 MG tablet, TAKE ONE TABLET BY MOUTH TWICE DAILY AS NEEDED FOR MUSCLE SPASMS, Disp: 60 tablet, Rfl: 3    Diclofenac Sodium (VOLTAREN) 1 % gel gel, Apply 4 g topically to the appropriate area as directed 4 (Four) Times a Day As Needed (shoulder/hip pain)., Disp: 100 g, Rfl: 3    fluticasone (FLONASE) 50 MCG/ACT nasal spray, SHAKE LIQUID AND USE 2 SPRAYS(100 MCG) IN EACH NOSTRIL EVERY DAY, Disp: 16 g, Rfl: 3    furosemide (LASIX) 40 MG tablet, Take 1 tablet by mouth Daily., Disp: 90 tablet, Rfl: 1    gabapentin (NEURONTIN) 300 MG capsule, Take 1 capsule by mouth 3 (Three) Times a Day for 30 days. (Patient taking differently: Take 1 capsule by mouth 1 time.), Disp: 90 capsule, Rfl: 0    guaiFENesin (MUCINEX) 600 MG 12 hr tablet, Take 1 tablet by mouth  Every 12 (Twelve) Hours., Disp: 60 tablet, Rfl: 3    hydroxychloroquine (PLAQUENIL) 200 MG tablet, TAKE 1 TABLET BY MOUTH TWICE DAILY WITH FOOD OR MILK; Duration: 90, Disp: , Rfl:     hydrOXYzine (ATARAX) 25 MG tablet, Take 1 tablet by mouth Every 8 (Eight) Hours As Needed for Itching., Disp: 90 tablet, Rfl: 1    levothyroxine (SYNTHROID, LEVOTHROID) 150 MCG tablet, TAKE 1 TABLET BY MOUTH EVERY DAY IN THE MORNING, Disp: 90 tablet, Rfl: 0    Lidocaine 4 %, Place 1 patch on the skin as directed by provider Daily. Remove & Discard patch within 12 hours or as directed by MD, Disp: 30 patch, Rfl: 0    magnesium oxide (MAG-OX) 400 MG tablet, TAKE 1 TABLET BY MOUTH EVERY DAY, Disp: 90 tablet, Rfl: 1    montelukast (SINGULAIR) 10 MG tablet, TAKE ONE TABLET BY MOUTH DAILY AT 9 PM EVERY NIGHT AT BEDTIME, Disp: 90 tablet, Rfl: 11    nystatin (MYCOSTATIN) 100,000 unit/mL suspension, Take 5 mL by mouth 4 (Four) Times a Day., Disp: 280 mL, Rfl: 0    O2 (OXYGEN), Inhale 3 L/min Daily., Disp: , Rfl:     olopatadine (PATANOL) 0.1 % ophthalmic solution, Apply 1 drop to eye(s) as directed by provider 2 (Two) Times a Day., Disp: 5 mL, Rfl: 0    ondansetron (ZOFRAN) 4 MG tablet, Take 1 tablet by mouth Every 12 (Twelve) Hours As Needed for Vomiting or Nausea., Disp: 30 tablet, Rfl: 1    pilocarpine (SALAGEN) 5 MG tablet, Take 1 tablet by mouth 3 times a day., Disp: 90 tablet, Rfl: 1    Pirfenidone 267 MG capsule, Take 3 capsules by mouth 3 times a day., Disp: 270 capsule, Rfl: 10    promethazine-dextromethorphan (PROMETHAZINE-DM) 6.25-15 MG/5ML syrup, TAKE 5 ML BY MOUTH AT NIGHT AS NEEDED FOR COUGH, Disp: 75 mL, Rfl: 1    rizatriptan (MAXALT) 10 MG tablet, Take 1 tablet by mouth As Needed (9) for up to 9 doses., Disp: 9 tablet, Rfl: 3    rOPINIRole (REQUIP) 1 MG tablet, TAKE ONE TABLET BY MOUTH DAILY AT 9 PM EVERY NIGHT AT BEDTIME, Disp: 90 tablet, Rfl: 11    spironolactone (ALDACTONE) 50 MG tablet, TAKE 1 TABLET BY MOUTH EVERY DAY,  "Disp: 90 tablet, Rfl: 1    SUMAtriptan (IMITREX) 5 MG/ACT nasal spray, 1 puff as needed one time Nasally prn; Duration: 1 day(s), Disp: , Rfl:     traZODone (DESYREL) 50 MG tablet, TAKE ONE TABLET BY MOUTH DAILY AT 9 PM, Disp: 30 tablet, Rfl: 11     Objective     Physical Exam  Vital Signs:   WDWN, Alert, NAD.    HEENT:  PERRL, EOMI.  OP, nares clear, no sinus tenderness  Chest:  Mildly decreased breath sounds throughout with inspiratory Velcro-like crackles at the bases. Normal work of breathing noted. Patient is able speak full sentences without difficulty. Patient is on 3 L of oxygen per minute via nasal cannula.   CV: RRR, no MGR, pulses 2+, equal.  EXT:  no clubbing, no cyanosis, no edema, no joint tenderness  Neuro:  A&Ox3, CN grossly intact, no focal deficits.  Skin: No rashes or lesions noted.    /83 (BP Location: Left arm, Patient Position: Sitting, Cuff Size: Large Adult)   Pulse 83   Temp 98.6 °F (37 °C) (Tympanic)   Resp 16   Ht 162.6 cm (64.02\")   Wt 101 kg (223 lb 3.2 oz)   SpO2 96% Comment: 3 liters PD  BMI 38.29 kg/m²         Result Review :   I have reviewed Dr. Dennis's last office visit note.     Procedures:           Assessment and Plan      Assessment:  1. Interstitial lung disease secondary to mixed connective tissue disease without exacerbation.   2. Known mixed connective tissue disease.       3. Chronic compensated diastolic congestive heart failure.   4. Chronic dyspnea at baseline.       5. Immunosuppressed status on Imuran.       6. Therapeutic drug monitoring on Imuran.       7. History of PE on anticoagulation in the past.     8.  Lung nodules.  9.  Seasonal allergies.  10. Lupus: Patient is under the care of Dr. Boone.  11. Never smoker.         Plan:  1.  Continue Esbriet as prescribed.  2.  Patient is scheduled have a chest CT scan on 11/3/2025 at 4:30 PM.  3.  Patient is scheduled to have pulmonary function test and a 6-minute walk test on 11/6/2025.  4.  Will order " CBC and CMP for medication monitoring.  5.  Continue Imuran as prescribed.  6.  Continue Symbicort and albuterol Hailer nebulizer treatments as needed.  Refill sent to the pharmacy today.  7.  Continue Zyrtec, Singulair, Astelin nasal spray, and Flonase nasal spray for seasonal allergies.  8.  Continue oxygen to keep SPO2 at 89% and above.  9.  Follow-up with Dr. Boone, hematologist also for therapeutic drug monitoring and lupus as scheduled.  10.  Follow-up with cardiology as scheduled.  11.  Vaccination status: patient reports they are up-to-date with flu, pneumonia, and Covid vaccines.    12.  Smoking status: Never smoker.  13.  Patient to call the office, 911, or go to the ER with new or worsening symptoms.  14.  Follow-up in 3 months, sooner if needed.          Follow Up   Return in about 4 months (around 11/15/2025).  Patient was given instructions and counseling regarding her condition or for health maintenance advice. Please see specific information pulled into the AVS if appropriate.

## 2025-07-07 NOTE — PROGRESS NOTES
Progress Note      Patient: Aida Mcclure   : 1965  Diagnosis/ICD-10 Code:  Cervical pain [M54.2]  Referring practitioner: No ref. provider found  Date of Initial Visit: Type: THERAPY  Noted: 3/4/2025  Today's Date: 2025  Patient seen for 10 sessions      Subjective:   Subjective Questionnaire: NDI:20  Clinical Progress: improved  Home Program Compliance: Yes  Treatment has included: therapeutic exercise, neuromuscular re-education, manual therapy, and therapeutic activity    Subjective   Patient reports she has been working more and that has caused her tightness in her neck to worsen.   Objective          Postural Observations    Additional Postural Observation Details  Fwd head, rounded shoulders    Palpation   Left   Hypertonic in the scalenes.   Tenderness of the scalenes.     Tenderness   Cervical Spine   Tenderness in the left 1st rib.     Additional Tenderness Details  Tender to C6 and C7    Active Range of Motion   Cervical/Thoracic Spine   Cervical    Flexion: WFL  Extension: 55 (pinch on right) degrees with pain  Left lateral flexion: 35 degrees   Right lateral flexion: 30 (pulling on R scalene) degrees with pain  Left rotation: 60 degrees with pain  Right rotation: 69 degrees     Strength/Myotome Testing   Cervical Spine     Left   Normal strength    Right   Normal strength      See Exercise, Manual, and Modality Logs for complete treatment.     Assessment/Plan  Patient returns after 1 month hiatus from care with objective regressions noted in ROM and in pain scores and subjective function via NDI. Scalene spasms have worsened over this course of time, likely related to increased work demand. They are still limited in ROM, strength, neurodynamics and by increased pain which limit their ability to perform heavy tasks in the home, work tasks, and resting comfortably. Further skilled care indicated at this time to address remaining deficits.     Progress toward previous goals: Partially  Met   1. The patient has limited ROM.                       LTG 1: 12 weeks:  The patient will demonstrate 65° of cervical spine rotation, 35° of cervical side bending, 55° of cervical flexion, and 60° of cervical extension in order to adequately monitor blind spots while driving and improve ability to perform activities of daily living.                          STATUS:  progressing    STG 1a: 6 weeks:  The patient will demonstrate 60° of cervical spine rotation, 30° of cervical side bending, 50° of cervical flexion, and 55° of cervical extension in order to adequately monitor blind spots while driving and improve ability to perform activities of daily living.                                      STATUS:  progressing                  2. The patient has complaints of pain.              LTG 2: 12 weeks:  The patient will report 1/10 pain in order to more easily tolerate activities of daily living and improve sleep quality.                          STATUS:  progressing                STG 2a: 6 weeks:  The patient will report 4/10 pain.                          STATUS:  met     3. The patient reports radicular symptoms in the upper extremity.              LTG 3: 12 weeks:  The patient will report a decrease in spasm symptoms in the upper extremity by 90%.                          STATUS:  progressing                STG 3a: 6 weeks:  The patient will report a decrease in spasm symptoms in the upper extremity by 50%.                          STATUS:  met     4. Carrying, Moving, and Handling Objects Functional Limitation                               LTG 4: 12 weeks:  The patient will demonstrate improved function by achieving a score of 5 on the Neck Disability Index.                          STATUS:  progressing                STG 4a: 6 weeks:  The patient will demonstrate improved function by achieving a score of 10 on the Neck Disability Index.                            STATUS:  progressing     Recommendations:  Continue as planned  Timeframe: 1 month  Prognosis to achieve goals: good    PT Signature: Al Walls, PT    Electronically signed 2025    [place] License: PT - [id number]       Timed:  Manual Therapy:    15     mins  64688;  Therapeutic Exercise:    10     mins  82513;     Neuromuscular Eric:    0    mins  60716;    Therapeutic Activity:     0     mins  98031;     Gait Trainin     mins  50632;     Aquatics                         0      mins  20140    Un-timed:  Mechanical Traction      0     mins  96550  Dry Needling     0     mins self-pay  Electrical Stimulation:    0     mins  83708 ( );  5 minutes re-eval 98210  Timed Treatment:   25   mins   Total Treatment:     30   mins

## 2025-07-08 ENCOUNTER — TELEPHONE (OUTPATIENT)
Dept: PHYSICAL THERAPY | Facility: CLINIC | Age: 60
End: 2025-07-08

## 2025-07-08 NOTE — TELEPHONE ENCOUNTER
Caller: Aida Mcclure April    Relationship: Self         What was the call regarding: FOOT IS SWOLLEN CAN NOT GET A SHOE ON,

## 2025-07-15 ENCOUNTER — TREATMENT (OUTPATIENT)
Dept: PHYSICAL THERAPY | Facility: CLINIC | Age: 60
End: 2025-07-15
Payer: MEDICARE

## 2025-07-15 ENCOUNTER — OFFICE VISIT (OUTPATIENT)
Dept: PULMONOLOGY | Facility: CLINIC | Age: 60
End: 2025-07-15
Payer: MEDICARE

## 2025-07-15 VITALS
RESPIRATION RATE: 16 BRPM | TEMPERATURE: 98.6 F | BODY MASS INDEX: 38.1 KG/M2 | OXYGEN SATURATION: 96 % | HEART RATE: 83 BPM | WEIGHT: 223.2 LBS | DIASTOLIC BLOOD PRESSURE: 83 MMHG | HEIGHT: 64 IN | SYSTOLIC BLOOD PRESSURE: 125 MMHG

## 2025-07-15 DIAGNOSIS — Z86.711 PERSONAL HISTORY OF PE (PULMONARY EMBOLISM): ICD-10-CM

## 2025-07-15 DIAGNOSIS — R91.8 LUNG NODULES: ICD-10-CM

## 2025-07-15 DIAGNOSIS — R06.09 CHRONIC DYSPNEA: ICD-10-CM

## 2025-07-15 DIAGNOSIS — M62.838 MUSCLE SPASMS OF NECK: ICD-10-CM

## 2025-07-15 DIAGNOSIS — D84.9 IMMUNOSUPPRESSED STATUS: ICD-10-CM

## 2025-07-15 DIAGNOSIS — J96.11 CHRONIC HYPOXEMIC RESPIRATORY FAILURE: ICD-10-CM

## 2025-07-15 DIAGNOSIS — J84.9 ILD (INTERSTITIAL LUNG DISEASE): ICD-10-CM

## 2025-07-15 DIAGNOSIS — J84.9 INTERSTITIAL LUNG DISEASE: ICD-10-CM

## 2025-07-15 DIAGNOSIS — M54.2 CERVICAL PAIN: Primary | ICD-10-CM

## 2025-07-15 DIAGNOSIS — J84.10 PULMONARY FIBROSIS: ICD-10-CM

## 2025-07-15 DIAGNOSIS — J30.2 SEASONAL ALLERGIES: Primary | ICD-10-CM

## 2025-07-15 PROCEDURE — 97140 MANUAL THERAPY 1/> REGIONS: CPT

## 2025-07-15 PROCEDURE — 97110 THERAPEUTIC EXERCISES: CPT

## 2025-07-15 RX ORDER — BUDESONIDE AND FORMOTEROL FUMARATE DIHYDRATE 160; 4.5 UG/1; UG/1
2 AEROSOL RESPIRATORY (INHALATION)
Qty: 10.2 G | Refills: 5 | Status: SHIPPED | OUTPATIENT
Start: 2025-07-15

## 2025-07-15 RX ORDER — SUMATRIPTAN 5 MG/1
SPRAY NASAL
COMMUNITY

## 2025-07-15 RX ORDER — HYDROXYCHLOROQUINE SULFATE 200 MG/1
TABLET, FILM COATED ORAL
COMMUNITY

## 2025-07-15 NOTE — PROGRESS NOTES
Physical Therapy Daily Treatment Note                          Patient: Aida Mcclure   : 1965  Diagnosis/ICD-10 Code:  Cervical pain [M54.2]  Referring practitioner: Merline Enamorado MD  Date of Initial Visit: Type: THERAPY  Noted: 3/4/2025  Today's Date: 7/15/2025  Patient seen for 11 sessions           Subjective   The patient reported no current complaints, yesterday she felt tightness on the left side instead of the right    Objective   See Exercise, Manual, and Modality Logs for complete treatment.     Assessment/Plan     Patient tolerated today's treatment without complaints of pain. Continued for pain relief and addressing of tone, strengthening tolerated well. Strength, ROM, and increased pain with activity deficits still limits patient's ability to perform ADLs. Further skilled care indicated at this time.       Timed:  Manual Therapy:    12     mins  56967;  Therapeutic Exercise:    18     mins  82641;     Neuromuscular Eric:   0    mins  81956;    Therapeutic Activity:     0     mins  95773;     Gait Trainin     mins  33680;     Aquatics                         0      mins  56419    Un-timed:  Mechanical Traction      0     mins  27703  Dry Needling     0     mins self-pay  Electrical Stimulation:    0     mins  06492 ( );      Timed Treatment:   30   mins   Total Treatment:     30   mins    Al Walls PT  Physical Therapist    Electronically signed 7/15/2025    [place] License: PT - [id number]

## 2025-07-17 ENCOUNTER — TREATMENT (OUTPATIENT)
Dept: PHYSICAL THERAPY | Facility: CLINIC | Age: 60
End: 2025-07-17
Payer: MEDICARE

## 2025-07-17 DIAGNOSIS — M62.838 MUSCLE SPASMS OF NECK: ICD-10-CM

## 2025-07-17 DIAGNOSIS — M54.2 CERVICAL PAIN: Primary | ICD-10-CM

## 2025-07-17 PROCEDURE — 97140 MANUAL THERAPY 1/> REGIONS: CPT

## 2025-07-17 PROCEDURE — 97110 THERAPEUTIC EXERCISES: CPT

## 2025-07-21 ENCOUNTER — TREATMENT (OUTPATIENT)
Dept: PHYSICAL THERAPY | Facility: CLINIC | Age: 60
End: 2025-07-21
Payer: MEDICARE

## 2025-07-21 DIAGNOSIS — M62.838 MUSCLE SPASMS OF NECK: ICD-10-CM

## 2025-07-21 DIAGNOSIS — M54.2 CERVICAL PAIN: Primary | ICD-10-CM

## 2025-07-21 PROCEDURE — 97140 MANUAL THERAPY 1/> REGIONS: CPT

## 2025-07-21 PROCEDURE — 97110 THERAPEUTIC EXERCISES: CPT

## 2025-07-21 NOTE — PROGRESS NOTES
Physical Therapy Daily Treatment Note                          Patient: Aida Mcclure   : 1965  Diagnosis/ICD-10 Code:  Cervical pain [M54.2]  Referring practitioner: Merline Enamorado MD  Date of Initial Visit: Type: THERAPY  Noted: 3/4/2025  Today's Date: 2025  Patient seen for 13 sessions           Subjective   The patient reported no current complaints    Objective   See Exercise, Manual, and Modality Logs for complete treatment.     Assessment/Plan     Patient tolerated today's treatment without complaints of pain. Improvements noted in LS tension on L following manual. Strength, ROM, and increased pain with activity deficits still limits patient's ability to perform ADLs and work tasks. Further skilled care indicated at this time.       Timed:  Manual Therapy:    15     mins  51199;  Therapeutic Exercise:    15     mins  65827;     Neuromuscular Eric:   0    mins  75877;    Therapeutic Activity:     0     mins  08056;     Gait Trainin     mins  97863;     Aquatics                         0      mins  18839    Un-timed:  Mechanical Traction      0     mins  20429  Dry Needling     0     mins self-pay  Electrical Stimulation:    0     mins  49547 ( );      Timed Treatment:   30   mins   Total Treatment:     30   mins    Al Walls PT  Physical Therapist    Electronically signed 2025    [place] License: PT - [id number]

## 2025-07-23 ENCOUNTER — TREATMENT (OUTPATIENT)
Dept: PHYSICAL THERAPY | Facility: CLINIC | Age: 60
End: 2025-07-23
Payer: MEDICARE

## 2025-07-23 DIAGNOSIS — M62.838 MUSCLE SPASMS OF NECK: ICD-10-CM

## 2025-07-23 DIAGNOSIS — M54.2 CERVICAL PAIN: Primary | ICD-10-CM

## 2025-07-23 NOTE — PROGRESS NOTES
Outpatient Physical Therapy  [address] [facility], [place] [address]                            Physical Therapy Daily Treatment Note    Patient: Aida Mcclure   : 1965  Diagnosis/ICD-10 Code:  Cervical pain [M54.2]  Referring practitioner: Merline Enamorado MD  Date of Initial Visit: Type: THERAPY  Noted: 3/4/2025  Today's Date: 2025  Patient seen for 14 sessions           Subjective   Aida Mcclure reports: that her neck is still tight, although not as painful. States that it might be the way she is sleeping, she is usually a back sleeper but sometimes ends up on her side.   States that when she was originally coming to PT her left side was bothering her, states that her right is bothering her more.     Objective   Some discomfort with Cervical Centeralized Posterior Anterior Mobs ~C 4-C5     See Exercise, Manual, and Modality Logs for complete treatment.     Assessment/Plan  Aida still experiencing increased neck pain, especially at night. Pt had some increased discomfort with manual therapy. Pt would benefit from skilled PT to address Range of Motion  and Strength deficits, pain management and any concerns with ADLs.       Progress per Plan of Care         Timed:  Manual Therapy:    20     mins  14698;  Therapeutic Exercise:    10     mins  72770;     Neuromuscular Eric:        mins  78428;    Therapeutic Activity:          mins  79453;     Gait Training:           mins  42080;      Untimed:  Electrical Stimulation:         mins  46214 ( );  Mechanical Traction:         mins  45869;     Timed Treatment:   30   mins   Total Treatment:     30   mins      Electronically signed:     Lorene Farooq PTA , CSRS  Physical Therapist Assistant  Certified Stroke Rehabilitation Specialist    [facility] [place] License #: [id number]

## 2025-08-01 ENCOUNTER — TREATMENT (OUTPATIENT)
Dept: PHYSICAL THERAPY | Facility: CLINIC | Age: 60
End: 2025-08-01
Payer: MEDICARE

## 2025-08-01 DIAGNOSIS — M54.2 CERVICAL PAIN: Primary | ICD-10-CM

## 2025-08-01 DIAGNOSIS — M62.838 MUSCLE SPASMS OF NECK: ICD-10-CM

## 2025-08-01 NOTE — PROGRESS NOTES
Physical Therapy Daily Treatment Note                      [facility] [address] [facility] [place] [address]    Patient: Aida Mcclure   : 1965  Diagnosis/ICD-10 Code:  Cervical pain [M54.2]  Referring practitioner: Merline Enamorado MD  Date of Initial Visit: Type: THERAPY  Noted: 3/4/2025  Today's Date: 2025  Patient seen for 15 sessions           Subjective   Pt reports her neck is still tight on the right side.  Pt states looking down aggravates her symptoms    Objective   See Exercise, Manual, and Modality Logs for complete treatment.     Assessment/Plan    Slight forward head and rounded shoulder posture noted.  Decreased L sided symptoms, continues to have symptoms in R side.  R suboccipitals have tightness noted during manual stretches.  Will continue to benefit from treatments to decrease symptoms.             Continue to progress POC per pt tolerance.    Timed:  Manual Therapy:    23     mins  86071;  Therapeutic Exercise:    4     mins  04343;     Neuromuscular Eric:   0    mins  42812;    Therapeutic Activity:     0     mins  08135;     Gait Trainin     mins  80616;         Un-timed:  Mechanical Traction      0     mins  24296  Dry Needling     0     mins self-pay  Electrical Stimulation:    0     mins  63730 ( );      Timed Treatment:   27   mins   Total Treatment:     27   mins    Abbie Johnson PTA  Physical Therapist Assistant    Electronically signed 2025    [place] License: PTA [id number]

## 2025-08-05 ENCOUNTER — TREATMENT (OUTPATIENT)
Dept: PHYSICAL THERAPY | Facility: CLINIC | Age: 60
End: 2025-08-05
Payer: MEDICARE

## 2025-08-05 ENCOUNTER — SPECIALTY PHARMACY (OUTPATIENT)
Dept: OTHER | Facility: HOSPITAL | Age: 60
End: 2025-08-05
Payer: MEDICARE

## 2025-08-05 DIAGNOSIS — M54.2 CERVICAL PAIN: Primary | ICD-10-CM

## 2025-08-05 DIAGNOSIS — M62.838 MUSCLE SPASMS OF NECK: ICD-10-CM

## 2025-08-05 PROCEDURE — 97140 MANUAL THERAPY 1/> REGIONS: CPT

## 2025-08-08 ENCOUNTER — TREATMENT (OUTPATIENT)
Dept: PHYSICAL THERAPY | Facility: CLINIC | Age: 60
End: 2025-08-08
Payer: MEDICARE

## 2025-08-08 DIAGNOSIS — M54.2 CERVICAL PAIN: Primary | ICD-10-CM

## 2025-08-08 DIAGNOSIS — M62.838 MUSCLE SPASMS OF NECK: ICD-10-CM

## 2025-08-08 PROCEDURE — 97140 MANUAL THERAPY 1/> REGIONS: CPT

## 2025-08-11 ENCOUNTER — TREATMENT (OUTPATIENT)
Dept: PHYSICAL THERAPY | Facility: CLINIC | Age: 60
End: 2025-08-11
Payer: MEDICARE

## 2025-08-11 DIAGNOSIS — M62.838 MUSCLE SPASMS OF NECK: ICD-10-CM

## 2025-08-11 DIAGNOSIS — M54.2 CERVICAL PAIN: Primary | ICD-10-CM

## 2025-08-11 PROCEDURE — 97110 THERAPEUTIC EXERCISES: CPT

## 2025-08-11 PROCEDURE — 97140 MANUAL THERAPY 1/> REGIONS: CPT

## 2025-08-11 PROCEDURE — 97164 PT RE-EVAL EST PLAN CARE: CPT

## 2025-08-12 ENCOUNTER — TREATMENT (OUTPATIENT)
Dept: PHYSICAL THERAPY | Facility: CLINIC | Age: 60
End: 2025-08-12
Payer: MEDICARE

## 2025-08-12 DIAGNOSIS — M62.838 MUSCLE SPASMS OF NECK: ICD-10-CM

## 2025-08-12 DIAGNOSIS — M54.2 CERVICAL PAIN: Primary | ICD-10-CM

## 2025-08-18 RX ORDER — ALBUTEROL SULFATE 90 UG/1
2 INHALANT RESPIRATORY (INHALATION) EVERY 6 HOURS PRN
Qty: 6.7 G | Refills: 11 | Status: SHIPPED | OUTPATIENT
Start: 2025-08-18

## 2025-08-19 ENCOUNTER — TREATMENT (OUTPATIENT)
Dept: PHYSICAL THERAPY | Facility: CLINIC | Age: 60
End: 2025-08-19
Payer: MEDICARE

## 2025-08-19 DIAGNOSIS — M54.2 CERVICAL PAIN: Primary | ICD-10-CM

## 2025-08-19 DIAGNOSIS — M62.838 MUSCLE SPASMS OF NECK: ICD-10-CM

## 2025-08-19 PROCEDURE — 97140 MANUAL THERAPY 1/> REGIONS: CPT | Performed by: PHYSICAL THERAPIST

## 2025-08-19 PROCEDURE — 97110 THERAPEUTIC EXERCISES: CPT | Performed by: PHYSICAL THERAPIST

## 2025-08-26 ENCOUNTER — TREATMENT (OUTPATIENT)
Dept: PHYSICAL THERAPY | Facility: CLINIC | Age: 60
End: 2025-08-26
Payer: MEDICARE

## 2025-08-26 DIAGNOSIS — M62.838 MUSCLE SPASMS OF NECK: ICD-10-CM

## 2025-08-26 DIAGNOSIS — M54.2 CERVICAL PAIN: Primary | ICD-10-CM

## 2025-08-26 PROCEDURE — 97140 MANUAL THERAPY 1/> REGIONS: CPT

## 2025-08-26 PROCEDURE — 97110 THERAPEUTIC EXERCISES: CPT

## 2025-08-28 ENCOUNTER — TREATMENT (OUTPATIENT)
Dept: PHYSICAL THERAPY | Facility: CLINIC | Age: 60
End: 2025-08-28
Payer: MEDICARE

## 2025-08-28 DIAGNOSIS — M54.2 CERVICAL PAIN: Primary | ICD-10-CM

## 2025-08-28 DIAGNOSIS — M62.838 MUSCLE SPASMS OF NECK: ICD-10-CM

## 2025-08-28 PROCEDURE — 97140 MANUAL THERAPY 1/> REGIONS: CPT
